# Patient Record
Sex: FEMALE | Race: BLACK OR AFRICAN AMERICAN | NOT HISPANIC OR LATINO | Employment: FULL TIME | URBAN - METROPOLITAN AREA
[De-identification: names, ages, dates, MRNs, and addresses within clinical notes are randomized per-mention and may not be internally consistent; named-entity substitution may affect disease eponyms.]

---

## 2017-05-27 ENCOUNTER — HOSPITAL ENCOUNTER (EMERGENCY)
Facility: OTHER | Age: 47
Discharge: HOME | End: 2017-05-27
Attending: EMERGENCY MEDICINE
Payer: COMMERCIAL

## 2017-05-27 VITALS
DIASTOLIC BLOOD PRESSURE: 72 MMHG | BODY MASS INDEX: 35.88 KG/M2 | SYSTOLIC BLOOD PRESSURE: 140 MMHG | TEMPERATURE: 99 F | HEART RATE: 80 BPM | OXYGEN SATURATION: 100 % | HEIGHT: 62 IN | RESPIRATION RATE: 18 BRPM | WEIGHT: 195 LBS

## 2017-05-27 DIAGNOSIS — T78.40XA ACUTE ALLERGIC REACTION, INITIAL ENCOUNTER: Primary | ICD-10-CM

## 2017-05-27 PROCEDURE — 99284 EMERGENCY DEPT VISIT MOD MDM: CPT | Mod: 25

## 2017-05-27 PROCEDURE — 25000003 PHARM REV CODE 250: Performed by: EMERGENCY MEDICINE

## 2017-05-27 PROCEDURE — 96375 TX/PRO/DX INJ NEW DRUG ADDON: CPT

## 2017-05-27 PROCEDURE — 63600175 PHARM REV CODE 636 W HCPCS: Performed by: EMERGENCY MEDICINE

## 2017-05-27 PROCEDURE — 96374 THER/PROPH/DIAG INJ IV PUSH: CPT

## 2017-05-27 RX ORDER — DIPHENHYDRAMINE HYDROCHLORIDE 50 MG/ML
25 INJECTION INTRAMUSCULAR; INTRAVENOUS
Status: COMPLETED | OUTPATIENT
Start: 2017-05-27 | End: 2017-05-27

## 2017-05-27 RX ORDER — EPINEPHRINE 0.3 MG/.3ML
1 INJECTION SUBCUTANEOUS
Qty: 2 DEVICE | Refills: 0 | Status: SHIPPED | OUTPATIENT
Start: 2017-05-27 | End: 2020-09-11

## 2017-05-27 RX ORDER — FAMOTIDINE 20 MG/1
20 TABLET, FILM COATED ORAL 2 TIMES DAILY
Qty: 20 TABLET | Refills: 0 | Status: SHIPPED | OUTPATIENT
Start: 2017-05-27 | End: 2017-05-27

## 2017-05-27 RX ORDER — PREDNISONE 20 MG/1
20 TABLET ORAL DAILY
Qty: 5 TABLET | Refills: 0 | Status: SHIPPED | OUTPATIENT
Start: 2017-05-27 | End: 2017-06-01

## 2017-05-27 RX ORDER — FAMOTIDINE 20 MG/1
20 TABLET, FILM COATED ORAL 2 TIMES DAILY
Qty: 10 TABLET | Refills: 0 | Status: SHIPPED | OUTPATIENT
Start: 2017-05-27 | End: 2018-11-01

## 2017-05-27 RX ORDER — METHYLPREDNISOLONE SOD SUCC 125 MG
125 VIAL (EA) INJECTION
Status: COMPLETED | OUTPATIENT
Start: 2017-05-27 | End: 2017-05-27

## 2017-05-27 RX ORDER — FAMOTIDINE 10 MG/ML
20 INJECTION INTRAVENOUS
Status: COMPLETED | OUTPATIENT
Start: 2017-05-27 | End: 2017-05-27

## 2017-05-27 RX ADMIN — METHYLPREDNISOLONE SODIUM SUCCINATE 125 MG: 125 INJECTION, POWDER, FOR SOLUTION INTRAMUSCULAR; INTRAVENOUS at 11:05

## 2017-05-27 RX ADMIN — FAMOTIDINE 20 MG: 10 INJECTION INTRAVENOUS at 11:05

## 2017-05-27 RX ADMIN — DIPHENHYDRAMINE HYDROCHLORIDE 25 MG: 50 INJECTION, SOLUTION INTRAMUSCULAR; INTRAVENOUS at 11:05

## 2017-05-27 NOTE — ED PROVIDER NOTES
Encounter Date: 5/27/2017       History     Chief Complaint   Patient presents with    Allergic Reaction     swelling to face, onset this am, denies shortness of breath      Review of patient's allergies indicates:  No Known Allergies  Mrs Urias reports itching and full feeling to face yesterday afternoon that she has felt 2-3 times over the past 2 months and that has usu gone away with Benadryl.  She took a Benadryl and went to bed and awoke this morning with diffuse facial swelling and redness and itching.  She denies any tongue swelling, itching or fullness in the back of her throat, difficulty swallowing, cough, wheezing or shortness of breath.  She does not know what could have triggered this.  She ate some salmon and crab dip yesterday.  She denies previous history of ALLERGIES and doesn't know what could've triggered the previous 2-3 milder episodes.  She took 2 Benadryl this morning with no relief.      The history is provided by the patient.   Allergic Reaction   The primary symptoms are  rash. The primary symptoms do not include wheezing, shortness of breath, cough, abdominal pain, nausea or palpitations. Primary symptoms comment: positive facial swelling. The current episode started several hours ago. The problem has not changed since onset.  The rash began today. The rash appears on the face. The rash is associated with itching.   Significant symptoms also include itching.     Past Medical History:   Diagnosis Date    Hypertension      Past Surgical History:   Procedure Laterality Date    DILATION AND CURETTAGE OF UTERUS      HYSTERECTOMY       Family History   Problem Relation Age of Onset    Diabetes Mother     Hypertension Mother     Hypertension Brother      Social History   Substance Use Topics    Smoking status: Never Smoker    Smokeless tobacco: Never Used    Alcohol use No     Review of Systems   Constitutional: Negative.    HENT: Positive for facial swelling. Negative for sore throat,  trouble swallowing and voice change.    Respiratory: Negative for apnea, cough, chest tightness, shortness of breath and wheezing.    Cardiovascular: Negative for palpitations.   Gastrointestinal: Negative for abdominal pain and nausea.   Musculoskeletal: Negative.    Skin: Positive for itching and rash.   Neurological: Negative.    All other systems reviewed and are negative.      Physical Exam     Initial Vitals [05/27/17 1035]   BP Pulse Resp Temp SpO2   (!) 160/93 72 18 99 °F (37.2 °C) 99 %     Physical Exam    Nursing note and vitals reviewed.  Constitutional: She appears well-developed and well-nourished. She is not diaphoretic. No distress.   HENT:   Head: Normocephalic and atraumatic.   Diffuse facial swelling and erythema. Periorbital swelling and lip swelling diffusely. Normal voice. No tongue swelling, normal posterior, patent oropharynx.    Eyes: EOM are normal. Pupils are equal, round, and reactive to light.   Neck: Normal range of motion.   Cardiovascular: Normal rate, regular rhythm and normal heart sounds.   No murmur heard.  Pulmonary/Chest: Breath sounds normal. No respiratory distress. She has no wheezes. She has no rales.   Abdominal: Soft.   Musculoskeletal: Normal range of motion. She exhibits no edema or tenderness.   Neurological: She is alert and oriented to person, place, and time.   Skin: Skin is warm.   Psychiatric: She has a normal mood and affect. Her behavior is normal. Thought content normal.         ED Course   Procedures  Labs Reviewed - No data to display          Medical Decision Making:   ED Management:  Ms Urias feels better. Swelling has started to decrease.  We discussed worrisome signs that should prompt need to return to the ER.  She will think about what may have been the trigger for this reaction and will follow up with her primary care physician.  Will refer to allergist.  We'll prescribe prednisone, Pepcid and Benadryl to take over the next few days, as well as EpiPen  to take only in case of emergency.  We discussed EpiPen extensively.  She stable for discharge.  There is no indication for further emergent intervention or evaluation at this time.                   ED Course     Clinical Impression:   The encounter diagnosis was Acute allergic reaction, initial encounter.          Inderjit Prieto MD  05/27/17 9104

## 2017-05-27 NOTE — DISCHARGE INSTRUCTIONS
Take 25-50 mg benadryl every 6 hours for the next 24 hours then every 6 hours as needed after that. Return for any new or acute problems or concerns.

## 2017-06-23 ENCOUNTER — LAB VISIT (OUTPATIENT)
Dept: LAB | Facility: HOSPITAL | Age: 47
End: 2017-06-23
Attending: STUDENT IN AN ORGANIZED HEALTH CARE EDUCATION/TRAINING PROGRAM
Payer: COMMERCIAL

## 2017-06-23 ENCOUNTER — OFFICE VISIT (OUTPATIENT)
Dept: ALLERGY | Facility: CLINIC | Age: 47
End: 2017-06-23
Payer: COMMERCIAL

## 2017-06-23 VITALS
SYSTOLIC BLOOD PRESSURE: 140 MMHG | DIASTOLIC BLOOD PRESSURE: 92 MMHG | WEIGHT: 201.94 LBS | HEIGHT: 62 IN | BODY MASS INDEX: 37.16 KG/M2

## 2017-06-23 DIAGNOSIS — R45.89 DEPRESSED MOOD: ICD-10-CM

## 2017-06-23 DIAGNOSIS — N95.1 PERIMENOPAUSAL: ICD-10-CM

## 2017-06-23 DIAGNOSIS — R60.9 SWELLING: ICD-10-CM

## 2017-06-23 DIAGNOSIS — R60.9 SWELLING: Primary | ICD-10-CM

## 2017-06-23 DIAGNOSIS — L40.9 PSORIASIS: ICD-10-CM

## 2017-06-23 LAB
ALBUMIN SERPL BCP-MCNC: 3.8 G/DL
ALP SERPL-CCNC: 65 U/L
ALT SERPL W/O P-5'-P-CCNC: 17 U/L
ANION GAP SERPL CALC-SCNC: 10 MMOL/L
AST SERPL-CCNC: 19 U/L
BASOPHILS # BLD AUTO: 0.03 K/UL
BASOPHILS NFR BLD: 0.6 %
BILIRUB SERPL-MCNC: 0.4 MG/DL
BUN SERPL-MCNC: 10 MG/DL
C4 SERPL-MCNC: 40 MG/DL
CALCIUM SERPL-MCNC: 9.6 MG/DL
CHLORIDE SERPL-SCNC: 106 MMOL/L
CO2 SERPL-SCNC: 25 MMOL/L
CREAT SERPL-MCNC: 0.8 MG/DL
CRP SERPL-MCNC: 6.6 MG/L
DIFFERENTIAL METHOD: ABNORMAL
EOSINOPHIL # BLD AUTO: 0.1 K/UL
EOSINOPHIL NFR BLD: 1.5 %
ERYTHROCYTE [DISTWIDTH] IN BLOOD BY AUTOMATED COUNT: 16.8 %
ERYTHROCYTE [SEDIMENTATION RATE] IN BLOOD BY WESTERGREN METHOD: 12 MM/HR
EST. GFR  (AFRICAN AMERICAN): >60 ML/MIN/1.73 M^2
EST. GFR  (NON AFRICAN AMERICAN): >60 ML/MIN/1.73 M^2
GLUCOSE SERPL-MCNC: 94 MG/DL
HCT VFR BLD AUTO: 41.8 %
HGB BLD-MCNC: 13.4 G/DL
LYMPHOCYTES # BLD AUTO: 1.4 K/UL
LYMPHOCYTES NFR BLD: 25.7 %
MCH RBC QN AUTO: 22.9 PG
MCHC RBC AUTO-ENTMCNC: 32.1 %
MCV RBC AUTO: 71 FL
MONOCYTES # BLD AUTO: 0.4 K/UL
MONOCYTES NFR BLD: 8.2 %
NEUTROPHILS # BLD AUTO: 3.4 K/UL
NEUTROPHILS NFR BLD: 64 %
PLATELET # BLD AUTO: 281 K/UL
PMV BLD AUTO: 12 FL
POTASSIUM SERPL-SCNC: 4.4 MMOL/L
PROT SERPL-MCNC: 7.4 G/DL
RBC # BLD AUTO: 5.86 M/UL
SODIUM SERPL-SCNC: 141 MMOL/L
TSH SERPL DL<=0.005 MIU/L-ACNC: 1.56 UIU/ML
WBC # BLD AUTO: 5.26 K/UL

## 2017-06-23 PROCEDURE — 85025 COMPLETE CBC W/AUTO DIFF WBC: CPT

## 2017-06-23 PROCEDURE — 80053 COMPREHEN METABOLIC PANEL: CPT

## 2017-06-23 PROCEDURE — 85651 RBC SED RATE NONAUTOMATED: CPT

## 2017-06-23 PROCEDURE — 86160 COMPLEMENT ANTIGEN: CPT | Mod: 59

## 2017-06-23 PROCEDURE — 36415 COLL VENOUS BLD VENIPUNCTURE: CPT | Mod: PO

## 2017-06-23 PROCEDURE — 99244 OFF/OP CNSLTJ NEW/EST MOD 40: CPT | Mod: 25,S$GLB,, | Performed by: STUDENT IN AN ORGANIZED HEALTH CARE EDUCATION/TRAINING PROGRAM

## 2017-06-23 PROCEDURE — 86038 ANTINUCLEAR ANTIBODIES: CPT

## 2017-06-23 PROCEDURE — 86160 COMPLEMENT ANTIGEN: CPT

## 2017-06-23 PROCEDURE — 86140 C-REACTIVE PROTEIN: CPT

## 2017-06-23 PROCEDURE — 99999 PR PBB SHADOW E&M-EST. PATIENT-LVL III: CPT | Mod: PBBFAC,,, | Performed by: STUDENT IN AN ORGANIZED HEALTH CARE EDUCATION/TRAINING PROGRAM

## 2017-06-23 PROCEDURE — 95004 PERQ TESTS W/ALRGNC XTRCS: CPT | Mod: S$GLB,,, | Performed by: STUDENT IN AN ORGANIZED HEALTH CARE EDUCATION/TRAINING PROGRAM

## 2017-06-23 PROCEDURE — 84443 ASSAY THYROID STIM HORMONE: CPT

## 2017-06-23 RX ORDER — CETIRIZINE HYDROCHLORIDE 10 MG/1
10 TABLET ORAL DAILY PRN
Qty: 30 TABLET | Refills: 0 | Status: SHIPPED | OUTPATIENT
Start: 2017-06-23 | End: 2018-03-15 | Stop reason: SDUPTHER

## 2017-06-23 NOTE — PATIENT INSTRUCTIONS
No evidience of allergies to foods or things in the air.  Labs to make sure no internal disease is causing swelling.  But probably causeis unknown    If another episide occurs, take phots again.  And make apt to see me.

## 2017-06-23 NOTE — LETTER
June 23, 2017      Inderjit Prieto MD  [address]  [place]  [place] [address]           [facility]  [address]  [place] [address]  Phone: [phone]          Patient: Brittny Urias   MR Number: [id number]   YOB: 1970   Date of Visit: 6/23/2017       Dear Dr. Inderjit Prieto:    Thank you for referring Brittny Urias to me for evaluation. Attached you will find relevant portions of my assessment and plan of care.    If you have questions, please do not hesitate to call me. I look forward to following Brittny Urias along with you.    Sincerely,    Gris Ulloa MD    Enclosure  CC:  No Recipients    If you would like to receive this communication electronically, please contact [email or web address] or [phone] to request more information on [facility] access.    For providers and/or their staff who would like to refer a patient to [facility], please contact us through our one-stop-shop provider referral line, [facility], at [phone].    If you feel you have received this communication in error or would no longer like to receive these types of communications, please e-mail [email or web address]

## 2017-06-23 NOTE — PROGRESS NOTES
Allergy Clinic Note  [facility]    Subjective:      Patient ID: Brittny Urias is a 46 y.o. female.    Chief Complaint: Consult (facial swelling)      History of Present Illness: 45 yo female referred from ER (Inderjit Patel M.D.) following worst-ever episode of facial swelling (face, lip, eyelids) associated with itching but no definite hives.  This episode occurred in late May 2017.  There was no associated intra-oral swelling and no n/v/d, abdominal pain or SOB. Pt states the day before presenting to the ER, she noticed a heavy feeling of her face and bumps on her cheeks but was otherwise well.  The following morning she awoke with swelling of her upper and lower eyelids.  A photo shows puffiness of lower eyelids > upper and L > R.  Pt states there was redness but no itching.  She states her lips were swollen (which has happened in the past; see below) and that her psoriasis was worse than usual.    She reported multiple episode of isolated lip swelling in past which resolved with Benadryl and for which she did not seek care.     Additional History:  She was diagnosed with psoriasis 2-3 years ago.  This has progressed significantly and she is not currently receiving TX.  This is causing her significant distress.  Family history is negative for swelling/angioedema or atopy.  She is  and lives in [place] and works in [place].  She is a lifetime nonsmoker.    Review of Systems   Constitutional: Negative for chills, fever and weight loss.   HENT: Negative for ear discharge and ear pain.    Eyes: Negative for pain, discharge and redness.   Respiratory: Negative for cough and wheezing.    Cardiovascular: Negative for chest pain and palpitations.   Gastrointestinal: Negative for diarrhea, nausea and vomiting.   Genitourinary: Negative for dysuria.   Skin: Positive for rash. Negative for itching.       Objective:     Vitals:    06/23/17 1010   BP: (!) 140/92       Physical Exam   Constitutional: She  is oriented to person, place, and time.   Nl build, occasionally tearful   HENT:   Tms clear  Nares pink, mildly swollen turbinates  oropharanx benign without exudate.  Tongue not coated.   Eyes: Conjunctivae are normal. Pupils are equal, round, and reactive to light.   Neck: Neck supple. No thyromegaly present.   Cardiovascular: Normal rate and normal heart sounds.    Pulmonary/Chest: No respiratory distress. She has no wheezes.   Abdominal: Soft. There is no tenderness.   Musculoskeletal: She exhibits no edema or deformity.   Lymphadenopathy:     She has no cervical adenopathy.   Neurological: She is alert and oriented to person, place, and time.   Skin: Rash noted.   Plaque psoriasis on extensor arms, legs, back, forehead, and ears in a symmetric pattern.   Psychiatric:   Affect sad,        Data:   Allergy skin testing by prick method negative  For standard panels or airborne and food allergens        Assessment:     1. Swelling    2. Psoriasis    3. Perimenopausal    4. Depressed mood      Swelling may or may not represent angioedema.  As it is unassociated with hives, will R/o hereditary angioedema by labs.  Plan:     Brittny was seen today for skin sx.    Diagnoses and all orders for this visit:    Swelling of eyelids (and possibly lips)  -     C4 complement; Future  -     C1 ESTERASE INHIBITOR PANEL; Future  -     CBC auto differential; Future  -     Comprehensive metabolic panel; Future  -     TSH; Future  -     BONIFACIO; Future  -     Sedimentation rate, manual; Future  -     C-REACTIVE PROTEIN; Future  -     Urinalysis; Future    Psoriasis  -     Ambulatory Referral to Dermatology ASAP ([place])    Perimenopausal with Depressed mood  Suggestions given for new PCP    Nonallergic rhinitis  -     cetirizine (ZYRTEC) 10 MG tablet; Take 1 tablet (10 mg total) by mouth daily as needed for Allergies (sneezing).        Patient Instructions   No evidience of allergies to foods or things in the air.  Labs to make sure  no internal disease is causing swelling.  But  cause is probably unknown    If another episide occurs, take photos again.  And make apt to see me.      Return in about 2 weeks (around 7/7/2017).    Gris Allen MD

## 2017-06-26 PROBLEM — L40.9 PSORIASIS: Status: ACTIVE | Noted: 2017-06-26

## 2017-06-26 PROBLEM — L40.9 PSORIASIS: Chronic | Status: ACTIVE | Noted: 2017-06-26

## 2017-06-26 LAB — ANA SER QL IF: NORMAL

## 2017-06-27 ENCOUNTER — PATIENT MESSAGE (OUTPATIENT)
Dept: FAMILY MEDICINE | Facility: CLINIC | Age: 47
End: 2017-06-27

## 2017-06-27 DIAGNOSIS — Z12.31 ENCOUNTER FOR SCREENING MAMMOGRAM FOR BREAST CANCER: Primary | ICD-10-CM

## 2017-06-27 DIAGNOSIS — I10 ESSENTIAL HYPERTENSION: ICD-10-CM

## 2017-06-27 RX ORDER — HYDROCHLOROTHIAZIDE 12.5 MG/1
12.5 TABLET ORAL DAILY
Qty: 30 TABLET | Refills: 0 | Status: SHIPPED | OUTPATIENT
Start: 2017-06-27 | End: 2018-01-30 | Stop reason: SDUPTHER

## 2017-06-29 LAB — C1INH SERPL-MCNC: 45 MG/DL

## 2017-07-05 ENCOUNTER — HOSPITAL ENCOUNTER (OUTPATIENT)
Dept: RADIOLOGY | Facility: HOSPITAL | Age: 47
Discharge: HOME | End: 2017-07-05
Attending: FAMILY MEDICINE
Payer: COMMERCIAL

## 2017-07-05 VITALS — WEIGHT: 201 LBS | BODY MASS INDEX: 36.99 KG/M2 | HEIGHT: 62 IN

## 2017-07-05 DIAGNOSIS — Z12.31 ENCOUNTER FOR SCREENING MAMMOGRAM FOR BREAST CANCER: ICD-10-CM

## 2017-07-05 PROCEDURE — 77067 SCR MAMMO BI INCL CAD: CPT | Mod: 26,,, | Performed by: RADIOLOGY

## 2017-07-05 PROCEDURE — 77063 BREAST TOMOSYNTHESIS BI: CPT | Mod: 26,,, | Performed by: RADIOLOGY

## 2017-07-05 PROCEDURE — 77067 SCR MAMMO BI INCL CAD: CPT | Mod: TC

## 2018-01-30 DIAGNOSIS — I10 ESSENTIAL HYPERTENSION: ICD-10-CM

## 2018-01-30 RX ORDER — HYDROCHLOROTHIAZIDE 12.5 MG/1
12.5 TABLET ORAL DAILY
Qty: 30 TABLET | Refills: 0 | Status: SHIPPED | OUTPATIENT
Start: 2018-01-30 | End: 2018-02-21 | Stop reason: SDUPTHER

## 2018-02-21 DIAGNOSIS — I10 ESSENTIAL HYPERTENSION: ICD-10-CM

## 2018-02-21 RX ORDER — HYDROCHLOROTHIAZIDE 12.5 MG/1
12.5 TABLET ORAL DAILY
Qty: 15 TABLET | Refills: 0 | Status: SHIPPED | OUTPATIENT
Start: 2018-02-21 | End: 2018-02-28 | Stop reason: SDUPTHER

## 2018-02-26 DIAGNOSIS — I10 ESSENTIAL HYPERTENSION: ICD-10-CM

## 2018-02-26 RX ORDER — HYDROCHLOROTHIAZIDE 12.5 MG/1
12.5 TABLET ORAL DAILY
Qty: 30 TABLET | Refills: 0 | OUTPATIENT
Start: 2018-02-26 | End: 2019-02-26

## 2018-02-28 ENCOUNTER — OFFICE VISIT (OUTPATIENT)
Dept: FAMILY MEDICINE | Facility: CLINIC | Age: 48
End: 2018-02-28
Payer: COMMERCIAL

## 2018-02-28 ENCOUNTER — LAB VISIT (OUTPATIENT)
Dept: LAB | Facility: HOSPITAL | Age: 48
End: 2018-02-28
Attending: FAMILY MEDICINE
Payer: COMMERCIAL

## 2018-02-28 VITALS
OXYGEN SATURATION: 99 % | BODY MASS INDEX: 39.02 KG/M2 | WEIGHT: 212.06 LBS | HEIGHT: 62 IN | HEART RATE: 70 BPM | TEMPERATURE: 98 F | DIASTOLIC BLOOD PRESSURE: 80 MMHG | SYSTOLIC BLOOD PRESSURE: 120 MMHG

## 2018-02-28 DIAGNOSIS — I10 ESSENTIAL HYPERTENSION: ICD-10-CM

## 2018-02-28 DIAGNOSIS — Z00.00 ANNUAL PHYSICAL EXAM: ICD-10-CM

## 2018-02-28 DIAGNOSIS — Z00.00 ANNUAL PHYSICAL EXAM: Primary | ICD-10-CM

## 2018-02-28 DIAGNOSIS — E66.01 CLASS 2 SEVERE OBESITY DUE TO EXCESS CALORIES WITH SERIOUS COMORBIDITY AND BODY MASS INDEX (BMI) OF 38.0 TO 38.9 IN ADULT: ICD-10-CM

## 2018-02-28 LAB
ALBUMIN SERPL BCP-MCNC: 3.7 G/DL
ALP SERPL-CCNC: 59 U/L
ALT SERPL W/O P-5'-P-CCNC: 17 U/L
ANION GAP SERPL CALC-SCNC: 9 MMOL/L
AST SERPL-CCNC: 17 U/L
BASOPHILS # BLD AUTO: 0.04 K/UL
BASOPHILS NFR BLD: 0.9 %
BILIRUB SERPL-MCNC: 0.5 MG/DL
BUN SERPL-MCNC: 10 MG/DL
CALCIUM SERPL-MCNC: 9.4 MG/DL
CHLORIDE SERPL-SCNC: 103 MMOL/L
CHOLEST SERPL-MCNC: 289 MG/DL
CHOLEST/HDLC SERPL: 3.3 {RATIO}
CO2 SERPL-SCNC: 26 MMOL/L
CREAT SERPL-MCNC: 0.7 MG/DL
DIFFERENTIAL METHOD: ABNORMAL
EOSINOPHIL # BLD AUTO: 0.1 K/UL
EOSINOPHIL NFR BLD: 1.1 %
ERYTHROCYTE [DISTWIDTH] IN BLOOD BY AUTOMATED COUNT: 18.4 %
EST. GFR  (AFRICAN AMERICAN): >60 ML/MIN/1.73 M^2
EST. GFR  (NON AFRICAN AMERICAN): >60 ML/MIN/1.73 M^2
ESTIMATED AVG GLUCOSE: 114 MG/DL
GLUCOSE SERPL-MCNC: 98 MG/DL
HBA1C MFR BLD HPLC: 5.6 %
HCT VFR BLD AUTO: 40.8 %
HDLC SERPL-MCNC: 88 MG/DL
HDLC SERPL: 30.4 %
HGB BLD-MCNC: 12.6 G/DL
IMM GRANULOCYTES # BLD AUTO: 0.01 K/UL
IMM GRANULOCYTES NFR BLD AUTO: 0.2 %
LDLC SERPL CALC-MCNC: 187.6 MG/DL
LYMPHOCYTES # BLD AUTO: 1.4 K/UL
LYMPHOCYTES NFR BLD: 32.8 %
MCH RBC QN AUTO: 22 PG
MCHC RBC AUTO-ENTMCNC: 30.9 G/DL
MCV RBC AUTO: 71 FL
MONOCYTES # BLD AUTO: 0.5 K/UL
MONOCYTES NFR BLD: 10.8 %
NEUTROPHILS # BLD AUTO: 2.4 K/UL
NEUTROPHILS NFR BLD: 54.2 %
NONHDLC SERPL-MCNC: 201 MG/DL
NRBC BLD-RTO: 0 /100 WBC
PLATELET # BLD AUTO: 287 K/UL
PMV BLD AUTO: 12 FL
POTASSIUM SERPL-SCNC: 4.4 MMOL/L
PROT SERPL-MCNC: 7.4 G/DL
RBC # BLD AUTO: 5.72 M/UL
SODIUM SERPL-SCNC: 138 MMOL/L
TRIGL SERPL-MCNC: 67 MG/DL
TSH SERPL DL<=0.005 MIU/L-ACNC: 1.1 UIU/ML
WBC # BLD AUTO: 4.36 K/UL

## 2018-02-28 PROCEDURE — 99999 PR PBB SHADOW E&M-EST. PATIENT-LVL III: CPT | Mod: PBBFAC,,, | Performed by: FAMILY MEDICINE

## 2018-02-28 PROCEDURE — 36415 COLL VENOUS BLD VENIPUNCTURE: CPT | Mod: PO

## 2018-02-28 PROCEDURE — 83036 HEMOGLOBIN GLYCOSYLATED A1C: CPT

## 2018-02-28 PROCEDURE — 85025 COMPLETE CBC W/AUTO DIFF WBC: CPT

## 2018-02-28 PROCEDURE — 80061 LIPID PANEL: CPT

## 2018-02-28 PROCEDURE — 80053 COMPREHEN METABOLIC PANEL: CPT

## 2018-02-28 PROCEDURE — 99396 PREV VISIT EST AGE 40-64: CPT | Mod: S$GLB,,, | Performed by: FAMILY MEDICINE

## 2018-02-28 PROCEDURE — 84443 ASSAY THYROID STIM HORMONE: CPT

## 2018-02-28 RX ORDER — HYDROCHLOROTHIAZIDE 12.5 MG/1
12.5 TABLET ORAL DAILY
Qty: 15 TABLET | Refills: 0 | Status: SHIPPED | OUTPATIENT
Start: 2018-02-28 | End: 2018-03-15 | Stop reason: SDUPTHER

## 2018-02-28 NOTE — PROGRESS NOTES
Office Visit    Patient Name: Brittny Urias    : 1970  MRN: [id number]    Subjective:  Brittny is a 47 y.o. female who presents today for:    Annual Exam      This patient has multiple medical diagnoses as noted below.  This patient is known to me and to this clinic.    She would like to complete her annual exam today.   Patient is currently working on weight loss.  She has been monitoring her dietary intake.  She reports that she struggles with meals as she will eat her evening meals and include.  Her job requires increase travel time.  Her job does not cause increased stress.  We did discuss her current weight.  We did discuss the benefit of decreasing her weight by at least 5%.  She will work on her diet and make substitutions when needed.  Answers for HPI/ROS submitted by the patient on 2018   Hypertension  Chronicity: recurrent  Onset: more than 1 year ago  Progression since onset: waxing and waning  anxiety: No  blurred vision: No  malaise/fatigue: Yes  orthopnea: No  peripheral edema: Yes  PND: No  sweats: Yes  Agents associated with hypertension: NSAIDs  CAD risks: obesity, stress  Compliance problems: exercise  Past treatments: diuretics, lifestyle changes  Improvement on treatment: moderate      Patient Active Problem List   Diagnosis    Depressed mood    Perimenopausal    Psoriasis    Class 2 obesity with serious comorbidity and body mass index (BMI) of 38.0 to 38.9 in adult       Past Surgical History:   Procedure Laterality Date    DILATION AND CURETTAGE OF UTERUS      HYSTERECTOMY         Family History   Problem Relation Age of Onset    Diabetes Mother     Hypertension Mother     Hypertension Brother        Social History     Social History    Marital status:      Spouse name: N/A    Number of children: N/A    Years of education: N/A     Occupational History    Not on file.     Social History Main Topics    Smoking status: Never Smoker    Smokeless tobacco: Never  Used    Alcohol use No    Drug use: No    Sexual activity: Yes     Partners: Male     Birth control/ protection: Surgical     Other Topics Concern    Not on file     Social History Narrative    No narrative on file       Current Medications  Medications reviewed and updated.     Allergies   Review of patient's allergies indicates:  No Known Allergies      Labs  Lab Results   Component Value Date    HGBA1C 6.0 05/05/2016     Lab Results   Component Value Date     06/23/2017    K 4.4 06/23/2017     06/23/2017    CO2 25 06/23/2017    BUN 10 06/23/2017    CREATININE 0.8 06/23/2017    CALCIUM 9.6 06/23/2017    ANIONGAP 10 06/23/2017    ESTGFRAFRICA >60.0 06/23/2017    EGFRNONAA >60.0 06/23/2017     Lab Results   Component Value Date    CHOL 247 (H) 05/05/2016    CHOL 266 (H) 06/25/2008    CHOL 248 (H) 12/22/2005     Lab Results   Component Value Date    HDL 83 (H) 05/05/2016    HDL 87 (H) 06/25/2008     (HH) 12/22/2005     Lab Results   Component Value Date    LDLCALC 150.6 05/05/2016    LDLCALC 167.4 (H) 06/25/2008    LDLCALC 129.8 (H) 12/22/2005     Lab Results   Component Value Date    TRIG 67 05/05/2016    TRIG 58 06/25/2008    TRIG 56 12/22/2005     Lab Results   Component Value Date    CHOLHDL 33.6 05/05/2016    CHOLHDL 32.7 06/25/2008    CHOLHDL 43.1 12/22/2005     Last set of blood work has been reviewed as noted above.    Review of Systems   Constitutional: Negative for activity change, appetite change, fatigue, fever and unexpected weight change.   HENT: Negative.  Negative for ear discharge, ear pain, rhinorrhea and sore throat.    Eyes: Negative.    Respiratory: Negative for apnea, cough, chest tightness, shortness of breath and wheezing.    Cardiovascular: Negative for chest pain, palpitations and leg swelling.   Gastrointestinal: Negative for abdominal distention, abdominal pain, constipation, diarrhea and vomiting.   Endocrine: Negative for cold intolerance, heat intolerance,  "polydipsia and polyuria.   Genitourinary: Negative for decreased urine volume, menstrual problem, urgency, vaginal bleeding, vaginal discharge and vaginal pain.   Musculoskeletal: Negative.  Negative for neck pain.   Skin: Negative for rash.   Neurological: Positive for headaches. Negative for dizziness.   Hematological: Does not bruise/bleed easily.   Psychiatric/Behavioral: Negative for agitation, sleep disturbance and suicidal ideas.       /80 (BP Location: Left arm, Patient Position: Sitting, BP Method: Large (Manual))   Pulse 70   Temp 98.4 °F (36.9 °C) (Oral)   Ht 5' 2" (1.575 m)   Wt 96.2 kg (212 lb 1.3 oz)   SpO2 99%   BMI 38.79 kg/m²      Physical Exam   Constitutional: She is oriented to person, place, and time. She appears well-developed and well-nourished.   HENT:   Head: Normocephalic and atraumatic.   Right Ear: External ear normal.   Left Ear: External ear normal.   Nose: Nose normal.   Mouth/Throat: Oropharynx is clear and moist.   Eyes: Conjunctivae and EOM are normal. Pupils are equal, round, and reactive to light.   Neck: Normal range of motion. No JVD present. No thyromegaly present.   Cardiovascular: Normal rate, regular rhythm and normal heart sounds.    Pulmonary/Chest: Effort normal and breath sounds normal. She has no wheezes.   Abdominal: Soft. Bowel sounds are normal. She exhibits no distension. There is no tenderness.   Musculoskeletal: Normal range of motion.   Lymphadenopathy:     She has no cervical adenopathy.   Neurological: She is alert and oriented to person, place, and time. She has normal reflexes.   Skin: Skin is warm and dry.   Psychiatric: She has a normal mood and affect. Her behavior is normal. Judgment and thought content normal.   Vitals reviewed.      Health Maintenance  Health Maintenance       Date Due Completion Date    TETANUS VACCINE 09/11/1987 ---    Influenza Vaccine 08/01/2017 ---    Mammogram 07/05/2019 7/5/2017    Lipid Panel 05/05/2021 5/5/2016    "       Assessment/Plan:  Brittny Urias is a 47 y.o. female who presents today for :    1. Annual physical exam    2. Essential hypertension    3. Class 2 severe obesity due to excess calories with serious comorbidity and body mass index (BMI) of 38.0 to 38.9 in adult        Problem List Items Addressed This Visit        Unprioritized    Class 2 obesity with serious comorbidity and body mass index (BMI) of 38.0 to 38.9 in adult  -  The patient is asked to make an attempt to improve diet and exercise patterns to aid in medical management of this problem.  -  Increase protein intake       Other Visit Diagnoses     Annual physical exam    -  Primary    Relevant Orders    CBC auto differential    Comprehensive metabolic panel    Lipid panel    Hemoglobin A1c    TSH    Essential hypertension        Relevant Medications    hydroCHLOROthiazide (HYDRODIURIL) 12.5 MG Tab    Other Relevant Orders    CBC auto differential    Comprehensive metabolic panel    Lipid panel    Hemoglobin A1c    TSH  -  Well controlled   -  Pt is currently stable on medication regimen.  Continue current therapy as scheduled.  Contact office with any questions about adjustments on medications.             Follow-up in about 1 year (around 2/28/2019).     This note was created by combination of typed  and Dragon dictation.  Transcription errors may be present.  If there are any questions, please contact me.

## 2018-03-15 DIAGNOSIS — I10 ESSENTIAL HYPERTENSION: ICD-10-CM

## 2018-03-15 RX ORDER — CETIRIZINE HYDROCHLORIDE 10 MG/1
10 TABLET ORAL DAILY PRN
Qty: 30 TABLET | Refills: 0 | Status: SHIPPED | OUTPATIENT
Start: 2018-03-15 | End: 2018-05-04 | Stop reason: SDUPTHER

## 2018-03-15 RX ORDER — HYDROCHLOROTHIAZIDE 12.5 MG/1
12.5 TABLET ORAL DAILY
Qty: 90 TABLET | Refills: 0 | Status: SHIPPED | OUTPATIENT
Start: 2018-03-15 | End: 2018-06-16 | Stop reason: SDUPTHER

## 2018-05-04 NOTE — TELEPHONE ENCOUNTER
Last office visit was 6/23/17    Received a fax from [facility] requesting refill on Cetirizine hcl 10mg tablets.

## 2018-05-07 RX ORDER — CETIRIZINE HYDROCHLORIDE 10 MG/1
10 TABLET ORAL DAILY PRN
Qty: 30 TABLET | Refills: 0 | Status: SHIPPED | OUTPATIENT
Start: 2018-05-07 | End: 2019-02-07 | Stop reason: SDUPTHER

## 2018-05-12 ENCOUNTER — HOSPITAL ENCOUNTER (EMERGENCY)
Facility: HOSPITAL | Age: 48
Discharge: HOME | End: 2018-05-12
Attending: EMERGENCY MEDICINE
Payer: COMMERCIAL

## 2018-05-12 VITALS
RESPIRATION RATE: 18 BRPM | BODY MASS INDEX: 40.12 KG/M2 | HEART RATE: 72 BPM | TEMPERATURE: 99 F | DIASTOLIC BLOOD PRESSURE: 94 MMHG | WEIGHT: 218 LBS | HEIGHT: 62 IN | SYSTOLIC BLOOD PRESSURE: 165 MMHG | OXYGEN SATURATION: 99 %

## 2018-05-12 DIAGNOSIS — J03.90 TONSILLITIS: Primary | ICD-10-CM

## 2018-05-12 DIAGNOSIS — J02.9 PHARYNGITIS, UNSPECIFIED ETIOLOGY: ICD-10-CM

## 2018-05-12 LAB
ANION GAP SERPL CALC-SCNC: 10 MMOL/L
APTT BLDCRRT: 32.4 SEC
BASOPHILS # BLD AUTO: 0.02 K/UL
BASOPHILS NFR BLD: 0.2 %
BUN SERPL-MCNC: 8 MG/DL
CALCIUM SERPL-MCNC: 9.7 MG/DL
CHLORIDE SERPL-SCNC: 104 MMOL/L
CO2 SERPL-SCNC: 25 MMOL/L
CREAT SERPL-MCNC: 0.8 MG/DL
DIFFERENTIAL METHOD: ABNORMAL
EOSINOPHIL # BLD AUTO: 0.1 K/UL
EOSINOPHIL NFR BLD: 0.6 %
ERYTHROCYTE [DISTWIDTH] IN BLOOD BY AUTOMATED COUNT: 16.7 %
EST. GFR  (AFRICAN AMERICAN): >60 ML/MIN/1.73 M^2
EST. GFR  (NON AFRICAN AMERICAN): >60 ML/MIN/1.73 M^2
GLUCOSE SERPL-MCNC: 108 MG/DL
HCT VFR BLD AUTO: 40.8 %
HGB BLD-MCNC: 13.9 G/DL
INR PPP: 1
LYMPHOCYTES # BLD AUTO: 1.4 K/UL
LYMPHOCYTES NFR BLD: 15.5 %
MCH RBC QN AUTO: 24 PG
MCHC RBC AUTO-ENTMCNC: 34.1 G/DL
MCV RBC AUTO: 70 FL
MONOCYTES # BLD AUTO: 0.8 K/UL
MONOCYTES NFR BLD: 8.8 %
NEUTROPHILS # BLD AUTO: 6.8 K/UL
NEUTROPHILS NFR BLD: 74.9 %
PLATELET # BLD AUTO: 279 K/UL
PMV BLD AUTO: 11.4 FL
POTASSIUM SERPL-SCNC: 3.8 MMOL/L
PROTHROMBIN TIME: 10.4 SEC
RBC # BLD AUTO: 5.8 M/UL
SODIUM SERPL-SCNC: 139 MMOL/L
WBC # BLD AUTO: 9.07 K/UL

## 2018-05-12 PROCEDURE — 25000003 PHARM REV CODE 250: Performed by: EMERGENCY MEDICINE

## 2018-05-12 PROCEDURE — 85025 COMPLETE CBC W/AUTO DIFF WBC: CPT

## 2018-05-12 PROCEDURE — 25500020 PHARM REV CODE 255: Performed by: EMERGENCY MEDICINE

## 2018-05-12 PROCEDURE — 96361 HYDRATE IV INFUSION ADD-ON: CPT

## 2018-05-12 PROCEDURE — 96374 THER/PROPH/DIAG INJ IV PUSH: CPT

## 2018-05-12 PROCEDURE — 96375 TX/PRO/DX INJ NEW DRUG ADDON: CPT

## 2018-05-12 PROCEDURE — 85610 PROTHROMBIN TIME: CPT

## 2018-05-12 PROCEDURE — 80048 BASIC METABOLIC PNL TOTAL CA: CPT

## 2018-05-12 PROCEDURE — 63600175 PHARM REV CODE 636 W HCPCS: Performed by: EMERGENCY MEDICINE

## 2018-05-12 PROCEDURE — 85730 THROMBOPLASTIN TIME PARTIAL: CPT

## 2018-05-12 PROCEDURE — 99284 EMERGENCY DEPT VISIT MOD MDM: CPT | Mod: 25

## 2018-05-12 RX ORDER — AMOXICILLIN AND CLAVULANATE POTASSIUM 875; 125 MG/1; MG/1
1 TABLET, FILM COATED ORAL
Status: COMPLETED | OUTPATIENT
Start: 2018-05-12 | End: 2018-05-12

## 2018-05-12 RX ORDER — AMOXICILLIN AND CLAVULANATE POTASSIUM 875; 125 MG/1; MG/1
1 TABLET, FILM COATED ORAL 2 TIMES DAILY
Qty: 14 TABLET | Refills: 0 | Status: SHIPPED | OUTPATIENT
Start: 2018-05-12 | End: 2018-08-30

## 2018-05-12 RX ORDER — METHYLPREDNISOLONE SOD SUCC 125 MG
125 VIAL (EA) INJECTION
Status: COMPLETED | OUTPATIENT
Start: 2018-05-12 | End: 2018-05-12

## 2018-05-12 RX ORDER — KETOROLAC TROMETHAMINE 30 MG/ML
15 INJECTION, SOLUTION INTRAMUSCULAR; INTRAVENOUS
Status: COMPLETED | OUTPATIENT
Start: 2018-05-12 | End: 2018-05-12

## 2018-05-12 RX ADMIN — IOHEXOL 75 ML: 350 INJECTION, SOLUTION INTRAVENOUS at 09:05

## 2018-05-12 RX ADMIN — SODIUM CHLORIDE 1000 ML: 0.9 INJECTION, SOLUTION INTRAVENOUS at 08:05

## 2018-05-12 RX ADMIN — AMOXICILLIN AND CLAVULANATE POTASSIUM 1 TABLET: 875; 125 TABLET, FILM COATED ORAL at 10:05

## 2018-05-12 RX ADMIN — KETOROLAC TROMETHAMINE 15 MG: 30 INJECTION, SOLUTION INTRAMUSCULAR; INTRAVENOUS at 08:05

## 2018-05-12 RX ADMIN — METHYLPREDNISOLONE SODIUM SUCCINATE 125 MG: 125 INJECTION, POWDER, FOR SOLUTION INTRAMUSCULAR; INTRAVENOUS at 08:05

## 2018-05-12 NOTE — ED TRIAGE NOTES
Patient arrived to ED c/o sore throat, SOB x2 days. Fever yesterday. Right side face and throat pain. Patient denies chest pain, nausea, vomiting.

## 2018-05-12 NOTE — DISCHARGE INSTRUCTIONS
Drink plenty of fluids to stay hydrated.  Complete entire course of antibiotics.  Use over-the-counter medications to control your symptoms. Return to the ER for any new or worsening symptoms particularly breathing difficulty, fever not controlled by medication or any new or worsening symptoms.

## 2018-05-12 NOTE — ED PROVIDER NOTES
Encounter Date: 5/12/2018    SCRIBE #1 NOTE: I, Cortney Lowery, am scribing for, and in the presence of,  Tadeo Graham MD. I have scribed the following portions of the note - Other sections scribed: HPI, ROS, and PEx.       History     Chief Complaint   Patient presents with    Shortness of Breath     started with sore throat x 3 days ago getting worse.  now feels like throat is swelling shut.  + fever last pm (did not measure).  Took Tylenol last pm.     CC: Sore Throat and Shortness of Breath    HPI: This 47 y.o. Female with PMHx of HTN presents to the ED c/o 2-3 day hx of progressively worsening sore throat. Pt states her throat feels like it is swelling shut. Pt also c/o shortness of breath, and she attributes it to possible swelling in throat. Pt reports subjective fever. Pt denies congestion, cough, chest pain, N/V, abdominal pain, and any other associated symptoms.       The history is provided by the patient. No  was used.     Review of patient's allergies indicates:  No Known Allergies  Past Medical History:   Diagnosis Date    Hypertension      Past Surgical History:   Procedure Laterality Date    DILATION AND CURETTAGE OF UTERUS      HYSTERECTOMY       Family History   Problem Relation Age of Onset    Diabetes Mother     Hypertension Mother     Hypertension Brother      Social History   Substance Use Topics    Smoking status: Never Smoker    Smokeless tobacco: Never Used    Alcohol use No     Review of Systems   Constitutional: Positive for fever (subjective). Negative for chills and diaphoresis.   HENT: Positive for sore throat ( progressively worsening). Negative for congestion and ear pain.    Eyes: Negative for pain.   Respiratory: Positive for shortness of breath. Negative for apnea, cough, choking, wheezing and stridor.    Cardiovascular: Negative for chest pain.   Gastrointestinal: Negative for abdominal pain, diarrhea, nausea and vomiting.   Genitourinary: Negative  for dysuria and flank pain.   Musculoskeletal: Negative for back pain.   Skin: Negative for rash.   Neurological: Negative for dizziness, numbness and headaches.       Physical Exam     Initial Vitals [05/12/18 0704]   BP Pulse Resp Temp SpO2   (!) 157/94 74 18 97.7 °F (36.5 °C) 96 %      MAP       115         Physical Exam    Nursing note and vitals reviewed.  Constitutional: She appears well-developed and well-nourished. She is not diaphoretic. No distress.   HENT:   Head: Normocephalic and atraumatic.   Mouth/Throat: Oropharyngeal exudate present.   Right luke-tonsillar swelling with exudate.    Eyes: EOM are normal. Pupils are equal, round, and reactive to light. No scleral icterus.   Neck: Normal range of motion. Neck supple.   Cardiovascular: Normal rate, regular rhythm and intact distal pulses.   see documented heart rate and blood pressure   Pulmonary/Chest: Breath sounds normal. No respiratory distress. She has no wheezes. She has no rhonchi. She has no rales.   Abdominal: Soft. There is no tenderness. There is no rebound and no guarding.   Musculoskeletal: She exhibits no edema or tenderness.   Lymphadenopathy:     She has cervical adenopathy (anterior ).   Neurological: She is alert and oriented to person, place, and time. No cranial nerve deficit or sensory deficit.   No obvious focal deficit   Skin: Skin is warm. No rash noted. No erythema.   Psychiatric: She has a normal mood and affect.         ED Course   Procedures  Labs Reviewed   CBC W/ AUTO DIFFERENTIAL - Abnormal; Notable for the following:        Result Value    RBC 5.80 (*)     MCV 70 (*)     MCH 24.0 (*)     RDW 16.7 (*)     Gran% 74.9 (*)     Lymph% 15.5 (*)     All other components within normal limits   APTT - Abnormal; Notable for the following:     aPTT 32.4 (*)     All other components within normal limits   BASIC METABOLIC PANEL   PROTIME-INR             Medical Decision Making:   History:   Old Medical Records: I decided to obtain old  medical records.  Differential Diagnosis:   Tonsillitis  Peritonsillar abscess  Retropharyngeal abscess URI  Pharyngitis  Clinical Tests:   Lab Tests: Ordered and Reviewed  Radiological Study: Ordered and Reviewed  ED Management:  Patient afebrile, no respiratory distress.  She has no stridor, she has no muffled voice.  She does have anterior cervical lymphadenopathy as well as bilateral tonsillar hypertrophy right >  left with exudates and some right peritonsillar swelling. Labs without leukocytosis.  Patient sent for CT scan of the neck with contrast to rule out peritonsillar abscess.  CT scan demonstrates some tonsillar hypertrophy with right peritonsillar stranding without fluid collection.  CT scan does note mild associated airway narrowing.  Patient given Solu-Medrol in the emergency room and monitored for several hours.  She has had no episodes of hypoxia or stridor or respiratory distress.  Patient tolerated p.o. fluids as well as p.o. antibiotics.  She is hemodynamically stable and fit for discharge on course of p.o. antibiotics.  Patient counseled on supportive care and given strict instructions return to emergency room for fever not controlled by medication, worsening swelling, or any breathing difficulty.  Patient has been expressed understanding in agreement with treatment plan.  This chart completed using dictation software, as a result there may be some typographical errors.             Scribe Attestation:   Scribe #1: I performed the above scribed service and the documentation accurately describes the services I performed. I attest to the accuracy of the note.    Attending Attestation:           Physician Attestation for Scribe:  Physician Attestation Statement for Scribe #1: I, Tadeo Graham MD, reviewed documentation, as scribed by Cortney Lowery in my presence, and it is both accurate and complete.                    Clinical Impression:   The primary encounter diagnosis was Tonsillitis. A  diagnosis of Pharyngitis, unspecified etiology was also pertinent to this visit.    Disposition:   Disposition: Discharged  Condition: Stable                        Tadeo Graham MD  05/12/18 3555

## 2018-06-16 DIAGNOSIS — I10 ESSENTIAL HYPERTENSION: ICD-10-CM

## 2018-06-18 RX ORDER — HYDROCHLOROTHIAZIDE 12.5 MG/1
12.5 TABLET ORAL DAILY
Qty: 90 TABLET | Refills: 0 | Status: SHIPPED | OUTPATIENT
Start: 2018-06-18 | End: 2018-09-17

## 2018-08-02 ENCOUNTER — TELEPHONE (OUTPATIENT)
Dept: FAMILY MEDICINE | Facility: CLINIC | Age: 48
End: 2018-08-02

## 2018-08-02 DIAGNOSIS — Z12.31 ENCOUNTER FOR SCREENING MAMMOGRAM FOR BREAST CANCER: Primary | ICD-10-CM

## 2018-08-02 NOTE — TELEPHONE ENCOUNTER
----- Message from Tye Cardenas sent at 8/2/2018  9:12 AM CDT -----  Contact: Self/[phone]  Patient would like orders submitted for a mammogram.    Thank you

## 2018-08-13 ENCOUNTER — HOSPITAL ENCOUNTER (OUTPATIENT)
Dept: RADIOLOGY | Facility: HOSPITAL | Age: 48
Discharge: HOME | End: 2018-08-13
Attending: FAMILY MEDICINE
Payer: COMMERCIAL

## 2018-08-13 VITALS — HEIGHT: 62 IN | WEIGHT: 218 LBS | BODY MASS INDEX: 40.12 KG/M2

## 2018-08-13 DIAGNOSIS — Z12.31 ENCOUNTER FOR SCREENING MAMMOGRAM FOR BREAST CANCER: ICD-10-CM

## 2018-08-13 PROCEDURE — 77063 BREAST TOMOSYNTHESIS BI: CPT | Mod: 26,,, | Performed by: RADIOLOGY

## 2018-08-13 PROCEDURE — 77067 SCR MAMMO BI INCL CAD: CPT | Mod: TC,PO

## 2018-08-13 PROCEDURE — 77067 SCR MAMMO BI INCL CAD: CPT | Mod: 26,,, | Performed by: RADIOLOGY

## 2018-08-14 ENCOUNTER — TELEPHONE (OUTPATIENT)
Dept: RADIOLOGY | Facility: HOSPITAL | Age: 48
End: 2018-08-14

## 2018-08-15 ENCOUNTER — HOSPITAL ENCOUNTER (OUTPATIENT)
Dept: RADIOLOGY | Facility: HOSPITAL | Age: 48
Discharge: HOME | End: 2018-08-15
Attending: FAMILY MEDICINE
Payer: COMMERCIAL

## 2018-08-15 DIAGNOSIS — R92.8 ABNORMAL MAMMOGRAM: ICD-10-CM

## 2018-08-15 PROCEDURE — 77061 BREAST TOMOSYNTHESIS UNI: CPT | Mod: TC

## 2018-08-15 PROCEDURE — 76642 ULTRASOUND BREAST LIMITED: CPT | Mod: 26,RT,, | Performed by: RADIOLOGY

## 2018-08-15 PROCEDURE — 77061 BREAST TOMOSYNTHESIS UNI: CPT | Mod: 26,,, | Performed by: RADIOLOGY

## 2018-08-15 PROCEDURE — 77065 DX MAMMO INCL CAD UNI: CPT | Mod: TC

## 2018-08-15 PROCEDURE — 77065 DX MAMMO INCL CAD UNI: CPT | Mod: 26,,, | Performed by: RADIOLOGY

## 2018-08-15 PROCEDURE — 76642 ULTRASOUND BREAST LIMITED: CPT | Mod: TC,RT

## 2018-08-21 ENCOUNTER — HOSPITAL ENCOUNTER (OUTPATIENT)
Dept: RADIOLOGY | Facility: HOSPITAL | Age: 48
Discharge: HOME | End: 2018-08-21
Attending: FAMILY MEDICINE
Payer: COMMERCIAL

## 2018-08-21 DIAGNOSIS — R92.8 ABNORMAL MAMMOGRAM: ICD-10-CM

## 2018-08-21 PROCEDURE — 19081 BX BREAST 1ST LESION STRTCTC: CPT | Mod: RT,,, | Performed by: RADIOLOGY

## 2018-08-21 PROCEDURE — 88305 TISSUE EXAM BY PATHOLOGIST: CPT | Mod: 26,,, | Performed by: PATHOLOGY

## 2018-08-21 PROCEDURE — 77065 DX MAMMO INCL CAD UNI: CPT | Mod: 26,RT,, | Performed by: RADIOLOGY

## 2018-08-21 PROCEDURE — 19081 BX BREAST 1ST LESION STRTCTC: CPT | Mod: TC,RT

## 2018-08-21 PROCEDURE — 27201044 MAMMO BREAST STEREOTACTIC BREAST BIOPSY RIGHT

## 2018-08-21 PROCEDURE — 88360 TUMOR IMMUNOHISTOCHEM/MANUAL: CPT | Performed by: PATHOLOGY

## 2018-08-21 PROCEDURE — 77061 BREAST TOMOSYNTHESIS UNI: CPT | Mod: TC,RT

## 2018-08-21 PROCEDURE — 88342 IMHCHEM/IMCYTCHM 1ST ANTB: CPT | Mod: 26,59,, | Performed by: PATHOLOGY

## 2018-08-21 PROCEDURE — 88360 TUMOR IMMUNOHISTOCHEM/MANUAL: CPT | Mod: 26,,, | Performed by: PATHOLOGY

## 2018-08-21 PROCEDURE — 77065 DX MAMMO INCL CAD UNI: CPT | Mod: TC,RT

## 2018-08-21 PROCEDURE — 88305 TISSUE EXAM BY PATHOLOGIST: CPT | Performed by: PATHOLOGY

## 2018-08-21 PROCEDURE — 77061 BREAST TOMOSYNTHESIS UNI: CPT | Mod: 26,RT,, | Performed by: RADIOLOGY

## 2018-08-24 ENCOUNTER — TELEPHONE (OUTPATIENT)
Dept: RADIOLOGY | Facility: HOSPITAL | Age: 48
End: 2018-08-24

## 2018-08-24 NOTE — TELEPHONE ENCOUNTER
Pt informed of positive breast biopsy results and first available appt at the [facility] will be 8/30/18 at 1:30pm.

## 2018-08-30 ENCOUNTER — OFFICE VISIT (OUTPATIENT)
Dept: SURGERY | Facility: CLINIC | Age: 48
End: 2018-08-30
Payer: COMMERCIAL

## 2018-08-30 ENCOUNTER — DOCUMENTATION ONLY (OUTPATIENT)
Dept: SURGERY | Facility: CLINIC | Age: 48
End: 2018-08-30

## 2018-08-30 VITALS
WEIGHT: 218 LBS | BODY MASS INDEX: 40.12 KG/M2 | DIASTOLIC BLOOD PRESSURE: 90 MMHG | TEMPERATURE: 98 F | HEART RATE: 74 BPM | SYSTOLIC BLOOD PRESSURE: 139 MMHG | HEIGHT: 62 IN

## 2018-08-30 DIAGNOSIS — C50.911 MALIGNANT NEOPLASM OF RIGHT FEMALE BREAST, UNSPECIFIED ESTROGEN RECEPTOR STATUS, UNSPECIFIED SITE OF BREAST: Primary | ICD-10-CM

## 2018-08-30 PROCEDURE — 99999 PR PBB SHADOW E&M-EST. PATIENT-LVL III: CPT | Mod: PBBFAC,,, | Performed by: SURGERY

## 2018-08-30 PROCEDURE — 3008F BODY MASS INDEX DOCD: CPT | Mod: CPTII,S$GLB,, | Performed by: SURGERY

## 2018-08-30 PROCEDURE — 99205 OFFICE O/P NEW HI 60 MIN: CPT | Mod: S$GLB,,, | Performed by: SURGERY

## 2018-08-30 PROCEDURE — 3080F DIAST BP >= 90 MM HG: CPT | Mod: CPTII,S$GLB,, | Performed by: SURGERY

## 2018-08-30 PROCEDURE — 3075F SYST BP GE 130 - 139MM HG: CPT | Mod: CPTII,S$GLB,, | Performed by: SURGERY

## 2018-08-30 NOTE — PROGRESS NOTES
New Breast Cancer  History and Physical  [facility]  Department of Surgery    REFERRING PROVIDER: Charmaine Sargent MD  [address] [place] [address]    CHIEF COMPLAINT: right breast cancer    Subjective:      Brittny Urias is a 47 y.o. postmenopausal female referred for evaluation of recently diagnosed carcinoma of the right breast. The patient was initially referred for surgical evaluation of an abnormal mammogram first noted 18. Follow-up breast biopsy (18), mammogram (8/15/18) and ultrasound (8/15/18) showed architectural distortion in the retroareolar right breast . A stereotactic biopsy was performed on 18 with pathology revealing infiltrating lobular carcinoma of the breast.     Patient does not routinely do self breast exams.  Patient has not noted a change on breast exam.  Patient denies nipple discharge. Patient denies to previous breast biopsy. Patient denies a personal history of breast cancer.    Findings at that time were the following:   Tumor size: not measured   Tumor ndgndrndanddndend:nd nd2nd Estrogen Receptor: +   Progesterone Receptor: +   Her-2 luis antonio: -   Lymph node status: clinically negative  Lymphatic invasion: unknown      GYN History:  Age of menarche was 11. Age of menopause was 45.  Last menstrual period was in  following partial hysterectomy, still has her ovaries. Patient admits to hormonal therapy, OCPs for approximately 10 years. Patient is . Age of first live birth was 19. Patient did breast feed.    FAMILY History:  No other family history of malignancy including ovarian, colon, pancreatic, prostate, gastric or melanoma cancers.    Past Medical History:   Diagnosis Date    Hypertension     Psoriasis     on biologic for 1 year, has been controlling it well     Past Surgical History:   Procedure Laterality Date    DILATION AND CURETTAGE OF UTERUS      HYSTERECTOMY       Current Outpatient Medications on File Prior to Visit   Medication Sig Dispense  Refill    cetirizine (ZYRTEC) 10 MG tablet Take 1 tablet (10 mg total) by mouth daily as needed for Allergies (sneezing). 30 tablet 0    hydroCHLOROthiazide (HYDRODIURIL) 12.5 MG Tab TAKE 1 TABLET (12.5 MG TOTAL) BY MOUTH ONCE DAILY. DUE FOR APPT 90 tablet 0    epinephrine (EPIPEN) 0.3 mg/0.3 mL AtIn Inject 0.3 mLs (0.3 mg total) into the muscle as needed. 2 Device 0    famotidine (PEPCID) 20 MG tablet Take 1 tablet (20 mg total) by mouth 2 (two) times daily. 10 tablet 0    [DISCONTINUED] amoxicillin-clavulanate 875-125mg (AUGMENTIN) 875-125 mg per tablet Take 1 tablet by mouth 2 (two) times daily. 14 tablet 0     No current facility-administered medications on file prior to visit.      Social History     Socioeconomic History    Marital status:      Spouse name: Not on file    Number of children: Not on file    Years of education: Not on file    Highest education level: Not on file   Social Needs    Financial resource strain: Not on file    Food insecurity - worry: Not on file    Food insecurity - inability: Not on file    Transportation needs - medical: Not on file    Transportation needs - non-medical: Not on file   Occupational History    Not on file   Tobacco Use    Smoking status: Never Smoker    Smokeless tobacco: Never Used   Substance and Sexual Activity    Alcohol use: No    Drug use: No    Sexual activity: Yes     Partners: Male     Birth control/protection: Surgical   Other Topics Concern    Not on file   Social History Narrative    Not on file     Family History   Problem Relation Age of Onset    Diabetes Mother     Hypertension Mother     Hypertension Brother         Review of Systems  Review of Systems   Constitutional: Negative for chills and fever.   Respiratory: Negative for chest tightness and shortness of breath.    Cardiovascular: Negative for chest pain.   Gastrointestinal: Positive for blood in stool (for the last 2 months). Negative for constipation, diarrhea,  "nausea and vomiting.   Genitourinary: Negative for dysuria and hematuria.   Musculoskeletal: Negative for arthralgias and back pain.   Skin: Negative for wound.   Neurological: Negative for dizziness and headaches.   Hematological: Negative for adenopathy. Does not bruise/bleed easily.        Objective:   PHYSICAL EXAM:  BP (!) 139/90 (BP Location: Left arm, Patient Position: Sitting, BP Method: Large (Automatic))   Pulse 74   Temp 98.3 °F (36.8 °C) (Oral)   Ht 5' 2" (1.575 m)   Wt 98.9 kg (218 lb)   BMI 39.87 kg/m²   General appearance: alert, appears stated age and cooperative  Head: Normocephalic, without obvious abnormality, atraumatic  Neck: no adenopathy and supple, symmetrical, trachea midline  Lungs: normal effort, nonlabored breathing  Breasts: Left breast: No nipple retraction or dimpling, No nipple discharge or bleeding, No axillary or supraclavicular adenopathy. Right breast: No nipple retraction or dimpling, No nipple discharge or bleeding, No axillary or supraclavicular adenopathy, positive findings: bruising around biopsy site.  Heart: regular rate and rhythm  Abdomen: soft, non-tender; bowel sounds normal; no masses,  no organomegaly  Extremities: extremities normal, atraumatic, no cyanosis or edema  Skin: normal, no edema and no lesions noted  Lymph nodes: Cervical, supraclavicular, and axillary nodes normal.  Neurologic: Grossly normal    Radiology review: Images personally reviewed by me in the clinic.   Mammogram:8/13/18 (screening), 8/15/18 (diagnostic)  Impression:  Right  Architectural Distortion: Right breast architectural distortion at the retroareolar anterior position. Assessment: 0 - Incomplete. Diagnostic Mammogram and/or Ultrasound is recommended.      Left  There is no mammographic evidence of malignancy.     BI-RADS Category:   Overall: 0 - Incomplete: Needs Additional Imaging Evaluation     Ultrasound:8/15/18 (diagnostic)  Targeted right breast ultrasound in the retroareolar " region and right axilla was performed. There is no definite sonographic abnormality seen to correlate likely due to significant shadowing with the mammographic finding. Normal right axillary lymph nodes are seen.        Pathology:  HORMONE RECEPTOR RESULTS (performed with appropriate controls):  ER - Positive (almost 90% of the tumor nuclei, moderate)  NE - Positive (almost 20% of the tumor nuclei, weak to moderate)  HER2 - Negative (0)  Ki67 - Less than 1% of the tumor nuclei  OMC,[id number],ER,PGR  (Electronically Signed: 2018-08-27 10:21:33 )  Diagnosed by: Linn Gill M.D.  FINAL PATHOLOGIC DIAGNOSIS  RIGHT BREAST, BIOPSY:  Invasive mammary carcinoma with lobular features      Assessment:      Brittny Urias is a 47 y.o. postmenopausal female with recently diagnosed carcinoma of the right breast.      Plan:    Options for management were discussed with the patient and her family. We reviewed the existing data noting the equivalency of breast conserving surgery with radiation therapy and mastectomy. We also reviewed the guidelines of the National Comprehensive Cancer Network for Stage 1 breast carcinoma. We discussed the need for lumpectomy margins to be negative for carcinoma, the necessity for postoperative radiation therapy after breast conservation in most cases, the possibility of a failed or false negative sentinel lymph node biopsy and the potential need for complete lymphadenectomy for a failed or positive sentinel lymph node biopsy were fully discussed. In the setting of mastectomy, delayed or immediate reconstruction options are available and were discussed.     In the setting of lumpectomy, radiation therapy would be recommended majority of the time.  The duration and treatment side effects were discussed with the patient.  This will coordinated with the radiation oncologist pending final pathology.    We also discussed the role of systemic therapy in the treatment of early stage breast cancer.  We  discussed that this is based on tumor biology and tylor status and will be determined based on final pathology.  We discussed that if the cancer is hormone positive, endocrine therapy would be recommended in most cases and its use can reduce the risk of recurrence as well as improve survival. Side effects of treatment were briefly discussed. We also discussed the potential role for chemotherapy based on a number of factors such as tumor phenotype (ER+ vs. triple negative vs. Rtk1ygx+) and this would be determined in coordination with the medical oncologist.    We will plan on obtaining bilateral breast MRI given lobular disease. We will see her back in clinic following MRI results but she is likely going to be a good candidate for a lumpectomy with SLNB pending MRI. Also discussed possible need for central lumpectomy given position just behind the nipple.  Will await MRI.  Will also refer to genetics given young age.    The patient, in consultation with her family, has elected to proceed with right partial mastectomy and sentinel lymph node biopsy pending MRI results. The operative risks of bleeding, infection, recurrence, scarring, and anesthetic complications and the possibility of requiring further surgery were all noted.    Patient was educated on breast cancer, receptors, wire localization lumpectomy, mastectomy, sentinel lymph node mapping and biopsy, axillary lymph node dissection, reconstruction, breast prosthesis with post-mastectomy bra and radiation therapy. Patient was given patient information binder including [facility] breast cancer treatment brochure.  All her questions were answered.    Total time spent with the patient: 60 minutes.  45 minutes of face to face consultation and 15 minutes of chart review and coordination of care.

## 2018-08-30 NOTE — LETTER
August 31, 2018      Charmaine Sargent MD  [address]  [place] [address]           [facility]  [address]  [facility] [address]  Phone: [phone]  Fax: [phone]          Patient: Brittny Urias   MR Number: [id number]   YOB: 1970   Date of Visit: 8/30/2018       Dear Dr. Charmaine Sargent:    Thank you for referring Brittny Urias to me for evaluation. Attached you will find relevant portions of my assessment and plan of care.    If you have questions, please do not hesitate to call me. I look forward to following Brittny Urias along with you.    Sincerely,    Marga Cardenas MD    Enclosure  CC:  No Recipients    If you would like to receive this communication electronically, please contact [email or web address] or [phone] to request more information on [employer] access.    For providers and/or their staff who would like to refer a patient to [facility], please contact us through our one-stop-shop provider referral line, [facility], at [phone].    If you feel you have received this communication in error or would no longer like to receive these types of communications, please e-mail [email or web address]

## 2018-08-30 NOTE — PROGRESS NOTES
Nurse Navigator Note:     Met with patient during her consult with Dr. Cardenas. Patient and I reviewed the information she discussed with Dr. Cardenas, including treatment options, diagnosis, and future plans for workup. Patient and I went through the new patient binder, explained some of the information and why it is provided.     Also offered patient consults with our other specialty clinics: Dr. Martin for gynecological health during treatment, Jennifer Lanier for physical therapy evaluation, Dr. Foley for psychological support, and Abigail Lanza for nutritional counseling. Explained to patient that all of these support services are completely optional. Discussed that physical therapy is the only service that is recommended pre-op specifically, everything else can be requested at a later time. Patient was given a copy of Dr. Cardenas's card and my card. Encouraged her to call me if she has any questions or concerns or would like to schedule any additional appointments. Verbalized understanding of all information.

## 2018-09-04 ENCOUNTER — HOSPITAL ENCOUNTER (OUTPATIENT)
Dept: RADIOLOGY | Facility: HOSPITAL | Age: 48
Discharge: HOME | End: 2018-09-04
Attending: SURGERY
Payer: COMMERCIAL

## 2018-09-04 DIAGNOSIS — C50.911 MALIGNANT NEOPLASM OF RIGHT FEMALE BREAST, UNSPECIFIED ESTROGEN RECEPTOR STATUS, UNSPECIFIED SITE OF BREAST: ICD-10-CM

## 2018-09-04 PROCEDURE — 77059 MRI BREAST BILATERAL W W/O CONTRAST: CPT | Mod: 26,,, | Performed by: RADIOLOGY

## 2018-09-04 PROCEDURE — 77059 MRI BREAST BILATERAL W W/O CONTRAST: CPT | Mod: TC

## 2018-09-04 PROCEDURE — A9577 INJ MULTIHANCE: HCPCS | Performed by: SURGERY

## 2018-09-04 PROCEDURE — 25500020 PHARM REV CODE 255: Performed by: SURGERY

## 2018-09-04 RX ADMIN — GADOBENATE DIMEGLUMINE 20 ML: 529 INJECTION, SOLUTION INTRAVENOUS at 01:09

## 2018-09-07 ENCOUNTER — OFFICE VISIT (OUTPATIENT)
Dept: SURGERY | Facility: CLINIC | Age: 48
End: 2018-09-07
Payer: COMMERCIAL

## 2018-09-07 DIAGNOSIS — Z71.83 ENCOUNTER FOR NONPROCREATIVE GENETIC COUNSELING: ICD-10-CM

## 2018-09-07 DIAGNOSIS — C50.919 INFILTRATING DUCTAL CARCINOMA OF FEMALE BREAST, UNSPECIFIED LATERALITY: Primary | ICD-10-CM

## 2018-09-07 PROCEDURE — 99213 OFFICE O/P EST LOW 20 MIN: CPT | Mod: S$GLB,,, | Performed by: SURGERY

## 2018-09-07 NOTE — PROGRESS NOTES
Mrs Urias presents for genetic counseling, referred by Dr Cardenas. She is a 47 year old  female with a recent diagnosis of IDC, ER/IN+ at age 47. . See pedigree for full family history which will be scanned into Epic media.  This patient does not have a known hereditary cancer genetic mutation on either side of the family and does not have known Ashkenazi [employer] ancestry.      We reviewed her medical and family history and discussed the genetics of breast cancer, cancer risks associated with a hereditary predisposition to cancer, and the benefits, risks, and limitations of genetic testing according to current NCCN guidelines.  Discussed sporadic verses family clustering verses hereditary cancer. The patients history most likely represents a sporadic breast cancer. She has no family history of any malignancies other than a paternal uncle with leukemia. She states she spoke with her mother who reports no cancers on her maternal side of the family. She reports cancer history is somewhat unknown on her father's side and she was encouraged to reach out to him to verify the family history. If no other cancers are discovered, genetic testing was not recommended based on current NCCN guidelines.     She was also inquiring about her MRI results from 9-3-18, discussed results with the patient today and she needs f/u with Dr Cardenas for definitive treatment plan. Message sent also to Dr Cardenas and she will be copied on this encounter as well.     Mrs Urias was informed to call with any changes in her family history.     Time in counseling today 35 min, total time 35 min (entire time spent in face to face counseling)

## 2018-09-11 ENCOUNTER — TELEPHONE (OUTPATIENT)
Dept: SURGERY | Facility: CLINIC | Age: 48
End: 2018-09-11

## 2018-09-11 NOTE — TELEPHONE ENCOUNTER
Spoke with patient regarding MRI results. Options discussed included:  1. Central lumpectomy with nipple removal  2. Mastectomy    She desires mastectomy with reconstruction, possible bilateral.  Will arrange for plastic surgery and coordinate surgery date.

## 2018-09-13 ENCOUNTER — SURGICAL CONSULT (OUTPATIENT)
Dept: PLASTIC SURGERY | Facility: CLINIC | Age: 48
End: 2018-09-13
Payer: COMMERCIAL

## 2018-09-13 VITALS
WEIGHT: 225 LBS | DIASTOLIC BLOOD PRESSURE: 93 MMHG | BODY MASS INDEX: 41.15 KG/M2 | SYSTOLIC BLOOD PRESSURE: 179 MMHG | HEART RATE: 69 BPM

## 2018-09-13 DIAGNOSIS — Z17.0 MALIGNANT NEOPLASM OF CENTRAL PORTION OF RIGHT BREAST IN FEMALE, ESTROGEN RECEPTOR POSITIVE: Primary | ICD-10-CM

## 2018-09-13 DIAGNOSIS — E66.01 MORBID OBESITY WITH BMI OF 40.0-44.9, ADULT: ICD-10-CM

## 2018-09-13 DIAGNOSIS — C50.111 MALIGNANT NEOPLASM OF CENTRAL PORTION OF RIGHT BREAST IN FEMALE, ESTROGEN RECEPTOR POSITIVE: Primary | ICD-10-CM

## 2018-09-13 PROCEDURE — 99204 OFFICE O/P NEW MOD 45 MIN: CPT | Mod: S$GLB,,, | Performed by: PLASTIC SURGERY

## 2018-09-13 PROCEDURE — 3077F SYST BP >= 140 MM HG: CPT | Mod: CPTII,S$GLB,, | Performed by: PLASTIC SURGERY

## 2018-09-13 PROCEDURE — 3080F DIAST BP >= 90 MM HG: CPT | Mod: CPTII,S$GLB,, | Performed by: PLASTIC SURGERY

## 2018-09-13 PROCEDURE — 3008F BODY MASS INDEX DOCD: CPT | Mod: CPTII,S$GLB,, | Performed by: PLASTIC SURGERY

## 2018-09-13 NOTE — PROGRESS NOTES
REFERRAL FOR BREAST RECONSTRUCTION    CHIEF COMPLAINT  Breast cancer    Referring Provider: No ref. provider found  PCP: Charmaine Sargetn MD    HPI  Brittny Urias is a 48 y.o. female presenting for newly diagnosed right breast cancer.  Initial abnormal mammogram was first noted 18 with follow up biopsy 18 revealing infiltrating lobular carcinoma, ER+MT+, [id number].  She denies personal or family history of breast or ovarian cancer or history of breast biopsy.  Breast MRI shows no abnormal findings on the left side and right breast 32 mm x 18 mm x 14 mm mass at the retroareolar anterior position.  She currently is a 40 C cup and would like to remain roughly the same size.   Her medical history is significant for hypertension and psoriasis.  She is otherwise healthy, obese, non-smoker, BMI 41.1.  She has had 3 prior pregnancies, prior  and laparoscopic hysterectomy for uterine prolapse.      Oncologic Hx  Diagnosis: Right breast invasive lobular cancer, ER+MT+H2neg  Tumor stage:  Adjuvant therapy  - chemotherapy: none  - radiation therapy: none  - current adjuvant therapy:none    Previous Surgery:  None  Treating surgeon: Ronald    Second opinions : none    Radiology review: Images personally reviewed by me in the clinic.   Mammogram:18 (screening), 8/15/18 (diagnostic)  Impression:  Right  Architectural Distortion: Right breast architectural distortion at the retroareolar anterior position. Assessment: 0 - Incomplete. Diagnostic Mammogram and/or Ultrasound is recommended.      Left  There is no mammographic evidence of malignancy.     BI-RADS Category:   Overall: 0 - Incomplete: Needs Additional Imaging Evaluation     Ultrasound:8/15/18 (diagnostic)  Targeted right breast ultrasound in the retroareolar region and right axilla was performed. There is no definite sonographic abnormality seen to correlate likely due to significant shadowing with the mammographic finding. Normal right axillary  lymph nodes are seen.      Pathology:  HORMONE RECEPTOR RESULTS (performed with appropriate controls):  ER - Positive (almost 90% of the tumor nuclei, moderate)  W[place] - Positive (almost 20% of the tumor nuclei, weak to moderate)  HER2 - Negative (0)  Ki67 - Less than 1% of the tumor nuclei  [facility],ETH0UIH,ER,PGR  (Electronically Signed: 2018-08-27 10:21:33 )  Diagnosed by: Linn Gill M.D.  FINAL PATHOLOGIC DIAGNOSIS  RIGHT BREAST, BIOPSY:  Invasive mammary carcinoma with lobular features    Right  There is a 32 mm x 18 mm x 14 mm irregularly shaped, homogeneous mass with spiculated margins seen in the retroareolar region of the right breast in the anterior depth, 0.8 cm from the nipple, 2 cm from the skin, and 7 cm from the chest wall. Delayed   phase is washout. Associated features include nipple retraction and architectural distortion.     Left  There is no evidence of suspicious masses, abnormal enhancement, or other abnormal findings.     There in no internal mammary or axillary adenopathy.    Impression:  Right  Mass: Right breast 32 mm x 18 mm x 14 mm mass at the retroareolar anterior position. Assessment: 6 - Known biopsy, proven malignancy.     Left  There is no MR evidence of malignancy.    PMH  Patient Active Problem List   Diagnosis    Depressed mood    Perimenopausal    Psoriasis    Class 2 obesity with serious comorbidity and body mass index (BMI) of 38.0 to 38.9 in adult       PSH  Past Surgical History:   Procedure Laterality Date    DILATION AND CURETTAGE OF UTERUS      HYSTERECTOMY         FH  Family History   Problem Relation Age of Onset    Diabetes Mother     Hypertension Mother     Hypertension Brother        MEDICATIONS  No outpatient medications have been marked as taking for the 9/13/18 encounter (Surgical Consult) with Tye Chapman MD.       ALLERGIES  Review of patient's allergies indicates:  No Known Allergies    SOCIAL HISTORY  Tobacco:   Social History     Tobacco Use   Smoking  Status Never Smoker   Smokeless Tobacco Never Used     EtOH:   Social History     Substance and Sexual Activity   Alcohol Use Yes    Alcohol/week: 1.8 oz    Types: 3 Glasses of wine per week       ROS  Review of Systems - General ROS: negative for - chills, fatigue, fever, hot flashes, malaise or night sweats  Psychological ROS: negative for - mood swings or sleep disturbances  Hematological and Lymphatic ROS: negative for - bleeding problems, blood clots, blood transfusions, bruising or fatigue  Endocrine ROS: negative for - hair pattern changes, hot flashes, malaise/lethargy, palpitations, polydipsia/polyuria or temperature intolerance  Breast ROS: positive for findings described above, negative for - nipple changes or nipple discharge  Respiratory ROS: no cough, shortness of breath, or wheezing  Cardiovascular ROS: no chest pain or dyspnea on exertion  Gastrointestinal ROS: no abdominal pain, change in bowel habits, or black or bloody stools  Genito-Urinary ROS: no dysuria, trouble voiding, or hematuria  Musculoskeletal ROS: negative for - gait disturbance, joint pain, joint stiffness, joint swelling or muscle pain  Neurological ROS: no TIA or stroke symptoms  Dermatological ROS: negative for acne, dry skin, eczema and hair changes    PHYSICAL EXAM  BP (!) 179/93   Pulse 69   Wt 102 kg (224 lb 15.7 oz)   BMI 41.15 kg/m²     Constitutional: She is oriented to person, place, and time. She appears well-developed and well-nourished.   HENT: Normocephalic and atraumatic.   Neck: Normal range of motion. Neck supple. No JVD present.   Cardiovascular: Normal rate, regular rhythm and normal heart sounds.    Pulmonary/Chest: Effort normal. No respiratory distress.   Musculoskeletal: Normal range of motion. She exhibits no edema or deformity.   Neurological: She is alert and oriented to person, place, and time. No sensory deficit. She exhibits normal muscle tone.   Skin: Skin is warm. No rash noted. No erythema.    Psychiatric: She has a normal mood and affect. Her behavior is normal.     SN-IMF: 21 cm    Right breast  SN-N: 25 cm  IMF-N: 10 cm  Base width: 13.5 cm  Height: 11 cm  Ptosis: grade 2  Masses absent  Scars: none  Subtle nipple retraction  Adenopathy: axillary, supraclavicular absent    Left breast  SN-N: 25 cm  IMF-N: 10 cm  Base width: 13.5 cm  Height: 11 cm  Ptosis: grade 2  Masses absent  Scars: none  Adenopathy: axillary, supraclavicular absent    Back  - fat pad 2 cm  - latissimus functional    Abdomen/Trunk/Thigh/Buttock  Abdomen: soft, nontender, nondistended, hernias absent, mild diastasis  Pfannensteil, LLQ scar  Fat excess in hypogastrium present  Buttock/Thigh: moderate    ASSESSMENT  Encounter Diagnoses   Name Primary?    Malignant neoplasm of central portion of right breast in female, estrogen receptor positive Yes    Morbid obesity with BMI of 40.0-44.9, adult      Options for breast reconstruction were discussed as detailed below, including the option for no reconstruction, implant-based reconstruction, and autologous tissue reconstruction.  The expected outcomes, risks, benefits, and alternatives of each option were discussed.  I additionally discussed the options for timing of reconstruction including immediate, delayed and delayed-immediate.     Reconstruction at the time of mastectomy reconstruction has a predictably higher rate of some perioperative complications than if performed after healing from a mastectomy. These include skin necrosis, infection, failure to clear the surgical margins discovered after mastectomy, unanticipated up-staging. These may be more critical management issues with device-based reconstruction, although flap loss may also be a factor.    Postsurgical complications may interfere with timely adjuvant therapy. Adjuvant radiation therapy may significantly degrade the cosmetic result of immediate breast reconstruction. Postoperative wound complications or infections  lead to additional unplanned office or emergency visits for follow up, re-operations, added expenses, pain, and disability, including inability to resume working in a timely manner.    She presents additional risk factors for immediate breast reconstruction:  1. Obesity    Options for breast reconstruction reviewed:    Lumpectomy with oncoplastic approach  Risk and limitations of this approach was discussed.  Focus was placed on the possibility of positive margins on final pathology necessitating further surgery and possible mastectomy; potential complications of radiation and changes of the breast shape and appearance with respect to radiation was discussed.    Two-stage expander and long-term implant vs direct-to-implant  Risks and limitations of this choice were discussed and written for these procedures.  A particular focus was placed upon possible limited aesthetic results in both shape and feel with this option.  Future need for surgery was also reviewed related to deflation and rupture of implant, distortion, capsular contracture and poor aesthetic outcome including visible wrinkling.    A staged technique was recommended. The first stage is performed immediately after completion of the mastectomy provided the sentinel lymph node biopsy is negative. An adjustable breast implant (expander) is placed deep to the pectoralis major muscle, and sometimes its lower portion covered with a human acellular dermal graft. After uneventful healing the expander is inflated over several months, and subsequently replaced with a long term implant.    Latissimus dorsi flap with or without implant  Details, risks and limitations of this choice were discussed.  Specific focus was placed upon the asymmetric scar across her entire back with possible puckering at each end, and possible long-term seroma, dehiscence, and back skin flap necrosis. Permanent functional restrictions were reviewed, as well as the potential for prolonged  early disability of at least two months. A longer recovery of at least 3-6 months before unrestricted activity might be achieved were also discussed. The frequent need for an implant under the latissimus muscle to provide sufficient breast mound projection was reviewed, along with potential problems this might create.    MEGAN, TRAM flap, Other  flaps (SGAP, PAP, Lateral thigh)  Details, risks and limitations of the use of free autologous tissue from the abdomen, thigh, and buttock were discussed.  Potential flap complications including fat necrosis, partial or complete flap loss, seroma, infection, bleeding, and need for further surgery.  For abdominal-based flaps, the risks of a functional deficit created by sacrifice of some or all of the rectus muscle, potential abdominal bulge or hernia that may not be correctable, abdominal wall numbness, umbilical necrosis and skin flap necrosis with resultant need for additional surgery were discussed.  Recovery time of at least two months and possible prolonged recovery of 3-6 months were likewise emphasized.    SYMMETRY  A contralateral breast reduction or mastopexy may improve symmetry. Potential problems were outlined. Contrateral mastectomy and reconstruction was also reviewed in this context.  Possible risks of breast reduction and mastopexy include but are not limited to: bleeding, infection, heavy and prolonged or permanent scarring, possible keloid formation, breasts different size and shape, breast lumps, loss of nipple sensation, hypersensitivity of nipple-areolar complex, wound separation or delayed wound healing, venous thromboembolism, complications from anesthesia, difficulty with future mammograms, emotional problems from altered breast size, a negative impact on relationships with a significant other sexual partner, desired breast size not attained: breast size too small or too large, difficult bra fitting, poor cosmetic outcome, puckering of  incisions, irregular shape, nipple location, need for further surgery    REVISION  Secondary revision surgery including autogenous fat grafting and nipple areolar reconstruction was reviewed.    Autogenous fat grafts might be necessary to improve skin thickness and enhance symmetric chest wall and breast mound symmetry and contour at a subsequent reconstructive procedure. Possible adverse outcomes, risks, and complications of fat grafting discussed include but are not limited to: Fat necrosis with a lumps, bleeding, infection, insufficient or excessive change in the size or shape, unevenness in the area grafted or donor sites, poor wound healing, inability to achieve desired cosmetic outcome, need for further interventions or surgery,iImplant puncture, venous thromboembolism    NIPPLE AREOLAR RECONSTRUCTION  Nipple reconstruction may be performed.     Risks of tattoo reviewed: pigment loss or irregularity, infection, slow healing, loss of implant (from infection), need for repeat tattooing    Risks of nipple mound creation reviewed:  Nipple flattening, asymmetry, fading tattoo pigment, poor cosmetic outcome, need for further surgery, bleeding, infection, poor scarring, wound separation, failure to heal, pain, need for further surgery, loss of implant from infection or wound separation  -----------------------------------------  After the above discussion of her options based on the history and physical examination, as well as her preferences, she is leaning toward mastectomy with MEGAN free flap; she is not a candidate for nipple sparing based on tumor proximity.  She is undecided on unilateral vs bilateral mastectomy and the considerations for reconstruction in both scenarios was discussed.  She will let us know her final decision.    PLAN  - CTA A/P ordered  - Photos ordered  - Leaning toward at least unilateral mastectomy with MEGAN flap recon; she will let us know her final preferences  *On cosentix for psoriasis-  may need to hold prior to recon  ?  This encounter length was 45 minutes for  Encounter Diagnoses   Name Primary?    Malignant neoplasm of central portion of right breast in female, estrogen receptor positive Yes    Morbid obesity with BMI of 40.0-44.9, adult      Over 50% of the encounter length was spent in face to face counseling about the relevant issues pertaining to the diagnoses, management choices, and prognosis.    Electronically signed by:  Tye Chapman  9/13/2018  1:16 PM

## 2018-09-13 NOTE — PATIENT INSTRUCTIONS
BREAST RECONSTRUCTION    A reconstructed breast IS NOT A BREAST. It can look natural, but will feel, move, and change differently than natural body tissue.    Timing of Breast Reconstruction  Reconstruction at the time of mastectomy reconstruction (immediate or synchronous breast reconstruction) has a higher rate of complications after surgery than if performed at a later date. The main problems are:  - skin necrosis  - infection  - failure to remove enough of cancer or pre-cancerous disease  - delay in initiation of necessary chemotherapy  Radiation therapy is sometimes recommended:  - to decrease recurrence with positive margins  - when tumors are large or invade surrounding tissues, or  - when 4 or more lymph nodes contain metastatic disease  The reconstructive result may change after radiation therapy. Without knowing the accurate disease extent (pathologic stage), a post-operative wound complication may delay starting necessary adjuvant therapy.    Options for Breast Reconstruction  You have many options for breast reconstruction and the list given below is an introduction to these choices.  The American Society of Pl[employer]geons web-site contains a comprehensive review of these options.    TWO-STAGE BREAST IMPLANT RECONSTRUCTION  A breast tissue expander is first placed and filled through a valve under the skin.  The expander becomes unnaturally hard during expansion. This is normal and will be temporary. After the expander is large enough, it is removed and a breast implant is placed, generally through the same scar  The operations lasts about 2-3 hours.  Length of hospitalization is for the day of surgery  Drain tubes are used if reconstruction is at the time of the mastectomy and possibly after the placement of implants.  Recovery time is about 4 weeks.  Risks of Breast Reconstruction with Implants include but are not limited to:  - implant exposure  - implant deflation, leak  - hardening of the  "implant  - visible or palpable wrinkling of implant,  - need for implant replacement  - poor cosmetic outcome  - infection requiring antibiotics, hospitalization, more surgery  - heavy (hypertrophic, keloid) or poor scarring  - gangrene of the breast skin  - need for reoperation  - incision separation, delayed wound healing  - numbness and/or pain that may be permanent  - contour irregularity of the breast mound  - fullness or soft tissue deficiency under the armpit  - shoulder or arm stiffness, loss of function, weakness  - bleeding, need for transfusion of blood  - prolonged fluid accumulation under the breast skin  - breast implant-associated anaplastic large cell lymphoma  - blood clots of the legs, pelvis, lungs  - anesthetic complications  - death    Silicone gel implants, form stable silicone implants, and saline implants:  Silicone gel implants  Benefits  - more natural looking, feeling  - deflation not accompanied by loss of projection  Limitations  - leak may not be identified by physical exam  - ultrasound available to detect leak  Form stable silicone implants ("gummy bear" implants)  Benefits  - breakage not accompanied by loss of projection  - shape controllable for certain breasts  - may resist deforming forces of radiation  Limitations  - breakage is not readily identified by physical exam  - ultrasound available to detect leak  - implant can rotate  - implant more firm  - need for larger incision  Saline implants  Benefits  - leak readily identified: the breast gets flat  - many women w/ saline implant satified  Limitations  - loss of projection if leak  - leak rate more common than gel  - implant can rotate if shaped implant used  - rippling of inner, upper edge, implant more visible through skin, easily felt  - sloshing feeling when active    LATISSIMUS DORSI BREAST RECONSTRUCTION  The latissimus dorsi muscle, along with a horizontal segment of skin below your shoulder blade will be transferred to " the breast being reconstructed. The tissue will be transferred beneath the skin under the arm and brought out from a breast incision. A tissue expander is often placed in conjunction with the latissimus dorsi flap in order to allow for eventual placement of an implant. Recovery is at least 6 weeks.  The operation lasts about 5-6 hours. Length of hospitalization is usually for 1 night after surgery. Drain tubes are typically used for the back and for the breast. They are removed after fluid drops to less than 30ml (1 oz) per day  Risks of Latissimus Dorsi Flap Breast Reconstruction include but are not limited to  - bleeding, need for transfusion of blood  - infection requiring oral or intravenous antibiotics, hospitalization, more surgery  - heavy scarring  - long scars and thinning or irregularities of back  - flap loss  - need for reoperation  - incision separation (dehiscence), poor or delayed wound healing  - numbness and/or pain that may be permanent. This may also affect the hand  - breast mound irregularity  - fullness under the armpit  - shoulder or arm stiffness, loss of function, weakness  - prolonged accumulation under the back requiring repeated drainage  - if an implant is used  implant exposure  implant deflation  hardening of the implant (tightness of the scar surrounding the implant)  visible or palpable wrinkling of implant,  need for implant replacement,  - blood clots of the legs, lungs (pulmonary emboli)  - poor cosmetic outcome  - anesthetic complications  - death    FREE FLAP (TRAM, muscle-sparing TRAM, MEGAN, PAP, SGAP) BREAST RECONSTRUCTION  Tissue in the back or lower abdomen may be used as a local or pedicled flap to reconstruct the breast. For a pedicled TRAM (transverse rectus abdominis myocutaneous), an elliptical-shaped horizontal skin segment between the belly button and pubic bone over the rectus abdominis muscle is transferred to the breast being reconstructed. The flap tissue will be  transferred beneath the skin over the rib cage and brought out from a breast incision.    With a free TRAM or MEGAN (deep inferior epigastric ) flap, blood vessels are re-connected to blood vessels around the chest. Rectus abdominal muscle is partially or fully preserved in this type of surgery. With a free PAP (profunda artery ) or SGAP (superior gluteal artery ) flap, tissue from the inner thigh or buttock is used to create the breast. All free flaps require re-connecting blood vessels from the flap to those in the chest using an operating microscope.    The operation lasts 6-10 hours. General anesthesia (totally asleep with a breathing tube). Length of hospitalization is usually for 4-5 nights after surgery. Drains are used for the abdomen/thigh/buttocks and for the breast. They are removed after fluid drops to less than 30ml (1 oz) per day, usually 7-10 days. Recovery is at least 6 weeks.  Risks of Local or Free Flap Breast Reconstruction include but are not limited to  - bleeding, need for transfusion of blood  - infection requiring oral or intravenous antibiotics, hospitalization, more surgery  - heavy (hypertrophic, keloid) or poor scarring  - partial/complete flap loss  - need for reoperation  - incision separation, poor healing  - numbness and/or pain that may be permanent  - contour of breast mound or back irregularity  - fullness under the armpit  - shoulder or arm stiffness, loss of function, weakness  - prolonged reaccumulation under the abdomen requiring drainage  - hardening of the transferred flap  - abdominal wall weakness, hernia/bulge which may be permanent  - loss of function, contour irregularity, asymmetry  - blood clots of the legs, lungs  - poor cosmetic outcome  - anesthetic complications  - death    SYMMETRY  An opposite side breast lift or breast reduction may help achieve a more symmetric result. The scars encircle the areola and extend down the lower breast  to the fold beneath the breast  Possible risks of breast reduction or mastopexy include but are not limited to:  - bleeding  - infection  - breast lumps  - permanent unsightly scarring  - breasts different size, shape  - loss of, or overly sensitive nipple sensation  - wound separation or delayed wound healing  - blood clots in your legs, lungs  - complications from anesthesia  - difficulty mammography  - emotional problems from altered breast size and shape  - a negative impact on relationships with a significant other sexual partner  - breast size too small or too large  - difficult bra fitting  - poor cosmetic outcome  - puckering of incisions  - irregular shape, nipple location  - need for further surgery    CONTRALATERAL PREVENTATIVE MASTECTOMY  There are possible means for reducing risk of another breast cancer in your other breast: tamoxifen, removal of ovaries, mastectomy.  Contralateral mastectomy risk reduction for breast cancer is estimated at 90-95%. Chemoprophylaxis risk reduction is estimated at 45% (studies include all types breast cancer).    REVISION  Secondary revision surgery may be necessary.  AUTOGENOUS FAT GRAFTS  Autogenous fat grafts might help to improve skin thickness and enhance symmetry. This is performed at a later stage after the initial reconstruction.  Risks of fat grafting include but are not limited to:  - bleeding  - infection  - insufficient or excessive change in the size or shape of the grafted area  - unevenness in the area grafted or area donating the fat by liposuction  - lumps in the grafted area  - poor wound healing or opening of the wounds  - persistent or worsened contour deformity  - need for further interventions or surgery  - fat can be mistakenly injected into an implant    NIPPLE RECONSTRUCTION  Nipple-sparing mastectomy  Limitations of this technique  - nipple asymmetry  - hidden cancer at base of nipple  - heightened risk of skin loss  - nipple inversion  - tumor  must be several centimeters away from nipple  - only for smaller breast sizes and non-smokers    A nipple can also usually be reconstructed from the skin of the reconstructed breast about 6-9 months after your reconstruction procedure. Pigment is added with a medical tattoo. Nipple reconstruction is done after all breast mound shaping is complete. The procedure is an office procedure under local anesthesia. You may drive yourself to and from the procedure. Most women can return to work and most other activities the next day.  Risks of nipple reconstruction  nipple flattening, asymmetry  loss of pigment  poor cosmetic outcome  need for further surgery  bleeding  infection  thick/poor scarring  wound separation, failure to heal  pain  need for further surgery  loss of implant from infection or wound separation    For further information about breast reconstruction, please visit the American Society of Plastic Surgeons web-site for a comprehensive review of options for breast reconstruction: [email or web address].plasticsurgery.org    Plastic Surgery Facilities:  [facility] [address], [address]  [place] [address]  Office: [phone]

## 2018-09-14 ENCOUNTER — PATIENT MESSAGE (OUTPATIENT)
Dept: FAMILY MEDICINE | Facility: CLINIC | Age: 48
End: 2018-09-14

## 2018-09-14 ENCOUNTER — PATIENT MESSAGE (OUTPATIENT)
Dept: PLASTIC SURGERY | Facility: CLINIC | Age: 48
End: 2018-09-14

## 2018-09-17 RX ORDER — HYDROCHLOROTHIAZIDE 25 MG/1
12.5 TABLET ORAL DAILY
Qty: 30 TABLET | Refills: 2 | Status: SHIPPED | OUTPATIENT
Start: 2018-09-17 | End: 2018-09-28 | Stop reason: SDUPTHER

## 2018-09-19 RX ORDER — HYDROCHLOROTHIAZIDE 25 MG/1
12.5 TABLET ORAL DAILY
Qty: 30 TABLET | Refills: 2 | Status: CANCELLED | OUTPATIENT
Start: 2018-09-19 | End: 2019-09-19

## 2018-09-24 ENCOUNTER — HOSPITAL ENCOUNTER (OUTPATIENT)
Dept: RADIOLOGY | Facility: OTHER | Age: 48
Discharge: HOME | End: 2018-09-24
Attending: PLASTIC SURGERY
Payer: COMMERCIAL

## 2018-09-24 DIAGNOSIS — Z17.0 MALIGNANT NEOPLASM OF CENTRAL PORTION OF RIGHT BREAST IN FEMALE, ESTROGEN RECEPTOR POSITIVE: ICD-10-CM

## 2018-09-24 DIAGNOSIS — C50.111 MALIGNANT NEOPLASM OF CENTRAL PORTION OF RIGHT BREAST IN FEMALE, ESTROGEN RECEPTOR POSITIVE: ICD-10-CM

## 2018-09-24 DIAGNOSIS — C50.919 MALIGNANT NEOPLASM OF FEMALE BREAST, UNSPECIFIED ESTROGEN RECEPTOR STATUS, UNSPECIFIED LATERALITY, UNSPECIFIED SITE OF BREAST: Primary | ICD-10-CM

## 2018-09-24 DIAGNOSIS — Z17.0 MALIGNANT NEOPLASM OF CENTRAL PORTION OF RIGHT BREAST IN FEMALE, ESTROGEN RECEPTOR POSITIVE: Primary | ICD-10-CM

## 2018-09-24 DIAGNOSIS — C50.111 MALIGNANT NEOPLASM OF CENTRAL PORTION OF RIGHT BREAST IN FEMALE, ESTROGEN RECEPTOR POSITIVE: Primary | ICD-10-CM

## 2018-09-24 PROCEDURE — 74174 CTA ABD&PLVS W/CONTRAST: CPT | Mod: TC

## 2018-09-24 PROCEDURE — 25500020 PHARM REV CODE 255: Performed by: PLASTIC SURGERY

## 2018-09-24 PROCEDURE — 74174 CTA ABD&PLVS W/CONTRAST: CPT | Mod: 26,,, | Performed by: RADIOLOGY

## 2018-09-24 RX ORDER — HEPARIN SODIUM 5000 [USP'U]/ML
5000 INJECTION, SOLUTION INTRAVENOUS; SUBCUTANEOUS ONCE
Status: CANCELLED | OUTPATIENT
Start: 2018-09-24

## 2018-09-24 RX ORDER — LIDOCAINE HYDROCHLORIDE 10 MG/ML
1 INJECTION, SOLUTION EPIDURAL; INFILTRATION; INTRACAUDAL; PERINEURAL ONCE
Status: CANCELLED | OUTPATIENT
Start: 2018-09-24 | End: 2018-09-24

## 2018-09-24 RX ADMIN — IOHEXOL 100 ML: 350 INJECTION, SOLUTION INTRAVENOUS at 11:09

## 2018-09-26 ENCOUNTER — TELEPHONE (OUTPATIENT)
Dept: PLASTIC SURGERY | Facility: CLINIC | Age: 48
End: 2018-09-26

## 2018-09-26 DIAGNOSIS — C50.911 MALIGNANT NEOPLASM OF RIGHT FEMALE BREAST, UNSPECIFIED ESTROGEN RECEPTOR STATUS, UNSPECIFIED SITE OF BREAST: Primary | ICD-10-CM

## 2018-09-26 DIAGNOSIS — Z01.818 PRE-OP TESTING: Primary | ICD-10-CM

## 2018-09-28 RX ORDER — HYDROCHLOROTHIAZIDE 25 MG/1
12.5 TABLET ORAL DAILY
Qty: 30 TABLET | Refills: 2 | Status: SHIPPED | OUTPATIENT
Start: 2018-09-28 | End: 2019-02-07 | Stop reason: SDUPTHER

## 2018-10-18 ENCOUNTER — TELEPHONE (OUTPATIENT)
Dept: PLASTIC SURGERY | Facility: CLINIC | Age: 48
End: 2018-10-18

## 2018-10-26 ENCOUNTER — PATIENT MESSAGE (OUTPATIENT)
Dept: SURGERY | Facility: CLINIC | Age: 48
End: 2018-10-26

## 2018-11-01 ENCOUNTER — HOSPITAL ENCOUNTER (OUTPATIENT)
Dept: RADIOLOGY | Facility: OTHER | Age: 48
Discharge: HOME | End: 2018-11-01
Attending: PLASTIC SURGERY
Payer: COMMERCIAL

## 2018-11-01 ENCOUNTER — ANESTHESIA EVENT (OUTPATIENT)
Dept: SURGERY | Facility: OTHER | Age: 48
DRG: 580 | End: 2018-11-01
Payer: COMMERCIAL

## 2018-11-01 ENCOUNTER — HOSPITAL ENCOUNTER (OUTPATIENT)
Dept: PREADMISSION TESTING | Facility: OTHER | Age: 48
Discharge: HOME | End: 2018-11-01
Attending: PLASTIC SURGERY
Payer: COMMERCIAL

## 2018-11-01 VITALS
DIASTOLIC BLOOD PRESSURE: 86 MMHG | HEART RATE: 67 BPM | HEIGHT: 62 IN | OXYGEN SATURATION: 96 % | WEIGHT: 220 LBS | TEMPERATURE: 98 F | SYSTOLIC BLOOD PRESSURE: 152 MMHG | BODY MASS INDEX: 40.48 KG/M2

## 2018-11-01 DIAGNOSIS — C50.919 MALIGNANT NEOPLASM OF FEMALE BREAST, UNSPECIFIED ESTROGEN RECEPTOR STATUS, UNSPECIFIED LATERALITY, UNSPECIFIED SITE OF BREAST: Primary | ICD-10-CM

## 2018-11-01 DIAGNOSIS — Z01.818 PRE-OP TESTING: ICD-10-CM

## 2018-11-01 LAB
ABO + RH BLD: NORMAL
ALBUMIN SERPL BCP-MCNC: 4 G/DL
ALP SERPL-CCNC: 59 U/L
ALT SERPL W/O P-5'-P-CCNC: 17 U/L
ANION GAP SERPL CALC-SCNC: 9 MMOL/L
ANISOCYTOSIS BLD QL SMEAR: SLIGHT
AST SERPL-CCNC: 19 U/L
BASOPHILS # BLD AUTO: 0.01 K/UL
BASOPHILS NFR BLD: 0.3 %
BILIRUB SERPL-MCNC: 0.4 MG/DL
BLD GP AB SCN CELLS X3 SERPL QL: NORMAL
BUN SERPL-MCNC: 10 MG/DL
CALCIUM SERPL-MCNC: 10 MG/DL
CHLORIDE SERPL-SCNC: 104 MMOL/L
CO2 SERPL-SCNC: 26 MMOL/L
CREAT SERPL-MCNC: 0.8 MG/DL
DIFFERENTIAL METHOD: ABNORMAL
EOSINOPHIL # BLD AUTO: 0.1 K/UL
EOSINOPHIL NFR BLD: 1.8 %
ERYTHROCYTE [DISTWIDTH] IN BLOOD BY AUTOMATED COUNT: 16.8 %
EST. GFR  (AFRICAN AMERICAN): >60 ML/MIN/1.73 M^2
EST. GFR  (NON AFRICAN AMERICAN): >60 ML/MIN/1.73 M^2
GIANT PLATELETS BLD QL SMEAR: PRESENT
GLUCOSE SERPL-MCNC: 100 MG/DL
HCT VFR BLD AUTO: 41.7 %
HGB BLD-MCNC: 13.3 G/DL
LYMPHOCYTES # BLD AUTO: 1.6 K/UL
LYMPHOCYTES NFR BLD: 39.1 %
MCH RBC QN AUTO: 22.7 PG
MCHC RBC AUTO-ENTMCNC: 31.9 G/DL
MCV RBC AUTO: 71 FL
MONOCYTES # BLD AUTO: 0.2 K/UL
MONOCYTES NFR BLD: 4.8 %
NEUTROPHILS # BLD AUTO: 2.2 K/UL
NEUTROPHILS NFR BLD: 54 %
PLATELET # BLD AUTO: 258 K/UL
PLATELET BLD QL SMEAR: ABNORMAL
PMV BLD AUTO: 11.7 FL
POTASSIUM SERPL-SCNC: 4.3 MMOL/L
PROT SERPL-MCNC: 7.7 G/DL
RBC # BLD AUTO: 5.87 M/UL
SODIUM SERPL-SCNC: 139 MMOL/L
WBC # BLD AUTO: 3.99 K/UL

## 2018-11-01 PROCEDURE — 36415 COLL VENOUS BLD VENIPUNCTURE: CPT

## 2018-11-01 PROCEDURE — 85025 COMPLETE CBC W/AUTO DIFF WBC: CPT

## 2018-11-01 PROCEDURE — 80053 COMPREHEN METABOLIC PANEL: CPT

## 2018-11-01 PROCEDURE — 93005 ELECTROCARDIOGRAM TRACING: CPT

## 2018-11-01 PROCEDURE — 93010 ELECTROCARDIOGRAM REPORT: CPT | Mod: ,,, | Performed by: INTERNAL MEDICINE

## 2018-11-01 PROCEDURE — 71046 X-RAY EXAM CHEST 2 VIEWS: CPT | Mod: TC

## 2018-11-01 PROCEDURE — 71046 X-RAY EXAM CHEST 2 VIEWS: CPT | Mod: 26,,, | Performed by: RADIOLOGY

## 2018-11-01 PROCEDURE — 86850 RBC ANTIBODY SCREEN: CPT

## 2018-11-01 RX ORDER — LIDOCAINE HYDROCHLORIDE 10 MG/ML
0.5 INJECTION, SOLUTION EPIDURAL; INFILTRATION; INTRACAUDAL; PERINEURAL ONCE
Status: CANCELLED | OUTPATIENT
Start: 2018-11-01 | End: 2018-11-01

## 2018-11-01 RX ORDER — MIDAZOLAM HYDROCHLORIDE 1 MG/ML
2 INJECTION INTRAMUSCULAR; INTRAVENOUS ONCE AS NEEDED
Status: DISCONTINUED | OUTPATIENT
Start: 2018-11-14 | End: 2018-11-02 | Stop reason: HOSPADM

## 2018-11-01 RX ORDER — SODIUM CHLORIDE, SODIUM LACTATE, POTASSIUM CHLORIDE, CALCIUM CHLORIDE 600; 310; 30; 20 MG/100ML; MG/100ML; MG/100ML; MG/100ML
INJECTION, SOLUTION INTRAVENOUS CONTINUOUS
Status: CANCELLED | OUTPATIENT
Start: 2018-11-01

## 2018-11-01 RX ORDER — FAMOTIDINE 20 MG/1
20 TABLET, FILM COATED ORAL
Status: CANCELLED | OUTPATIENT
Start: 2018-11-01 | End: 2018-11-01

## 2018-11-01 NOTE — ANESTHESIA PREPROCEDURE EVALUATION
11/01/2018  Brittny Urias is a 48 y.o., female.    Anesthesia Evaluation    I have reviewed the Patient Summary Reports.    I have reviewed the Nursing Notes.   I have reviewed the Medications.     Review of Systems  Anesthesia Hx:  No problems with previous Anesthesia  Denies Family Hx of Anesthesia complications.   Denies Personal Hx of Anesthesia complications.   Social:  Non-Smoker    Hematology/Oncology:  Hematology Normal   Oncology Normal     EENT/Dental:EENT/Dental Normal   Cardiovascular:   Hypertension, well controlled    Pulmonary:  Pulmonary Normal    Hepatic/GI:  Hepatic/GI Normal    Musculoskeletal:  Musculoskeletal Normal    Neurological:  Neurology Normal    Endocrine:  Endocrine Normal    Dermatological:  Skin Normal    Psych:  Psychiatric Normal           Physical Exam  General:  Well nourished    Airway/Jaw/Neck:  Airway Findings: Mouth Opening: Normal Tongue: Normal  General Airway Assessment: Adult  Mallampati: II  TM Distance: Normal, at least 6 cm  Jaw/Neck Findings:     Neck ROM: Normal ROM      Dental:  Dental Findings: In tact             Anesthesia Plan  Type of Anesthesia, risks & benefits discussed:  Anesthesia Type:  general  Patient's Preference:   Intra-op Monitoring Plan:   Intra-op Monitoring Plan Comments:   Post Op Pain Control Plan:   Post Op Pain Control Plan Comments:   Induction:   IV  Beta Blocker:         Informed Consent: Patient understands risks and agrees with Anesthesia plan.  Questions answered. Anesthesia consent signed with patient.  ASA Score: 2     Day of Surgery Review of History & Physical:    H&P update referred to the surgeon.     Anesthesia Plan Notes: Labs ordered.        Ready For Surgery From Anesthesia Perspective.

## 2018-11-01 NOTE — DISCHARGE INSTRUCTIONS
PRE-ADMIT TESTING -  [phone]    [address]  [facility]          Your surgery has been scheduled at [facility]. We are pleased to have the opportunity to serve you. For Further Information please call [phone].    On the day of surgery please report to the Information Desk on the 1st floor.    · CONTACT YOUR PHYSICIAN'S OFFICE THE DAY PRIOR TO YOUR SURGERY TO OBTAIN YOUR ARRIVAL TIME.     · The evening before surgery do not eat anything after 9 p.m. ( this includes hard candy, chewing gum and mints).  You may only have GATORADE, POWERADE AND WATER  from 9 p.m. until you leave your home.   DO NOT DRINK ANY LIQUIDS ON THE WAY TO THE HOSPITAL.      SPECIAL MEDICATION INSTRUCTIONS: TAKE medications checked off by the Anesthesiologist on your Medication List.    Angiogram Patients: Take medications as instructed by your physician, including aspirin.     Surgery Patients:    If you take ASPIRIN - Your PHYSICIAN/SURGEON will need to inform you IF/OR when you need to stop taking aspirin prior to your surgery.     Do Not take any medications containing IBUPROFEN.  Do Not Wear any make-up or dark nail polish   (especially eye make-up) to surgery. If you come to surgery with makeup on you will be required to remove the makeup or nail polish.    Do not shave your surgical area at least 5 days prior to your surgery. The surgical prep will be performed at the hospital according to Infection Control regulations.    Leave all valuables at home.   Do Not wear any jewelry or watches, including any metal in body piercings.  Contact Lens must be removed before surgery. Either do not wear the contact lens or bring a case and solution for storage.  Please bring a container for eyeglasses or dentures as required.  Bring any paperwork your physician has provided, such as consent forms,  history and physicals, doctor's orders, etc.   Bring comfortable clothes that are loose fitting to wear upon  discharge. Take into consideration the type of surgery being performed.  Maintain your diet as advised per your physician the day prior to surgery.      Adequate rest the night before surgery is advised.   Park in the Parking lot behind the hospital or in the [facility] across the street from the parking lot. Parking is complimentary.  If you will be discharged the same day as your procedure, please arrange for a responsible adult to drive you home or to accompany you if traveling by taxi.   YOU WILL NOT BE PERMITTED TO DRIVE OR TO LEAVE THE HOSPITAL ALONE AFTER SURGERY.   It is strongly recommended that you arrange for someone to remain with you for the first 24 hrs following your surgery.       Thank you for your cooperation.  The Staff of [facility].        Bathing Instructions                                                                 Please shower the evening before and morning of your procedure with    ANTIBACTERIAL SOAP. ( DIAL, etc )  Concentrate on the surgical area   for at least 3 minutes and rinse completely. Dry off as usual.   Do not use any deodorant, powder, body lotions, perfume, after shave or    cologne.

## 2018-11-12 ENCOUNTER — OFFICE VISIT (OUTPATIENT)
Dept: PLASTIC SURGERY | Facility: CLINIC | Age: 48
End: 2018-11-12
Payer: COMMERCIAL

## 2018-11-12 VITALS
HEART RATE: 65 BPM | WEIGHT: 225.63 LBS | BODY MASS INDEX: 41.52 KG/M2 | HEIGHT: 62 IN | DIASTOLIC BLOOD PRESSURE: 90 MMHG | SYSTOLIC BLOOD PRESSURE: 170 MMHG

## 2018-11-12 DIAGNOSIS — C50.911 MALIGNANT NEOPLASM OF RIGHT FEMALE BREAST, UNSPECIFIED ESTROGEN RECEPTOR STATUS, UNSPECIFIED SITE OF BREAST: ICD-10-CM

## 2018-11-12 PROCEDURE — 3077F SYST BP >= 140 MM HG: CPT | Mod: CPTII,S$GLB,, | Performed by: PLASTIC SURGERY

## 2018-11-12 PROCEDURE — 3008F BODY MASS INDEX DOCD: CPT | Mod: CPTII,S$GLB,, | Performed by: PLASTIC SURGERY

## 2018-11-12 PROCEDURE — 3080F DIAST BP >= 90 MM HG: CPT | Mod: CPTII,S$GLB,, | Performed by: PLASTIC SURGERY

## 2018-11-12 PROCEDURE — 99213 OFFICE O/P EST LOW 20 MIN: CPT | Mod: S$GLB,,, | Performed by: PLASTIC SURGERY

## 2018-11-12 NOTE — PROGRESS NOTES
PREOPERATIVE HISTORY AND PHYSICAL    Chief Complaint:  No chief complaint on file.    PCP: Charmaine Sargent MD    HPI  From my original consult:  Brittny Urias is a 48 y.o. female presenting for newly diagnosed right breast cancer.  Initial abnormal mammogram was first noted 18 with follow up biopsy 18 revealing infiltrating lobular carcinoma, ER+WV+, Smd1gdh.  She denies personal or family history of breast or ovarian cancer or history of breast biopsy.  Breast MRI shows no abnormal findings on the left side and right breast 32 mm x 18 mm x 14 mm mass at the retroareolar anterior position.  She currently is a 40 C cup and would like to remain roughly the same size.   Her medical history is significant for hypertension and psoriasis.  She is otherwise healthy, obese, non-smoker, BMI 41.1.  She has had 3 prior pregnancies, prior  and laparoscopic hysterectomy for uterine prolapse.      Today:  She returns for pre-operative markings, review of surgical consents and luke-operative considerations.  I reviewed that she may be somewhat smaller after MEGAN reconstruction, depending on the volume obtainable from the abdomen or extended abdomen.     Oncologic Hx  Diagnosis: Right breast invasive lobular cancer, ER+WV+H2neg  Tumor stage:  Adjuvant therapy  - chemotherapy: none  - radiation therapy: none  - current adjuvant therapy:none     Previous Surgery:  None  Treating surgeon: Ronald     Second opinions : none     Radiology review: Images personally reviewed by me in the clinic.   Mammogram:18 (screening), 8/15/18 (diagnostic)  Impression:  Right  Architectural Distortion: Right breast architectural distortion at the retroareolar anterior position. Assessment: 0 - Incomplete. Diagnostic Mammogram and/or Ultrasound is recommended.      Left  There is no mammographic evidence of malignancy.     BI-RADS Category:   Overall: 0 - Incomplete: Needs Additional Imaging  Evaluation     Ultrasound:8/15/18 (diagnostic)  Targeted right breast ultrasound in the retroareolar region and right axilla was performed. There is no definite sonographic abnormality seen to correlate likely due to significant shadowing with the mammographic finding. Normal right axillary lymph nodes are seen.      Pathology:  HORMONE RECEPTOR RESULTS (performed with appropriate controls):  ER - Positive (almost 90% of the tumor nuclei, moderate)  DE - Positive (almost 20% of the tumor nuclei, weak to moderate)  HER2 - Negative (0)  Ki67 - Less than 1% of the tumor nuclei  [facility],[id number],ER,PGR  (Electronically Signed: 2018-08-27 10:21:33 )  Diagnosed by: Linn Gill M.D.  FINAL PATHOLOGIC DIAGNOSIS  RIGHT BREAST, BIOPSY:  Invasive mammary carcinoma with lobular features     Right  There is a 32 mm x 18 mm x 14 mm irregularly shaped, homogeneous mass with spiculated margins seen in the retroareolar region of the right breast in the anterior depth, 0.8 cm from the nipple, 2 cm from the skin, and 7 cm from the chest wall. Delayed   phase is washout. Associated features include nipple retraction and architectural distortion.     Left  There is no evidence of suspicious masses, abnormal enhancement, or other abnormal findings.     There in no internal mammary or axillary adenopathy.    Impression:  Right  Mass: Right breast 32 mm x 18 mm x 14 mm mass at the retroareolar anterior position. Assessment: 6 - Known biopsy, proven malignancy.     Left  There is no MR evidence of malignancy.     PMH  Patient Active Problem List    Diagnosis Date Noted    Class 2 obesity with serious comorbidity and body mass index (BMI) of 38.0 to 38.9 in adult 02/28/2018    Psoriasis 06/26/2017    Depressed mood 06/23/2017    Perimenopausal 06/23/2017     There are no hospital problems to display for this patient.      PSH  Past Surgical History:   Procedure Laterality Date    DILATION AND CURETTAGE OF UTERUS      HYSTERECTOMY          FHx  Family History   Problem Relation Age of Onset    Diabetes Mother     Hypertension Mother     Hypertension Brother        OB-Hx  OB History      Para Term  AB Living    3 3 3     3    SAB TAB Ectopic Multiple Live Births            3          MEDICATIONS  Current Outpatient Medications   Medication Sig Dispense Refill    cetirizine (ZYRTEC) 10 MG tablet Take 1 tablet (10 mg total) by mouth daily as needed for Allergies (sneezing). 30 tablet 0    hydroCHLOROthiazide (HYDRODIURIL) 25 MG tablet Take 0.5 tablets (12.5 mg total) by mouth once daily. 30 tablet 2    epinephrine (EPIPEN) 0.3 mg/0.3 mL AtIn Inject 0.3 mLs (0.3 mg total) into the muscle as needed. 2 Device 0     No current facility-administered medications for this visit.        ALLERGIES Review of patient's allergies indicates:  No Known Allergies    SOCIAL HISTORY  Tobacco:   Social History     Tobacco Use   Smoking Status Never Smoker   Smokeless Tobacco Never Used     EtOH:   Social History     Substance and Sexual Activity   Alcohol Use Yes    Alcohol/week: 1.8 oz    Types: 3 Glasses of wine per week    Comment: social     Drugs:   Social History     Substance and Sexual Activity   Drug Use No       ALLERGIES  Review of patient's allergies indicates:  No Known Allergies    REVIEW OF SYSTEMS:  CONSTITUTIONAL: negative for fatigue, chills, fever, weight gain, weight loss  INTEGUMENTARY: negative for rash, pruritis, skin lesions  HENT: negative for congestion, hoarseness, hearing loss, and sore throat  PULMONARY: negative cough, productive sputum, wheezing, shortness of breath  CARDIAC: negative chest pain, palpitations, dyspnea on exertion, orthopnea  GENITOURINARY: negative for dysuria, bloody urine, discharge  ENDOCRINE: negative excessive thirst, frequent urination, unexplained weight loss  HEMATOPOIEOTIC:negative bruising, enlarged lymph nodes, prolonged bleeding  NEUROLOGIC: negative dizziness, loss of consciousness,  "numbness  PSYCHIATRIC: negative for depression, hearing voices, anxiety    PHYSICAL EXAMINATION  GENERAL APPEARANCE  BP (!) 170/90   Pulse 65   Ht 5' 2" (1.575 m)   Wt 102.3 kg (225 lb 9.6 oz)   BMI 41.26 kg/m²   Well developed, well nourished in no acute distress.    INTEGUMENT  Skin is warm and dry. No erythema, rash.    PSYCHIATRIC  Affect normal.    HEENT  Normocephalic, atraumatic.  Extraocular motions normal. PERRL  Neck: Neck supple.    SN-IMF: 21 cm     Right breast  SN-N: 25 cm  IMF-N: 10 cm  Base width: 13.5 cm  Height: 11 cm  Ptosis: grade 2  Masses absent  Scars: none  Subtle nipple retraction  Adenopathy: axillary, supraclavicular absent     Left breast  SN-N: 25 cm  IMF-N: 10 cm  Base width: 13.5 cm  Height: 11 cm  Ptosis: grade 2  Masses absent  Scars: none  Adenopathy: axillary, supraclavicular absent     Back  - fat pad 2 cm  - latissimus functional     Abdomen/Trunk/Thigh/Buttock  Abdomen: soft, nontender, nondistended, hernias absent, mild diastasis  Pfannensteil, LLQ scar  Fat excess in hypogastrium present  Buttock/Thigh: moderate    LUNGS  Clear, no wheezing, crackles, rhonchi    CARDIOVASCULAR  Regular rate and rhythm    EXTREMITIES  Peripheral edema absent  Pedal, wrist pulses present    LABS  Lab Results   Component Value Date    WBC 3.99 11/01/2018    HGB 13.3 11/01/2018    HCT 41.7 11/01/2018    MCV 71 (L) 11/01/2018     11/01/2018     Lab Results   Component Value Date     11/01/2018    K 4.3 11/01/2018     11/01/2018    CO2 26 11/01/2018     Lab Results   Component Value Date    BUN 10 11/01/2018     Lab Results   Component Value Date    CREATININE 0.8 11/01/2018     Lab Results   Component Value Date    ALBUMIN 4.0 11/01/2018     Lab Results   Component Value Date    HGBA1C 5.6 02/28/2018       IMAGING  CTA reviewed, adrenal adenoma to be followed up by PCP 6-8 weeks post-op    ASSESSMENT  Encounter Diagnoses   Name Primary?    Malignant neoplasm of right female " breast, unspecified estrogen receptor status, unspecified site of breast      I reviewed options for implant-based and autologous reconstruction as well as a combination of these procedures.  The patient prefers to use abdominal-based tissue without implants.  I reviewed the luke-operative plan and considerations for recovery, outcomes, morbidity, and aftercare.  She is aware that the breast may be smaller than her native breast volume after MEGAN flaps and is ok with that.    INFORMED CONSENT  Procedure: Bilateral breast reconstruction with deep inferior epigastric  flap reconstruction from the abdomen, possible transverse rectus abdominis free flap reconstruction    The specific risks of reconstruction with free tissue transfer in detail as listed below in addition to the anticipated recovery time and in-hospital stay for flap monitoring was discussed.  Microsurgery, due to its dependence on the small vessel anatomy of each individual patient, has inherent risks of vessel thrombosis in the perioperative period and needs to be monitored -- this may result in emergent take back to the operating room for attempted salvage. If unsuccessful, another method of reconstruction will be offered.  We finally discussed the plan for symmetrizing procedures and secondary operations including fat grafting, scar revisions, and nipple mound creation.     The proposed procedure, any treatment options, expected and possible outcomes including complications, any necessary perioperative medicinal and activity restrictions, expected recovery time and potential disability were fully reviewed with the patient. Permission to obtain photographs before, during, and after surgery was also granted. An opportunity to ask questions was given, and written informed consent was given to proceed. This Informed Consent discussion took place prior to the signing the consent form.    Risks of the procedure:  Complete or partial flap loss from  vascular thrombosis or infection  Incomplete removal of cancer/tumor  Tumor recurrence  Poor cosmetic outcome  Asymmetry  Need for re-operation, possibly urgently  Infection at all operative locations  Bleeding, need for transfusion, transfusion reaction  Infection  Hernias  Abdominal wall necrosis  Umbilical necrosis or malposition  Numbness of all operative sites, groin and/or thigh  Abdominal bulge, possible unable to be corrected  Fat necrosis or breast flap lump  Wound separation, failure to heal  Hypertrophic/poor scarring  Fluid accumulation under operative sites, need for drainage  Blood clots, venous thromboembolism, pulmonary embolism  Organ system damage: respiratory, cardiovascular, liver, kidneys, brain, gastro-intestinal, immune  Donor site morbidity: Loss of normal function, poor cosmesis, wound healing problems, hernias or bulge  Heart attack, Stroke, Death    PLAN  Procedure booked: Bilateral breast reconstruction with deep inferior epigastric  flap reconstruction from the abdomen, possible transverse rectus abdominis free flap reconstruction  Location: [facility]  Anesthesia: General  Time: 8-12 h  Labs: reviewed  Consults: none  Pre-op: needed  Imaging: CTA reviewed, markings performed  DVT Prophylaxis: SC heparin 5000 U pre-op in holding, SCDs  Abx: IV Ancef in pre-op  Medications reviewed, anticipated in-hospital course, and expected aftercare discussed  [employer] held    Electronically signed by:  Tye Chapman  11/12/2018  11:50 AM

## 2018-11-13 ENCOUNTER — TELEPHONE (OUTPATIENT)
Dept: SURGERY | Facility: CLINIC | Age: 48
End: 2018-11-13

## 2018-11-13 NOTE — TELEPHONE ENCOUNTER
Spoke with pt regarding surgery, pt advised to arrive to [facility] at 0500 for 0700 surgery, pt verbalized understanding, pre-op education reinforced, all questions answered at this time, pt given reassurance

## 2018-11-14 ENCOUNTER — HOSPITAL ENCOUNTER (INPATIENT)
Facility: OTHER | Age: 48
LOS: 4 days | Discharge: HOME | DRG: 580 | End: 2018-11-18
Attending: PLASTIC SURGERY | Admitting: PLASTIC SURGERY
Payer: COMMERCIAL

## 2018-11-14 ENCOUNTER — HOSPITAL ENCOUNTER (OUTPATIENT)
Dept: RADIOLOGY | Facility: OTHER | Age: 48
Discharge: HOME | DRG: 580 | End: 2018-11-14
Attending: SURGERY | Admitting: PLASTIC SURGERY
Payer: COMMERCIAL

## 2018-11-14 ENCOUNTER — ANESTHESIA (OUTPATIENT)
Dept: SURGERY | Facility: OTHER | Age: 48
DRG: 580 | End: 2018-11-14
Payer: COMMERCIAL

## 2018-11-14 DIAGNOSIS — C50.919 MALIGNANT NEOPLASM OF FEMALE BREAST: Primary | ICD-10-CM

## 2018-11-14 DIAGNOSIS — C50.911 MALIGNANT NEOPLASM OF RIGHT FEMALE BREAST, UNSPECIFIED ESTROGEN RECEPTOR STATUS, UNSPECIFIED SITE OF BREAST: ICD-10-CM

## 2018-11-14 DIAGNOSIS — C50.919 MALIGNANT NEOPLASM OF FEMALE BREAST, UNSPECIFIED ESTROGEN RECEPTOR STATUS, UNSPECIFIED LATERALITY, UNSPECIFIED SITE OF BREAST: ICD-10-CM

## 2018-11-14 PROCEDURE — 27800903 OPTIME MED/SURG SUP & DEVICES OTHER IMPLANTS: Performed by: PLASTIC SURGERY

## 2018-11-14 PROCEDURE — 38900 IO MAP OF SENT LYMPH NODE: CPT | Mod: ,,, | Performed by: SURGERY

## 2018-11-14 PROCEDURE — P9045 ALBUMIN (HUMAN), 5%, 250 ML: HCPCS | Mod: JG | Performed by: NURSE ANESTHETIST, CERTIFIED REGISTERED

## 2018-11-14 PROCEDURE — 25000003 PHARM REV CODE 250: Performed by: PLASTIC SURGERY

## 2018-11-14 PROCEDURE — 36000709 HC OR TIME LEV III EA ADD 15 MIN: Performed by: PLASTIC SURGERY

## 2018-11-14 PROCEDURE — 88332 PATH CONSLTJ SURG EA ADD BLK: CPT | Performed by: PATHOLOGY

## 2018-11-14 PROCEDURE — 20000000 HC ICU ROOM

## 2018-11-14 PROCEDURE — 63600175 PHARM REV CODE 636 W HCPCS: Performed by: NURSE ANESTHETIST, CERTIFIED REGISTERED

## 2018-11-14 PROCEDURE — 88332 PATH CONSLTJ SURG EA ADD BLK: CPT | Mod: 26,,, | Performed by: PATHOLOGY

## 2018-11-14 PROCEDURE — 19307 MAST MOD RAD: CPT | Mod: RT,,, | Performed by: SURGERY

## 2018-11-14 PROCEDURE — 88307 TISSUE EXAM BY PATHOLOGIST: CPT | Mod: 26,,, | Performed by: PATHOLOGY

## 2018-11-14 PROCEDURE — A9541 TC99M SULFUR COLLOID: HCPCS

## 2018-11-14 PROCEDURE — 25000003 PHARM REV CODE 250: Performed by: ANESTHESIOLOGY

## 2018-11-14 PROCEDURE — 07B50ZX EXCISION OF RIGHT AXILLARY LYMPHATIC, OPEN APPROACH, DIAGNOSTIC: ICD-10-PCS | Performed by: SURGERY

## 2018-11-14 PROCEDURE — 25000003 PHARM REV CODE 250: Performed by: NURSE ANESTHETIST, CERTIFIED REGISTERED

## 2018-11-14 PROCEDURE — 38792 RA TRACER ID OF SENTINL NODE: CPT

## 2018-11-14 PROCEDURE — 21600 PARTIAL REMOVAL OF RIB: CPT | Mod: ,,, | Performed by: PLASTIC SURGERY

## 2018-11-14 PROCEDURE — S2068 BREAST DIEP OR SIEA FLAP: HCPCS | Mod: ,,, | Performed by: PLASTIC SURGERY

## 2018-11-14 PROCEDURE — 25000003 PHARM REV CODE 250: Performed by: STUDENT IN AN ORGANIZED HEALTH CARE EDUCATION/TRAINING PROGRAM

## 2018-11-14 PROCEDURE — 19303 MAST SIMPLE COMPLETE: CPT | Mod: 59,LT,, | Performed by: SURGERY

## 2018-11-14 PROCEDURE — C1729 CATH, DRAINAGE: HCPCS | Performed by: PLASTIC SURGERY

## 2018-11-14 PROCEDURE — 21600 PARTIAL REMOVAL OF RIB: CPT | Mod: 80,,, | Performed by: PLASTIC SURGERY

## 2018-11-14 PROCEDURE — 0HRV077 REPLACEMENT OF BILATERAL BREAST USING DEEP INFERIOR EPIGASTRIC ARTERY PERFORATOR FLAP, OPEN APPROACH: ICD-10-PCS | Performed by: PLASTIC SURGERY

## 2018-11-14 PROCEDURE — S2068 BREAST DIEP OR SIEA FLAP: HCPCS | Mod: 80,,, | Performed by: PLASTIC SURGERY

## 2018-11-14 PROCEDURE — 37000009 HC ANESTHESIA EA ADD 15 MINS: Performed by: PLASTIC SURGERY

## 2018-11-14 PROCEDURE — 36000708 HC OR TIME LEV III 1ST 15 MIN: Performed by: PLASTIC SURGERY

## 2018-11-14 PROCEDURE — 37000008 HC ANESTHESIA 1ST 15 MINUTES: Performed by: PLASTIC SURGERY

## 2018-11-14 PROCEDURE — 15860 IV NJX TST VASC FLO FLAP/GRF: CPT | Mod: ,,, | Performed by: PLASTIC SURGERY

## 2018-11-14 PROCEDURE — 63600175 PHARM REV CODE 636 W HCPCS: Performed by: STUDENT IN AN ORGANIZED HEALTH CARE EDUCATION/TRAINING PROGRAM

## 2018-11-14 PROCEDURE — 88331 PATH CONSLTJ SURG 1 BLK 1SPC: CPT | Mod: 26,,, | Performed by: PATHOLOGY

## 2018-11-14 PROCEDURE — 27201423 OPTIME MED/SURG SUP & DEVICES STERILE SUPPLY: Performed by: PLASTIC SURGERY

## 2018-11-14 PROCEDURE — 63600175 PHARM REV CODE 636 W HCPCS: Performed by: PLASTIC SURGERY

## 2018-11-14 DEVICE — COUPLER 2.0MM: Type: IMPLANTABLE DEVICE | Site: BREAST | Status: FUNCTIONAL

## 2018-11-14 DEVICE — COUPLER MICROVAS ANSTMS 3.0MM: Type: IMPLANTABLE DEVICE | Site: BREAST | Status: FUNCTIONAL

## 2018-11-14 DEVICE — COUPLER MICROVAS ANSTMS 2.5MM: Type: IMPLANTABLE DEVICE | Site: BREAST | Status: FUNCTIONAL

## 2018-11-14 RX ORDER — SODIUM CHLORIDE, SODIUM LACTATE, POTASSIUM CHLORIDE, CALCIUM CHLORIDE 600; 310; 30; 20 MG/100ML; MG/100ML; MG/100ML; MG/100ML
INJECTION, SOLUTION INTRAVENOUS CONTINUOUS
Status: DISCONTINUED | OUTPATIENT
Start: 2018-11-14 | End: 2018-11-14

## 2018-11-14 RX ORDER — HEPARIN SODIUM 5000 [USP'U]/ML
5000 INJECTION, SOLUTION INTRAVENOUS; SUBCUTANEOUS ONCE
Status: COMPLETED | OUTPATIENT
Start: 2018-11-14 | End: 2018-11-14

## 2018-11-14 RX ORDER — FAMOTIDINE 20 MG/1
20 TABLET, FILM COATED ORAL
Status: COMPLETED | OUTPATIENT
Start: 2018-11-14 | End: 2018-11-14

## 2018-11-14 RX ORDER — CEFAZOLIN SODIUM 2 G/50ML
2 SOLUTION INTRAVENOUS
Status: COMPLETED | OUTPATIENT
Start: 2018-11-14 | End: 2018-11-15

## 2018-11-14 RX ORDER — ONDANSETRON 2 MG/ML
4 INJECTION INTRAMUSCULAR; INTRAVENOUS DAILY PRN
Status: DISCONTINUED | OUTPATIENT
Start: 2018-11-14 | End: 2018-11-14 | Stop reason: HOSPADM

## 2018-11-14 RX ORDER — ROCURONIUM BROMIDE 10 MG/ML
INJECTION, SOLUTION INTRAVENOUS
Status: DISCONTINUED | OUTPATIENT
Start: 2018-11-14 | End: 2018-11-14

## 2018-11-14 RX ORDER — MORPHINE SULFATE 10 MG/ML
2 INJECTION INTRAMUSCULAR; INTRAVENOUS; SUBCUTANEOUS EVERY 4 HOURS PRN
Status: DISCONTINUED | OUTPATIENT
Start: 2018-11-14 | End: 2018-11-18 | Stop reason: HOSPADM

## 2018-11-14 RX ORDER — ALBUMIN HUMAN 50 G/1000ML
SOLUTION INTRAVENOUS CONTINUOUS PRN
Status: DISCONTINUED | OUTPATIENT
Start: 2018-11-14 | End: 2018-11-14

## 2018-11-14 RX ORDER — ACETAMINOPHEN 10 MG/ML
INJECTION, SOLUTION INTRAVENOUS
Status: DISCONTINUED | OUTPATIENT
Start: 2018-11-14 | End: 2018-11-14

## 2018-11-14 RX ORDER — OXYCODONE HYDROCHLORIDE 5 MG/1
5 TABLET ORAL
Status: DISCONTINUED | OUTPATIENT
Start: 2018-11-14 | End: 2018-11-14 | Stop reason: HOSPADM

## 2018-11-14 RX ORDER — LIDOCAINE HYDROCHLORIDE AND EPINEPHRINE 10; 10 MG/ML; UG/ML
INJECTION, SOLUTION INFILTRATION; PERINEURAL
Status: DISCONTINUED | OUTPATIENT
Start: 2018-11-14 | End: 2018-11-14 | Stop reason: HOSPADM

## 2018-11-14 RX ORDER — PROPOFOL 10 MG/ML
VIAL (ML) INTRAVENOUS
Status: DISCONTINUED | OUTPATIENT
Start: 2018-11-14 | End: 2018-11-14

## 2018-11-14 RX ORDER — DEXAMETHASONE SODIUM PHOSPHATE 4 MG/ML
INJECTION, SOLUTION INTRA-ARTICULAR; INTRALESIONAL; INTRAMUSCULAR; INTRAVENOUS; SOFT TISSUE
Status: DISCONTINUED | OUTPATIENT
Start: 2018-11-14 | End: 2018-11-14

## 2018-11-14 RX ORDER — BACITRACIN 50000 [IU]/1
INJECTION, POWDER, FOR SOLUTION INTRAMUSCULAR
Status: DISCONTINUED | OUTPATIENT
Start: 2018-11-14 | End: 2018-11-14 | Stop reason: HOSPADM

## 2018-11-14 RX ORDER — PAPAVERINE HYDROCHLORIDE 30 MG/ML
INJECTION INTRAMUSCULAR; INTRAVENOUS
Status: DISCONTINUED | OUTPATIENT
Start: 2018-11-14 | End: 2018-11-14 | Stop reason: HOSPADM

## 2018-11-14 RX ORDER — MIDAZOLAM HYDROCHLORIDE 1 MG/ML
INJECTION INTRAMUSCULAR; INTRAVENOUS
Status: DISCONTINUED | OUTPATIENT
Start: 2018-11-14 | End: 2018-11-14

## 2018-11-14 RX ORDER — HEPARIN SODIUM 10000 [USP'U]/ML
INJECTION, SOLUTION INTRAVENOUS; SUBCUTANEOUS
Status: DISCONTINUED | OUTPATIENT
Start: 2018-11-14 | End: 2018-11-14 | Stop reason: HOSPADM

## 2018-11-14 RX ORDER — FENTANYL CITRATE 50 UG/ML
25 INJECTION, SOLUTION INTRAMUSCULAR; INTRAVENOUS EVERY 5 MIN PRN
Status: DISCONTINUED | OUTPATIENT
Start: 2018-11-14 | End: 2018-11-14 | Stop reason: HOSPADM

## 2018-11-14 RX ORDER — MEPERIDINE HYDROCHLORIDE 50 MG/ML
12.5 INJECTION INTRAMUSCULAR; INTRAVENOUS; SUBCUTANEOUS ONCE AS NEEDED
Status: DISCONTINUED | OUTPATIENT
Start: 2018-11-14 | End: 2018-11-14 | Stop reason: HOSPADM

## 2018-11-14 RX ORDER — INDOCYANINE GREEN AND WATER 25 MG
KIT INJECTION
Status: DISCONTINUED | OUTPATIENT
Start: 2018-11-14 | End: 2018-11-14

## 2018-11-14 RX ORDER — FENTANYL CITRATE 50 UG/ML
INJECTION, SOLUTION INTRAMUSCULAR; INTRAVENOUS
Status: DISCONTINUED | OUTPATIENT
Start: 2018-11-14 | End: 2018-11-14

## 2018-11-14 RX ORDER — SODIUM CHLORIDE 0.9 % (FLUSH) 0.9 %
3 SYRINGE (ML) INJECTION
Status: DISCONTINUED | OUTPATIENT
Start: 2018-11-14 | End: 2018-11-15

## 2018-11-14 RX ORDER — LIDOCAINE HYDROCHLORIDE 10 MG/ML
0.5 INJECTION, SOLUTION EPIDURAL; INFILTRATION; INTRACAUDAL; PERINEURAL ONCE
Status: DISCONTINUED | OUTPATIENT
Start: 2018-11-14 | End: 2018-11-14

## 2018-11-14 RX ORDER — CETIRIZINE HYDROCHLORIDE 10 MG/1
10 TABLET ORAL DAILY
Status: DISCONTINUED | OUTPATIENT
Start: 2018-11-15 | End: 2018-11-18 | Stop reason: HOSPADM

## 2018-11-14 RX ORDER — HETASTARCH 6 G/100ML
INJECTION, SOLUTION INTRAVENOUS CONTINUOUS PRN
Status: DISCONTINUED | OUTPATIENT
Start: 2018-11-14 | End: 2018-11-14

## 2018-11-14 RX ORDER — ONDANSETRON 8 MG/1
8 TABLET, ORALLY DISINTEGRATING ORAL EVERY 8 HOURS PRN
Status: DISCONTINUED | OUTPATIENT
Start: 2018-11-14 | End: 2018-11-18 | Stop reason: HOSPADM

## 2018-11-14 RX ORDER — ASPIRIN 325 MG
325 TABLET ORAL ONCE
Status: COMPLETED | OUTPATIENT
Start: 2018-11-14 | End: 2018-11-14

## 2018-11-14 RX ORDER — HYDROCODONE BITARTRATE AND ACETAMINOPHEN 5; 325 MG/1; MG/1
1 TABLET ORAL EVERY 4 HOURS PRN
Status: DISCONTINUED | OUTPATIENT
Start: 2018-11-14 | End: 2018-11-18 | Stop reason: HOSPADM

## 2018-11-14 RX ORDER — ONDANSETRON 2 MG/ML
INJECTION INTRAMUSCULAR; INTRAVENOUS
Status: DISCONTINUED | OUTPATIENT
Start: 2018-11-14 | End: 2018-11-14

## 2018-11-14 RX ORDER — SODIUM CHLORIDE 0.9 % (FLUSH) 0.9 %
3 SYRINGE (ML) INJECTION
Status: DISCONTINUED | OUTPATIENT
Start: 2018-11-14 | End: 2018-11-18 | Stop reason: HOSPADM

## 2018-11-14 RX ORDER — HYDROCODONE BITARTRATE AND ACETAMINOPHEN 10; 325 MG/1; MG/1
1 TABLET ORAL EVERY 4 HOURS PRN
Status: DISCONTINUED | OUTPATIENT
Start: 2018-11-14 | End: 2018-11-18 | Stop reason: HOSPADM

## 2018-11-14 RX ORDER — CEFAZOLIN SODIUM 1 G/3ML
INJECTION, POWDER, FOR SOLUTION INTRAMUSCULAR; INTRAVENOUS
Status: DISCONTINUED | OUTPATIENT
Start: 2018-11-14 | End: 2018-11-14

## 2018-11-14 RX ORDER — LIDOCAINE HYDROCHLORIDE 10 MG/ML
1 INJECTION, SOLUTION EPIDURAL; INFILTRATION; INTRACAUDAL; PERINEURAL ONCE
Status: DISCONTINUED | OUTPATIENT
Start: 2018-11-14 | End: 2018-11-14

## 2018-11-14 RX ORDER — PHENYLEPHRINE HYDROCHLORIDE 10 MG/ML
INJECTION INTRAVENOUS
Status: DISCONTINUED | OUTPATIENT
Start: 2018-11-14 | End: 2018-11-14

## 2018-11-14 RX ORDER — SODIUM CHLORIDE, SODIUM LACTATE, POTASSIUM CHLORIDE, CALCIUM CHLORIDE 600; 310; 30; 20 MG/100ML; MG/100ML; MG/100ML; MG/100ML
INJECTION, SOLUTION INTRAVENOUS CONTINUOUS
Status: DISCONTINUED | OUTPATIENT
Start: 2018-11-14 | End: 2018-11-15

## 2018-11-14 RX ORDER — GLYCOPYRROLATE 0.2 MG/ML
INJECTION INTRAMUSCULAR; INTRAVENOUS
Status: DISCONTINUED | OUTPATIENT
Start: 2018-11-14 | End: 2018-11-14

## 2018-11-14 RX ORDER — LIDOCAINE HCL/PF 100 MG/5ML
SYRINGE (ML) INTRAVENOUS
Status: DISCONTINUED | OUTPATIENT
Start: 2018-11-14 | End: 2018-11-14

## 2018-11-14 RX ORDER — ACETAMINOPHEN 325 MG/1
650 TABLET ORAL EVERY 4 HOURS PRN
Status: DISCONTINUED | OUTPATIENT
Start: 2018-11-14 | End: 2018-11-18 | Stop reason: HOSPADM

## 2018-11-14 RX ORDER — NEOSTIGMINE METHYLSULFATE 1 MG/ML
INJECTION, SOLUTION INTRAVENOUS
Status: DISCONTINUED | OUTPATIENT
Start: 2018-11-14 | End: 2018-11-14

## 2018-11-14 RX ADMIN — ALBUMIN (HUMAN): 2.5 SOLUTION INTRAVENOUS at 10:11

## 2018-11-14 RX ADMIN — PROPOFOL 100 MG: 10 INJECTION, EMULSION INTRAVENOUS at 08:11

## 2018-11-14 RX ADMIN — PHENYLEPHRINE HYDROCHLORIDE 200 MCG: 10 INJECTION INTRAVENOUS at 02:11

## 2018-11-14 RX ADMIN — PHENYLEPHRINE HYDROCHLORIDE 100 MCG: 10 INJECTION INTRAVENOUS at 04:11

## 2018-11-14 RX ADMIN — LIDOCAINE HYDROCHLORIDE 100 MG: 20 INJECTION, SOLUTION INTRAVENOUS at 08:11

## 2018-11-14 RX ADMIN — ROCURONIUM BROMIDE 20 MG: 10 INJECTION INTRAVENOUS at 01:11

## 2018-11-14 RX ADMIN — PROPOFOL 100 MG: 10 INJECTION, EMULSION INTRAVENOUS at 09:11

## 2018-11-14 RX ADMIN — FAMOTIDINE 20 MG: 20 TABLET ORAL at 05:11

## 2018-11-14 RX ADMIN — ONDANSETRON 4 MG: 2 INJECTION INTRAMUSCULAR; INTRAVENOUS at 08:11

## 2018-11-14 RX ADMIN — CEFAZOLIN SODIUM 2 G: 2 SOLUTION INTRAVENOUS at 08:11

## 2018-11-14 RX ADMIN — PHENYLEPHRINE HYDROCHLORIDE 100 MCG: 10 INJECTION INTRAVENOUS at 05:11

## 2018-11-14 RX ADMIN — PHENYLEPHRINE HYDROCHLORIDE 200 MCG: 10 INJECTION INTRAVENOUS at 04:11

## 2018-11-14 RX ADMIN — SODIUM CHLORIDE, SODIUM LACTATE, POTASSIUM CHLORIDE, AND CALCIUM CHLORIDE: 600; 310; 30; 20 INJECTION, SOLUTION INTRAVENOUS at 12:11

## 2018-11-14 RX ADMIN — ROCURONIUM BROMIDE 10 MG: 10 INJECTION INTRAVENOUS at 10:11

## 2018-11-14 RX ADMIN — ROCURONIUM BROMIDE 10 MG: 10 INJECTION INTRAVENOUS at 09:11

## 2018-11-14 RX ADMIN — CEFAZOLIN 2 G: 330 INJECTION, POWDER, FOR SOLUTION INTRAMUSCULAR; INTRAVENOUS at 03:11

## 2018-11-14 RX ADMIN — HETASTARCH IN SODIUM CHLORIDE: 6; .9 INJECTION, SOLUTION INTRAVENOUS at 04:11

## 2018-11-14 RX ADMIN — MIDAZOLAM HYDROCHLORIDE 2 MG: 1 INJECTION, SOLUTION INTRAMUSCULAR; INTRAVENOUS at 09:11

## 2018-11-14 RX ADMIN — ROCURONIUM BROMIDE 20 MG: 10 INJECTION INTRAVENOUS at 03:11

## 2018-11-14 RX ADMIN — GLYCOPYRROLATE 0.8 MG: 0.2 INJECTION, SOLUTION INTRAMUSCULAR; INTRAVENOUS at 07:11

## 2018-11-14 RX ADMIN — ROCURONIUM BROMIDE 30 MG: 10 INJECTION INTRAVENOUS at 02:11

## 2018-11-14 RX ADMIN — MIDAZOLAM HYDROCHLORIDE 2 MG: 1 INJECTION, SOLUTION INTRAMUSCULAR; INTRAVENOUS at 02:11

## 2018-11-14 RX ADMIN — HYDROCODONE BITARTRATE AND ACETAMINOPHEN 1 TABLET: 10; 325 TABLET ORAL at 09:11

## 2018-11-14 RX ADMIN — PHENYLEPHRINE HYDROCHLORIDE 200 MCG: 10 INJECTION INTRAVENOUS at 01:11

## 2018-11-14 RX ADMIN — ALBUMIN (HUMAN): 2.5 SOLUTION INTRAVENOUS at 09:11

## 2018-11-14 RX ADMIN — MIDAZOLAM HYDROCHLORIDE 2 MG: 1 INJECTION, SOLUTION INTRAMUSCULAR; INTRAVENOUS at 12:11

## 2018-11-14 RX ADMIN — FENTANYL CITRATE 100 MCG: 50 INJECTION, SOLUTION INTRAMUSCULAR; INTRAVENOUS at 06:11

## 2018-11-14 RX ADMIN — GLYCOPYRROLATE 0.2 MG: 0.2 INJECTION, SOLUTION INTRAMUSCULAR; INTRAVENOUS at 08:11

## 2018-11-14 RX ADMIN — NEOSTIGMINE METHYLSULFATE 5 MG: 1 INJECTION INTRAVENOUS at 07:11

## 2018-11-14 RX ADMIN — PROPOFOL 50 MG: 10 INJECTION, EMULSION INTRAVENOUS at 03:11

## 2018-11-14 RX ADMIN — PROPOFOL 200 MG: 10 INJECTION, EMULSION INTRAVENOUS at 08:11

## 2018-11-14 RX ADMIN — ALBUMIN (HUMAN): 2.5 SOLUTION INTRAVENOUS at 01:11

## 2018-11-14 RX ADMIN — CEFAZOLIN 2 G: 330 INJECTION, POWDER, FOR SOLUTION INTRAMUSCULAR; INTRAVENOUS at 01:11

## 2018-11-14 RX ADMIN — FENTANYL CITRATE 150 MCG: 50 INJECTION, SOLUTION INTRAMUSCULAR; INTRAVENOUS at 08:11

## 2018-11-14 RX ADMIN — SODIUM CHLORIDE, SODIUM LACTATE, POTASSIUM CHLORIDE, AND CALCIUM CHLORIDE: 600; 310; 30; 20 INJECTION, SOLUTION INTRAVENOUS at 11:11

## 2018-11-14 RX ADMIN — CARBOXYMETHYLCELLULOSE SODIUM 2 DROP: 2.5 SOLUTION/ DROPS OPHTHALMIC at 08:11

## 2018-11-14 RX ADMIN — MORPHINE SULFATE 2 MG: 10 INJECTION INTRAVENOUS at 08:11

## 2018-11-14 RX ADMIN — DEXAMETHASONE SODIUM PHOSPHATE 8 MG: 4 INJECTION, SOLUTION INTRAMUSCULAR; INTRAVENOUS at 08:11

## 2018-11-14 RX ADMIN — CEFAZOLIN 2 G: 330 INJECTION, POWDER, FOR SOLUTION INTRAMUSCULAR; INTRAVENOUS at 08:11

## 2018-11-14 RX ADMIN — SODIUM CHLORIDE, SODIUM LACTATE, POTASSIUM CHLORIDE, AND CALCIUM CHLORIDE: 600; 310; 30; 20 INJECTION, SOLUTION INTRAVENOUS at 07:11

## 2018-11-14 RX ADMIN — INDOCYANINE GREEN 20 MG: KIT INTRAVENOUS at 05:11

## 2018-11-14 RX ADMIN — HEPARIN SODIUM 5000 UNITS: 5000 INJECTION, SOLUTION INTRAVENOUS; SUBCUTANEOUS at 05:11

## 2018-11-14 RX ADMIN — FENTANYL CITRATE 100 MCG: 50 INJECTION, SOLUTION INTRAMUSCULAR; INTRAVENOUS at 08:11

## 2018-11-14 RX ADMIN — ALBUMIN (HUMAN): 2.5 SOLUTION INTRAVENOUS at 02:11

## 2018-11-14 RX ADMIN — MIDAZOLAM HYDROCHLORIDE 2 MG: 1 INJECTION, SOLUTION INTRAMUSCULAR; INTRAVENOUS at 08:11

## 2018-11-14 RX ADMIN — SODIUM CHLORIDE, SODIUM LACTATE, POTASSIUM CHLORIDE, AND CALCIUM CHLORIDE: .6; .31; .03; .02 INJECTION, SOLUTION INTRAVENOUS at 07:11

## 2018-11-14 RX ADMIN — ROCURONIUM BROMIDE 40 MG: 10 INJECTION INTRAVENOUS at 12:11

## 2018-11-14 RX ADMIN — ASPIRIN 325 MG ORAL TABLET 325 MG: 325 PILL ORAL at 08:11

## 2018-11-14 RX ADMIN — ROCURONIUM BROMIDE 20 MG: 10 INJECTION INTRAVENOUS at 04:11

## 2018-11-14 RX ADMIN — ACETAMINOPHEN 1000 MG: 10 INJECTION, SOLUTION INTRAVENOUS at 07:11

## 2018-11-14 RX ADMIN — ROCURONIUM BROMIDE 35 MG: 10 INJECTION INTRAVENOUS at 08:11

## 2018-11-14 RX ADMIN — ROCURONIUM BROMIDE 5 MG: 10 INJECTION INTRAVENOUS at 09:11

## 2018-11-14 NOTE — H&P
Plastic Surgery H&P Update    No changes in medical history since the patient was last seen in clinic on 11/12/2018. To OR for bilateral breast reconstruction with deep inferior epigastric  flap reconstruction from the abdomen, possible transverse rectus abdominis free flap reconstruction.         Maciel Garcia MD  Plastic Surgery PGY-4  [phone]

## 2018-11-14 NOTE — OP NOTE
Operative Note     11/14/2018    PRE-OP DIAGNOSIS: Malignant neoplasm of female breast, unspecified estrogen receptor status, unspecified laterality, unspecified site of breast [C50.919]      POST-OP DIAGNOSIS: Post-Op Diagnosis Codes:     * Malignant neoplasm of female breast, unspecified estrogen receptor status, unspecified laterality, unspecified site of breast [C50.919]    Procedure(s):  RECONSTRUCTION, BREAST, USING MEGAN FREE FLAP  MASTECTOMY bilateral skin sparing  SENTINEL NODE BIOPSY right axillary  ID of SLN  Injection of radioactive technetium colloid and isosulfan blue dye for SLN  Completion axillary dissection     SURGEON: Surgeon(s) and Role:  Panel 1:     * Tye Chapman MD - Primary     * Kevin Nelson MD - Assisting  Panel 2:     * Marga Cardenas MD - Primary    ANESTHESIA: General     OPERATIVE FINDINGS: 2 SLN, count 111 and 61.  #1 positive for carcinoma so completion dissection performed.    INDICATION FOR PROCEDURE: This patient presents with a history of cancer of the right breast    PROCEDURE IN DETAIL:  Brittny Urias is a 48 y.o. female brought to the operating room for definitive surgery of cancer of the right breast.  The patient has elected to undergo bilateral simple mastectomy with sentinel lymph node biopsy for tylor assessment. The patient was informed of the possible risks and complications of the procedure, including but not limited to anesthetic risks, bleeding, infection, and need for additional surgery.  The patient concurred with the proposed plan, and has given informed consent.  The site of surgery was properly noted/marked in the preoperative holding area.    Prior to arriving in the operating room, the patient's right breast was injected with technetium to facilitate sentinel lymph node identification. The patient was then brought to the operating room and placed in the supine position with both upper extremities extended.  general anesthesia was administered.  Perioperative antibiotics were administered consisting of Ancef and a time out was performed confirming the patient, site, and procedure.  The bilateral chest and axilla was then prepped and draped in the usual sterile fashion.    We first turned our attention to the left prophylactic breast where a circumareolar incision was made to incorporate the nipple areolar complex.  The incision was made with a plasmablade and deepened through the subcutaneous tissues with plasmablade.  Skin flaps were raised to the clavicle superiorly, to the lateral border of the sternum medially, to the inframammary fold inferiorly, and to the anterior border of the latissimus dorsi muscle laterally. The breast tissue was sharply excised off the chest wall taking care to incorporate the pectoralis fascia while leaving the serratus fascia behind.  The resulting mastectomy specimen was marked using a short stitch superiorly and a long stitch laterally.  The breast was sent to pathology for permanent evaluation.   The operative field was irrigated with normal saline and all bleeding points were secured with plasmablade. The incision will be closed by the plastic surgery service.      We then turned our attention to the right breast where a circumareolar incision was fashioned to incorporate the nipple areolar complex.  The incision was made with a plasmablade and extended through the subcutaneous tissues with plasmablade.  Skin flaps were raised to the clavicle superiorly.  We then  turned our attention to the right axilla.  A small incision was made in the axilla. Blue dye was injected prior to the incision in the usual subareolar fashion. The gamma probe was used to identify an area of increased radioactivity within the lower axilla. The clavipectoral sheath was sharply incised to reveal the level I axillary lymph nodes. The probe was used to identify a single node with increased radioactivity.  This node was brought into the operative  field and carefully dissected free of the surrounding lymphovascular structures.  The highest ex vivo count of the node was 111.  The node was then sent to pathology for frozen section evaluation, labeled as sentinel node #1.  A total of 2 axillary sentinel nodes and 0 axillary non-sentinel nodes were identified, excised and submitted to pathology.  Bed counts were obtained to confirm that the 10% rule had not been violated.   The wound was irrigated with normal saline, and all bleeding points were secured with plasmablade.     We then proceeded to raise the remainder of the flaps to the lateral border of the sternum medially, to the inframammary fold inferiorly, and to the anterior border of the latissimus dorsi muscle laterally. The breast tissue was sharply excised off the chest wall taking care to incorporate the pectoralis fascia while leaving the serratus fascia behind.  The resulting mastectomy specimen was marked using a short stitch superiorly and long stitch laterally.  The breast was sent to pathology for permanent evaluation.      Frozen section tylor evaluation revealed evidence of metastatic disease.  Therefore,an axillary dissection was performed.   An axillary dissection was performed with removal of the associated lymph nodes and surrounding adipose tissue. This included levels I and II. This was accomplished by exposing the axillary vein superiorly. Small venous tributaries, lymphatics, and vessels were clipped and ligated or cauterized and divided. The subscapularis muscle was skeletonized. The long thoracic and thoracodorsal neurovascular bundles were identified and preserved. The tissue between the long thoracic and thoracodorsal bundle was removed.  Of note, at the end of the dissection, level 3 nodes were palpated and no abnormal nodes were noted. The specimen was submitted to pathology. The wound was irrigated. The incision will be closed by the plastic surgery service.      At the end of the  operation, all sponge, instrument, and needle counts x 2 were correct.    ESTIMATED BLOOD LOSS: less than 50 mL    COMPLICATIONS: none    DISPOSITION: intraop transfer to plastic surgery    ATTESTATION:   I was present and scrubbed for the entire procedure.

## 2018-11-14 NOTE — OP NOTE
OPERATIVE REPORT    Date of Service 11/14/2018  MRN [id number]  Patient Name Brittny Urias    SURGEON: Tye Chapman MD    ASSISTANT: Kevin Nelson MD    FELLOW: Karey Donaldson MD    RESIDENT: Maciel Garcia MD  The resident is not qualified to act as assistant in this case.    PRE-OPERATIVE DIAGNOSIS  1. Right Breast Cancer  2. Breast Hypertrophy, Bilateral    POST-OPERATIVE DIAGNOSIS  1. Right Breast Cancer  2. Breast Hypertrophy, Bilateral    PROCEDURE  1.  BILATERAL MICROVASCULAR FREE MEGAN FLAPS FOR IMMEDIATE BREAST RECONSTRUCTION  2.  RIB RESECTION, PARTIAL, BILATERAL  3.  ICG ANGIOGRAPHY    ANESTHESIA: GENERAL, ENDOTRACHEAL.    Micro:  Arterial anastomosis: Right: Deep inf epigastric a to LIZA antegrade 2.5 mm  Arterial anastomosis: Left: Deep inf epigastric a to LIZA antegrade 2.5 mm  Venous anastomosis: Right: DIEV to IMV antegrade 2.5 mm, DIEV to 2nd IMV antegrade 2.0 mm  Venous anastomosis: Left: DIEV to IMV antegrade 2.5 mm, DIEV to 2nd IMV antegrade 2.5 mm  Right flap weight: 830 gm  Left flap weight: 820 gm  Right mastectomy weight: 745 gm   Left mastectomy weight: 789 gm    Implant Name Type Inv. Item Serial No.  Lot No. LRB No. Used    MICROVAS ANSTMS 2.5MM - [id number]   MICROVAS ANSTMS 2.5MM  [employer] [id number] Right 1    2.0MM - [id number]   2.0MM  [employer] [id number] Right 1    MICROVAS ANSTMS 3.0MM - [id number]   MICROVAS ANSTMS 3.0MM  [employer] [id number] Left 2    MICROVAS ANSTMS 2.5MM - [id number]   MICROVAS ANSTMS 2.5MM  [employer] [id number] Left 1    MICROVAS ANSTMS 2.5MM - [id number]   MICROVAS ANSTMS 2.5MM  [employer] [id number] Left 1       INTAKE / OUTPUT  Estimated blood loss: 200  mL  Urine output: 1500 mL  Fluid Replacement: 2500 mL LR, 1 L Albumin    SPECIMENS:   Mastectomy specimens, bilateral  Axillary  lymph nodes, right    CULTURES: NONE    DRAINS:  - ABDOMEN: 19 FR RADHA X 2  - BREAST: 19 FR RADHA x2   Bladder indwelling    COMPLICATIONS: None.    COUNTS: Sponge and needle counts correct. Radiofrequency negative    DISPOSITION: Extubated to postanesthesia care unit.    INDICATIONS  Brittny Urias is a 48 y.o. female with right breast cancer now presenting for bilateral mastectomy and reconstruction.  Options for treatment were discussed including implant-based and autologous options. She preferred autologous tissue from the abdomen; pre-operative imaging demonstrated good blood supply off the deep inferior epigastric vessels and plan was made for bilateral MEGAN flap reconstruction. The risks, benefits, alternatives, expected outcomes, recovery time, and potential morbidity were discussed and the patient wished to proceed. Informed consent was obtained.    OPERATIVE PROCEDURE  The patient was met in the preoperative holding area. The patient denied an interval change in her health and review of preoperative screening tests was normal. The operative sites were marked including sternal midline, IMF, and abdominal flap outline. Perforators were marked based on pre-operative mapping from the abdominal imaging study.    The patient was then taken to the operating room and a surgical time-out verified her identity, diagnosis, nature and sites of procedures. Necessary equipment was verified. The patient was given 2 grams cefazolin intravenously for antimicrobial prophylaxis; this was re-dosed every 6 hours throughout the case. SCD boots were placed. After induction of anesthesia and orotracheal intubation an indwelling bladder catheter was placed. The entire chest, both upper extremities and the abdomen were sterilely prepped and draped.    Markings were reconfirmed. Local with 1% lidocaine and 1:100,000 epinephrine solution was injected into all incision sites. The breast surgery team then performed bilateral  immediate skin sparing mastectomy with sentinel lymph node biopsy.  The mastectomy specimens were each oriented and weighed.  MEGAN harvest from the abdomen proceeded simultaneously.    MEGAN flap harvest proceeded with incision through the lower abdominal skin and subcutaneous tissue. SIEVs were dissected for several cm close to their origin and clipped with hemoclips. Circumflex iliac and SIEA vessels were ligated bilaterally. Dissection was carried down to fascia. Incision was then made along the upper portion of the flap bilaterally with slight beveling to capture additional subcutaneous fat. The superior abdominal wall flap was elevated to the xiphoid superiorly and to the rib margin laterally with careful attention to preserve lateral perforators.    The flap harvest then proceeded.  Lateral row perforators were identified. The umbilicus was released and midline incised.  Medial row perforators approached.  One dominant central  was chosen for harvest of the left flap, including the dominant  identified on pre-op imaging.  A fascial incision was made following the pedicle inferiorly.   dissection was then carried out, maintaining perforators of choice.  Dissection was carried back to the origin of the DIEA/DIEV. The health of the flap was confirmed by doppler signal, capillary refill and robust bleeding from the edge.    Perforators of the right flap were then similarly dissected.  Lateral row followed by medial row perforators were identified; 2 medial row perforators including the dominant  were chosen for flap harvest. A fascial incision was made following the pedicle inferiorly.  dissection was then carried out, maintaining perforators of choice.  Dissection was carried back to the origin of the DIEA/DIEV. The health of the flap was confirmed.     In the chest, the mastectomy pockets were irrigated and hemostasis achieved. The pocket was opened to clavicle  superiorly, anterior axillary line laterally, and IMF inferiorly. The right chest internal mammary vessels were isolated followed by the left. First, the 3rd rib was identified. Electrocautery was used to dissect through the pectoralis muscle to create a superior flap of muscle. The 3rd rib was exposed and perichondrium incised. The perichondrium was then elevated off the cartilaginous portion with a freer elevator. Bovie was used to dissect intercostal muscle free from the 2nd and 4th ribs superiorly and inferiorly along the length of the cartilaginous portion of the ribs. The cartilaginous portion of the 3rd rib was removed leaving intact perichondrium. The perichondrium was split along its length to identify the internal mammary vessels which were healthy and intact. Bipolar cautery was then used to dissect remaining intercostal muscle fibers away from the course of the vessels superiorly and inferiorly. Perforating branches off the LIZA/IMV were carefully ligated with hemoclips. Once the course of the vessels was free, remaining dissection was performed under an operating microscope. Vessels were cleaned of surrounding fat and prepared for microsurgical anastomosis.    The operating microscope was then brought into the field and chest vessels prepared. Vascular clamps were used to clamp the proximal IMV and LIZA and the distal end of the LIZA was clipped and transected. The distal IMV was clamped and preserved as a possible retrograde system. The IMV was transected in the distal midportion. The left flap was harvested and brought to the right chest. The pedicle was dissected and vessels  for a few cm. The lay of the pedicle was verified to be free of kink or twist. Venous anastomosis was then performed from the DIEV to the antegrade IMV using a 2.5 mm venous  and second venous anastomosis from the DIEV to antegrade IMV using a second 2.0 mm . The arterial anastomosis was sewn using  interrupted 9-0 nylon. Vascular clamps were removed and excellent flow confirmed. The distal IMV was clipped. The flap was inset in a few places. The right flap was then harvested and brought to the left chest. The pedicle was dissected and vessels  for a few cm. The lay of the pedicle was verified to be free of kink or twist. Two antegrade venous anastomoses were then performed using 2.5 mm venous couplers. The arterial anastomosis was sewn using interrupted 9-0 nylon. Vascular clamps were removed and excellent flow confirmed.    The flaps were then shaped and inset.  Each flap was rotated 90 degrees, setting the area of zone 4 as the superior pole. Excess tissue was removed from zone 4. Partial de-epithelialization was performed. 2-0 vicryl sutures were used to inset the flaps along the medial and lateral breast border, IMF, and superior pole.  The native skin envelope of the breast was contoured to the flap by tailor-tacking.  ICG angiography was performed after intravenous injection of ICG.  The vascularity of both flaps and mastectomy skin was imaged and appeared healthy with excellent blood supply.  Exparel was infiltrated for local intercostal block and along incisions. 19 Fr matthew drains were placed. The skin of the flap was closed to native skin with interrupted 3-0 monocryl followed by running 4-0 monocryl. Dopplerable arterial and venous signals were confirmed and marked with 5-0 prolene.    The abdomen was closed simultaneously. Hemostasis was achieved. Muscle fibers were reapproximated with 2-0 polysorb as needed.  0-polysorb suture was used to closed fascia followed by running 2-0 PDS. Rectus diastasis of the upper abdominal wall was repaired with 0-vicryl followed by 2-0 PDS.  Progressive tension sutures were placed with 2-0 Vicryl.  Irrigation was performed. The back and legs of the bed were brought up and abdomen was closed. Sharon's layer was approximated with 2-0 vicryl followed by  interrupted and running 3-0 monocryl for deep dermal closure. Bilateral 19 Fr Zia drains were placed. The umbilicus was re-inset. Sterile dressings were applied. Doppler signals were reconfirmed.    The patient was then awakened, extubated, and transferred to recovery in good condition.    Electronically signed by:  Tye Chapman  11/14/2018  8:04 AM

## 2018-11-15 LAB
ANION GAP SERPL CALC-SCNC: 8 MMOL/L
BASOPHILS # BLD AUTO: 0.01 K/UL
BASOPHILS NFR BLD: 0.1 %
BUN SERPL-MCNC: 10 MG/DL
CALCIUM SERPL-MCNC: 8.5 MG/DL
CHLORIDE SERPL-SCNC: 107 MMOL/L
CO2 SERPL-SCNC: 25 MMOL/L
CREAT SERPL-MCNC: 0.8 MG/DL
DIFFERENTIAL METHOD: ABNORMAL
EOSINOPHIL # BLD AUTO: 0 K/UL
EOSINOPHIL NFR BLD: 0 %
ERYTHROCYTE [DISTWIDTH] IN BLOOD BY AUTOMATED COUNT: 16.8 %
EST. GFR  (AFRICAN AMERICAN): >60 ML/MIN/1.73 M^2
EST. GFR  (NON AFRICAN AMERICAN): >60 ML/MIN/1.73 M^2
GLUCOSE SERPL-MCNC: 137 MG/DL
HCT VFR BLD AUTO: 30.1 %
HGB BLD-MCNC: 9.8 G/DL
LYMPHOCYTES # BLD AUTO: 1.4 K/UL
LYMPHOCYTES NFR BLD: 17.3 %
MCH RBC QN AUTO: 23.1 PG
MCHC RBC AUTO-ENTMCNC: 32.6 G/DL
MCV RBC AUTO: 71 FL
MONOCYTES # BLD AUTO: 0.6 K/UL
MONOCYTES NFR BLD: 7.2 %
NEUTROPHILS # BLD AUTO: 6.2 K/UL
NEUTROPHILS NFR BLD: 75.3 %
PLATELET # BLD AUTO: 241 K/UL
PMV BLD AUTO: 10.9 FL
POTASSIUM SERPL-SCNC: 3.6 MMOL/L
RBC # BLD AUTO: 4.24 M/UL
SODIUM SERPL-SCNC: 140 MMOL/L
WBC # BLD AUTO: 8.22 K/UL

## 2018-11-15 PROCEDURE — 99900035 HC TECH TIME PER 15 MIN (STAT)

## 2018-11-15 PROCEDURE — 11000001 HC ACUTE MED/SURG PRIVATE ROOM

## 2018-11-15 PROCEDURE — 36415 COLL VENOUS BLD VENIPUNCTURE: CPT

## 2018-11-15 PROCEDURE — 25000003 PHARM REV CODE 250: Performed by: STUDENT IN AN ORGANIZED HEALTH CARE EDUCATION/TRAINING PROGRAM

## 2018-11-15 PROCEDURE — 94799 UNLISTED PULMONARY SVC/PX: CPT

## 2018-11-15 PROCEDURE — 63600175 PHARM REV CODE 636 W HCPCS: Performed by: STUDENT IN AN ORGANIZED HEALTH CARE EDUCATION/TRAINING PROGRAM

## 2018-11-15 PROCEDURE — 85025 COMPLETE CBC W/AUTO DIFF WBC: CPT

## 2018-11-15 PROCEDURE — 80048 BASIC METABOLIC PNL TOTAL CA: CPT

## 2018-11-15 PROCEDURE — 94761 N-INVAS EAR/PLS OXIMETRY MLT: CPT

## 2018-11-15 RX ORDER — BISACODYL 5 MG
5 TABLET, DELAYED RELEASE (ENTERIC COATED) ORAL DAILY PRN
Status: DISCONTINUED | OUTPATIENT
Start: 2018-11-15 | End: 2018-11-18 | Stop reason: HOSPADM

## 2018-11-15 RX ORDER — DOCUSATE SODIUM 100 MG/1
100 CAPSULE, LIQUID FILLED ORAL 2 TIMES DAILY
Status: DISCONTINUED | OUTPATIENT
Start: 2018-11-15 | End: 2018-11-18 | Stop reason: HOSPADM

## 2018-11-15 RX ORDER — DEXTROSE MONOHYDRATE, SODIUM CHLORIDE, AND POTASSIUM CHLORIDE 50; 1.49; 4.5 G/1000ML; G/1000ML; G/1000ML
INJECTION, SOLUTION INTRAVENOUS CONTINUOUS
Status: DISCONTINUED | OUTPATIENT
Start: 2018-11-15 | End: 2018-11-17

## 2018-11-15 RX ORDER — ASPIRIN 81 MG/1
81 TABLET ORAL DAILY
Status: DISCONTINUED | OUTPATIENT
Start: 2018-11-15 | End: 2018-11-18 | Stop reason: HOSPADM

## 2018-11-15 RX ORDER — HEPARIN SODIUM 5000 [USP'U]/ML
5000 INJECTION, SOLUTION INTRAVENOUS; SUBCUTANEOUS EVERY 8 HOURS
Status: DISCONTINUED | OUTPATIENT
Start: 2018-11-15 | End: 2018-11-18 | Stop reason: HOSPADM

## 2018-11-15 RX ADMIN — HYDROCODONE BITARTRATE AND ACETAMINOPHEN 1 TABLET: 10; 325 TABLET ORAL at 05:11

## 2018-11-15 RX ADMIN — MORPHINE SULFATE 2 MG: 10 INJECTION INTRAVENOUS at 03:11

## 2018-11-15 RX ADMIN — DEXTROSE MONOHYDRATE, SODIUM CHLORIDE, AND POTASSIUM CHLORIDE: 50; 4.5; 1.49 INJECTION, SOLUTION INTRAVENOUS at 09:11

## 2018-11-15 RX ADMIN — MORPHINE SULFATE 2 MG: 10 INJECTION INTRAVENOUS at 07:11

## 2018-11-15 RX ADMIN — SODIUM CHLORIDE, SODIUM LACTATE, POTASSIUM CHLORIDE, AND CALCIUM CHLORIDE: .6; .31; .03; .02 INJECTION, SOLUTION INTRAVENOUS at 04:11

## 2018-11-15 RX ADMIN — DOCUSATE SODIUM 100 MG: 100 CAPSULE, LIQUID FILLED ORAL at 09:11

## 2018-11-15 RX ADMIN — HYDROCODONE BITARTRATE AND ACETAMINOPHEN 1 TABLET: 10; 325 TABLET ORAL at 09:11

## 2018-11-15 RX ADMIN — CEFAZOLIN SODIUM 2 G: 2 SOLUTION INTRAVENOUS at 03:11

## 2018-11-15 RX ADMIN — HEPARIN SODIUM 5000 UNITS: 5000 INJECTION, SOLUTION INTRAVENOUS; SUBCUTANEOUS at 09:11

## 2018-11-15 RX ADMIN — HEPARIN SODIUM 5000 UNITS: 5000 INJECTION, SOLUTION INTRAVENOUS; SUBCUTANEOUS at 03:11

## 2018-11-15 RX ADMIN — ONDANSETRON 8 MG: 8 TABLET, ORALLY DISINTEGRATING ORAL at 09:11

## 2018-11-15 RX ADMIN — CETIRIZINE HYDROCHLORIDE 10 MG: 10 TABLET, FILM COATED ORAL at 09:11

## 2018-11-15 RX ADMIN — ASPIRIN 81 MG: 81 TABLET, COATED ORAL at 09:11

## 2018-11-15 NOTE — NURSING
Patient received from ICU RN. Patient alert and oriented x 4. Doppler checks done with RN with good venous and arterial sound. Flaps pink and warm. Abdominal incision clean and well approximated. Patient's bed is low and brake is engaged, call light within patient's reach and advised to call nurse with all assists.

## 2018-11-15 NOTE — ANESTHESIA POSTPROCEDURE EVALUATION
"Anesthesia Post Evaluation    Patient: Brittny Urias    Procedure(s) Performed: Procedure(s) (LRB):  RECONSTRUCTION, BREAST, USING MEGAN FREE FLAP (Bilateral)  MASTECTOMY (Bilateral)  SENTINEL NODE BIOPSY AND AXILLARY LYMPHADENECTOMY (Right)    Final Anesthesia Type: general  Patient location during evaluation: ICU  Patient participation: Yes- Able to Participate  Level of consciousness: awake and alert  Post-procedure vital signs: reviewed and stable  Pain management: adequate  Airway patency: patent  PONV status at discharge: No PONV  Anesthetic complications: no      Cardiovascular status: hemodynamically stable  Respiratory status: unassisted  Hydration status: euvolemic  Follow-up not needed.        Visit Vitals  BP (!) 163/98 (BP Location: Left arm, Patient Position: Sitting)   Pulse 71   Temp 37 °C (98.6 °F) (Oral)   Resp 18   Ht 5' 2" (1.575 m)   Wt 102.3 kg (225 lb 9.6 oz)   SpO2 98%   Breastfeeding? No   BMI 41.26 kg/m²       Pain/Deep Score: Pain Assessment Performed: Yes (11/14/2018  5:34 AM)  Presence of Pain: denies (11/14/2018  5:34 AM)        "

## 2018-11-15 NOTE — PLAN OF CARE
Patient is alert and oriented with no communication barriers.     Prior to admission patient was independent. Patient denies the use of HH or DME.     Patients PCP is correct on the face sheet. Patient choice pharmacy is [facility] on [address].       Patient denies a history of mental illness.     Patients family will transport her home at discharge.     No CM needs identified for discharge at this time.      CM team will continue to follow.     11/15/18 1330   Discharge Assessment   Assessment Type Discharge Planning Assessment   Confirmed/corrected address and phone number on facesheet? Yes   Assessment information obtained from? Patient   Communicated expected length of stay with patient/caregiver no   Prior to hospitilization cognitive status: Alert/Oriented   Prior to hospitalization functional status: Independent   Current cognitive status: Alert/Oriented   Current Functional Status: Independent   Lives With spouse   Able to Return to Prior Arrangements yes   Is patient able to care for self after discharge? Yes   Patient's perception of discharge disposition home or selfcare   Readmission Within The Last 30 Days no previous admission in last 30 days   Patient currently being followed by outpatient case management? No   Patient currently receives any other outside agency services? No   Equipment Currently Used at Home none   Do you have any problems affording any of your prescribed medications? No   Is the patient taking medications as prescribed? yes   Does the patient have transportation home? Yes   Transportation Available family or friend will provide   Does the patient receive services at the [facility]? No   Discharge Plan A Home   Patient/Family In Agreement With Plan yes

## 2018-11-15 NOTE — PROGRESS NOTES
Plastic Surgery Progress Note    S: NAEO, pain well controlled, flap checks intact       O:  Vitals:    11/15/18 0800   BP: (!) 161/67   Pulse: 88   Resp: 18   Temp:      General: NAD, A&Ox3  CV: RRR  Pulm: CTAB  Abd: Soft, appropriately tender, lower incision intact with Perino tape and Dermabond in place, R drain 50 cc serosang, L drain 30 cc serosang  Breast: Bilateral breasts soft, flaps viable              Right: moderate mastectomy flap ecchymosis, skin paddle warm with good capillary refill, strong dopplerable signal, drain 50 cc serosang              Left: moderate mastectomy flap ecchymosis, skin paddle warm with good capillary refill, strong venous dopplerable signal, drain 20 cc serosang    Recent Labs   Lab 11/15/18  0401   WBC 8.22   RBC 4.24   HGB 9.8*   HCT 30.1*      MCV 71*   MCH 23.1*   MCHC 32.6           A/P: 48 year old female with history of right breast cancer status-post right therapeutic skin-sparing mastectomy with SLN and left prophylactic skin-sparing mastectomy. She underwent immediate bilateral MEGAN flap breast reconstruction on 11-. She was admitted to the ICU post-operatively for close flap monitoring.     1. Post-op care  -Transfer to the floor  -Decrease flap checks to q4h  -Start clear liquid diet, advance to regular diet by dinner if tolerating.    -D5 0.5NS @ 75 cc-h, heplock once tolerating a regular diet  -Remove gonzalez  -Continue current pain regimen   -Continue all drains  -Start Colace and Dulcolax for constipation  -Out of bed to chair  -DVT ppx: SQH 5000U Q8H, continue Asa 81mg PO daily    2 . HTN  -Hold HCTZ      Maciel Garcia MD  Plastic Surgery PGY-4  [phone]

## 2018-11-15 NOTE — NURSING TRANSFER
Nursing Transfer Note      11/15/2018     Transfer To:     Transfer via wheelchair    Transfer with belongings    Transported by Taya Motta RN    Medicines sent: None    Chart send with patient: Yes    Notified: daughter at bedside    Patient reassessed at: 1050 11/15/18     Upon arrival to floor: patient oriented to room, call bell in reach and bed in lowest position

## 2018-11-15 NOTE — TRANSFER OF CARE
"Anesthesia Transfer of Care Note    Patient: Brittny Urias    Procedure(s) Performed: Procedure(s) (LRB):  RECONSTRUCTION, BREAST, USING MEGAN FREE FLAP (Bilateral)  MASTECTOMY (Bilateral)  SENTINEL NODE BIOPSY AND AXILLARY LYMPHADENECTOMY (Right)    Patient location: ICU    Anesthesia Type: general    Transport from OR: Transported from OR on 2-3 L/min O2 by NC with adequate spontaneous ventilation    Post pain: adequate analgesia    Post assessment: no apparent anesthetic complications    Post vital signs: stable    Level of consciousness: awake, alert and oriented    Nausea/Vomiting: no nausea/vomiting    Complications: none    Transfer of care protocol was followed      Last vitals:   Visit Vitals  BP (!) 163/98 (BP Location: Left arm, Patient Position: Sitting)   Pulse 71   Temp 37 °C (98.6 °F) (Oral)   Resp 18   Ht 5' 2" (1.575 m)   Wt 102.3 kg (225 lb 9.6 oz)   SpO2 98%   Breastfeeding? No   BMI 41.26 kg/m²     "

## 2018-11-15 NOTE — NURSING
Dr. Chapman called and update on LINO drain output and moderate amount of leaking at insertion site. Will monitor.

## 2018-11-16 PROCEDURE — 94761 N-INVAS EAR/PLS OXIMETRY MLT: CPT

## 2018-11-16 PROCEDURE — 11000001 HC ACUTE MED/SURG PRIVATE ROOM

## 2018-11-16 PROCEDURE — 25000003 PHARM REV CODE 250: Performed by: STUDENT IN AN ORGANIZED HEALTH CARE EDUCATION/TRAINING PROGRAM

## 2018-11-16 PROCEDURE — 63600175 PHARM REV CODE 636 W HCPCS: Performed by: STUDENT IN AN ORGANIZED HEALTH CARE EDUCATION/TRAINING PROGRAM

## 2018-11-16 RX ADMIN — CETIRIZINE HYDROCHLORIDE 10 MG: 10 TABLET, FILM COATED ORAL at 09:11

## 2018-11-16 RX ADMIN — HYDROCODONE BITARTRATE AND ACETAMINOPHEN 1 TABLET: 10; 325 TABLET ORAL at 12:11

## 2018-11-16 RX ADMIN — HEPARIN SODIUM 5000 UNITS: 5000 INJECTION, SOLUTION INTRAVENOUS; SUBCUTANEOUS at 04:11

## 2018-11-16 RX ADMIN — HYDROCODONE BITARTRATE AND ACETAMINOPHEN 1 TABLET: 10; 325 TABLET ORAL at 05:11

## 2018-11-16 RX ADMIN — DOCUSATE SODIUM 100 MG: 100 CAPSULE, LIQUID FILLED ORAL at 08:11

## 2018-11-16 RX ADMIN — HYDROCODONE BITARTRATE AND ACETAMINOPHEN 1 TABLET: 10; 325 TABLET ORAL at 08:11

## 2018-11-16 RX ADMIN — HEPARIN SODIUM 5000 UNITS: 5000 INJECTION, SOLUTION INTRAVENOUS; SUBCUTANEOUS at 05:11

## 2018-11-16 RX ADMIN — HYDROCODONE BITARTRATE AND ACETAMINOPHEN 1 TABLET: 10; 325 TABLET ORAL at 01:11

## 2018-11-16 RX ADMIN — DOCUSATE SODIUM 100 MG: 100 CAPSULE, LIQUID FILLED ORAL at 09:11

## 2018-11-16 RX ADMIN — HEPARIN SODIUM 5000 UNITS: 5000 INJECTION, SOLUTION INTRAVENOUS; SUBCUTANEOUS at 08:11

## 2018-11-16 RX ADMIN — ASPIRIN 81 MG: 81 TABLET, COATED ORAL at 09:11

## 2018-11-16 NOTE — NURSING
Patient lying in bed with family at her bedside. Patient is alert and oriented X 4. Patient running a low grade temp, Dr. Chapman notified and is aware. IS ordered. Purposeful rounding done with patient. Patient's bed is low and brake is engaged, call light at her bedside and advised to call nurse with all assists. RN to continue to monitor this shift.

## 2018-11-16 NOTE — PROGRESS NOTES
Plastic Surgery Progress Note    S: NAEO, pain well controlled, flap checks intact       O:  Vitals:    11/16/18 0814   BP: 125/78   Pulse: 93   Resp: 17   Temp: 99.4 °F (37.4 °C)     General: NAD, A&Ox3  CV: RRR  Pulm: CTAB  Abd: Soft, appropriately tender, lower incision intact with Perino tape and Dermabond in place, R drain 165 cc serosang, L drain 85 cc serosang  Breast: Bilateral breasts soft, flaps viable              Right: moderate mastectomy flap ecchymosis, skin paddle congested/ecchymotic and cool medially but with good capillary refill, strong dopplerable venous signal, drain 165 cc serosang, stab incision with moderately congested blood              Left: moderate mastectomy flap ecchymosis, skin paddle warm with good capillary refill, strong venous dopplerable signal, drain 90 cc serosang      A/P: 48 year old female with history of right breast cancer status-post right therapeutic skin-sparing mastectomy with SLN and left prophylactic skin-sparing mastectomy. She underwent immediate bilateral MEGAN flap breast reconstruction on 11-. She was transferred to the floor yesterday. Right breast flap with medial congestion/bruising, however had good capillary refill. No evidence of breast hematoma.     1. Post-op flap care  -Continue q4h flap checks  -Will make NPO for now and monitor the flap, restart diet later in the day if no worsening  -Bedrest for now  -Continue current pain regimen   -Continue all drains  -IS for fevers  -DVT ppx: SQH 5000U Q8H, continue Asa 81mg PO daily      2 . HTN  -Hold HCTZ for normotension      Maciel Garcia MD  Plastic Surgery PGY-4  [phone]

## 2018-11-16 NOTE — PLAN OF CARE
Problem: Patient Care Overview  Goal: Plan of Care Review  Outcome: Ongoing (interventions implemented as appropriate)  Patient remained free from falls/injury.  No acute changes in status.  Patient ambulates to bathroom, voiding freely.  Pain maintained with ordered meds.  Flap check completed q4 hours.  Bed locked in lowest position.  Side rails up x2.  Call bell within reach.  Purposeful rounding maintained throughout shift.

## 2018-11-16 NOTE — PLAN OF CARE
Problem: Patient Care Overview  Goal: Plan of Care Review  Pt currently on RA  . Pt in no distress at this time. Sats 96  %. Will continue to monitor.

## 2018-11-17 PROCEDURE — 94761 N-INVAS EAR/PLS OXIMETRY MLT: CPT

## 2018-11-17 PROCEDURE — 94799 UNLISTED PULMONARY SVC/PX: CPT

## 2018-11-17 PROCEDURE — 63600175 PHARM REV CODE 636 W HCPCS: Performed by: STUDENT IN AN ORGANIZED HEALTH CARE EDUCATION/TRAINING PROGRAM

## 2018-11-17 PROCEDURE — 25000003 PHARM REV CODE 250: Performed by: STUDENT IN AN ORGANIZED HEALTH CARE EDUCATION/TRAINING PROGRAM

## 2018-11-17 PROCEDURE — 11000001 HC ACUTE MED/SURG PRIVATE ROOM

## 2018-11-17 RX ADMIN — HEPARIN SODIUM 5000 UNITS: 5000 INJECTION, SOLUTION INTRAVENOUS; SUBCUTANEOUS at 06:11

## 2018-11-17 RX ADMIN — HYDROCODONE BITARTRATE AND ACETAMINOPHEN 1 TABLET: 10; 325 TABLET ORAL at 11:11

## 2018-11-17 RX ADMIN — DOCUSATE SODIUM 100 MG: 100 CAPSULE, LIQUID FILLED ORAL at 09:11

## 2018-11-17 RX ADMIN — HEPARIN SODIUM 5000 UNITS: 5000 INJECTION, SOLUTION INTRAVENOUS; SUBCUTANEOUS at 04:11

## 2018-11-17 RX ADMIN — HYDROCODONE BITARTRATE AND ACETAMINOPHEN 1 TABLET: 10; 325 TABLET ORAL at 12:11

## 2018-11-17 RX ADMIN — HYDROCODONE BITARTRATE AND ACETAMINOPHEN 1 TABLET: 10; 325 TABLET ORAL at 09:11

## 2018-11-17 RX ADMIN — ASPIRIN 81 MG: 81 TABLET, COATED ORAL at 09:11

## 2018-11-17 RX ADMIN — CETIRIZINE HYDROCHLORIDE 10 MG: 10 TABLET, FILM COATED ORAL at 09:11

## 2018-11-17 RX ADMIN — HYDROCODONE BITARTRATE AND ACETAMINOPHEN 1 TABLET: 10; 325 TABLET ORAL at 04:11

## 2018-11-17 RX ADMIN — HEPARIN SODIUM 5000 UNITS: 5000 INJECTION, SOLUTION INTRAVENOUS; SUBCUTANEOUS at 09:11

## 2018-11-17 NOTE — PROGRESS NOTES
Plastic Surgery Progress Note    S: NAEO, pain well controlled, flap checks intact         O:  Vitals:    11/17/18 0403   BP: 131/78   Pulse: 83   Resp: 18   Temp: 98.4 °F (36.9 °C)     General: NAD, A&Ox3  CV: RRR  Pulm: CTAB  Abd: Soft, appropriately tender, lower incision intact with Perino tape and Dermabond in place, R drain 100 cc serosang, L drain 70 cc serosang  Breast: Bilateral breasts soft, flaps viable              Right: mastectomy flap ecchymosis improved, skin paddle improved with good capillary refill, strong dopplerable arterial and venous signal, drain 115 cc serosang, stab incision with moderately congested blood              Left: moderate mastectomy flap ecchymosis, skin paddle warm with good capillary refill, strong venous dopplerable signal, drain 60 cc serosang            A/P: 48 year old female with history of right breast cancer status-post right therapeutic skin-sparing mastectomy with SLN and left prophylactic skin-sparing mastectomy. She underwent immediate bilateral MEGAN flap breast reconstruction on 11-. She was transferred to the floor yesterday. Right breast flap improved.      1. Post-op flap care  -Continue q4h flap checks  -Regular diet  -Ambulate  -Continue current pain regimen   -Continue all drains  -No fevers in last 24 h  -DVT ppx: SQH 5000U Q8H, continue Asa 81mg PO daily        2 . HTN  -Hold HCTZ for normotension        Dispo: plan for DC tomorrow if continued improvement       Maciel Garcia MD  Plastic Surgery PGY-4  [phone]

## 2018-11-18 VITALS
TEMPERATURE: 98 F | BODY MASS INDEX: 41.38 KG/M2 | SYSTOLIC BLOOD PRESSURE: 130 MMHG | DIASTOLIC BLOOD PRESSURE: 76 MMHG | OXYGEN SATURATION: 97 % | HEART RATE: 76 BPM | RESPIRATION RATE: 18 BRPM | WEIGHT: 224.88 LBS | HEIGHT: 62 IN

## 2018-11-18 PROCEDURE — 63600175 PHARM REV CODE 636 W HCPCS: Performed by: STUDENT IN AN ORGANIZED HEALTH CARE EDUCATION/TRAINING PROGRAM

## 2018-11-18 PROCEDURE — 94799 UNLISTED PULMONARY SVC/PX: CPT

## 2018-11-18 PROCEDURE — 25000003 PHARM REV CODE 250: Performed by: STUDENT IN AN ORGANIZED HEALTH CARE EDUCATION/TRAINING PROGRAM

## 2018-11-18 PROCEDURE — 94761 N-INVAS EAR/PLS OXIMETRY MLT: CPT

## 2018-11-18 RX ORDER — HYDROCODONE BITARTRATE AND ACETAMINOPHEN 5; 325 MG/1; MG/1
1 TABLET ORAL EVERY 4 HOURS PRN
Qty: 30 TABLET | Refills: 0 | Status: SHIPPED | OUTPATIENT
Start: 2018-11-18 | End: 2019-03-29

## 2018-11-18 RX ORDER — ONDANSETRON 8 MG/1
8 TABLET, ORALLY DISINTEGRATING ORAL EVERY 8 HOURS PRN
Qty: 15 TABLET | Refills: 0 | Status: SHIPPED | OUTPATIENT
Start: 2018-11-18 | End: 2019-03-29

## 2018-11-18 RX ORDER — ASPIRIN 81 MG/1
81 TABLET ORAL DAILY
Qty: 30 TABLET | Refills: 0 | Status: ON HOLD | OUTPATIENT
Start: 2018-11-18 | End: 2019-10-10 | Stop reason: HOSPADM

## 2018-11-18 RX ORDER — DOCUSATE SODIUM 100 MG/1
100 CAPSULE, LIQUID FILLED ORAL 2 TIMES DAILY
Qty: 30 CAPSULE | Refills: 0 | Status: SHIPPED | OUTPATIENT
Start: 2018-11-18 | End: 2019-03-29

## 2018-11-18 RX ORDER — BISACODYL 5 MG
5 TABLET, DELAYED RELEASE (ENTERIC COATED) ORAL DAILY PRN
Qty: 30 TABLET | Refills: 0 | Status: SHIPPED | OUTPATIENT
Start: 2018-11-18 | End: 2019-03-29

## 2018-11-18 RX ADMIN — HYDROCODONE BITARTRATE AND ACETAMINOPHEN 1 TABLET: 10; 325 TABLET ORAL at 06:11

## 2018-11-18 RX ADMIN — ASPIRIN 81 MG: 81 TABLET, COATED ORAL at 09:11

## 2018-11-18 RX ADMIN — HEPARIN SODIUM 5000 UNITS: 5000 INJECTION, SOLUTION INTRAVENOUS; SUBCUTANEOUS at 06:11

## 2018-11-18 RX ADMIN — DOCUSATE SODIUM 100 MG: 100 CAPSULE, LIQUID FILLED ORAL at 09:11

## 2018-11-18 RX ADMIN — CETIRIZINE HYDROCHLORIDE 10 MG: 10 TABLET, FILM COATED ORAL at 09:11

## 2018-11-18 NOTE — PLAN OF CARE
Problem: Patient Care Overview  Goal: Plan of Care Review  Outcome: Ongoing (interventions implemented as appropriate)  Pt remains free from falls. Vitals were stable throughout the night on room air. Positions self independently. Pain managed with PO medications, no complaints of nausea. Bed in low position and call light within reach. Will continue to monitor.

## 2018-11-18 NOTE — PLAN OF CARE
No DC needs from CM perspective.       11/18/18 1022   Final Note   Assessment Type Final Discharge Note   Anticipated Discharge Disposition Home   What phone number can be called within the next 1-3 days to see how you are doing after discharge? [phone]   Hospital Follow Up  Appt(s) scheduled? Yes   Discharge plans and expectations educations in teach back method with documentation complete? Yes   Right Care Referral Info   Post Acute Recommendation No Care

## 2018-11-18 NOTE — DISCHARGE SUMMARY
HOSPITAL DISCHARGE SUMMARY     ADMISSION DATE: 11/14/2018   DISCHARGE DATE: 11/18/2018   ADMITTING PROVIDER   Candido Olvera   PRIMARY CARE PROVIDER   Charmaine Sargent MD     PRINCIPAL/SECONDARY HOSPITAL DIAGNOSES   Active Hospital Problems    Diagnosis  POA    *Malignant neoplasm of female breast [C50.919]  Yes      Resolved Hospital Problems   No resolved problems to display.        DISCHARGE DIAGNOSES   Active Hospital Problems    Diagnosis  POA    *Malignant neoplasm of female breast [C50.919]  Yes      Resolved Hospital Problems   No resolved problems to display.        PROCEDURES   BREAST MEGAN OR SIEA FLAP [] (RECONSTRUCTION, BREAST, USING MEGAN FREE FLAP)  MO MASTECTOMY, SIMPLE, COMPLETE [13322] (MASTECTOMY)  HC BIOPSY/REM LYMPH NODES, AXILLARY [67846] (SENTINEL NODE BIOPSY AND AXILLARY LYMPHADENECTOMY)     CONSULTS   General Surgery     ALLERGIES   Review of patient's allergies indicates:  No Known Allergies     HOSPITAL COURSE   Brittny Urias is a 48 y.o. female admitted per hospital ambulatory surgery same day admission on 11/14/2018 for treatment of   Active Hospital Problems    Diagnosis  POA    *Malignant neoplasm of female breast [C50.919]  Yes      Resolved Hospital Problems   No resolved problems to display.   .   BREAST MEGAN OR SIEA FLAP [] (RECONSTRUCTION, BREAST, USING MEGAN FREE FLAP)  MO MASTECTOMY, SIMPLE, COMPLETE [34085] (MASTECTOMY)  HC BIOPSY/REM LYMPH NODES, AXILLARY [08868] (SENTINEL NODE BIOPSY AND AXILLARY LYMPHADENECTOMY) performed on 11/14/2018 by GIOVANNY LAURA MD without perioperative complications. At this time, it is anticipated that the patient will be discharged when all discharge criteria have been met. The patient is to be discharged on 11/18/2018 to home with family by personal automobile.     LABS   No results for input(s): WBC, RBC, HGB, HCT, PLT, MCV, MCH, MCHC in the last 24 hours.    COMPLICATIONS   None     DISPOSITION   Discharged to  home on 11/18/2018 with family by personal automobile.     CONDITION ON DISCHARGE   Good condition, problem improved     MEDICATION RECONCILIATION (All Prior To Admission Medications and Inpatient Medications have been reviewed)   Current Discharge Medication List      START taking these medications    Details   aspirin (ECOTRIN) 81 MG EC tablet Take 1 tablet (81 mg total) by mouth once daily.  Qty: 30 tablet, Refills: 0      bisacodyl (DULCOLAX) 5 mg EC tablet Take 1 tablet (5 mg total) by mouth daily as needed for Constipation.  Qty: 30 tablet, Refills: 0      docusate sodium (COLACE) 100 MG capsule Take 1 capsule (100 mg total) by mouth 2 (two) times daily.  Qty: 30 capsule, Refills: 0      HYDROcodone-acetaminophen (NORCO) 5-325 mg per tablet Take 1 tablet by mouth every 4 (four) hours as needed.  Qty: 30 tablet, Refills: 0      ondansetron (ZOFRAN-ODT) 8 MG TbDL Take 1 tablet (8 mg total) by mouth every 8 (eight) hours as needed.  Qty: 15 tablet, Refills: 0         CONTINUE these medications which have NOT CHANGED    Details   hydroCHLOROthiazide (HYDRODIURIL) 25 MG tablet Take 0.5 tablets (12.5 mg total) by mouth once daily.  Qty: 30 tablet, Refills: 2      cetirizine (ZYRTEC) 10 MG tablet Take 1 tablet (10 mg total) by mouth daily as needed for Allergies (sneezing).  Qty: 30 tablet, Refills: 0      epinephrine (EPIPEN) 0.3 mg/0.3 mL AtIn Inject 0.3 mLs (0.3 mg total) into the muscle as needed.  Qty: 2 Device, Refills: 0              ACTIVITY   Avoid:   - Lifting more than 2 lbs   - Reaching above head   - Pullover garments (T-shirt, crew neck sweater or shirt)   - Sitting more than 1 hour at a time during daytime hours.   - Bending over, twisting, pushing, pulling, stooping   - Driving   Recommended:   - Walk around your house or apartment occasionally during the day   - When walking, stay slightly bent forward at the hips; as your abdomen relaxes, you will walk more upright; this will take a few weeks   -  Breathe deeply and cough frequently to clear your lungs after anesthesia   - Sleep with your head propped up on several pillows for the first few weeks after surgery     Sleeping:   - Keep your back elevated at 30 to 45 degrees with several pillows; place pillows under your knees to keep them bent   - DO NOT REST ON FLAP; SLEEP ON YOUR BACK ONLY   - PLACE PILLOWS ON EACH SIDE TO PREVENT TURNING AND RESTING ON THE FLAPS     INCISIONS:   - Re-apply xeroform or gauze to abdominal incisions as needed.   - Empty and record drain outputs from the breast and abdomen every 8 hours.   - Keep dressings dry and intact until your first office visit.   - Wear the abdominal binder LOW on the abdomen until your first post-op visit.     BATHING:   - Sponge bathe until first postop visit; keep incisions dry   - Keep surgical bra applied loosely, apply soft abdominal pad as needed for light support within the bra   - DO NOT TIGHTLY COMPRESS the breast flaps     SHOWERING:   - You may shower after first postop visit, even if drains are still in place; pat the incisions dry and re-apply any necessary dressings   - It is normal to have slight oozing or drainage for a few days to 2 weeks.   - Pad your bra with gauze as needed to areas of greater seepage/drainage.   Observe for and report immediately:   - foul smelling drainage from wound,   - color of drainage is bright red,   - sudden dramatic increase in size of breast,   - red and hot skin (mild swelling and bruises are normal)   - chills and fever over 102 degrees.   Please see additional discharge instructions attached.     CODE STATUS   Full     FOLLOW UP   Future Appointments   Date Time Provider Department Center   11/20/2018 11:20 AM Tye Chapman MD [facility]   11/26/2018 10:15 AM Marga Cardenas MD [facility] [place]      ?   Total Unit/Floor Time: 45 minutes spent coordinating this discharge of which greater than 50% was spent in counseling and arranging  care as summarized elsewhere in this note.   Electronically signed by:   Candido Olvera   11/18/2018   9:08 AM

## 2018-11-18 NOTE — DISCHARGE INSTRUCTIONS
INSTRUCTIONS FOR DISCHARGE    FOLLOW-UP  You will be scheduled for follow-up in 1 week(s)  Please return to have the wound inspected and sutures removed as instructed by Dr. Chapman    EMERGENCY NUMBERS  For all life-threatening emergencies, please call 911  For all other concerns regarding your plastic surgery, you may call the office from 9am - 5pm  For the after-hours plastic surgery 24-h on-call answering service  EMERGENCY FACILITIES:  [facility]     ACTIVITY:  Avoid:  - Lifting more than 2 lbs  - Reaching above head  - Pullover garments (T-shirt, crew neck sweater or shirt)  - Sitting for prolonged periods of time during daytime hours.  - Bending over, twisting, pushing, pulling, stooping  - Driving until instructed this is safe (generally 1-2 weeks after surgery)    Recommended:  - Walk around your house or apartment occasionally during the day  - When walking, stay slightly bent forward at the hips; as your abdomen relaxes, you will walk more upright; this will take a few weeks  - Breathe deeply and cough frequently to clear your lungs after anesthesia  - Sleep with your head propped up on several pillows for the first few weeks after surgery    Sleeping:  - Keep your back elevated at 30 to 45 degrees with several pillows; place pillows under your knees to keep them bent  - DO NOT REST ON THE BREAST FLAP; SLEEP ON YOUR BACK ONLY  - PLACE PILLOWS ON EACH SIDE TO PREVENT TURNING AND RESTING ON THE FLAPS    BATHING:  Sponge bathe or shower until first post-op visit; pat incisions dry and replace any dressings.  Keep surgical bra applied for support, avoid tight compression/  DO NOT COMPRESS the breast flap.    INCISIONS & WOUND CARE:  Re-apply xeroform and gauze to abdominal incisions as needed.  Empty and record drain outputs every 8 hours.  Apply the abdominal binder and wear low on the abdomen.  It is normal to have slight oozing or drainage along the incisions for a few days to 2 weeks.  Pad your  bra and abdominal binder with gauze or pads as needed to areas of greater seepage/drainage.  Observe for and report immediately:  - foul smelling drainage from wound,  - color of drainage is bright red,  - sudden dramatic increase in size of breast,  - red and hot skin (mild swelling and bruises are normal)  - chills and fever over 102 degrees.    CLOSED SUCTION DRAIN INSTRUCTIONS  1. Care of this bulb is very simple. However, it is important that neither the drain site nor bulb be submerged in water; do not take a tub bath, use a hot tub or go swimming. You may shower with your doctor's approval.  2. You will need to empty the bulb itself 2 or 3 times per day or whenever it is 1/3 full.  Measure and record the amount of fluid drained from the bulb if you have been instructed to do so by your doctor.  3. To empty your drain, you will need to:  a. Wash your hands thoroughly.  b. Open the cap on the bulb.  c. Empty the contents into the measuring cup provided.  d. Restore suction by squeezing the bulb and replacing the cap while the bulb is depressed between your fingers.  e. Secure the bulb with tape or pin to your clothing.  4. What to do if your drain is not emptying.  Pinch the tubing with 2 fingers as close as possible to your skin and hold it tightly to prevent pulling on the skin. With your other hand pinch the tube and gently slide your finder toward the bulb. By doing that once a day you will prevent blood clots from obstructing possible drainage. If the tubing remains flat, gently pinch the tube at the flat edges until it is round again.    DIET  After surgery eat and drink lightly: crackers, plain toast, clear soups, water and sport drinks are preferable  Avoid food high in protein and fat until you are tolerating starches and fluids without problem  Resume your normal diet when you feel well. If your stomach is upset, try bland, low-fat foods like plain rice, broiled chicken, toast, and yogurt. Continue to  drink plenty of fluids.  Many people are constipated after surgery. This can be due to the pain medicine and a lack of activity. Be sure you get plenty of fluids, and eat high fiber cereal, prunes, or take a fiber supplement such as Citrucel or Metamucil, or a stool softener like Miralax or Colace.    MEDICATIONS  Current Discharge Medication List      START taking these medications    Details   aspirin (ECOTRIN) 81 MG EC tablet Take 1 tablet (81 mg total) by mouth once daily.  Qty: 30 tablet, Refills: 0      bisacodyl (DULCOLAX) 5 mg EC tablet Take 1 tablet (5 mg total) by mouth daily as needed for Constipation.  Qty: 30 tablet, Refills: 0      docusate sodium (COLACE) 100 MG capsule Take 1 capsule (100 mg total) by mouth 2 (two) times daily.  Qty: 30 capsule, Refills: 0      HYDROcodone-acetaminophen (NORCO) 5-325 mg per tablet Take 1 tablet by mouth every 4 (four) hours as needed.  Qty: 30 tablet, Refills: 0      ondansetron (ZOFRAN-ODT) 8 MG TbDL Take 1 tablet (8 mg total) by mouth every 8 (eight) hours as needed.  Qty: 15 tablet, Refills: 0         CONTINUE these medications which have NOT CHANGED    Details   hydroCHLOROthiazide (HYDRODIURIL) 25 MG tablet Take 0.5 tablets (12.5 mg total) by mouth once daily.  Qty: 30 tablet, Refills: 2      cetirizine (ZYRTEC) 10 MG tablet Take 1 tablet (10 mg total) by mouth daily as needed for Allergies (sneezing).  Qty: 30 tablet, Refills: 0      epinephrine (EPIPEN) 0.3 mg/0.3 mL AtIn Inject 0.3 mLs (0.3 mg total) into the muscle as needed.  Qty: 2 Device, Refills: 0             Antibiotics  Take the prescribed oral antibiotics until gone unless you suspect a side effect. Contact your surgeon if you suspect a drug reaction.    Pain Medications  Take pain medicine as needed, following the directions carefully. Plan to take your pain medicine 30 minutes before exercises or therapy. Do not wait until you are in severe pain. You will get better results if you take it sooner.  To avoid an upset stomach, take your pain pills with food.  Do not take plain acetominophen (Tylenol) within 6 hrs of any prescribed narcotic: most narcotic preparations already contain acetominophen and you may receive a dangerous overdose    You will continue baby aspirin (81 mg) tab for a total of 30 days    DRIVING  It may be 2-4 weeks after surgery before it is safe for you to drive. Ask your doctor or his staff when this will be OK. For your safety, you must not drive until you are no longer taking narcotic pain medicines and you can move and react easily.    SCAR MANAGEMENT  Scars may take over 1 year to mature.  Some scars will remain pink, dark purple, and possibly raised for 6-9 months after surgery.  After one year, scars often become flatter, smoother, and may change color.  After removal of the tape, suture removal, or when glue was used, apply a thin layer of Aquaphor (available at any drugstore) or antibiotic ointment to the scars for another 2 weeks.  Begin silicone when scars smooth, generally starting about 6 weeks after surgery.  Medical grade silicone gel is available on companies' web sites or on amazon.com  Brands recommended:  - Scarfade (scarfade.com)  - NewGel (newgelplus.com)  - Kelocote (kelocote.com)  Massage the scar twice daily for about 30 seconds    Sun Screen  The best sunscreens contain zinc oxide and SPF 50+  Use at all times, even when overcast or raining while scar is pink  Recommended brands for face:  Fallene Total Block (available at amazon.com) especially good for the face  Neutrogena Sensitive Skin SPF 60    WHEN TO CALL  Call your surgeon or leave a message for any problems between 8 am and 5 pm. If you do not receive a call back within 1 hour, call again but you should then call the [facility]  Your wound is still draining 1 week after the surgery.  Your wound comes open or begins to bleed larger than a spot 3 inches in diameter  You have signs of infection, such  as increasing tenderness, red streaks, pus from your incision, oral fever >101, or shaking chills.  You have pain that does not get better after you take pain pills. Pain pills will make the pain less, but WILL NOT REMOVE ALL THE PAIN    Call or have someone caring for you call 911 immediately for any medical emergency. Examples of symptoms that may be an emergency include:  - You pass out (lose consciousness).  - You have severe trouble breathing.  - You have chest pain.    WHEN TO GO TO EMERGENCY ROOM  Go to the Emergency Room if:  Your wound comes open and begins to bleed.  You have signs of infection, such as increasing tenderness, red streaks, or pus from your incision.  You are sick to your stomach or cannot keep fluids down.  You have signs of a blood clot, such as unexplained pain or extensive swelling of your leg.  You have increasing numbness or tingling in your leg or foot that is not relieved with elevation.  You have cannot pass urine or stool.    PLASTIC SURGERY FACILITIES  For general inquiries, appointments, or concerns, call the office

## 2018-11-18 NOTE — PLAN OF CARE
Problem: Patient Care Overview  Goal: Plan of Care Review  Outcome: Outcome(s) achieved Date Met: 11/18/18  Eager & in agreement w/ DC. VU of DC instructions--paperwork & prescriptions passed & explained.To be DCd home w/ daughter that's at bedside- will be escorted downstairs via  transport team once dressed and ready. Free from falls, injury, or skin breakdown this hospital admission.

## 2018-11-19 NOTE — PHYSICIAN QUERY
"PT Name: Brittny Urias  MR #: [id number]     Physician Query Form - Documentation Clarification      CDS/: Brandy E Capley               Contact information:  Spectralink:  [phone]    This form is a permanent document in the medical record.     Query Date: November 19, 2018    By submitting this query, we are merely seeking further clarification of documentation. Please utilize your independent clinical judgment when addressing the question(s) below.    The Medical record reflects the following:    Supporting Clinical Findings Location in Medical Record     Height 5' 2" (1.575 m)    Weight 102.3 kg (225lb 9.6 oz)   BMI (Calculated) 41.      Anthropometric flowsheet 11/12     Cardiovascular: Hypertension     Anesthesia info 11/14                                                                            Doctor, Please specify diagnosis or diagnoses associated with above clinical findings.    Provider Use Only     (  x)  Morbid obesity     (  )  Other (please specify):  _______________________                                                                                                                Clinically Undetermined               "

## 2018-11-20 ENCOUNTER — OFFICE VISIT (OUTPATIENT)
Dept: PLASTIC SURGERY | Facility: CLINIC | Age: 48
End: 2018-11-20
Payer: COMMERCIAL

## 2018-11-20 VITALS — WEIGHT: 224.88 LBS | BODY MASS INDEX: 41.38 KG/M2 | HEIGHT: 62 IN

## 2018-11-20 DIAGNOSIS — C50.911 MALIGNANT NEOPLASM OF RIGHT FEMALE BREAST, UNSPECIFIED ESTROGEN RECEPTOR STATUS, UNSPECIFIED SITE OF BREAST: Primary | ICD-10-CM

## 2018-11-20 PROCEDURE — 99024 POSTOP FOLLOW-UP VISIT: CPT | Mod: S$GLB,,, | Performed by: PLASTIC SURGERY

## 2018-11-21 NOTE — PROGRESS NOTES
"POSTOP FOLLOW-UP EXAM    CC  No chief complaint on file.      DIAGNOSES  Encounter Diagnoses   Name Primary?    Malignant neoplasm of right female breast, unspecified estrogen receptor status, unspecified site of breast Yes       PROCEDURE  Bilateral MEGAN reconstruction    SUBJECTIVE  Patient doing well, complaining of minor amount of abdominal pain that is slowly improving. She is ambulating, tolerating diet, having bowel movements     PHYSICAL EXAM  Ht 5' 2" (1.575 m)   Wt 102 kg (224 lb 13.9 oz)   BMI 41.13 kg/m²   Breasts:  Soft, no collections  Flap soft, warm, well perfused, healthy appearing  Cap refill 2-3 sec  Good contour and symmetry  L Drain removed    Abdomen:  Soft, nt, nd  Umbilicus healthy and intact  Incisions c/d/i  Drains: serosanguinous, no collections    ASSESSMENT  Encounter Diagnoses   Name Primary?    Malignant neoplasm of right female breast, unspecified estrogen receptor status, unspecified site of breast Yes       PLAN  - Daily dressing change with xeroform/abd pads  - Continue loose fitting bra and abdominal binder  - Ok to shower daily, pat incisions dry, reapply dressings  - Light ambulation, no heavy lifting  - Activity and aftercare instructions reviewed  - Notify office once right abdominal drain <30 cc per day for removal  - F/u as scheduled with me in 1 week    Electronically signed by:  Tye Chapman  11/20/2018  7:19 PM      "

## 2018-11-26 ENCOUNTER — OFFICE VISIT (OUTPATIENT)
Dept: SURGERY | Facility: CLINIC | Age: 48
End: 2018-11-26
Payer: COMMERCIAL

## 2018-11-26 VITALS
HEART RATE: 70 BPM | SYSTOLIC BLOOD PRESSURE: 137 MMHG | WEIGHT: 229 LBS | DIASTOLIC BLOOD PRESSURE: 87 MMHG | TEMPERATURE: 99 F | BODY MASS INDEX: 41.88 KG/M2

## 2018-11-26 DIAGNOSIS — C50.911 MALIGNANT NEOPLASM OF RIGHT FEMALE BREAST, UNSPECIFIED ESTROGEN RECEPTOR STATUS, UNSPECIFIED SITE OF BREAST: Primary | ICD-10-CM

## 2018-11-26 PROCEDURE — 99999 PR PBB SHADOW E&M-EST. PATIENT-LVL III: CPT | Mod: PBBFAC,,, | Performed by: SURGERY

## 2018-11-26 PROCEDURE — 99024 POSTOP FOLLOW-UP VISIT: CPT | Mod: S$GLB,,, | Performed by: SURGERY

## 2018-11-26 NOTE — PROGRESS NOTES
New Breast Cancer  History and Physical  [facility]  Department of Surgery    REFERRING PROVIDER: No referring provider defined for this encounter.    CHIEF COMPLAINT: right breast cancer    Subjective:   47 yo female with hx of right IDC (Stage 1a -pT1pN1, ER/FL+,Her2-) s/p bilateral mastectomy with right axillary dissection and immediate MEGAN reconstruction on 18.     She is doing well. Only needing pain medications intermittently. Right breast and right abdomen drains in place - only 15ml and 20ml in the last 24 hours.       Interval History:   Brittny Urias is a 48 y.o. postmenopausal female referred for evaluation of recently diagnosed carcinoma of the right breast. The patient was initially referred for surgical evaluation of an abnormal mammogram first noted 18. Follow-up breast biopsy (18), mammogram (8/15/18) and ultrasound (8/15/18) showed architectural distortion in the retroareolar right breast . A stereotactic biopsy was performed on 18 with pathology revealing infiltrating lobular carcinoma of the breast.     Patient does not routinely do self breast exams.  Patient has not noted a change on breast exam.  Patient denies nipple discharge. Patient denies to previous breast biopsy. Patient denies a personal history of breast cancer.      GYN History:  Age of menarche was 11. Age of menopause was 45.  Last menstrual period was in  following partial hysterectomy, still has her ovaries. Patient admits to hormonal therapy, OCPs for approximately 10 years. Patient is . Age of first live birth was 19. Patient did breast feed.    FAMILY History:  No other family history of malignancy including ovarian, colon, pancreatic, prostate, gastric or melanoma cancers.    Past Medical History:   Diagnosis Date    Breast cancer, right breast 2018    Cancer     Hypertension     Psoriasis     on biologic for 1 year, has been controlling it well     Past Surgical  History:   Procedure Laterality Date    DILATION AND CURETTAGE OF UTERUS      HYSTERECTOMY      MASTECTOMY Bilateral 11/14/2018    Procedure: MASTECTOMY;  Surgeon: Marga Cardenas MD;  Location: [facility] OR;  Service: Plastics;  Laterality: Bilateral;    MASTECTOMY Bilateral 11/14/2018    Performed by Marga Cardenas MD at [facility] OR    MASTECTOMY WITH SENTINEL NODE BIOPSY AND AXILLARY LYMPH NODE DISSECTION Right 11/14/2018    Procedure: SENTINEL NODE BIOPSY AND AXILLARY LYMPHADENECTOMY;  Surgeon: Marga Cardenas MD;  Location: [facility] OR;  Service: Plastics;  Laterality: Right;    RECONSTRUCTION OF BREAST WITH DEEP INFERIOR EPIGASTRIC ARTERY  (MEGAN) FREE FLAP Bilateral 11/14/2018    Procedure: RECONSTRUCTION, BREAST, USING MEGAN FREE FLAP;  Surgeon: Tye Chapman MD;  Location: [facility] OR;  Service: Plastics;  Laterality: Bilateral;    RECONSTRUCTION, BREAST, USING MEGAN FREE FLAP Bilateral 11/14/2018    Performed by Tye Chapman MD at [facility] OR    SENTINEL NODE BIOPSY AND AXILLARY LYMPHADENECTOMY Right 11/14/2018    Performed by Marga Cardenas MD at [facility] OR     Current Outpatient Medications on File Prior to Visit   Medication Sig Dispense Refill    aspirin (ECOTRIN) 81 MG EC tablet Take 1 tablet (81 mg total) by mouth once daily. 30 tablet 0    bisacodyl (DULCOLAX) 5 mg EC tablet Take 1 tablet (5 mg total) by mouth daily as needed for Constipation. 30 tablet 0    cetirizine (ZYRTEC) 10 MG tablet Take 1 tablet (10 mg total) by mouth daily as needed for Allergies (sneezing). 30 tablet 0    docusate sodium (COLACE) 100 MG capsule Take 1 capsule (100 mg total) by mouth 2 (two) times daily. 30 capsule 0    epinephrine (EPIPEN) 0.3 mg/0.3 mL AtIn Inject 0.3 mLs (0.3 mg total) into the muscle as needed. 2 Device 0    hydroCHLOROthiazide (HYDRODIURIL) 25 MG tablet Take 0.5 tablets (12.5 mg total) by mouth once daily. 30 tablet 2    HYDROcodone-acetaminophen (NORCO) 5-325 mg per tablet Take 1 tablet by mouth  every 4 (four) hours as needed. 30 tablet 0    ondansetron (ZOFRAN-ODT) 8 MG TbDL Take 1 tablet (8 mg total) by mouth every 8 (eight) hours as needed. 15 tablet 0     No current facility-administered medications on file prior to visit.      Social History     Socioeconomic History    Marital status:      Spouse name: Not on file    Number of children: Not on file    Years of education: Not on file    Highest education level: Not on file   Social Needs    Financial resource strain: Not on file    Food insecurity - worry: Not on file    Food insecurity - inability: Not on file    Transportation needs - medical: Not on file    Transportation needs - non-medical: Not on file   Occupational History    Not on file   Tobacco Use    Smoking status: Never Smoker    Smokeless tobacco: Never Used   Substance and Sexual Activity    Alcohol use: Yes     Alcohol/week: 1.8 oz     Types: 3 Glasses of wine per week     Comment: social    Drug use: No    Sexual activity: Yes     Partners: Male     Birth control/protection: Surgical   Other Topics Concern    Not on file   Social History Narrative    Not on file     Family History   Problem Relation Age of Onset    Diabetes Mother     Hypertension Mother     Hypertension Brother         Review of Systems  Review of Systems   Constitutional: Negative for chills and fever.   Respiratory: Negative for chest tightness and shortness of breath.    Cardiovascular: Negative for chest pain.   Gastrointestinal: Positive for blood in stool (for the last 2 months). Negative for constipation, diarrhea, nausea and vomiting.   Genitourinary: Negative for dysuria and hematuria.   Musculoskeletal: Negative for arthralgias and back pain.   Skin: Negative for wound.   Neurological: Negative for dizziness and headaches.   Hematological: Negative for adenopathy. Does not bruise/bleed easily.        Objective:   PHYSICAL EXAM:  There were no vitals taken for this visit.  General  appearance: alert, appears stated age and cooperative  Head: Normocephalic, without obvious abnormality, atraumatic  Neck: no adenopathy and supple, symmetrical, trachea midline  Lungs: normal effort, nonlabored breathing  Breasts: bilateral MEGAN flap. Appear healthy and incisions are clean and dry. No erythema or drainage. Right drain in place with serosanguinous drainage. Lower abdominal incision is clean and dry. Right abdominal drain in place which is serosanguinous.   Heart: regular rate and rhythm  Abdomen: soft, non-tender; bowel sounds normal; no masses,  no organomegaly  Extremities: extremities normal, atraumatic, no cyanosis or edema  Skin: normal, no edema and no lesions noted  Lymph nodes: Cervical, supraclavicular, and axillary nodes normal.  Neurologic: Grossly normal    Radiology review: Images personally reviewed by me in the clinic.   Mammogram:8/13/18 (screening), 8/15/18 (diagnostic)  Impression:  Right  Architectural Distortion: Right breast architectural distortion at the retroareolar anterior position. Assessment: 0 - Incomplete. Diagnostic Mammogram and/or Ultrasound is recommended.      Left  There is no mammographic evidence of malignancy.     BI-RADS Category:   Overall: 0 - Incomplete: Needs Additional Imaging Evaluation     Ultrasound:8/15/18 (diagnostic)  Targeted right breast ultrasound in the retroareolar region and right axilla was performed. There is no definite sonographic abnormality seen to correlate likely due to significant shadowing with the mammographic finding. Normal right axillary lymph nodes are seen.        Pathology:  FINAL PATHOLOGIC DIAGNOSIS  1. LYMPH NODE (RIGHT SENTINEL LYMPH NODE, CLINICAL): METASTATIC CARCINOMA.  Note: The metastatic tumor measures up to 6 mm and shows focal extranodal extension.  2. LYMPH NODES (2 RIGHT SENTINEL LYMPH NODES, CLINICAL): METASTATIC CARCINOMA TO ONE (1)  OF TWO (2) LYMPH NODES.  Note: The metastatic tumor measures up to 11 mm and  "shows extranodal extension.  3. BREAST (LEFT MASTECTOMY): FIBROCYSTIC CHANGES; NO EVIDENCE OF MALIGNANCY IN  SECTIONS OF BREAST, SKIN, OR NIPPLE.  4. BREAST (RIGHT MASTECTOMY): INVASIVE MAMMARY CARCINOMA WITH LOBULAR FEATURES (20  mm, CENTRAL BREAST); ANTERIOR AND POSTERIOR RESECTIONS MARGINS, NIPPLE, AND SKIN FREE  OF MALIGNANCY; FIBROCYSTIC CHANGES OF SURROUNDING BREAST.  5. LYMPH NODES (12, AXILLA): NO EVIDENCE OF MALIGNANCY; SMALL FRAGMENT OF BENIGN  AXILLARY BREAST TISSUE.  SURGICAL PATHOLOGY CANCER CASE SUMMARY  Invasive carcinoma of the breast:  Procedure: total mastectomy  Laterality: right  Tumor site: central breast  Tumor size: 20mm  Histologic type: invasive mammary carcinoma with lobular features  Histologic grade: intermediate grade (3, 2, 1)  Tumor focality: single focus  DCIS: absent  LCIS: absent  Tumor extension:  Skin: absent  Nipple: absent  Skeletal muscle: na  Margins: uninvolved  Invasive carcinoma: >20 mm from anterior and posterior margins  DCIS margin: na  Lymph nodes: 2+/15  Nodes with macrometastases (>2mm): 2  Nodes with micrometastases (>0.2mm-</=2mm): 0  Nodes with isolated tumor cells (</=0.2mm or 200cells): 0  Size of largest tumor deposit: 11mm  Extranodal extension: present  Treatment effect: na  In breast: na  In lymph nodes: na  Pathological stage: pT1c pN1  Ancillary studies-interpreted by Dr. YURI Gill on needle biopsy [id number]  ER positive  IN positive  Her2 negative  Tumor block: 4D  Supplemental Diagnosis  The invasive mammary carcinoma with lobular features appears of the "classic" variety.      Assessment:      Brittny Urias is a 48 y.o. postmenopausal female with recently diagnosed carcinoma of the right breast (Stage 1a).  s/p bilateral mastectomy with right axillary dissection and immediate MEGAN reconstruction 11/14     Plan:       Healing well. Both drains removed. Large amount of drainage from right breast once drain removed.   Radiation oncology referral for " possible post adjuvant radiation   Medical oncology referral for post adjuvant for endocrine therapy   Arm exercises, PT for arm mobility   F/u in 4 weeks

## 2018-11-27 DIAGNOSIS — Z17.0 MALIGNANT NEOPLASM OF RIGHT BREAST IN FEMALE, ESTROGEN RECEPTOR POSITIVE, UNSPECIFIED SITE OF BREAST: Primary | ICD-10-CM

## 2018-11-27 DIAGNOSIS — Z91.89 AT RISK FOR LYMPHEDEMA: ICD-10-CM

## 2018-11-27 DIAGNOSIS — C50.911 MALIGNANT NEOPLASM OF RIGHT BREAST IN FEMALE, ESTROGEN RECEPTOR POSITIVE, UNSPECIFIED SITE OF BREAST: Primary | ICD-10-CM

## 2018-11-28 ENCOUNTER — CLINICAL SUPPORT (OUTPATIENT)
Dept: REHABILITATION | Facility: HOSPITAL | Age: 48
End: 2018-11-28
Attending: SURGERY
Payer: COMMERCIAL

## 2018-11-28 DIAGNOSIS — Z17.0 MALIGNANT NEOPLASM OF NIPPLE OF RIGHT BREAST IN FEMALE, ESTROGEN RECEPTOR POSITIVE: ICD-10-CM

## 2018-11-28 DIAGNOSIS — C50.011 MALIGNANT NEOPLASM OF NIPPLE OF RIGHT BREAST IN FEMALE, ESTROGEN RECEPTOR POSITIVE: ICD-10-CM

## 2018-11-28 DIAGNOSIS — C50.012 BILATERAL MALIGNANT NEOPLASM OF AREOLA OF BREAST IN FEMALE, UNSPECIFIED ESTROGEN RECEPTOR STATUS: Primary | ICD-10-CM

## 2018-11-28 DIAGNOSIS — M25.611 DECREASED SHOULDER MOBILITY, RIGHT: ICD-10-CM

## 2018-11-28 DIAGNOSIS — Z91.89 AT RISK FOR LYMPHEDEMA: ICD-10-CM

## 2018-11-28 DIAGNOSIS — C50.011 BILATERAL MALIGNANT NEOPLASM OF AREOLA OF BREAST IN FEMALE, UNSPECIFIED ESTROGEN RECEPTOR STATUS: Primary | ICD-10-CM

## 2018-11-28 PROCEDURE — 97162 PT EVAL MOD COMPLEX 30 MIN: CPT | Mod: PO | Performed by: PHYSICAL MEDICINE & REHABILITATION

## 2018-11-28 PROCEDURE — 97110 THERAPEUTIC EXERCISES: CPT | Mod: PO | Performed by: PHYSICAL MEDICINE & REHABILITATION

## 2018-11-28 NOTE — PATIENT INSTRUCTIONS
Exaggerated Breathing and Relaxation      Practice deep breathing frequently in the first few days following surgery even before you begin exercising your arm. Inhale slowly and deeply through the nose and exhale through pursed lips. Concentrate on relaxing as you let the air out of your lungs. Repeat three (3) to four (4) times, remembering to breath in deeply and then relaxing. This exercise helps to ease the sensation of pulling and discomfort that may be experienced while exercising.    ROM: Flexion - Wand (Supine)    Lie on back holding wand. Raise arms over head.   Repeat 10 times and hold 30 seconds.  Do 2 sessions per day.         ROM: Abduction (Lying Down)    Bring arms straight out from sides and raise as high as possible without pain.  Repeat 10 times. Do 1 sessions 2x per day.        Scapular Retraction (Standing)    With arms at sides, squeeze shoulder blades together. Do not shrug and do not hold your breath. Hold 5 seconds. Repeat 10 times 2 sessions 2 x day.         One hand on wall, rotate shoulder by stepping outward.  Hold 5 seconds - repeat 5x.  Do 2 sessions per day    .Exaggerated Breathing and Relaxation      Practice deep breathing frequently in the first few days following surgery even before you begin exercising. This exercise helps with tissue extensibility in the chest wall.  Inhale slowly and deeply through the nose and exhale through pursed lips. Concentrate on relaxing as you let the air out of your lungs. Repeat three (3) to four (4) times, remembering to breath in deeply and then relaxing. This exercise helps to ease the sensation of pulling and discomfort that may be experienced while exercising.                                                     POST OP PATIENT EDUCATION        Lymphedema - Identification and Prevention     Lymphedema - is the swelling of a body area or extremity caused by the accumulation of lymphatic fluid.  There is a risk for lymphedema with the removal of  lymph nodes, trauma or radiation therapy.  Treatment of breast cancer often involves surgery: mastectomy or lumpectomy. Some of the lymph nodes in the underarm (called axillary lymph nodes) may be removed and checked to see if they contain cancer cells.     During breast surgery when axillary lymph nodes are removed (with sentinel node biopsy or axillary dissection) or are treated with radiation therapy, the lymphatic system may become impaired. This may prevent lymphatic fluid from leaving the area therefore, causing lymphedema.     Lymphatic fluid is a normal part of the circulatory system. Its function is to remove waste products and to produce cells vital to fighting infection. Swelling occurs when the vessels become restricted and the lymphatic fluid is unable to freely flow through them.  If lymphedema is left untreated, the affected limb could progressively become more swollen, which could lead to hardening of the skin, bulkiness in the limb, infection and impaired wound healing.         There are things you can do to decrease the chance of developing lymphedema.                                          [email or web address]                                                                                                                                                  The information presented is intended for general information and educational purposes. It is not intended to replace the  advice of your health care provider. Contact your health care provider if you believe you have a health problem.

## 2018-11-28 NOTE — PLAN OF CARE
[facility]  Physical Therapy Initial Evaluation    Name: Brittny Urias  Clinic Number: [id number]    Therapy Diagnosis:   Encounter Diagnoses   Name Primary?    Bilateral malignant neoplasm of areola of breast in female, unspecified estrogen receptor status Yes    Malignant neoplasm of nipple of right breast in female, estrogen receptor positive     Decreased shoulder mobility, right     At risk for lymphedema      Physician: Marga Cardenas MD    Physician Orders: PT Eval and Treat   Medical Diagnosis: right breast cancer  Evaluation Date: 11/28/2018  Authorization Period Expiration: 12/31/18  Plan of Care Certification Period: 1/23/18  Visit # / Visits authorized: 1/ 20  Insurance: [employer]    Time In: 10;00 AM  Time Out: 11:00 AM  Total Billable Time: 60  minutes    Precautions: Standard and cancer      History   History of Present Illness: Brittny is a 48 y.o. female that presents to  [facility] at the [facility] s/p right breast surgery.   Diagnosis: Right breast IDC, Grade 1, ER (+), MT (+), HER2 (-)   Chief complaint: tightness in abdomen and chest and difficulty raising arms upward  Surgical History:11/14/18 for bilateral mastectomies with SLNB and MEGAN Flap reconstruction  Brittny Urias  has a past surgical history that includes Hysterectomy; Dilation and curettage of uterus; RECONSTRUCTION, BREAST, USING MEGAN FREE FLAP (Bilateral, 11/14/2018); MASTECTOMY (Bilateral, 11/14/2018); and SENTINEL NODE BIOPSY AND AXILLARY LYMPHADENECTOMY (Right, 11/14/2018).    Chemotherapy: pending  Radiation: pending    Past Medical History:   Diagnosis Date    Breast cancer, right breast 11/12/2018    Cancer     Hypertension     Psoriasis     on biologic for 1 year, has been controlling it well          Medications:  Brittny has a current medication list which includes the following prescription(s): aspirin, bisacodyl, cetirizine, docusate  sodium, epinephrine, hydrochlorothiazide, hydrocodone-acetaminophen, and ondansetron.    Allergies:  Review of patient's allergies indicates:  No Known Allergies     Prior Therapy: None  Social History:  lives with their family  Occupation:    Prior Level of Function: Independent with all ADL's  Current Level of Function: difficulty with dressing and raising arms upward    Other Past Medical History: None    Patient's Goals: I want to be able to move my arms better    Hand dominance: Right handed    Subjective   Pt states: not having a lot of pain. States not wearing abdominal garment--was removed at Dr. Chapman's office  Pain: 2/10 on VAS currently.   Best: 2/10  Worst: 6/10   Pain location:  Chest and abdomen   Objective   Mental status :alert    Postural examination/scapula alignment: Rounded shoulder and Head forward; trunk held in forward flexion    Skin Integrity:   Scar Location: bilateral breasts and abdomen  Appearance: white eschar, healing, blood tinged drainage-abdominal incision; scab present umbilicus  Signs of infection: No  Drainage:blood tinged  Color:pinkish    Edema: Moderate  Location: abdomen    Axillary Web Syndrome/Cording:   Location: Right Axilla  Degree of Cording: mild (mild, moderate etc...)   Number of cords present: 3    Sensation: numbness noted bilateral breasts and lateral right thigh        Range of Motion:      Shoulder Range of Motion:   Active /Passive ROM Right Left   Flexion 120 130   Abduction 90 110   Extension 70 70   IR/90deg 85 85   ER/90deg 55 60          Strength: manual muscle test grades below   Upper Extremity Strength   (R) UE (L) UE   Shoulder flexion: 4/5 5/5   Shoulder Abduction: 4/5 5/5   Shoulder IR 4/5 5/5   Shoulder ER 4/5 5/5   Elbow flexion: 5/5 5/5   Elbow extension: 5/5 5/5   Wrist flexion: 5/5 5/5   Wrist extension: 5/5 5/5    5/5 5/5       Baseline Measurements of BL UE's for early detection of Lymphedema:     LANDMARK RIGHT UE LEFT UE  "DIFFERENCE   E + 8" 40 cm 40 cm 0 cm   E + 6" 35 cm 36 cm 1 cm   E + 4" 33 cm 32 cm 1 cm   E + 2" 32 cm 30 cm 2 cm   Elbow 29 cm 28 cm 1 cm   W+ 8" 29 cm 28 cm 1 cm   W +  6" 27.5 cm 26 cm 1.5 cm   W + 4" 23 cm 22 cm 1 cm   Wrist 18 cm 17 cm 1 cm   DPC 22 cm 22 cm 0 cm   IP Thumb 8 cm 8 cm 0 cm     Functional Mobility (Bed mobility, transfers)  I    ADL's:  Needs assist with dressing    Gait Assessment:   - AD used: none  - Assistance: independent  - Distance: community distances     Patient Education Provided   - role of therapy in multi - disciplinary team, goals for therapy  - Pt was educated in lymphedema etiology and management plans.    - Pt was provided with written risk reductions and precautions for managing lymphedema.     Pt has no cultural, educational or language barriers to learning provided.  Treatment and Instruction of Home Exercise Program    Time In: 10:30  Time Out: 11:00    Brittny received individual therapeutic exercises to improve postural correction and alignment, stretching and soft tissue mobility, and strengthening for 30 minutes including the following:  Supine Shoulder Flexion with wand     1 x 10  Supine Shoulder Abd ([place])   1 x 10  Exaggerated Breathing  Seated Scap Retractions                     10 x 5"  Shoulder ER with hand on wall            5 x 5"  (B)  Patient instructed to sit up staight when sitting and to work on trying to hold her trunk erect when standing      Written Home Exercises Provided and Patient Education: Handouts given   See EMR under patient instructions for program given  Pt demonstrated good understanding of the education provided. Patient demo good return demo of skill of exercises.    Functional Limitations Reporting      CMS Impairment/Limitation/Restriction for FOTO Outcome Survey    Therapist reviewed FOTO scores for Brittny Urias on 11/28/2018.   FOTO documents entered into [employer] - see Media section.    Limitations Score: 77%  Category: " Carrying           Assessment   This is a 48 y.o. female referred to outpatient physical therapy and presents with a medical diagnosis of right  breast cancer and was seen today post-operatively to establish PT plan of care for impairments following surgery including: decreased AROM and strength bilateral shoulders, cording right axilla; poor posture.     Pt instructed in HEP this session and was able to perform all exercises given independently. Pt instructed to follow up with therapist if any concerns arise with program established. Pt will continue to benefit from skilled physical therapy to address the impairments stated in chart below, provide patient/family education and to maximize pt's level of independence in home and community environment. Suggested to patient to call Dr. Chapman and ask about an abdominal garment for support.    Anticipated barriers to physical therapy: None     Pt's spiritual, cultural and educational needs considered and pt agreeable to plan of care and goals as stated below:     Medical necessity is demonstrated by the following IMPAIRMENTS/PROMBLEM LIST:  History  Co-morbidities and personal factors that may impact the plan of care Examination  Body Structures and Functions, activity limitations and participation restrictions that may impact the plan of care    Clinical Presentation   Co-morbidities:   Breast cancer  S/p Bilateral mastectomies with MEGAN Flap Reconstruction          Personal Factors:   no deficits Body Regions:   upper extremities  trunk    Body Systems:   ROM  strength  edema  scar formation        Participation Restrictions:   Standing straight, lifting BUE upward     Activity limitations:   Mobility  lifting and carrying objects    Self care  washing oneself (bathing, drying, washing hands)  dressing    Domestic Life  cooking  doing house work (cleaning house, washing dishes, laundry)    Interactions/Relationships  no deficits    Life Areas  no deficits    Community  and Social Life  no deficits         evolving clinical presentation with changing clinical characteristics                      moderate   moderate  moderate Decision Making/ Complexity Score:  moderate       Goals: Pt agrees with goals set    Short Term goals: 4 weeks  1. Patient will demonstrate 100% understanding of lymphedema risk reduction practices to include self monitoring for lymphedema. (progressing, not met)  2. Patient will demonstrate independence with Home Exercise program established. (progressing, not met)  3. Pt will increase AROM/PROM in shoulder abduction ROM to 140 degrees bilaterally to improve functional reach, carry, push, pull pain free. (progressing, not met)  4. Pt will increase AROM/PROM in shoulder flexion to 160 degrees bilaterally to improve functional reach, carry, push, pull pain free.(progressing, not met)  5. Pt will increase AROM/PROMin shoulder ER to 75 degrees bilaterally in order to perform functional activites  6. Strength will be 4+/5 for right shoulder in order to perform functional activities. (progressing, not met)    Long Term Goals: 8 weeks   1.  Pt will increase AROM/PROM in shoulder flexion to 180 degrees bilaterally to improve functional reach, carry, push, pull pain free. (progressing, not met)  2. Pt will increase strength to 5/5 in gross right shoulder musculature to improve tolerance to all functional activities pain free. (progressing, not met)  3. Pt will demonstrate full/maximized tissue mobility to increase ROM, promote healthy tissue to be pain free, and to hold trunk erect when sitting, standing and ambulating at discharge. (progressing, not met)  4. Pt will report decrease in overall worst pain to 2/10 at discharge. (progressing, not met)  5. Pt will increase AROM/PROM in shoulder abduction ROM to 180 degrees bilaterally to improve functional reach, carry, push, pull pain free. (progressing, not met)  6. Patient will report compliance with walking program 5x  week for 10 min each day to improve overall cardiovascular function and decrease cancer related fatigue at discharge. (progressing, not met)      Plan   Outpatient physical therapy 2x week for 8 weeks to include the following:   Manual Therapy, Patient Education and Therapeutic Exercise.    Plan of care Certification Period: 11/28/2018 to 1/23/18.    Therapist: Roberta Love, PT  11/28/2018

## 2018-11-28 NOTE — PROGRESS NOTES
[facility]  Physical Therapy Initial Evaluation    Name: Brittny Urias  Clinic Number: [id number]    Therapy Diagnosis:   Encounter Diagnoses   Name Primary?    Bilateral malignant neoplasm of areola of breast in female, unspecified estrogen receptor status Yes    Malignant neoplasm of nipple of right breast in female, estrogen receptor positive     Decreased shoulder mobility, right     At risk for lymphedema      Physician: Marga Cardenas MD    Physician Orders: PT Eval and Treat   Medical Diagnosis: right breast cancer  Evaluation Date: 11/28/2018  Authorization Period Expiration: 12/31/18  Plan of Care Certification Period: 1/23/18  Visit # / Visits authorized: 1/ 20  Insurance: im[employer]in ([employer])    Time In: 10;00 AM  Time Out: 11:00 AM  Total Billable Time: 60  minutes    Precautions: Standard and cancer      History   History of Present Illness: Brittny is a 48 y.o. female that presents to  [facility] at the [facility] s/p right breast surgery.   Diagnosis: Right breast IDC, Grade 1, ER (+), LA (+), HER2 (-)   Chief complaint: tightness in abdomen and chest and difficulty raising arms upward  Surgical History:11/14/18 for bilateral mastectomies with SLNB and MEGAN Flap reconstruction  Brittny Urias  has a past surgical history that includes Hysterectomy; Dilation and curettage of uterus; RECONSTRUCTION, BREAST, USING MEGAN FREE FLAP (Bilateral, 11/14/2018); MASTECTOMY (Bilateral, 11/14/2018); and SENTINEL NODE BIOPSY AND AXILLARY LYMPHADENECTOMY (Right, 11/14/2018).    Chemotherapy: pending  Radiation: pending    Past Medical History:   Diagnosis Date    Breast cancer, right breast 11/12/2018    Cancer     Hypertension     Psoriasis     on biologic for 1 year, has been controlling it well          Medications:  Brittny has a current medication list which includes the following prescription(s): aspirin, bisacodyl, cetirizine, docusate  sodium, epinephrine, hydrochlorothiazide, hydrocodone-acetaminophen, and ondansetron.    Allergies:  Review of patient's allergies indicates:  No Known Allergies     Prior Therapy: None  Social History:  lives with their family  Occupation:    Prior Level of Function: Independent with all ADL's  Current Level of Function: difficulty with dressing and raising arms upward    Other Past Medical History: None    Patient's Goals: I want to be able to move my arms better    Hand dominance: Right handed    Subjective   Pt states: not having a lot of pain. States not wearing abdominal garment--was removed at Dr. Chapman's office  Pain: 2/10 on VAS currently.   Best: 2/10  Worst: 6/10   Pain location:  Chest and abdomen   Objective   Mental status :alert    Postural examination/scapula alignment: Rounded shoulder and Head forward; trunk held in forward flexion    Skin Integrity:   Scar Location: bilateral breasts and abdomen  Appearance: white eschar, healing, blood tinged drainage-abdominal incision; scab present umbilicus  Signs of infection: No  Drainage:blood tinged  Color:pinkish    Edema: Moderate  Location: abdomen    Axillary Web Syndrome/Cording:   Location: Right Axilla  Degree of Cording: mild (mild, moderate etc...)   Number of cords present: 3    Sensation: numbness noted bilateral breasts and lateral right thigh        Range of Motion:      Shoulder Range of Motion:   Active /Passive ROM Right Left   Flexion 120 130   Abduction 90 110   Extension 70 70   IR/90deg 85 85   ER/90deg 55 60          Strength: manual muscle test grades below   Upper Extremity Strength   (R) UE (L) UE   Shoulder flexion: 4/5 5/5   Shoulder Abduction: 4/5 5/5   Shoulder IR 4/5 5/5   Shoulder ER 4/5 5/5   Elbow flexion: 5/5 5/5   Elbow extension: 5/5 5/5   Wrist flexion: 5/5 5/5   Wrist extension: 5/5 5/5    5/5 5/5       Baseline Measurements of BL UE's for early detection of Lymphedema:     LANDMARK RIGHT UE LEFT UE  "DIFFERENCE   E + 8" 40 cm 40 cm 0 cm   E + 6" 35 cm 36 cm 1 cm   E + 4" 33 cm 32 cm 1 cm   E + 2" 32 cm 30 cm 2 cm   Elbow 29 cm 28 cm 1 cm   W+ 8" 29 cm 28 cm 1 cm   W +  6" 27.5 cm 26 cm 1.5 cm   W + 4" 23 cm 22 cm 1 cm   Wrist 18 cm 17 cm 1 cm   DPC 22 cm 22 cm 0 cm   IP Thumb 8 cm 8 cm 0 cm     Functional Mobility (Bed mobility, transfers)  I    ADL's:  Needs assist with dressing    Gait Assessment:   - AD used: none  - Assistance: independent  - Distance: community distances     Patient Education Provided   - role of therapy in multi - disciplinary team, goals for therapy  - Pt was educated in lymphedema etiology and management plans.    - Pt was provided with written risk reductions and precautions for managing lymphedema.     Pt has no cultural, educational or language barriers to learning provided.  Treatment and Instruction of Home Exercise Program    Time In: 10:30  Time Out: 11:00    Brittny received individual therapeutic exercises to improve postural correction and alignment, stretching and soft tissue mobility, and strengthening for 30 minutes including the following:  Supine Shoulder Flexion with wand     1 x 10  Supine Shoulder Abd ([place])   1 x 10  Exaggerated Breathing  Seated Scap Retractions                     10 x 5"  Shoulder ER with hand on wall            5 x 5"  (B)  Patient instructed to sit up staight when sitting and to work on trying to hold her trunk erect when standing      Written Home Exercises Provided and Patient Education: Handouts given   See EMR under patient instructions for program given  Pt demonstrated good understanding of the education provided. Patient demo good return demo of skill of exercises.    Functional Limitations Reporting      CMS Impairment/Limitation/Restriction for FOTO Outcome Survey    Therapist reviewed FOTO scores for Brittny Urias on 11/28/2018.   FOTO documents entered into [employer] - see Media section.    Limitations Score: 77%  Category: " Carrying           Assessment   This is a 48 y.o. female referred to outpatient physical therapy and presents with a medical diagnosis of right  breast cancer and was seen today post-operatively to establish PT plan of care for impairments following surgery including: decreased AROM and strength bilateral shoulders, cording right axilla; poor posture.     Pt instructed in HEP this session and was able to perform all exercises given independently. Pt instructed to follow up with therapist if any concerns arise with program established. Pt will continue to benefit from skilled physical therapy to address the impairments stated in chart below, provide patient/family education and to maximize pt's level of independence in home and community environment. Suggested to patient to call Dr. Chapman and ask about an abdominal garment for support.    Anticipated barriers to physical therapy: None     Pt's spiritual, cultural and educational needs considered and pt agreeable to plan of care and goals as stated below:     Medical necessity is demonstrated by the following IMPAIRMENTS/PROMBLEM LIST:  History  Co-morbidities and personal factors that may impact the plan of care Examination  Body Structures and Functions, activity limitations and participation restrictions that may impact the plan of care    Clinical Presentation   Co-morbidities:   Breast cancer  S/p Bilateral mastectomies with MEGAN Flap Reconstruction          Personal Factors:   no deficits Body Regions:   upper extremities  trunk    Body Systems:   ROM  strength  edema  scar formation        Participation Restrictions:   Standing straight, lifting BUE upward     Activity limitations:   Mobility  lifting and carrying objects    Self care  washing oneself (bathing, drying, washing hands)  dressing    Domestic Life  cooking  doing house work (cleaning house, washing dishes, laundry)    Interactions/Relationships  no deficits    Life Areas  no deficits    Community  and Social Life  no deficits         evolving clinical presentation with changing clinical characteristics                      moderate   moderate  moderate Decision Making/ Complexity Score:  moderate       Goals: Pt agrees with goals set    Short Term goals: 4 weeks  1. Patient will demonstrate 100% understanding of lymphedema risk reduction practices to include self monitoring for lymphedema. (progressing, not met)  2. Patient will demonstrate independence with Home Exercise program established. (progressing, not met)  3. Pt will increase AROM/PROM in shoulder abduction ROM to 140 degrees bilaterally to improve functional reach, carry, push, pull pain free. (progressing, not met)  4. Pt will increase AROM/PROM in shoulder flexion to 160 degrees bilaterally to improve functional reach, carry, push, pull pain free.(progressing, not met)  5. Pt will increase AROM/PROMin shoulder ER to 75 degrees bilaterally in order to perform functional activites  6. Strength will be 4+/5 for right shoulder in order to perform functional activities. (progressing, not met)    Long Term Goals: 8 weeks   1.  Pt will increase AROM/PROM in shoulder flexion to 180 degrees bilaterally to improve functional reach, carry, push, pull pain free. (progressing, not met)  2. Pt will increase strength to 5/5 in gross right shoulder musculature to improve tolerance to all functional activities pain free. (progressing, not met)  3. Pt will demonstrate full/maximized tissue mobility to increase ROM, promote healthy tissue to be pain free, and to hold trunk erect when sitting, standing and ambulating at discharge. (progressing, not met)  4. Pt will report decrease in overall worst pain to 2/10 at discharge. (progressing, not met)  5. Pt will increase AROM/PROM in shoulder abduction ROM to 180 degrees bilaterally to improve functional reach, carry, push, pull pain free. (progressing, not met)  6. Patient will report compliance with walking program 5x  week for 10 min each day to improve overall cardiovascular function and decrease cancer related fatigue at discharge. (progressing, not met)      Plan   Outpatient physical therapy 2x week for 8 weeks to include the following:   Manual Therapy, Patient Education and Therapeutic Exercise.    Plan of care Certification Period: 11/28/2018 to 1/23/18.    Therapist: Roberta Love, PT  11/28/2018

## 2018-12-04 ENCOUNTER — OFFICE VISIT (OUTPATIENT)
Dept: PLASTIC SURGERY | Facility: CLINIC | Age: 48
End: 2018-12-04
Payer: COMMERCIAL

## 2018-12-04 VITALS — BODY MASS INDEX: 42.15 KG/M2 | HEIGHT: 62 IN | WEIGHT: 229.06 LBS

## 2018-12-04 DIAGNOSIS — C50.011 MALIGNANT NEOPLASM OF NIPPLE OF RIGHT BREAST IN FEMALE, ESTROGEN RECEPTOR POSITIVE: Primary | ICD-10-CM

## 2018-12-04 DIAGNOSIS — Z17.0 MALIGNANT NEOPLASM OF NIPPLE OF RIGHT BREAST IN FEMALE, ESTROGEN RECEPTOR POSITIVE: Primary | ICD-10-CM

## 2018-12-04 PROCEDURE — 99024 POSTOP FOLLOW-UP VISIT: CPT | Mod: S$GLB,,, | Performed by: PLASTIC SURGERY

## 2018-12-04 PROCEDURE — 99999 PR PBB SHADOW E&M-EST. PATIENT-LVL III: CPT | Mod: PBBFAC,,, | Performed by: PLASTIC SURGERY

## 2018-12-04 NOTE — PROGRESS NOTES
"POSTOP FOLLOW-UP EXAM    CC  No chief complaint on file.    DIAGNOSES  Encounter Diagnoses   Name Primary?    Malignant neoplasm of nipple of right breast in female, estrogen receptor positive Yes     PROCEDURE  Bilateral MEGAN reconstruction    SUBJECTIVE  Patient doing well, small amount of central dehiscence of abdomen, about 3 cm length    PHYSICAL EXAM  Ht 5' 2" (1.575 m)   Wt 103.9 kg (229 lb 0.9 oz)   BMI 41.90 kg/m²   Breasts:  Soft, no collections  Flaps soft, warm, well perfused, healthy appearing  Cap refill 2-3 sec  Good contour and symmetry    Abdomen:  Soft, nt, nd  Umbilicus healthy and intact  Incisions clean ,dry, small central dehiscence, about 1 x3 cm    ASSESSMENT  Encounter Diagnoses   Name Primary?    Malignant neoplasm of nipple of right breast in female, estrogen receptor positive Yes       PLAN  - Daily dressing change with xeroform/abd pads  - Continue loose fitting bra and abdominal binder  - Ok to shower daily, pat incisions dry, reapply dressings  - Light ambulation, no heavy lifting  - Activity and aftercare instructions reviewed  - Wet-to-dry gauze dressing to abdominal midline    Electronically signed by:  Tye Chapman  12/4/2018  1:54 PM      "

## 2018-12-05 NOTE — PROGRESS NOTES
Physical Therapy Daily Treatment Note     Name: Brittny Urias  Clinic Number: [id number]  Diagnosis:   Encounter Diagnoses   Name Primary?    Decreased shoulder mobility, right     At risk for lymphedema      Physician: Marga Cardenas MD    Precautions: None  Visit #: 2 of 20  Time In: 2:05 PM  Time Out: 3:00 PM  Total Treatment Time 1:1: 55    Evaluation Date: 11/28/18  Visit # authorized: 20  Authorization period: 12/31/18  Plan of care Expiration: 1/23/19  MD referral: Marga Cardenas MD  Insurance: [facility]      Subjective     Pt reports: feeling better and having slight tightness and pain in anterior chest and right axilla. Patient states has small open area in abdominal incision and saw Dr. Chapman on 12/4/18. Also states she is wearing surgical binding belt over her abdomen for support per Dr. Chapman's orders.   Pain Scale: Brittny rates pain on a scale of 2/10 on VAS.   Pain location: abdomen and chest    Fatigue: Mild  Functional change: able to raise her arms higher and standing straighter   Diagnosis: Right breast IDC, Grade 1, ER (+), ME (+), HER2 (-)   Chief complaint: tightness in abdomen and chest and difficulty raising arms upward  Surgical History:11/14/18 for bilateral mastectomies with SLNB and MEGAN Flap reconstruction  Brittny Urias  has a past surgical history that includes Hysterectomy; Dilation and curettage of uterus; RECONSTRUCTION, BREAST, USING MEGAN FREE FLAP (Bilateral, 11/14/2018); MASTECTOMY (Bilateral, 11/14/2018); and SENTINEL NODE BIOPSY AND AXILLARY LYMPHADENECTOMY (Right, 11/14/2018).     Chemotherapy: pending  Radiation: pending       Objective     Brittny received individual therapeutic exercises to improve postural correction and alignment, stretching and soft tissue mobility, and strengthening for 25 minutes including the following:   Supine Shoulder Flexion with wand     1 x 10  Butterflies                              "              10 x 5"  Supine Shld ER with wand                1 x 10  LTR                                                       1 x 10  Standing Shoulder Abd ([place])   1 x 10  Exaggerated Breathing--at home  Seated Scap Retractions                     10 x 5"  Patient instructed to sit up staight when sitting and to work on trying to hold her trunk erect when standing       Brittny received the following manual therapy techniques were performed to increased myofascial/soft tissue length, mobility and pliability, increase PROM, AROM and function as well as to decrease pain for 30 minutes  Stretching and bending for right axillary cording  Anterior chest Stretch (B)  Lateral chest stretch (B)  Grade II G-H jt mobs right shoulder  Passive stretch all shoulder motions (B)    Home Exercise Program and Patient Education   Education provided re:  - role of PT in multi - disciplinary team, goals for PT  - progress towards goals     See EMR under notes/patient instructions for HEP given/taught this session - all sets and reps included. Pt received printed copy.     Pt was able to demonstrate and report understanding and performance  Pt has no cultural, educational or language barriers to learning provided.      CMS Impairment/Limitation/Restriction for FOTO Outcome Survey     Therapist reviewed FOTO scores for Brittny Urias on 12/6/2018.   FOTO documents entered into [employer] - see Media section.     Limitations Score: 44%  Category: Carrying       Assessment     Patient is responding well to physical therapy. Is at expected function and pain level for 3 weeks post operatively.   Pain after treatment: 1/10    Pt prognosis is Good. Patient received manual therapy as above to decrease shoulder joint tightness and right axillary cording. Patient performed above exercise program and tolerated addition of Shld ER with wand, Butterflies and LTR exercises.  Pt will continue to benefit from skilled outpatient physical " therapy to address the deficits listed in the problem list chart on initial evaluation, provide pt/family education and to maximize pt's level of independence in the home and community environment.     Goals as follows:  Short Term goals: 4 weeks  1. Patient will demonstrate 100% understanding of lymphedema risk reduction practices to include self monitoring for lymphedema. (progressing, not met)  2. Patient will demonstrate independence with Home Exercise program established. (progressing, not met)  3. Pt will increase AROM/PROM in shoulder abduction ROM to 140 degrees bilaterally to improve functional reach, carry, push, pull pain free. (progressing, not met)  4. Pt will increase AROM/PROM in shoulder flexion to 160 degrees bilaterally to improve functional reach, carry, push, pull pain free.(progressing, not met)  5. Pt will increase AROM/PROMin shoulder ER to 75 degrees bilaterally in order to perform functional activites  6. Strength will be 4+/5 for right shoulder in order to perform functional activities. (progressing, not met)     Long Term Goals: 8 weeks   1.  Pt will increase AROM/PROM in shoulder flexion to 180 degrees bilaterally to improve functional reach, carry, push, pull pain free. (progressing, not met)  2. Pt will increase strength to 5/5 in gross right shoulder musculature to improve tolerance to all functional activities pain free. (progressing, not met)  3. Pt will demonstrate full/maximized tissue mobility to increase ROM, promote healthy tissue to be pain free, and to hold trunk erect when sitting, standing and ambulating at discharge. (progressing, not met)  4. Pt will report decrease in overall worst pain to 2/10 at discharge. (progressing, not met)  5. Pt will increase AROM/PROM in shoulder abduction ROM to 180 degrees bilaterally to improve functional reach, carry, push, pull pain free. (progressing, not met)  6. Patient will report compliance with walking program 5x week for 10 min each  day to improve overall cardiovascular function and decrease cancer related fatigue at discharge. (progressing, not met)        Plan     Continue with established POC towards physical therapy goals. Will measure AROM bilateral shoulders next session.    Therapist: Roberta Love, PT  12/6/2018

## 2018-12-06 ENCOUNTER — CLINICAL SUPPORT (OUTPATIENT)
Dept: REHABILITATION | Facility: HOSPITAL | Age: 48
End: 2018-12-06
Attending: SURGERY
Payer: COMMERCIAL

## 2018-12-06 DIAGNOSIS — M25.611 DECREASED SHOULDER MOBILITY, RIGHT: ICD-10-CM

## 2018-12-06 DIAGNOSIS — Z91.89 AT RISK FOR LYMPHEDEMA: ICD-10-CM

## 2018-12-06 PROCEDURE — 97140 MANUAL THERAPY 1/> REGIONS: CPT | Mod: PO | Performed by: PHYSICAL MEDICINE & REHABILITATION

## 2018-12-06 PROCEDURE — 97110 THERAPEUTIC EXERCISES: CPT | Mod: PO | Performed by: PHYSICAL MEDICINE & REHABILITATION

## 2018-12-06 NOTE — PATIENT INSTRUCTIONS
Butterflies    Lie on your back with your hands behind your head. Open your chest by  your elbows apart and gently down. Hold for 5 seconds. Then, bring  your elbows back together. Repeat 2x daily  10 reps         ROM: External Rotation - Wand (supine)    Lie on back holding wand with elbows bent to 90°. Rotate forearms over head as far as possible.   Repeat _10___ times per set. Do __1__ sets per session. Do _2___ sessions per day.     https://orth.[employer].us/932     Copyright © I. All rights reserved.              Sidelying Shoulder Abduction            Sidelying Shoulder Abduction    Lie on your right/left side with right/left arm on top. Keep your elbow straight. Lift your arm upward toward your head.  Perform __10__ times. Perform __1__sets.   Perform __2__ times per day.  Copyright © 9909-4901 [employer]ROM:       Lumbar Rotation       Feet on floor, slowly rock knees from side to side in small, pain-free range of motion. Allow lower back to rotate slightly, but keep shoulders flat on ground. Repeat each side. Repeat 10 times per side. Do 2 sessions per day.

## 2018-12-06 NOTE — OPERATIVE NOTE ADDENDUM
Certification of Assistant at Surgery       Surgery Date:  11/14/18    Participating Surgeons:  Tye Chapman   Primary  Kevin Nelson First Assist  Maciel España Fellow  Karey Donaldson Fellow    Procedures:  Bilateral MEGAN, rib resection, ICG angiograph    Assistant Surgeon's Certification of Necessity:  I understand that section 1842 (b) (6) (d) of the Social Security Act generally prohibits Medicare Part B reasonable charge payment for the services of assistants at surgery in teaching hospitals when qualified residents are available to furnish such services. I certify that the services for which payment is claimed were medically necessary, and that no qualified resident was available to perform the services. I further understand that these services are subject to post-payment review by the Medicare carrier.      Kevin Nelson MD    12/06/2018  4:48 PM

## 2018-12-11 ENCOUNTER — TUMOR BOARD CONFERENCE (OUTPATIENT)
Dept: SURGERY | Facility: CLINIC | Age: 48
End: 2018-12-11

## 2018-12-13 ENCOUNTER — OFFICE VISIT (OUTPATIENT)
Dept: SURGERY | Facility: CLINIC | Age: 48
End: 2018-12-13
Payer: COMMERCIAL

## 2018-12-13 ENCOUNTER — TELEPHONE (OUTPATIENT)
Dept: HEMATOLOGY/ONCOLOGY | Facility: CLINIC | Age: 48
End: 2018-12-13

## 2018-12-13 ENCOUNTER — CLINICAL SUPPORT (OUTPATIENT)
Dept: REHABILITATION | Facility: HOSPITAL | Age: 48
End: 2018-12-13
Attending: SURGERY
Payer: COMMERCIAL

## 2018-12-13 ENCOUNTER — OFFICE VISIT (OUTPATIENT)
Dept: PLASTIC SURGERY | Facility: CLINIC | Age: 48
End: 2018-12-13
Payer: COMMERCIAL

## 2018-12-13 VITALS
DIASTOLIC BLOOD PRESSURE: 85 MMHG | HEART RATE: 65 BPM | HEIGHT: 62 IN | WEIGHT: 229.75 LBS | BODY MASS INDEX: 42.28 KG/M2 | SYSTOLIC BLOOD PRESSURE: 164 MMHG | TEMPERATURE: 98 F

## 2018-12-13 VITALS
TEMPERATURE: 98 F | WEIGHT: 229.75 LBS | BODY MASS INDEX: 42.28 KG/M2 | DIASTOLIC BLOOD PRESSURE: 85 MMHG | RESPIRATION RATE: 16 BRPM | SYSTOLIC BLOOD PRESSURE: 164 MMHG | HEIGHT: 62 IN | HEART RATE: 65 BPM

## 2018-12-13 VITALS — BODY MASS INDEX: 42.28 KG/M2 | HEIGHT: 62 IN | WEIGHT: 229.75 LBS

## 2018-12-13 DIAGNOSIS — Z17.0 MALIGNANT NEOPLASM OF CENTRAL PORTION OF RIGHT BREAST IN FEMALE, ESTROGEN RECEPTOR POSITIVE: Primary | ICD-10-CM

## 2018-12-13 DIAGNOSIS — C50.111 MALIGNANT NEOPLASM OF CENTRAL PORTION OF RIGHT BREAST IN FEMALE, ESTROGEN RECEPTOR POSITIVE: ICD-10-CM

## 2018-12-13 DIAGNOSIS — M25.611 DECREASED SHOULDER MOBILITY, RIGHT: ICD-10-CM

## 2018-12-13 DIAGNOSIS — Z91.89 AT RISK FOR LYMPHEDEMA: ICD-10-CM

## 2018-12-13 DIAGNOSIS — C50.111 MALIGNANT NEOPLASM OF CENTRAL PORTION OF RIGHT BREAST IN FEMALE, ESTROGEN RECEPTOR POSITIVE: Primary | ICD-10-CM

## 2018-12-13 DIAGNOSIS — Z17.0 MALIGNANT NEOPLASM OF CENTRAL PORTION OF RIGHT BREAST IN FEMALE, ESTROGEN RECEPTOR POSITIVE: ICD-10-CM

## 2018-12-13 PROCEDURE — 99999 PR PBB SHADOW E&M-EST. PATIENT-LVL IV: CPT | Mod: PBBFAC,,, | Performed by: RADIOLOGY

## 2018-12-13 PROCEDURE — 97140 MANUAL THERAPY 1/> REGIONS: CPT | Mod: PO | Performed by: PHYSICAL MEDICINE & REHABILITATION

## 2018-12-13 PROCEDURE — 99204 OFFICE O/P NEW MOD 45 MIN: CPT | Mod: S$GLB,,, | Performed by: RADIOLOGY

## 2018-12-13 PROCEDURE — 3079F DIAST BP 80-89 MM HG: CPT | Mod: CPTII,S$GLB,, | Performed by: INTERNAL MEDICINE

## 2018-12-13 PROCEDURE — 99999 PR PBB SHADOW E&M-EST. PATIENT-LVL III: CPT | Mod: PBBFAC,,, | Performed by: PLASTIC SURGERY

## 2018-12-13 PROCEDURE — 3079F DIAST BP 80-89 MM HG: CPT | Mod: CPTII,S$GLB,, | Performed by: RADIOLOGY

## 2018-12-13 PROCEDURE — 99205 OFFICE O/P NEW HI 60 MIN: CPT | Mod: S$GLB,,, | Performed by: INTERNAL MEDICINE

## 2018-12-13 PROCEDURE — 99024 POSTOP FOLLOW-UP VISIT: CPT | Mod: S$GLB,,, | Performed by: PLASTIC SURGERY

## 2018-12-13 PROCEDURE — 3077F SYST BP >= 140 MM HG: CPT | Mod: CPTII,S$GLB,, | Performed by: INTERNAL MEDICINE

## 2018-12-13 PROCEDURE — 99999 PR PBB SHADOW E&M-EST. PATIENT-LVL IV: CPT | Mod: PBBFAC,,, | Performed by: INTERNAL MEDICINE

## 2018-12-13 PROCEDURE — 3077F SYST BP >= 140 MM HG: CPT | Mod: CPTII,S$GLB,, | Performed by: RADIOLOGY

## 2018-12-13 PROCEDURE — 3008F BODY MASS INDEX DOCD: CPT | Mod: CPTII,S$GLB,, | Performed by: INTERNAL MEDICINE

## 2018-12-13 PROCEDURE — 3008F BODY MASS INDEX DOCD: CPT | Mod: CPTII,S$GLB,, | Performed by: RADIOLOGY

## 2018-12-13 PROCEDURE — 97110 THERAPEUTIC EXERCISES: CPT | Mod: PO | Performed by: PHYSICAL MEDICINE & REHABILITATION

## 2018-12-13 NOTE — PROGRESS NOTES
"POSTOP FOLLOW-UP EXAM    CC  No chief complaint on file.      DIAGNOSES  Encounter Diagnoses   Name Primary?    Malignant neoplasm of central portion of right breast in female, estrogen receptor positive Yes       PROCEDURE  Bilateral MEGAN reconstruction    SUBJECTIVE  Patient doing well, developed some breakdown along midportion of abdominal incision    PHYSICAL EXAM  Ht 5' 2" (1.575 m)   Wt 104.2 kg (229 lb 11.5 oz)   BMI 42.02 kg/m²   Breasts:  Soft, no collections  Flap soft, warm, well perfused, healthy appearing  Cap refill 2-3 sec  Good contour and symmetry    Abdomen:  Soft, nt, nd  Umbilicus healthy and intact  Midline breakdown along incision, otherwise clean, intact    ASSESSMENT  Encounter Diagnoses   Name Primary?    Malignant neoplasm of central portion of right breast in female, estrogen receptor positive Yes       PLAN  - Daily dressing change with wet to dry dressing to abdomen  - Continue loose surgical bra and abdominal binder  - Ok to shower daily, pat incisions dry, reapply dressings  - Light ambulation, no heavy lifting   - Pt was recommended for radiation, will plan according to anticipated start date  - Activity and aftercare instructions reviewed    Electronically signed by:  Tye Chapman  12/13/2018  12:45 PM    "

## 2018-12-13 NOTE — PATIENT INSTRUCTIONS
Return to see me based on chemo plans.    Radiation Therapy Treatment  Radiation therapy can help you in your fight against cancer. It begins with a session to discuss treatment with your doctor. If you and your doctor decide on radiation, you will return for a simulation. The simulation is a planning session that helps the doctor target your cancer. He or she will design a radiation plan to protect normal tissues. When the simulation and plan are completed, you will begin your daily treatments. Treatment is usually once daily Monday to Friday. It takes less than a half an hour. Sometimes you may need radiation twice a day, with usually 6 hours between treatments. After the course of radiation is complete, you will be scheduled for follow-up appointments. This is to make sure the cancer is under control. The follow-ups will also make sure that any side effects from the treatment are taken care of.  Radiation therapy uses high-energy X-rays to kill cancer cells.   Your treatment planning visit: The simulation  Your radiation therapy team uses a special machine called a simulator to map out your treatment. The simulator is usually an X-ray machine (fluoroscopy), CT scanner, MRI scanner, or PET-CT scanner machine. Laser lights act as guides to help position your body accurately. During this visit:  · The team figures out the best position for your body. They make notes in your chart so youll be placed the same way each time.  · You may use special devices to keep your body correctly positioned and still during treatment. These may include molds, masks, rests, and blocks.  · The team makes ink marks on your skin. These will help you get in the same position for each treatment. Tiny permanent tattoos may also be used.   · Markers such as metal balls or wires may be put on or in your body. Sometime these are taped to the skin to help with the imaging process. These work with the X-rays to position your body. The  markers are removed when the visit is over.  After the team has the imaging and data, the information is sent into the computer planning system. Your doctor and the team of physicists and dosimetrists design a treatment field. The field will best target your cancer and how it might spread. It will also help limit radiation to nearby normal tissues.  Your treatments  Each treatment usually takes 10 to 30 minutes. You may need to change into a hospital gown. The radiation therapist puts you in the correct position on the treatment table, then leaves the room. Sometimes you may need more imaging before each treatment. The machine may take digital X-rays or a CT scan to help make sure you are lined up correctly. During treatment, lie as still as you can and breathe normally. You will hear noises coming from the machine. You can talk to the radiation therapist, who watches you from the control room on a TV monitor. After treatment, the therapist will help you off the table. You can then get dressed and go back to your normal activities.  Date Last Reviewed: 1/14/2016 © 2000-2017 [employer]. [address] [place] [address]. All rights reserved. This information is not intended as a substitute for professional medical care. Always follow your healthcare professional's instructions.        Discharge Instructions for Radiation Therapy  Radiation therapy uses high-energy X-rays to kill cancer cells and help you in your fight against cancer. Radiation destroys cancer cells gradually, over time. The goal of therapy is to focus on and kill as many cancer cells as possible. Radiation can also damage or kill some of the normal cells that are closest to the tumor. Damaged normal cells can repair themselves, often within a few days.  Caring for your skin  You should ask your healthcare provider for specific products that he or she recommends for washing and bathing. In general, use a mild nondetergent soap  "and warm (not hot) water to clean the area receiving radiation. Pat the region dry rather than rubbing.  Your healthcare provider may give you products to moisturize the skin and prevent infection. The goal is to prevent cracks or breaks in the skin that may be sensitive from treatment:   · Dont be surprised if your treatment causes skin redness, and a type of "sunburn" over time. Some radiation treatments can cause this.   · Ask your therapy team what lotion to use. Also ask for directions about when and how to apply it.  · Avoid prolonged or direct sunlight on the treated area. Ask your therapy team about using a sunscreen. You do not have to avoid going outside altogether, but must take appropriate precautions.  · Dont remove ink marks unless your radiation therapist says its OK. Dont scrub or use soap on the marks when you wash. Let water run over them and pat them dry.  · Protect your skin from heat or cold. Avoid hot tubs, saunas, heating pads, or ice packs.  · Avoid clothing that causes friction or rubbing on the skin.  Fighting fatigue  Radiation therapy may cause you to feel tired. Your body is working hard to heal and repair itself. To feel better, try these things:  · Do light exercise each day. Take short walks.  · Plan tasks for the times when you tend to have the most energy. Ask for help when you need it.  · Relax before you go to bed. This will help you sleep better. Try reading or listening to soothing music.  Coping with appetite changes  Here are ways to cope:  · Tell your therapy team if you find it hard to eat or you have no appetite. You may be referred to a nutritionist to help you with meal planning.  · Radiation to certain internal sites can cause nausea, depending on the location of treatment. This can affect your appetite. Think of healthy eating as part of your treatment. Try these tips:  ¨ Eat slowly.  ¨ Eat small meals several times a day.  ¨ Eat more food when youre feeling " better.  ¨ Ask others to keep you company when you eat.  ¨ Stock up on easy-to-prepare foods.  ¨ Eat foods high in protein and calories. Your healthcare provider may recommend liquid meal supplements.  ¨ Drink plenty of water and other fluids.  ¨ Ask your healthcare provider before taking any vitamins or over-the-counter supplements. Such products are not regulated by the FDA and can sometimes interfere with your treatments.   Dealing with other side effects  Here are suggestions to deal with other side effects:   · Be prepared for hair loss in the area being treated. The hair loss may be permanent. Be sure to discuss this with your healthcare provider.  · Sip cool water if your mouth or throat becomes dry or sore. Ice chips may also help.  · Tell your healthcare provider if you have diarrhea or constipation. You may be given a special diet.  · If you have trouble swallowing liquids, tell your healthcare provider.  Follow-up  Make a follow-up appointment as directed by your healthcare provider.     When to call your healthcare provider  Call your healthcare provider right away if you have any of the following:  · Unexpected headaches  · Trouble concentrating  · Ongoing fatigue  · Wheezing, shortness of breath, or trouble breathing  · Pain that doesnt go away, especially if its always in the same place  · New or unusual lumps, bumps, or swelling  · Dizziness or lightheadedness  · Unusual rashes, bruises, or bleeding  · Fever of 100.4°F (38°C) or higher, or chills  · Nausea and vomiting  · Diarrhea that doesnt improve with time  · Skin breakdown; significant pain due to skin irritation   Date Last Reviewed: 1/13/2016  © 3605-9119 [employer]. [address] [place] [address], [place] [address]. All rights reserved. This information is not intended as a substitute for professional medical care. Always follow your healthcare professional's instructions.        Radiation Therapy: Managing Short-Term Side Effects      Take short walks daily.   Radiation therapy uses high-energy X-rays or particles to kill cancer cells. Some normal cells can also be affected. This causes side effects such as dry skin, tiredness (fatigue), or changes in your appetite. Most side effects go away when your radiation therapy is over.  Having side effects of radiation therapy does not mean that your cancer is getting worse or that therapy isnt working.   Caring for your skin  Skin problems may happen where your body gets radiation. Your skin may become dry, itchy, red, and peeling. It may darken in that spot, like a tan. To care for your skin:  · Dont scrub on the treatment area. Clean that area of the skin every day. Use warm water and mild soap, or as your healthcare provider advises. Pat the skin afterward or let it air dry.  · Ask your therapy team what lotion to use and when to use it.  · Keep the treated area out of the sun. Ask your team about using a sunscreen.  · Don't remove ink marks unless your radiation therapist says you can. Dont scrub the marks when you wash. Let water run over them and pat them dry.  · Protect your skin from heat or cold. Avoid hot tubs, saunas, hot pads, and ice packs.  · Wear soft, loose clothing to avoid rubbing skin.  Fighting tiredness  The cancer itself or the radiation therapy may cause you to feel tired. Your body is working hard to heal and repair itself. To feel better:  · Try light exercise each day. Take short walks.  · Plan tasks for the times when you tend to have the most energy. Ask for help when you need it.  · Relax before you go to bed to sleep better. Try reading or listening to soothing music.  · Be sure to let your cancer care team know if you continue to have fatigue that is not getting better. They may be able to offer ways to help.   Coping with appetite changes  Tell your therapy team if you find it hard to eat or have no appetite. You may need to see a nutritionist. This is a healthcare  provider with special training in meal planning. To keep your strength up, you need to eat well and maintain your weight. Think of healthy eating as part of your treatment. Try these tips:  · Eat slowly.  · Eat small meals several times a day.  · Eat more food when youre feeling better, even if it is not mealtime.  · Ask others to keep you company when you eat.  · Stock up on easy-to-prepare foods.  · Eat foods high in protein and calories.  · Drink plenty of water and other fluids.  · Ask your healthcare provider before taking any vitamins.  Site-specific side effects  These side effects include the following:   · You may lose hair in the area being treated. The hair often grows back after treatment.  · Your mouth or throat can become dry or sore if your head or neck is being treated. Sip cool water to help ease discomfort.  · Nausea and bowel changes can happen with radiation to the pelvic region. Tell your healthcare provider if you have nausea, diarrhea, or constipation. You may need to take medicine or follow a special diet.  Talk with your healthcare team  Radiation therapy can also have other side effects, including some that might not show up until years later. Be sure to talk with your healthcare team about what to expect with the type of radiation therapy you are getting, including when you should call them with concerns.   Date Last Reviewed: 5/1/2016  © 8022-0508 [employer]. [address] [place] [address], [place] [address]. All rights reserved. This information is not intended as a substitute for professional medical care. Always follow your healthcare professional's instructions.

## 2018-12-13 NOTE — PROGRESS NOTES
Physical Therapy Daily Treatment Note     Name: Brittny Urias  Clinic Number: [id number]  Diagnosis:   Encounter Diagnoses   Name Primary?    Decreased shoulder mobility, right     At risk for lymphedema      Physician: Marga Cardenas MD    Precautions: None  Visit #: 3 of 20  Time In: 11:20 AM  Time Out: 12:15 PM  Total Treatment Time 1:1: 55    Evaluation Date: 11/28/18  Visit # authorized: 20  Authorization period: 12/31/18  Plan of care Expiration: 1/23/19  MD referral: Marga Cardenas MD  Insurance: [facility]      Subjective     Pt reports: feeling better and having slight tightness in anterior chest and right axilla. Patient states has small open area in abdominal incision and continuing to pack this are. Patient  saw Dr. Chapman, Dr. Teague and Dr. Woods today prior to therapy.   Pain Scale: Brittny rates pain on a scale of 1/10 on VAS.   Pain location: abdomen and chest    Fatigue: Mild  Functional change: able to raise her arms higher and standing straighter and easier use BUE's with dressing   Diagnosis: Right breast IDC, Grade 1, ER (+), MI (+), HER2 (-)   Chief complaint: tightness in abdomen and chest and difficulty raising arms upward  Surgical History:11/14/18 for bilateral mastectomies with SLNB and MEGAN Flap reconstruction  Brittny Urias  has a past surgical history that includes Hysterectomy; Dilation and curettage of uterus; RECONSTRUCTION, BREAST, USING MEGAN FREE FLAP (Bilateral, 11/14/2018); MASTECTOMY (Bilateral, 11/14/2018); and SENTINEL NODE BIOPSY AND AXILLARY LYMPHADENECTOMY (Right, 11/14/2018).     Chemotherapy: pending  Radiation: pending       Objective     Brittny received individual therapeutic exercises to improve postural correction and alignment, stretching and soft tissue mobility, and strengthening for 30 minutes including the following:   Supine Shoulder Flexion with wand            1 x 10  Butterflies                      "                               10 x 5"  Supine Shld ER with wand                         1 x 10  LTR  with ER                                              1 x 10  Standing Shoulder Abd (Snow Chamberlain)      1 x 10  Scap Retractions                                       10 x 5" OTB  Wall Climbs  Forward (B)                           5 x 5"                        Sideways (B)                        5 x 5'    Brittny received the following manual therapy techniques were performed to increased myofascial/soft tissue length, mobility and pliability, increase PROM, AROM and function as well as to decrease pain for 25 minutes  Stretching and bending for right axillary cording  Anterior chest Stretch (B)  Lateral chest stretch (B)  Grade II G-H jt mobs right shoulder  Passive stretch all shoulder motions (B)    Shoulder Range of Motion: 12/13/18  Active /Passive ROM Right Left   Flexion 135 145   Abduction 115 135   Extension 70 70   IR/90deg 85 85   ER/90deg 70 85          Home Exercise Program and Patient Education   Education provided re:  - role of PT in multi - disciplinary team, goals for PT  - progress towards goals     See EMR under notes/patient instructions for HEP given/taught this session - all sets and reps included. Pt received printed copy.     Pt was able to demonstrate and report understanding and performance  Pt has no cultural, educational or language barriers to learning provided.      CMS Impairment/Limitation/Restriction for FOTO Outcome Survey     Therapist reviewed FOTO scores for Brittny Urias on 12/13/2018.   FOTO documents entered into [employer] - see Media section.     Limitations Score: 42%  Category: Carrying       Assessment     Patient is responding well to physical therapy. Is at expected function and pain level for 4 weeks post operatively.   Pain after treatment: 1/10    Pt prognosis is Good. Patient received manual therapy as above to decrease shoulder joint tightness and right axillary " cording and cording is decreasing. Patient performed above exercise program and tolerated addition of LTR with ER exercises. Improvements noted with AROM bilateral shoulders.  Patient instructed to continue with HEP.  Pt will continue to benefit from skilled outpatient physical therapy to address the deficits listed in the problem list chart on initial evaluation, provide pt/family education and to maximize pt's level of independence in the home and community environment. Patient met 2 STG this session.     Goals as follows:  Short Term goals: 4 weeks  1. Patient will demonstrate 100% understanding of lymphedema risk reduction practices to include self monitoring for lymphedema. (met, 12/13/18)  2. Patient will demonstrate independence with Home Exercise program established. (met, 12/13/18)  3. Pt will increase AROM/PROM in shoulder abduction ROM to 140 degrees bilaterally to improve functional reach, carry, push, pull pain free. (progressing, not met)  4. Pt will increase AROM/PROM in shoulder flexion to 160 degrees bilaterally to improve functional reach, carry, push, pull pain free.(progressing, not met)  5. Pt will increase AROM/PROMin shoulder ER to 75 degrees bilaterally in order to perform functional activites (progressing, not met)  6. Strength will be 4+/5 for right shoulder in order to perform functional activities. (progressing, not met)     Long Term Goals: 8 weeks   1.  Pt will increase AROM/PROM in shoulder flexion to 180 degrees bilaterally to improve functional reach, carry, push, pull pain free. (progressing, not met)  2. Pt will increase strength to 5/5 in gross right shoulder musculature to improve tolerance to all functional activities pain free. (progressing, not met)  3. Pt will demonstrate full/maximized tissue mobility to increase ROM, promote healthy tissue to be pain free, and to hold trunk erect when sitting, standing and ambulating at discharge. (progressing, not met)  4. Pt will report  decrease in overall worst pain to 2/10 at discharge. (progressing, not met)  5. Pt will increase AROM/PROM in shoulder abduction ROM to 180 degrees bilaterally to improve functional reach, carry, push, pull pain free. (progressing, not met)  6. Patient will report compliance with walking program 5x week for 10 min each day to improve overall cardiovascular function and decrease cancer related fatigue at discharge. (progressing, not met)        Plan     Continue with established POC towards physical therapy goals.     Therapist: Roberta Love, PT  12/13/2018

## 2018-12-13 NOTE — TELEPHONE ENCOUNTER
Contacted [facility] to see if mammaprint completed/requested. Per [facility] , no records indicate that patient on their files. Form to request downloaded, completed and to be faxed.

## 2018-12-13 NOTE — PROGRESS NOTES
REFERRING PHYSICIAN:  Marga Cardenas M.D.    DIAGNOSIS:  pT1c N1a M0, stage IB,  invasive mammary carcinoma of the right breast    HISTORY OF PRESENT ILLNESS:   Ms. Urias is a 48-year-old female who was recently diagnosed with right breast cancer after an abnormal mammogram in August 2018 which revealed an architectural distortion in the retroareolar region of the right breast.  A core needle biopsy on August 21, 2018 revealed invasive mammary carcinoma with lobular features.  This lesion is ER positive (90%), NJ positive (20%), and her 2 negative with Ki-67 index of less than 1%.  An MRI of the bilateral breasts on September 4, 2018 revealed a 3.2 x 1.8 x 1.4 cm irregularly-shaped homogeneous mass with spiculated margins in the retroareolar region of the right breast.  On November 14, 2018, she underwent bilateral mastectomy and MEGAN reconstruction with right sentinel node biopsy and axillary dissection.  The pathology revealed the right breast with a 2.0 cm, intermediate grade, invasive mammary carcinoma with lobular features.  The closest margin is greater than 2 cm.  2/3 sentinel lymph nodes were found to have macro metastatic involvement, one measuring 6 mm and the other measuring 11 mm, both with extranodal extension.  Upon further axillary dissection, 12 additional lymph nodes were removed which were negative for involvement.  She is here today for recommendations regarding further treatment.    At present, patient is healing from the surgery without any unexpected side effects.  She reports wound dehiscence involving the abdominal incision. Dr. De Luna is following that. She denies pain, erythema or discharge in the breast mounds. She also denies fever, night sweats, or weight loss.     REVIEW OF SYSTEMS:  As above.  In addition, patient denies headaches, visual problems, dizziness, chest pain, shortness of breath, cough, nausea, vomiting, diarrhea, or any new bony pains. Patient also denies easy bruising,  skin rashes, or numbness or tingling.    GYN HISTORY:   Menarche age 11.  .  Menopause at age 45.  She has approximately 10 year history of oral contraceptive pills.    ECO    PAST MEDICAL HISTORY:  Past Medical History:   Diagnosis Date    Breast cancer, right breast 2018    Cancer     Hypertension     Psoriasis     on biologic for 1 year, has been controlling it well       PAST SURGICAL HISTORY:  Past Surgical History:   Procedure Laterality Date    DILATION AND CURETTAGE OF UTERUS      HYSTERECTOMY      MASTECTOMY Bilateral 2018    Procedure: MASTECTOMY;  Surgeon: Marga Cardenas MD;  Location: [facility];  Service: Plastics;  Laterality: Bilateral;    MASTECTOMY Bilateral 2018    Performed by Marga Cardenas MD at [facility], [place]    MASTECTOMY WITH SENTINEL NODE BIOPSY AND AXILLARY LYMPH NODE DISSECTION Right 2018    Procedure: SENTINEL NODE BIOPSY AND AXILLARY LYMPHADENECTOMY;  Surgeon: Marga Cardenas MD;  Location: [facility];  Service: Plastics;  Laterality: Right;    RECONSTRUCTION OF BREAST WITH DEEP INFERIOR EPIGASTRIC ARTERY  (MEGAN) FREE FLAP Bilateral 2018    Procedure: RECONSTRUCTION, BREAST, USING MEGAN FREE FLAP;  Surgeon: Tye Chapman MD;  Location: [facility];  Service: Plastics;  Laterality: Bilateral;    RECONSTRUCTION, BREAST, USING MEGAN FREE FLAP Bilateral 2018    Performed by Tye Chapman MD at [facility], [place]    SENTINEL NODE BIOPSY AND AXILLARY LYMPHADENECTOMY Right 2018    Performed by Marga Cardenas MD at [facility], [place]       ALLERGIES:   Review of patient's allergies indicates:  No Known Allergies    MEDICATIONS:  Current Outpatient Medications   Medication Sig    aspirin (ECOTRIN) 81 MG EC tablet Take 1 tablet (81 mg total) by mouth once daily.    bisacodyl (DULCOLAX) 5 mg EC tablet Take 1 tablet (5 mg total) by mouth daily as needed for Constipation.    docusate sodium (COLACE) 100 MG capsule Take 1 capsule (100 mg total) by mouth 2 (two)  "times daily.    hydroCHLOROthiazide (HYDRODIURIL) 25 MG tablet Take 0.5 tablets (12.5 mg total) by mouth once daily.    cetirizine (ZYRTEC) 10 MG tablet Take 1 tablet (10 mg total) by mouth daily as needed for Allergies (sneezing).    epinephrine (EPIPEN) 0.3 mg/0.3 mL AtIn Inject 0.3 mLs (0.3 mg total) into the muscle as needed.    HYDROcodone-acetaminophen (NORCO) 5-325 mg per tablet Take 1 tablet by mouth every 4 (four) hours as needed.    ondansetron (ZOFRAN-ODT) 8 MG TbDL Take 1 tablet (8 mg total) by mouth every 8 (eight) hours as needed.     No current facility-administered medications for this visit.        SOCIAL HISTORY:  Social History     Socioeconomic History    Marital status:      Spouse name: Not on file    Number of children: Not on file    Years of education: Not on file    Highest education level: Not on file   Social Needs    Financial resource strain: Not on file    Food insecurity - worry: Not on file    Food insecurity - inability: Not on file    Transportation needs - medical: Not on file    Transportation needs - non-medical: Not on file   Occupational History    Not on file   Tobacco Use    Smoking status: Never Smoker    Smokeless tobacco: Never Used   Substance and Sexual Activity    Alcohol use: Yes     Alcohol/week: 1.8 oz     Types: 3 Glasses of wine per week     Comment: social    Drug use: No    Sexual activity: Yes     Partners: Male     Birth control/protection: Surgical   Other Topics Concern    Not on file   Social History Narrative    Not on file       FAMILY HISTORY:  Family History   Problem Relation Age of Onset    Diabetes Mother     Hypertension Mother     Hypertension Brother     Cancer Paternal Uncle          PHYSICAL EXAMINATION:  Vitals:    12/13/18 0823   BP: (!) 164/85   Pulse: 65   Resp: 16   Temp: 98.4 °F (36.9 °C)   TempSrc: Oral   Weight: 104.2 kg (229 lb 11.5 oz)   Height: 5' 2" (1.575 m)   Body mass index is 42.02 " kg/m².  GENERAL: Patient is alert and oriented, in no acute distress.  HEENT:Extraocular muscles are intact.  Oropharynx is clear without lesions.  There is no cervical or supraclavicular lymphadenopathy palpated.  No thyromegaly noted.  HEART: Regular rate and rhythm.  LUNGS: Clear to auscultation bilaterally.  BREAST EXAM: bilateral breast mounds with healing scars secondary to mastectomy and MEGAN reconstruction. No abnormal masses palpated in the bialaterl breast mounds or bilaterall axillae.  ABDOMEN:Soft, nontender, nondistended, without hepatosplenomegaly.  Normoactive bowel sounds.  EXTREMITIES: No clubbing, cyanosis, or edema.  NEUROLOGICAL: Cranial nerve II through XII grossly intact.  Sensation is intact.  Strength is 5 out of 5 in the upper and lower extremities bilaterally.     ASSESSMENT:   This is a 48-year-old female with p T1c N1a M0, grade 2, invasive mammary carcinoma (with lobular features)  of the right breast who underwent bilateral mastectomy, MEGAN reconstruction, and right axillary dissection on November 14, 2018 with a 2 cm lesion with 2/14 lymph nodes with macro metastasis, both with extranodal extension, ER/NY positive, her 2-with Ki-67 less than 1%.    PLAN:   After review of the available pathology and images of the radiological studies, Ms. Urias is noted to have 2 lymph nodes with macrometastasis with extranodal extension. She is undergoing genomic testing to determine the benefit with chemotherapy. I had a long discussion with the patient and her sister about the risks, benefits, and side effects of radiation. I also discussed the results of trials that show benefit with post mastectomy radiation in patients with one to three lymph nodes even in the setting of chemotherapy. All of her questions were answered. She would like to undergo post mastectomy radiation to reduce her chance of recurrence. I plan to deliver 5040 cGy to the right breast mound and draining lymph nodes. Based on  recommendations from medical oncology, Dr. Woods, regarding chemotherapy, I will see her back for treatment planning CT.    The risks, benefits, and side effects of radiation were explained in detail to the patient and her sister.  All questions were answered and informed consent was signed.  I plan to see the patient back for follow up and/or radiation planning CT based on chemo plans.    Psychosocial Distress screening score of Distress Score: 0 noted and reviewed. A referral to  Oncology Social Worker initiated per patient's request.     I spent approximately 60 minutes reviewing the available records and evaluating the patient, out of which over 50% of the time was spent face to face with the patient in counseling and coordinating this patient's care.

## 2018-12-13 NOTE — PROGRESS NOTES
Subjective:       Patient ID: Brittny Urias is a 48 y.o. female.    Chief Complaint: No chief complaint on file.    HPI 48-year-old female referred by Dr. Cardenas for recent diagnosis of right breast cancer.    Screening mammogram on August 3, 2018 showed architectural distortion in the retroareolar right breast.  Follow-up diagnostic mammogram on August 18th showed architectural distortion in nipple retraction on right.  Ultrasound showed no definite abnormality.    August 21, 2018 a biopsy was performed which showed infiltrating lobular carcinoma (histologic grade 3, nuclear grade 1, mitotic index 1).  ER was 90% positive, NH was 20% positive and HER2 was 0.  Ki-67 was less than 1%.    MRI of the breast on September 4, 2018 showed a 3.2 x 1.8 x 1.4 cm mass with spiculated margins in the subareolar region of the right breast.  The left breast was unremarkable.    On November 14, 2018 bilateral mastectomies were performed.  The right breast showed a 2 cm intermediate grade carcinoma (3+ 2+ 1) 2 of 3 sentinel lymph nodes were positive with some extracapsular extension.  Completion axillary lymph node dissection showed 0 of 12 additional nodes were involved.  Margins were negative.  The left breast showed no abnormality.    Mammo print results are pending.      Menstrual History:    Menarche - 11   G -3   P - 3 First birth age -19 ,BCP -  10 years,   Menopause - partial hysterectomy 2003, menopausal symptoms for the last 2-3 years      HRT - no    Family History -                                 Breast - no                                Ovarian -   no                                   Other -paternal uncle with leukemia    Social History :    Smoking -never             ETOH -occasion  Works at [employer] as a     PMH:    Review of Systems   Constitutional: Negative.    HENT: Negative.    Respiratory: Negative.    Cardiovascular: Negative.    Gastrointestinal: Negative.    Genitourinary: Negative.     Musculoskeletal: Positive for arthralgias (right hip).   Neurological: Negative.    Psychiatric/Behavioral: Negative.        Objective:      Physical Exam   Constitutional: She is oriented to person, place, and time. She appears well-developed and well-nourished.   HENT:   Head: Normocephalic and atraumatic.   Mouth/Throat: No oropharyngeal exudate.   Eyes: Pupils are equal, round, and reactive to light. No scleral icterus.   Cardiovascular: Normal rate and regular rhythm.   Pulmonary/Chest: Effort normal and breath sounds normal. She has no wheezes. She has no rales.       Abdominal: Soft. She exhibits no mass. There is no tenderness.   Musculoskeletal: She exhibits no edema.   Lymphadenopathy:     She has no cervical adenopathy.   Neurological: She is alert and oriented to person, place, and time.   Psychiatric: She has a normal mood and affect. Her behavior is normal. Thought content normal.   Vitals reviewed.      Assessment:       1. Malignant neoplasm of central portion of right breast in female, estrogen receptor positive        Plan:       I discussed endocrine therapy which she will need. Await Mammaprint results.   RTC 2 weeks with labs and BMD.         Distress Screening Results: Psychosocial Distress screening score of Distress Score: 2 noted and reviewed. No intervention indicated.

## 2018-12-17 DIAGNOSIS — Z17.0 MALIGNANT NEOPLASM OF CENTRAL PORTION OF RIGHT BREAST IN FEMALE, ESTROGEN RECEPTOR POSITIVE: Primary | ICD-10-CM

## 2018-12-17 DIAGNOSIS — C50.111 MALIGNANT NEOPLASM OF CENTRAL PORTION OF RIGHT BREAST IN FEMALE, ESTROGEN RECEPTOR POSITIVE: Primary | ICD-10-CM

## 2018-12-19 ENCOUNTER — CLINICAL SUPPORT (OUTPATIENT)
Dept: REHABILITATION | Facility: HOSPITAL | Age: 48
End: 2018-12-19
Attending: SURGERY
Payer: COMMERCIAL

## 2018-12-19 DIAGNOSIS — Z91.89 AT RISK FOR LYMPHEDEMA: ICD-10-CM

## 2018-12-19 DIAGNOSIS — M25.611 DECREASED SHOULDER MOBILITY, RIGHT: ICD-10-CM

## 2018-12-19 PROCEDURE — 97110 THERAPEUTIC EXERCISES: CPT | Mod: PO | Performed by: PHYSICAL MEDICINE & REHABILITATION

## 2018-12-19 PROCEDURE — 97140 MANUAL THERAPY 1/> REGIONS: CPT | Mod: PO | Performed by: PHYSICAL MEDICINE & REHABILITATION

## 2018-12-19 NOTE — PROGRESS NOTES
"                                                    Physical Therapy Daily Treatment Note     Name: Brittny Urias  Clinic Number: [id number]  Diagnosis:   Encounter Diagnoses   Name Primary?    Decreased shoulder mobility, right     At risk for lymphedema      Physician: Marga Cardenas MD    Precautions: None  Visit #: 4 of 20  Time In: 10:00 AM  Time Out:  10:45 AM  Total Treatment Time 1:1: 55    Evaluation Date: 11/28/18  Visit # authorized: 20  Authorization period: 12/31/18  Plan of care Expiration: 1/23/19  MD referral: Marga Cardenas MD  Insurance: [facility]      Subjective     Pt reports: feeling better and having slight tightness in anterior chest and right axilla. Patient states has small open area in abdominal incision and continuing to pack this area..   Pain Scale: Brittny rates pain on a scale of 1/10 on VAS.   Pain location: abdomen and chest    Fatigue: Mild  Functional change: able to raise her arms higher and standing straighter and easier use BUE's with dressing   Diagnosis: Right breast IDC, Grade 1, ER (+), AZ (+), HER2 (-)   Chief complaint: tightness in abdomen and chest and difficulty raising arms upward  Surgical History:11/14/18 for bilateral mastectomies with SLNB and MEGAN Flap reconstruction  Brittny Urias  has a past surgical history that includes Hysterectomy; Dilation and curettage of uterus; RECONSTRUCTION, BREAST, USING MEGAN FREE FLAP (Bilateral, 11/14/2018); MASTECTOMY (Bilateral, 11/14/2018); and SENTINEL NODE BIOPSY AND AXILLARY LYMPHADENECTOMY (Right, 11/14/2018).     Chemotherapy: pending  Radiation: pending       Objective     Brittny received individual therapeutic exercises to improve postural correction and alignment, stretching and soft tissue mobility, and strengthening for 30 minutes including the following:   Supine Shoulder Flexion with wand            1 x 10  Butterflies                                                    10 x 5"  Supine Shld ER with wand         " "                1 x 10  LTR  with ER                                              1 x 10  Standing Shoulder Abd ([place])      1 x 10  Scap Retractions                                       10 x 5" OTB  Wall Climbs  Forward (B)                           5 x 5"                        Sideways (B)                        5 x 5'    Brittny received the following manual therapy techniques were performed to increased myofascial/soft tissue length, mobility and pliability, increase PROM, AROM and function as well as to decrease pain for 15 minutes  Stretching and bending for right axillary cording--no longer present  Anterior chest Stretch (B)  Lateral chest stretch (B)  Grade II G-H jt mobs right shoulder  Passive stretch all shoulder motions (B)    Shoulder Range of Motion: 12/19/18  Active /Passive ROM Right Left   Flexion 160 165   Abduction 130 160   Extension 70 70   IR/90deg 85 85   ER/90deg 90 90          Home Exercise Program and Patient Education   Education provided re:  - role of PT in multi - disciplinary team, goals for PT  - progress towards goals     See EMR under notes/patient instructions for HEP given/taught this session - all sets and reps included. Pt received printed copy.     Pt was able to demonstrate and report understanding and performance  Pt has no cultural, educational or language barriers to learning provided.      CMS Impairment/Limitation/Restriction for FOTO Outcome Survey     Therapist reviewed FOTO scores for Brittny Urias on 12/19/2018.   FOTO documents entered into [employer] - see Media section.     Limitations Score: 37%  Category: Carrying       Assessment     Patient is responding well to physical therapy. Is at expected function and pain level for 5 weeks post operatively.   Pain after treatment: 1/10    Pt prognosis is Good. Patient received manual therapy as above to decrease shoulder joint tightness and right axillary cording and cording no longer present. Patient performed " above exercise program. Improvements noted with AROM bilateral shoulders.  Patient instructed to continue with HEP.  Pt will continue to benefit from skilled outpatient physical therapy to address the deficits listed in the problem list chart on initial evaluation, provide pt/family education and to maximize pt's level of independence in the home and community environment. Patient met 2 STG this session.     Goals as follows:  Short Term goals: 4 weeks  1. Patient will demonstrate 100% understanding of lymphedema risk reduction practices to include self monitoring for lymphedema. (met, 12/13/18)  2. Patient will demonstrate independence with Home Exercise program established. (met, 12/13/18)  3. Pt will increase AROM/PROM in shoulder abduction ROM to 140 degrees bilaterally to improve functional reach, carry, push, pull pain free. (progressing, not met)  4. Pt will increase AROM/PROM in shoulder flexion to 160 degrees bilaterally to improve functional reach, carry, push, pull pain free.(met, 12/19/18)  5. Pt will increase AROM/PROMin shoulder ER to 75 degrees bilaterally in order to perform functional activites (met, 12/19/18)  6. Strength will be 4+/5 for right shoulder in order to perform functional activities. (progressing, not met)     Long Term Goals: 8 weeks   1.  Pt will increase AROM/PROM in shoulder flexion to 180 degrees bilaterally to improve functional reach, carry, push, pull pain free. (progressing, not met)  2. Pt will increase strength to 5/5 in gross right shoulder musculature to improve tolerance to all functional activities pain free. (progressing, not met)  3. Pt will demonstrate full/maximized tissue mobility to increase ROM, promote healthy tissue to be pain free, and to hold trunk erect when sitting, standing and ambulating at discharge. (progressing, not met)  4. Pt will report decrease in overall worst pain to 2/10 at discharge. (progressing, not met)  5. Pt will increase AROM/PROM in  shoulder abduction ROM to 180 degrees bilaterally to improve functional reach, carry, push, pull pain free. (progressing, not met)  6. Patient will report compliance with walking program 5x week for 10 min each day to improve overall cardiovascular function and decrease cancer related fatigue at discharge. (progressing, not met)        Plan     Continue with established POC towards physical therapy goals.     Therapist: Roberta Love, PT  12/19/2018

## 2018-12-20 ENCOUNTER — OFFICE VISIT (OUTPATIENT)
Dept: PLASTIC SURGERY | Facility: CLINIC | Age: 48
End: 2018-12-20
Payer: COMMERCIAL

## 2018-12-20 ENCOUNTER — OFFICE VISIT (OUTPATIENT)
Dept: SURGERY | Facility: CLINIC | Age: 48
End: 2018-12-20
Payer: COMMERCIAL

## 2018-12-20 VITALS
DIASTOLIC BLOOD PRESSURE: 85 MMHG | BODY MASS INDEX: 42.28 KG/M2 | HEART RATE: 65 BPM | SYSTOLIC BLOOD PRESSURE: 167 MMHG | HEIGHT: 62 IN | WEIGHT: 229.75 LBS

## 2018-12-20 VITALS — HEIGHT: 62 IN | BODY MASS INDEX: 42.28 KG/M2 | WEIGHT: 229.75 LBS

## 2018-12-20 DIAGNOSIS — Z17.0 MALIGNANT NEOPLASM OF CENTRAL PORTION OF RIGHT BREAST IN FEMALE, ESTROGEN RECEPTOR POSITIVE: ICD-10-CM

## 2018-12-20 DIAGNOSIS — Z51.89 AFTERCARE: Primary | ICD-10-CM

## 2018-12-20 DIAGNOSIS — Z17.0 MALIGNANT NEOPLASM OF RIGHT BREAST IN FEMALE, ESTROGEN RECEPTOR POSITIVE, UNSPECIFIED SITE OF BREAST: Primary | ICD-10-CM

## 2018-12-20 DIAGNOSIS — C50.111 MALIGNANT NEOPLASM OF CENTRAL PORTION OF RIGHT BREAST IN FEMALE, ESTROGEN RECEPTOR POSITIVE: ICD-10-CM

## 2018-12-20 DIAGNOSIS — C50.911 MALIGNANT NEOPLASM OF RIGHT BREAST IN FEMALE, ESTROGEN RECEPTOR POSITIVE, UNSPECIFIED SITE OF BREAST: Primary | ICD-10-CM

## 2018-12-20 PROCEDURE — 99024 POSTOP FOLLOW-UP VISIT: CPT | Mod: S$GLB,,, | Performed by: SURGERY

## 2018-12-20 PROCEDURE — 99024 POSTOP FOLLOW-UP VISIT: CPT | Mod: S$GLB,,, | Performed by: PLASTIC SURGERY

## 2018-12-20 PROCEDURE — 99999 PR PBB SHADOW E&M-EST. PATIENT-LVL III: CPT | Mod: PBBFAC,,, | Performed by: SURGERY

## 2018-12-21 ENCOUNTER — TELEPHONE (OUTPATIENT)
Dept: HEMATOLOGY/ONCOLOGY | Facility: CLINIC | Age: 48
End: 2018-12-21

## 2018-12-21 DIAGNOSIS — C50.911 MALIGNANT NEOPLASM OF RIGHT FEMALE BREAST, UNSPECIFIED ESTROGEN RECEPTOR STATUS, UNSPECIFIED SITE OF BREAST: Primary | ICD-10-CM

## 2018-12-21 NOTE — TELEPHONE ENCOUNTER
Spoke with Jimy at [facility] to give her a verbal okay to release pts specimen results to Dr Cardenas. Jimy verbalized an understanding and does not need any other information at this time.     ----- Message from Carolina Selby sent at 12/21/2018 10:50 AM CST -----  Contact: jimy from [facility]   jimy from [facility] called to speak with nurse erick  have some questions   Callback#[phone]  Thank You  VICTOR MANUEL Selby

## 2018-12-26 NOTE — PROGRESS NOTES
Subjective:       Patient ID: Brittny Urias is a 48 y.o. female.    Chief Complaint: No chief complaint on file.    HPI 48-year-old female who returns for follow-up of a recent diagnosis of right breast cancer, T1cN1a, ER positive, HER2 negative.    She has subsequently met with Radiation Oncology.    Screening mammogram on August 3, 2018 showed architectural distortion in the retroareolar right breast.  Follow-up diagnostic mammogram on August 18th showed architectural distortion in nipple retraction on right.  Ultrasound showed no definite abnormality.    August 21, 2018 a biopsy was performed which showed infiltrating lobular carcinoma (histologic grade 3, nuclear grade 1, mitotic index 1).  ER was 90% positive, LA was 20% positive and HER2 was 0.  Ki-67 was less than 1%.    MRI of the breast on September 4, 2018 showed a 3.2 x 1.8 x 1.4 cm mass with spiculated margins in the subareolar region of the right breast.  The left breast was unremarkable.    On November 14, 2018 bilateral mastectomies were performed.  The right breast showed a 2 cm intermediate grade carcinoma (3+ 2+ 1) 2 of 3 sentinel lymph nodes were positive with some extracapsular extension.  Completion axillary lymph node dissection showed 0 of 12 additional nodes were involved.  Margins were negative.  The left breast showed no abnormali          Review of Systems       Objective:      Physical Exam   Constitutional: She is oriented to person, place, and time. She appears well-developed and well-nourished.   Neurological: She is alert and oriented to person, place, and time.   Psychiatric: She has a normal mood and affect. Her behavior is normal. Thought content normal.   Vitals reviewed.      Assessment:     mamma print is pending  Bone mineral density is normal  FSH is 54 and estradiol 10  1. Malignant neoplasm of central portion of right breast in female, estrogen receptor positive        Plan:       I provided her with written  information regarding letrozole.  Based on her genomic score I will see her back either before or after radiation.  Distress Screening Results: Psychosocial Distress screening score of Distress Score: 0 noted and reviewed. No intervention indicated.

## 2018-12-26 NOTE — PROGRESS NOTES
"POSTOP FOLLOW-UP EXAM    CC  No chief complaint on file.      DIAGNOSES  Encounter Diagnoses   Name Primary?    Aftercare Yes    Malignant neoplasm of central portion of right breast in female, estrogen receptor positive        PROCEDURE  Bilateral MEGAN reconstruction    SUBJECTIVE  Patient doing well, denies complaints, abdominal incision healing secondarily at midline    PHYSICAL EXAM  Ht 5' 2" (1.575 m)   Wt 104.2 kg (229 lb 11.5 oz)   BMI 42.02 kg/m²   Breasts:  Soft, no collections  Flap soft, warm, well perfused, healthy appearing  Cap refill 2-3 sec  Good contour and symmetry     Abdomen:  Soft, nt, nd  Umbilicus healthy and intact  Midline breakdown along incision, otherwise clean, intact    ASSESSMENT  Encounter Diagnoses   Name Primary?    Aftercare Yes    Malignant neoplasm of central portion of right breast in female, estrogen receptor positive        PLAN  - Daily dressing change with wet to dry to abdomen  - Continue surgical bra and abdominal binder  - Ok to shower daily, pat incisions dry, reapply dressings  - Light ambulation, no heavy lifting, slow progression of activities  - Activity and aftercare instructions reviewed  - F/u as scheduled with me in 3 weeks    Electronically signed by:  Tye Chapman  12/25/2018  10:08 PM    "

## 2018-12-27 ENCOUNTER — CLINICAL SUPPORT (OUTPATIENT)
Dept: REHABILITATION | Facility: HOSPITAL | Age: 48
End: 2018-12-27
Attending: SURGERY
Payer: COMMERCIAL

## 2018-12-27 ENCOUNTER — OFFICE VISIT (OUTPATIENT)
Dept: HEMATOLOGY/ONCOLOGY | Facility: CLINIC | Age: 48
End: 2018-12-27
Payer: COMMERCIAL

## 2018-12-27 ENCOUNTER — LAB VISIT (OUTPATIENT)
Dept: LAB | Facility: HOSPITAL | Age: 48
End: 2018-12-27
Attending: INTERNAL MEDICINE
Payer: COMMERCIAL

## 2018-12-27 ENCOUNTER — HOSPITAL ENCOUNTER (OUTPATIENT)
Dept: RADIOLOGY | Facility: CLINIC | Age: 48
Discharge: HOME | End: 2018-12-27
Attending: INTERNAL MEDICINE
Payer: COMMERCIAL

## 2018-12-27 VITALS
HEIGHT: 62 IN | RESPIRATION RATE: 18 BRPM | OXYGEN SATURATION: 96 % | DIASTOLIC BLOOD PRESSURE: 91 MMHG | WEIGHT: 230.63 LBS | TEMPERATURE: 98 F | BODY MASS INDEX: 42.44 KG/M2 | SYSTOLIC BLOOD PRESSURE: 160 MMHG | HEART RATE: 71 BPM

## 2018-12-27 DIAGNOSIS — Z91.89 AT RISK FOR LYMPHEDEMA: ICD-10-CM

## 2018-12-27 DIAGNOSIS — C50.111 MALIGNANT NEOPLASM OF CENTRAL PORTION OF RIGHT BREAST IN FEMALE, ESTROGEN RECEPTOR POSITIVE: ICD-10-CM

## 2018-12-27 DIAGNOSIS — Z17.0 MALIGNANT NEOPLASM OF CENTRAL PORTION OF RIGHT BREAST IN FEMALE, ESTROGEN RECEPTOR POSITIVE: Primary | ICD-10-CM

## 2018-12-27 DIAGNOSIS — M25.611 DECREASED SHOULDER MOBILITY, RIGHT: ICD-10-CM

## 2018-12-27 DIAGNOSIS — C50.111 MALIGNANT NEOPLASM OF CENTRAL PORTION OF RIGHT BREAST IN FEMALE, ESTROGEN RECEPTOR POSITIVE: Primary | ICD-10-CM

## 2018-12-27 DIAGNOSIS — Z17.0 MALIGNANT NEOPLASM OF CENTRAL PORTION OF RIGHT BREAST IN FEMALE, ESTROGEN RECEPTOR POSITIVE: ICD-10-CM

## 2018-12-27 LAB
ESTRADIOL SERPL-MCNC: 10 PG/ML
FSH SERPL-ACNC: 52.4 MIU/ML

## 2018-12-27 PROCEDURE — 3080F DIAST BP >= 90 MM HG: CPT | Mod: CPTII,S$GLB,, | Performed by: INTERNAL MEDICINE

## 2018-12-27 PROCEDURE — 83001 ASSAY OF GONADOTROPIN (FSH): CPT

## 2018-12-27 PROCEDURE — 77080 DXA BONE DENSITY AXIAL: CPT | Mod: TC

## 2018-12-27 PROCEDURE — 3077F SYST BP >= 140 MM HG: CPT | Mod: CPTII,S$GLB,, | Performed by: INTERNAL MEDICINE

## 2018-12-27 PROCEDURE — 99999 PR PBB SHADOW E&M-EST. PATIENT-LVL IV: CPT | Mod: PBBFAC,,, | Performed by: INTERNAL MEDICINE

## 2018-12-27 PROCEDURE — 99212 OFFICE O/P EST SF 10 MIN: CPT | Mod: S$GLB,,, | Performed by: INTERNAL MEDICINE

## 2018-12-27 PROCEDURE — 97140 MANUAL THERAPY 1/> REGIONS: CPT | Mod: PO | Performed by: PHYSICAL MEDICINE & REHABILITATION

## 2018-12-27 PROCEDURE — 36415 COLL VENOUS BLD VENIPUNCTURE: CPT

## 2018-12-27 PROCEDURE — 97110 THERAPEUTIC EXERCISES: CPT | Mod: PO | Performed by: PHYSICAL MEDICINE & REHABILITATION

## 2018-12-27 PROCEDURE — 77080 DXA BONE DENSITY AXIAL: CPT | Mod: 26,,, | Performed by: INTERNAL MEDICINE

## 2018-12-27 PROCEDURE — 82670 ASSAY OF TOTAL ESTRADIOL: CPT

## 2018-12-27 PROCEDURE — 3008F BODY MASS INDEX DOCD: CPT | Mod: CPTII,S$GLB,, | Performed by: INTERNAL MEDICINE

## 2018-12-27 NOTE — PROGRESS NOTES
Physical Therapy Daily Treatment Note     Name: Brittny Urias  Clinic Number: [id number]  Diagnosis:   Encounter Diagnoses   Name Primary?    Decreased shoulder mobility, right     At risk for lymphedema      Physician: Marga Cardenas MD    Precautions: None  Visit #: 5 of 20  Time In: 10:00 AM  Time Out:  10:45 AM  Total Treatment Time 1:1: 45    Evaluation Date: 11/28/18  Visit # authorized: 20  Authorization period: 12/31/18  Plan of care Expiration: 1/23/19  MD referral: Marga Cardenas MD  Insurance: [facility]      Subjective     Pt reports: feeling better and having slight tightness in anterior chest and right axilla. Patient states has small open area in abdominal incision and continuing to pack this area and states these areas are closing in..   Pain Scale: Brittny rates pain on a scale of 0/10 on VAS.   Pain location: abdomen and chest    Fatigue: Mild  Functional change: able to raise her arms higher and standing straighter and easier use BUE's with dressing   Diagnosis: Right breast IDC, Grade 1, ER (+), FL (+), HER2 (-)   Chief complaint: tightness in abdomen and chest and difficulty raising arms upward  Surgical History:11/14/18 for bilateral mastectomies with SLNB and MEGAN Flap reconstruction  Brittny Urias  has a past surgical history that includes Hysterectomy; Dilation and curettage of uterus; RECONSTRUCTION, BREAST, USING MEGAN FREE FLAP (Bilateral, 11/14/2018); MASTECTOMY (Bilateral, 11/14/2018); and SENTINEL NODE BIOPSY AND AXILLARY LYMPHADENECTOMY (Right, 11/14/2018).     Chemotherapy: pending  Radiation: pending       Objective     Brittny received individual therapeutic exercises to improve postural correction and alignment, stretching and soft tissue mobility, and strengthening for 30 minutes including the following:   Supine Shoulder Flexion with wand            1 x 10  Butterflies                                                    10  "x 5"  Supine Shld ER with wand                         1 x 10  LTR  with ER                                              1 x 10  Standing Shoulder Abd ([place])      1 x 10  Scap Retractions                                       2 x 10 x 5" OTB  Wall Climbs  Forward (B)                           10 x 5"                        Sideways (B)                        10 x 5'  Doorway Pec Stretch (B)                         5 x 5"    Brittny received the following manual therapy techniques were performed to increased myofascial/soft tissue length, mobility and pliability, increase PROM, AROM and function as well as to decrease pain for 15 minutes  Anterior chest Stretch (B)  Lateral chest stretch (B)  Grade II G-H jt mobs right shoulder  Passive stretch all shoulder motions (B)    Shoulder Range of Motion: 12/19/18  Active /Passive ROM Right Left   Flexion 165 170   Abduction 150 170   Extension 70 70   IR/90deg 85 85   ER/90deg 90 90          Home Exercise Program and Patient Education   Education provided re:  - role of PT in multi - disciplinary team, goals for PT  - progress towards goals     See EMR under notes/patient instructions for HEP given/taught this session - all sets and reps included. Pt received printed copy.     Pt was able to demonstrate and report understanding and performance  Pt has no cultural, educational or language barriers to learning provided.      CMS Impairment/Limitation/Restriction for FOTO Outcome Survey--not done today (12/27/18) due error with FOTO system     Therapist reviewed FOTO scores for Brittny Urias on 12/19/2018.   FOTO documents entered into [employer] - see Media section.     Limitations Score: 37%  Category: Carrying       Assessment     Patient is responding well to physical therapy. Is at expected function and pain level for 6 weeks post operatively.   Pain after treatment: 1/10    Pt prognosis is Good. Patient received manual therapy as above to decrease shoulder joint " tightness.Patiient performed above exercise program and tolerated addition of Doorway Pec stretch. Improvements noted with AROM bilateral shoulders.  Patient instructed to continue with HEP.  Pt will continue to benefit from skilled outpatient physical therapy to address the deficits listed in the problem list chart on initial evaluation, provide pt/family education and to maximize pt's level of independence in the home and community environment. Patient met 1 STG this session.     Goals as follows:  Short Term goals: 4 weeks  1. Patient will demonstrate 100% understanding of lymphedema risk reduction practices to include self monitoring for lymphedema. (met, 12/13/18)  2. Patient will demonstrate independence with Home Exercise program established. (met, 12/13/18)  3. Pt will increase AROM/PROM in shoulder abduction ROM to 140 degrees bilaterally to improve functional reach, carry, push, pull pain free. (met, 12/27/18)  4. Pt will increase AROM/PROM in shoulder flexion to 160 degrees bilaterally to improve functional reach, carry, push, pull pain free.(met, 12/19/18)  5. Pt will increase AROM/PROMin shoulder ER to 75 degrees bilaterally in order to perform functional activites (met, 12/19/18)  6. Strength will be 4+/5 for right shoulder in order to perform functional activities. (progressing, not met)     Long Term Goals: 8 weeks   1.  Pt will increase AROM/PROM in shoulder flexion to 180 degrees bilaterally to improve functional reach, carry, push, pull pain free. (progressing, not met)  2. Pt will increase strength to 5/5 in gross right shoulder musculature to improve tolerance to all functional activities pain free. (progressing, not met)  3. Pt will demonstrate full/maximized tissue mobility to increase ROM, promote healthy tissue to be pain free, and to hold trunk erect when sitting, standing and ambulating at discharge. (progressing, not met)  4. Pt will report decrease in overall worst pain to 2/10 at  discharge. (progressing, not met)  5. Pt will increase AROM/PROM in shoulder abduction ROM to 180 degrees bilaterally to improve functional reach, carry, push, pull pain free. (progressing, not met)  6. Patient will report compliance with walking program 5x week for 10 min each day to improve overall cardiovascular function and decrease cancer related fatigue at discharge. (progressing, not met)        Plan     Continue with established POC towards physical therapy goals.     Therapist: Roberta Love, PT  12/27/2018

## 2018-12-27 NOTE — PATIENT INSTRUCTIONS
Doorway Pec Stretch:  Start by having the affected arm flush against  the side of the wall at ~90 degrees between  your arm and torso and also the arm and  elbow.  Have the opposite leg forward and the other  back in a modified lunging stance.  Slowly begin to lean forward and there will be  a stretching sensation through the pectoral  muscle group of the arm on the wall.  Perform 5 times holding for count of 5. Perform to each side 2 times a day.

## 2018-12-28 ENCOUNTER — TELEPHONE (OUTPATIENT)
Dept: HEMATOLOGY/ONCOLOGY | Facility: CLINIC | Age: 48
End: 2018-12-28

## 2018-12-28 NOTE — TELEPHONE ENCOUNTER
Spoke with pt to let her know her MammaPrint came back with a low risk result and with these results Dr Woods would like to proceed with hormonal therapy (which he will prescribe for her) as well as radiation therapy. Informed pt would reach out to [facility] in [facility] to see if they can set her up with an appt to see Dr Teague. Pt verbalized understanding of this information and knows to call with any questions or concerns.      ----- Message from Kamran Woods MD sent at 12/28/2018 11:28 AM CST -----  Can you get her Mammaprint results for me and call me [phone]?

## 2019-01-02 ENCOUNTER — CLINICAL SUPPORT (OUTPATIENT)
Dept: REHABILITATION | Facility: HOSPITAL | Age: 49
End: 2019-01-02
Attending: SURGERY
Payer: COMMERCIAL

## 2019-01-02 DIAGNOSIS — Z91.89 AT RISK FOR LYMPHEDEMA: ICD-10-CM

## 2019-01-02 DIAGNOSIS — M25.611 DECREASED SHOULDER MOBILITY, RIGHT: ICD-10-CM

## 2019-01-02 PROCEDURE — 97110 THERAPEUTIC EXERCISES: CPT | Mod: PO | Performed by: PHYSICAL MEDICINE & REHABILITATION

## 2019-01-02 PROCEDURE — 97140 MANUAL THERAPY 1/> REGIONS: CPT | Mod: PO | Performed by: PHYSICAL MEDICINE & REHABILITATION

## 2019-01-02 NOTE — PROGRESS NOTES
Physical Therapy Discharge Note     Name: Brittny Urias  Clinic Number: [id number]  Diagnosis:   Encounter Diagnoses   Name Primary?    Decreased shoulder mobility, right     At risk for lymphedema      Physician: Marga Cardenas MD    Precautions: None  Visit #: 6 of 20  Time In: 9:00 AM  Time Out:   9:55AM  Total Treatment Time 1:1: 55 minutes     Evaluation Date: 11/28/18  Visit # authorized: 20  Authorization period: 12/31/19  Plan of care Expiration: 1/23/19  MD referral: Marga Cardenas MD  Insurance: [facility]      Subjective     Pt reports: feeling much better and having very slight tightness in anterior chest and right axilla. Patient states still  has small open area in abdominal incision which is closing in.Patient states Dr. Chapman told her she ca begin to exercise and to ease into exercising slowly. Patient feels ready for discharge and  to continue with HEP and   Pain Scale: Brittny rates pain on a scale of 0/10 on VAS.   Pain location: NA    Fatigue: Mild  Functional change: able to use both arms with all ADL's--able to do her hair now   Diagnosis: Right breast IDC, Grade 1, ER (+), [place] (+), HER2 (-)   Chief complaint: tightness in abdomen and chest and difficulty raising arms upward  Surgical History:11/14/18 for bilateral mastectomies with SLNB and MEGAN Flap reconstruction  Brittny Urias  has a past surgical history that includes Hysterectomy; Dilation and curettage of uterus; RECONSTRUCTION, BREAST, USING MEGAN FREE FLAP (Bilateral, 11/14/2018); MASTECTOMY (Bilateral, 11/14/2018); and SENTINEL NODE BIOPSY AND AXILLARY LYMPHADENECTOMY (Right, 11/14/2018).     Chemotherapy: None  Radiation: Appoint with Dr. Teague 1/8/19       Objective     Brittny received individual therapeutic exercises to improve postural correction and alignment, stretching and soft tissue mobility, and strengthening for 40 minutes including the following:   Supine  "Shoulder Flexion with wand            1 x 10  Supine Shld ER with wand                         1 x 10  LTR  with ER                                              1 x 10  Triceps Extensions                                  2 x 10 3#  Chest Fly                                                   1 x 10 3#  Standing Shoulder Abd ([place])      1 x 10  Scap Retractions                                       2 x 10 x 5" OTB  Wall Climbs  Forward (B)                           10 x 5"                        Sideways (B)                        10 x 5'  Doorway Pec Stretch (B)                          5 x 5"  Biceps curls                                            2 x 10 3#  Standing Shoulder Press                      2 x 10 2#    Core Exercises:  TA contractions                                   10 x 5"  Bridging                                                1 x 10  Supine Marching                                 1 x 10  Supine HSS   (B)                                  5 x 10"      [place] received the following manual therapy techniques were performed to increased myofascial/soft tissue length, mobility and pliability, increase PROM, AROM and function as well as to decrease pain for 15 minutes  Anterior chest Stretch (B)  Lateral chest stretch (B)  Grade II G-H jt mobs right shoulder  Passive stretch all shoulder motions (B)    Shoulder Range of Motion: 1/2/19  Active /Passive ROM Right Left   Flexion 170 170   Abduction 170 170   Extension 70 70   IR/90deg 85 85   ER/90deg 90 90      Strength: manual muscle test grades below   Upper Extremity Strength    (R) UE (L) UE   Shoulder flexion: 5/5 5/5   Shoulder Abduction: 5/5 5/5   Shoulder IR 5/5 5/5   Shoulder ER 5/5 5/5   Elbow flexion: 5/5 5/5   Elbow extension: 5/5 5/5   Wrist flexion: 5/5 5/5   Wrist extension: 5/5 5/5    5/5 5/5         Home Exercise Program and Patient Education   Education provided re:  - role of PT in multi - disciplinary team, goals for PT  - " progress towards goals     See EMR under notes/patient instructions for HEP given/taught this session - all sets and reps included. Pt received printed copy.     Pt was able to demonstrate and report understanding and performance  Pt has no cultural, educational or language barriers to learning provided.      CMS Impairment/Limitation/Restriction for FOTO Outcome Survey--not done today (12/27/18) due error with FOTO system     Therapist reviewed FOTO scores for Brittny Urias on 1/2/2019.   FOTO documents entered into [employer] - see Media section.     Limitations Score: 15%  Category: Carrying       Assessment     Patient has responded well to physical therapy. Is at expected function and pain level for 7 weeks post operatively.   Pain after treatment: 0/10    Pt prognosis is Good. Reassessment for discharge. AROM bilateral shoulders WNL and strength now in 5/5 range; patient able to assume radiation position comfortably with RUE. Patient received manual therapy as above and no longer exhibits right shoulder joint  tightness.Patiient reviewed  above exercise program to continue on HEP and tolerated addition of UE and core strengthening exercises.  Patient is independent with HEP.  Patient has met all STG and LTG.  Goals as follows:  Short Term goals: 4 weeks  1. Patient will demonstrate 100% understanding of lymphedema risk reduction practices to include self monitoring for lymphedema. (met, 12/13/18)  2. Patient will demonstrate independence with Home Exercise program established. (met, 12/13/18)  3. Pt will increase AROM/PROM in shoulder abduction ROM to 140 degrees bilaterally to improve functional reach, carry, push, pull pain free. (met, 12/27/18)  4. Pt will increase AROM/PROM in shoulder flexion to 160 degrees bilaterally to improve functional reach, carry, push, pull pain free.(met, 12/19/18)  5. Pt will increase AROM/PROMin shoulder ER to 75 degrees bilaterally in order to perform functional activites  (met, 12/19/18)  6. Strength will be 4+/5 for right shoulder in order to perform functional activities. (Met, 1/2/19)     Long Term Goals: 8 weeks   1.  Pt will increase AROM/PROM in shoulder flexion to 180 degrees bilaterally to improve functional reach, carry, push, pull pain free. (met, 1/2/19)  2. Pt will increase strength to 5/5 in gross right shoulder musculature to improve tolerance to all functional activities pain free. (met, 1/2/19)  3. Pt will demonstrate full/maximized tissue mobility to increase ROM, promote healthy tissue to be pain free, and to hold trunk erect when sitting, standing and ambulating at discharge. (met, 1/2/19)  4. Pt will report decrease in overall worst pain to 2/10 at discharge. (met, 1/2/19)  5. Pt will increase AROM/PROM in shoulder abduction ROM to 180 degrees bilaterally to improve functional reach, carry, push, pull pain free. (met, 1/2/19)  6. Patient will report compliance with walking program 5x week for 10 min each day to improve overall cardiovascular function and decrease cancer related fatigue at discharge. (met, 1/2/19)        Plan     Patient is discharged from physical therapy.    Therapist: Roberta Love, PT  1/2/2019

## 2019-01-02 NOTE — PATIENT INSTRUCTIONS
Tricep Strength    Lie comfortably on back with a weight in one palm. Bend arm so elbow points up and weight gently hangs. Keep shoulder flat on floor. Raise hand to straighten arm. Repeat with other arm. Use 3 pounds.  Repeat 10 times. Do 2 sessions 1x per day.            Chest Fly    Lying on your back, lower arms until parallel with floor, elbows slightly bent, palms up. Use 3 pound dumbbells.  Repeat 10 times. Do 1 sessions 1-2x per day.        Bilateral Arm Curl    Sit holding 3 lb dumbbell in each hand. Bend elbows.   Repeat 10 times. Do2 sessions 1x per day.      Press: Standing (Dumbbell)    Knees slightly bent, palms in, press to straight arms, rotating to palms forward at end of movement. Use 2 pounds.  Repeat 10 times. Do 2 sessions 1x per day.          Core Strengthening    Flatten back by tightening stomach muscles and buttocks. Do not hold your breath.  Hold 5 seconds.  Repeat 10 times per set. Do 1 sets per session. Do 2 sessions per day.      Bridging    Flatten back against floor then slowly raise buttocks from floor, keeping stomach tight.   Repeat 10 times per set. Do 1 sets per session. Do 2 sessions per day.      Bent Leg Lift (Hook-Lying)    Tighten stomach and slowly raise right leg from floor. Keep trunk rigid. Slowly lower and switch sides.  Repeat 10 times per set. Do 1 sets per session. Do 2 sessions per day.          Supine Hamstring Stretch    Straight leg with belt around heel of foot. Raise leg until a stretch is felt in the back of the thigh. Keep knee straight. Hold 30 seconds.   Repeat 10 times each side per set. Do 1 sets per session. Do 2 sessions per day.

## 2019-01-08 ENCOUNTER — OFFICE VISIT (OUTPATIENT)
Dept: PLASTIC SURGERY | Facility: CLINIC | Age: 49
End: 2019-01-08
Payer: COMMERCIAL

## 2019-01-08 ENCOUNTER — HOSPITAL ENCOUNTER (OUTPATIENT)
Dept: RADIATION THERAPY | Facility: HOSPITAL | Age: 49
Discharge: HOME | End: 2019-01-08
Attending: RADIOLOGY
Payer: COMMERCIAL

## 2019-01-08 VITALS
HEART RATE: 75 BPM | HEIGHT: 62 IN | SYSTOLIC BLOOD PRESSURE: 162 MMHG | BODY MASS INDEX: 42.18 KG/M2 | DIASTOLIC BLOOD PRESSURE: 93 MMHG

## 2019-01-08 DIAGNOSIS — Z17.0 MALIGNANT NEOPLASM OF CENTRAL PORTION OF RIGHT BREAST IN FEMALE, ESTROGEN RECEPTOR POSITIVE: Primary | ICD-10-CM

## 2019-01-08 DIAGNOSIS — C50.111 MALIGNANT NEOPLASM OF CENTRAL PORTION OF RIGHT BREAST IN FEMALE, ESTROGEN RECEPTOR POSITIVE: Primary | ICD-10-CM

## 2019-01-08 DIAGNOSIS — Z51.89 AFTERCARE: ICD-10-CM

## 2019-01-08 PROCEDURE — 77333 RADIATION TREATMENT AID(S): CPT | Mod: TC | Performed by: RADIOLOGY

## 2019-01-08 PROCEDURE — 77263 THER RADIOLOGY TX PLNG CPLX: CPT | Mod: ,,, | Performed by: RADIOLOGY

## 2019-01-08 PROCEDURE — 77290 THER RAD SIMULAJ FIELD CPLX: CPT | Mod: TC | Performed by: RADIOLOGY

## 2019-01-08 PROCEDURE — 77333 RADIATION TREATMENT AID(S): CPT | Mod: 26,,, | Performed by: RADIOLOGY

## 2019-01-08 PROCEDURE — 99024 PR POST-OP FOLLOW-UP VISIT: ICD-10-PCS | Mod: S$GLB,,, | Performed by: PLASTIC SURGERY

## 2019-01-08 PROCEDURE — 77014 HC CT GUIDANCE RADIATION THERAPY FLDS PLACEMENT: CPT | Mod: TC | Performed by: RADIOLOGY

## 2019-01-08 PROCEDURE — 99024 POSTOP FOLLOW-UP VISIT: CPT | Mod: S$GLB,,, | Performed by: PLASTIC SURGERY

## 2019-01-08 PROCEDURE — 77290 THER RAD SIMULAJ FIELD CPLX: CPT | Mod: 26,,, | Performed by: RADIOLOGY

## 2019-01-08 PROCEDURE — 77333 PR  RADN TREATMENT AID(S) INTERM: ICD-10-PCS | Mod: 26,,, | Performed by: RADIOLOGY

## 2019-01-08 PROCEDURE — 77263 PR  RADIATION THERAPY PLAN COMPLEX: ICD-10-PCS | Mod: ,,, | Performed by: RADIOLOGY

## 2019-01-08 PROCEDURE — 77290 PR  SET RADN THERAPY FIELD COMPLEX: ICD-10-PCS | Mod: 26,,, | Performed by: RADIOLOGY

## 2019-01-08 NOTE — PROGRESS NOTES
"POSTOP FOLLOW-UP EXAM    CC  Chief Complaint   Patient presents with    Post-op Evaluation       DIAGNOSES  Encounter Diagnoses   Name Primary?    Malignant neoplasm of central portion of right breast in female, estrogen receptor positive Yes    Aftercare        PROCEDURE  Bilateral breast immediate MEGAN reconstruction    SUBJECTIVE  Patient doing well, denies complaints, abdomen almost fully healed; has been recommended for radiation to the right breast; ok to start after 8 weeks post-operatively    PHYSICAL EXAM  BP (!) 162/93   Pulse 75   Ht 5' 2" (1.575 m)   BMI 42.18 kg/m²   Breasts:  Soft, no collections  Flap soft, warm, well perfused, healthy appearing  Cap refill 2-3 sec  Good contour and symmetry  Flap skin paddle warm, well perfused  Incisions well healed, intact    Abdomen:  Soft, nt, nd  Umbilicus healthy and intact  Small area of secondary wound healing present right of midline, approx 4 cm along the incisions    ASSESSMENT  Encounter Diagnoses   Name Primary?    Malignant neoplasm of central portion of right breast in female, estrogen receptor positive Yes    Aftercare        PLAN  - Ok to start radiation at 8 weeks post-op, advised necessary precautions for flap care, will monitor flap health throughout radiation course  - Wet to dry dressing along abdominal incision until healed  - May shower normally  - Surgical garments discussed; continue surgical bra or soft front zip/clasp sports bra for 6 weeks, then bra without underwire for and additional 6 weeks; advise surgical compression garment until abdominal incision is fully healed  - Slow progression to full activities by 8-10 weeks post-op  - Avoid lying on chest or direct pressure to chest for 3 months after surgery  - Activity and aftercare instructions reviewed  - PT/OT permissible per therapist recommendations  - F/u as scheduled with me in 3-4 weeks  - Return to work discussed    Electronically signed by:  Tye EASLEY" Ari  1/8/2019  1:58 PM

## 2019-01-15 PROCEDURE — 77300 PR RADIATION THERAPY,DOSIMETRY PLAN: ICD-10-PCS | Mod: 26,,, | Performed by: RADIOLOGY

## 2019-01-15 PROCEDURE — 77300 RADIATION THERAPY DOSE PLAN: CPT | Mod: TC | Performed by: RADIOLOGY

## 2019-01-15 PROCEDURE — 77300 RADIATION THERAPY DOSE PLAN: CPT | Mod: 26,,, | Performed by: RADIOLOGY

## 2019-01-15 PROCEDURE — 77295 3-D RADIOTHERAPY PLAN: CPT | Mod: 26,,, | Performed by: RADIOLOGY

## 2019-01-15 PROCEDURE — 77334 RADIATION TREATMENT AID(S): CPT | Mod: 26,,, | Performed by: RADIOLOGY

## 2019-01-15 PROCEDURE — 77295 3-D RADIOTHERAPY PLAN: CPT | Mod: TC | Performed by: RADIOLOGY

## 2019-01-15 PROCEDURE — 77334 RADIATION TREATMENT AID(S): CPT | Mod: TC | Performed by: RADIOLOGY

## 2019-01-15 PROCEDURE — 77295 PR 3D RADIOTHERAPY PLAN: ICD-10-PCS | Mod: 26,,, | Performed by: RADIOLOGY

## 2019-01-15 PROCEDURE — 77334 PR  RADN TREATMENT AID(S) COMPLX: ICD-10-PCS | Mod: 26,,, | Performed by: RADIOLOGY

## 2019-01-16 PROCEDURE — 77280 THER RAD SIMULAJ FIELD SMPL: CPT | Mod: TC | Performed by: RADIOLOGY

## 2019-01-16 PROCEDURE — 77280 THER RAD SIMULAJ FIELD SMPL: CPT | Mod: 26,,, | Performed by: RADIOLOGY

## 2019-01-16 PROCEDURE — 77280 PR  SET RADN THERAPY FIELD SIMPLE: ICD-10-PCS | Mod: 26,,, | Performed by: RADIOLOGY

## 2019-01-17 PROCEDURE — 77412 RADIATION TX DELIVERY LVL 3: CPT | Performed by: RADIOLOGY

## 2019-01-18 PROCEDURE — 77412 RADIATION TX DELIVERY LVL 3: CPT | Performed by: RADIOLOGY

## 2019-01-21 PROCEDURE — 77412 RADIATION TX DELIVERY LVL 3: CPT | Performed by: RADIOLOGY

## 2019-01-22 ENCOUNTER — DOCUMENTATION ONLY (OUTPATIENT)
Dept: RADIATION ONCOLOGY | Facility: CLINIC | Age: 49
End: 2019-01-22

## 2019-01-22 PROCEDURE — 77412 RADIATION TX DELIVERY LVL 3: CPT | Performed by: RADIOLOGY

## 2019-01-22 PROCEDURE — 77336 RADIATION PHYSICS CONSULT: CPT | Performed by: RADIOLOGY

## 2019-01-22 NOTE — PLAN OF CARE
Problem: Adult Inpatient Plan of Care  Goal: Plan of Care Review  Outcome: Ongoing (interventions implemented as appropriate)  Pt. On day 4 of xrt to breast.  Pt. Doing well.  Just started.

## 2019-01-23 PROCEDURE — 77412 RADIATION TX DELIVERY LVL 3: CPT | Performed by: RADIOLOGY

## 2019-01-24 PROCEDURE — 77412 RADIATION TX DELIVERY LVL 3: CPT | Performed by: RADIOLOGY

## 2019-01-24 PROCEDURE — 77417 THER RADIOLOGY PORT IMAGE(S): CPT | Performed by: RADIOLOGY

## 2019-01-24 PROCEDURE — 77387 GUIDANCE FOR RADJ TX DLVR: CPT | Mod: TC | Performed by: RADIOLOGY

## 2019-01-24 PROCEDURE — 77387 PR GUIDANCE FOR RADIATION TREATMENT DELIVERY: ICD-10-PCS | Mod: 26,,, | Performed by: RADIOLOGY

## 2019-01-24 PROCEDURE — 77387 GUIDANCE FOR RADJ TX DLVR: CPT | Mod: 26,,, | Performed by: RADIOLOGY

## 2019-01-25 PROCEDURE — 77412 RADIATION TX DELIVERY LVL 3: CPT | Performed by: RADIOLOGY

## 2019-01-28 PROCEDURE — 77387 GUIDANCE FOR RADJ TX DLVR: CPT | Mod: 26,,, | Performed by: RADIOLOGY

## 2019-01-28 PROCEDURE — 77412 RADIATION TX DELIVERY LVL 3: CPT | Performed by: RADIOLOGY

## 2019-01-28 PROCEDURE — 77387 PR GUIDANCE FOR RADIATION TREATMENT DELIVERY: ICD-10-PCS | Mod: 26,,, | Performed by: RADIOLOGY

## 2019-01-28 PROCEDURE — 77387 GUIDANCE FOR RADJ TX DLVR: CPT | Mod: TC | Performed by: RADIOLOGY

## 2019-01-30 PROCEDURE — 77412 RADIATION TX DELIVERY LVL 3: CPT | Performed by: RADIOLOGY

## 2019-01-31 PROCEDURE — 77336 RADIATION PHYSICS CONSULT: CPT | Performed by: RADIOLOGY

## 2019-01-31 PROCEDURE — 77412 RADIATION TX DELIVERY LVL 3: CPT | Performed by: RADIOLOGY

## 2019-02-01 ENCOUNTER — HOSPITAL ENCOUNTER (OUTPATIENT)
Dept: RADIATION THERAPY | Facility: HOSPITAL | Age: 49
Discharge: HOME | End: 2019-02-01
Attending: RADIOLOGY
Payer: COMMERCIAL

## 2019-02-01 PROCEDURE — 77412 RADIATION TX DELIVERY LVL 3: CPT | Performed by: RADIOLOGY

## 2019-02-04 PROCEDURE — 77387 GUIDANCE FOR RADJ TX DLVR: CPT | Mod: TC | Performed by: RADIOLOGY

## 2019-02-04 PROCEDURE — 77412 RADIATION TX DELIVERY LVL 3: CPT | Performed by: RADIOLOGY

## 2019-02-04 PROCEDURE — 77387 PR GUIDANCE FOR RADIATION TREATMENT DELIVERY: ICD-10-PCS | Mod: 26,,, | Performed by: RADIOLOGY

## 2019-02-04 PROCEDURE — 77387 GUIDANCE FOR RADJ TX DLVR: CPT | Mod: 26,,, | Performed by: RADIOLOGY

## 2019-02-05 ENCOUNTER — DOCUMENTATION ONLY (OUTPATIENT)
Dept: RADIATION ONCOLOGY | Facility: CLINIC | Age: 49
End: 2019-02-05

## 2019-02-05 PROCEDURE — 77417 THER RADIOLOGY PORT IMAGE(S): CPT | Performed by: RADIOLOGY

## 2019-02-05 PROCEDURE — 77412 RADIATION TX DELIVERY LVL 3: CPT | Performed by: RADIOLOGY

## 2019-02-05 NOTE — PLAN OF CARE
Problem: Adult Inpatient Plan of Care  Goal: Plan of Care Review  Outcome: Ongoing (interventions implemented as appropriate)  Pt. On day 13 of outpt xrt to right breast.  Doing well.  SMall pimple peasize.  Skin intact.  Using miaderm.

## 2019-02-06 ENCOUNTER — PATIENT MESSAGE (OUTPATIENT)
Dept: PLASTIC SURGERY | Facility: CLINIC | Age: 49
End: 2019-02-06

## 2019-02-06 PROCEDURE — 77387 GUIDANCE FOR RADJ TX DLVR: CPT | Mod: TC | Performed by: RADIOLOGY

## 2019-02-06 PROCEDURE — 77387 GUIDANCE FOR RADJ TX DLVR: CPT | Mod: 26,,, | Performed by: RADIOLOGY

## 2019-02-06 PROCEDURE — 77412 RADIATION TX DELIVERY LVL 3: CPT | Performed by: RADIOLOGY

## 2019-02-06 PROCEDURE — 77387 PR GUIDANCE FOR RADIATION TREATMENT DELIVERY: ICD-10-PCS | Mod: 26,,, | Performed by: RADIOLOGY

## 2019-02-07 PROCEDURE — 77387 GUIDANCE FOR RADJ TX DLVR: CPT | Mod: TC | Performed by: RADIOLOGY

## 2019-02-07 PROCEDURE — 77336 RADIATION PHYSICS CONSULT: CPT | Performed by: RADIOLOGY

## 2019-02-07 PROCEDURE — 77412 RADIATION TX DELIVERY LVL 3: CPT | Performed by: RADIOLOGY

## 2019-02-07 PROCEDURE — 77387 PR GUIDANCE FOR RADIATION TREATMENT DELIVERY: ICD-10-PCS | Mod: 26,,, | Performed by: RADIOLOGY

## 2019-02-07 PROCEDURE — 77387 GUIDANCE FOR RADJ TX DLVR: CPT | Mod: 26,,, | Performed by: RADIOLOGY

## 2019-02-07 RX ORDER — CETIRIZINE HYDROCHLORIDE 10 MG/1
10 TABLET ORAL DAILY PRN
Qty: 30 TABLET | Refills: 0 | Status: SHIPPED | OUTPATIENT
Start: 2019-02-07 | End: 2020-09-11

## 2019-02-07 RX ORDER — HYDROCHLOROTHIAZIDE 25 MG/1
12.5 TABLET ORAL DAILY
Qty: 30 TABLET | Refills: 0 | Status: SHIPPED | OUTPATIENT
Start: 2019-02-07 | End: 2019-03-14 | Stop reason: SDUPTHER

## 2019-02-07 RX ORDER — CETIRIZINE HYDROCHLORIDE 10 MG/1
10 TABLET ORAL DAILY PRN
Qty: 30 TABLET | Refills: 0 | Status: SHIPPED | OUTPATIENT
Start: 2019-02-07 | End: 2019-02-07 | Stop reason: SDUPTHER

## 2019-02-08 PROCEDURE — 77412 RADIATION TX DELIVERY LVL 3: CPT | Performed by: RADIOLOGY

## 2019-02-11 PROCEDURE — 77412 RADIATION TX DELIVERY LVL 3: CPT | Performed by: RADIOLOGY

## 2019-02-12 ENCOUNTER — DOCUMENTATION ONLY (OUTPATIENT)
Dept: RADIATION ONCOLOGY | Facility: CLINIC | Age: 49
End: 2019-02-12

## 2019-02-12 PROCEDURE — 77412 RADIATION TX DELIVERY LVL 3: CPT | Performed by: RADIOLOGY

## 2019-02-13 PROCEDURE — 77412 RADIATION TX DELIVERY LVL 3: CPT | Performed by: RADIOLOGY

## 2019-02-13 PROCEDURE — 77417 THER RADIOLOGY PORT IMAGE(S): CPT | Performed by: RADIOLOGY

## 2019-02-14 PROCEDURE — 77412 RADIATION TX DELIVERY LVL 3: CPT | Performed by: RADIOLOGY

## 2019-02-14 PROCEDURE — 77336 RADIATION PHYSICS CONSULT: CPT | Performed by: RADIOLOGY

## 2019-02-15 PROCEDURE — 77412 RADIATION TX DELIVERY LVL 3: CPT | Performed by: RADIOLOGY

## 2019-02-18 PROCEDURE — 77387 PR GUIDANCE FOR RADIATION TREATMENT DELIVERY: ICD-10-PCS | Mod: 26,,, | Performed by: RADIOLOGY

## 2019-02-18 PROCEDURE — 77412 RADIATION TX DELIVERY LVL 3: CPT | Performed by: RADIOLOGY

## 2019-02-18 PROCEDURE — 77387 GUIDANCE FOR RADJ TX DLVR: CPT | Mod: TC | Performed by: RADIOLOGY

## 2019-02-18 PROCEDURE — 77387 GUIDANCE FOR RADJ TX DLVR: CPT | Mod: 26,,, | Performed by: RADIOLOGY

## 2019-02-19 PROCEDURE — 77412 RADIATION TX DELIVERY LVL 3: CPT | Performed by: RADIOLOGY

## 2019-02-20 ENCOUNTER — DOCUMENTATION ONLY (OUTPATIENT)
Dept: RADIATION ONCOLOGY | Facility: CLINIC | Age: 49
End: 2019-02-20

## 2019-02-20 PROCEDURE — 77417 THER RADIOLOGY PORT IMAGE(S): CPT | Performed by: RADIOLOGY

## 2019-02-20 PROCEDURE — 77412 RADIATION TX DELIVERY LVL 3: CPT | Performed by: RADIOLOGY

## 2019-02-20 NOTE — PLAN OF CARE
Problem: Adult Inpatient Plan of Care  Goal: Plan of Care Review  Outcome: Ongoing (interventions implemented as appropriate)  Pt. On day 24 of outpt. xrt to breast mound.  Pt. With brisk erythema.  Skin dark.  Dry desquamation in axilla.  Given gel packs for comfort.

## 2019-02-21 PROCEDURE — 77336 RADIATION PHYSICS CONSULT: CPT | Performed by: RADIOLOGY

## 2019-02-21 PROCEDURE — 77412 RADIATION TX DELIVERY LVL 3: CPT | Performed by: RADIOLOGY

## 2019-02-22 PROCEDURE — 77387 PR GUIDANCE FOR RADIATION TREATMENT DELIVERY: ICD-10-PCS | Mod: 26,,, | Performed by: RADIOLOGY

## 2019-02-22 PROCEDURE — 77387 GUIDANCE FOR RADJ TX DLVR: CPT | Mod: TC | Performed by: RADIOLOGY

## 2019-02-22 PROCEDURE — 77387 GUIDANCE FOR RADJ TX DLVR: CPT | Mod: 26,,, | Performed by: RADIOLOGY

## 2019-02-22 PROCEDURE — 77412 RADIATION TX DELIVERY LVL 3: CPT | Performed by: RADIOLOGY

## 2019-02-25 PROCEDURE — 77412 RADIATION TX DELIVERY LVL 3: CPT | Performed by: RADIOLOGY

## 2019-02-26 ENCOUNTER — DOCUMENTATION ONLY (OUTPATIENT)
Dept: RADIATION ONCOLOGY | Facility: CLINIC | Age: 49
End: 2019-02-26

## 2019-02-26 ENCOUNTER — PATIENT MESSAGE (OUTPATIENT)
Dept: HEMATOLOGY/ONCOLOGY | Facility: CLINIC | Age: 49
End: 2019-02-26

## 2019-02-26 ENCOUNTER — OFFICE VISIT (OUTPATIENT)
Dept: PLASTIC SURGERY | Facility: CLINIC | Age: 49
End: 2019-02-26
Payer: COMMERCIAL

## 2019-02-26 VITALS — DIASTOLIC BLOOD PRESSURE: 92 MMHG | SYSTOLIC BLOOD PRESSURE: 168 MMHG

## 2019-02-26 DIAGNOSIS — Z90.12 HISTORY OF MASTECTOMY, LEFT: ICD-10-CM

## 2019-02-26 DIAGNOSIS — Z98.890 HISTORY OF RECONSTRUCTION OF LEFT BREAST: ICD-10-CM

## 2019-02-26 DIAGNOSIS — Z92.3 HISTORY OF RADIATION THERAPY: ICD-10-CM

## 2019-02-26 DIAGNOSIS — Z51.89 AFTERCARE: Primary | ICD-10-CM

## 2019-02-26 PROCEDURE — 77412 RADIATION TX DELIVERY LVL 3: CPT | Performed by: RADIOLOGY

## 2019-02-26 PROCEDURE — 99213 OFFICE O/P EST LOW 20 MIN: CPT | Mod: S$GLB,,, | Performed by: PLASTIC SURGERY

## 2019-02-26 PROCEDURE — 99213 PR OFFICE/OUTPT VISIT, EST, LEVL III, 20-29 MIN: ICD-10-PCS | Mod: S$GLB,,, | Performed by: PLASTIC SURGERY

## 2019-02-26 PROCEDURE — 77387 GUIDANCE FOR RADJ TX DLVR: CPT | Mod: TC | Performed by: RADIOLOGY

## 2019-02-26 PROCEDURE — 77387 PR GUIDANCE FOR RADIATION TREATMENT DELIVERY: ICD-10-PCS | Mod: 26,,, | Performed by: RADIOLOGY

## 2019-02-26 PROCEDURE — 3077F PR MOST RECENT SYSTOLIC BLOOD PRESSURE >= 140 MM HG: ICD-10-PCS | Mod: CPTII,S$GLB,, | Performed by: PLASTIC SURGERY

## 2019-02-26 PROCEDURE — 3080F PR MOST RECENT DIASTOLIC BLOOD PRESSURE >= 90 MM HG: ICD-10-PCS | Mod: CPTII,S$GLB,, | Performed by: PLASTIC SURGERY

## 2019-02-26 PROCEDURE — 3080F DIAST BP >= 90 MM HG: CPT | Mod: CPTII,S$GLB,, | Performed by: PLASTIC SURGERY

## 2019-02-26 PROCEDURE — 3077F SYST BP >= 140 MM HG: CPT | Mod: CPTII,S$GLB,, | Performed by: PLASTIC SURGERY

## 2019-02-26 PROCEDURE — 77387 GUIDANCE FOR RADJ TX DLVR: CPT | Mod: 26,,, | Performed by: RADIOLOGY

## 2019-02-26 NOTE — PLAN OF CARE
Problem: Adult Inpatient Plan of Care  Goal: Plan of Care Review  Outcome: Outcome(s) achieved Date Met: 02/26/19  Pt. Completed outpt. xrt to breast mound.  rtc in 6 weeks.

## 2019-02-26 NOTE — PROGRESS NOTES
FOLLOW-UP EXAM    CC  History of bilateral breast reconstruction    DIAGNOSES  Encounter Diagnoses   Name Primary?    Aftercare Yes    History of mastectomy, left     History of reconstruction of left breast     History of radiation therapy        PROCEDURE  Bilateral MEGAN reconstruction    SUBJECTIVE  Patient doing well, denies complaints, now presents at completion of XRT therapy for check of left flap; overall she has done well, has some superficial wounds with desquamation of the skin at the axilla, IMF and sternum, otherwise doing well; her abdomen has healed and she desires to restart cosentyx for psoriasis    PHYSICAL EXAM  BP (!) 168/92   Breasts:  Soft, no collections  Flaps soft, warm, well perfused, healthy appearing  Cap refill 2-3 sec  Superficial skin slough, radiation hyperpigmentation of the right skin  Desquamation in axilla, sternum and IMF  Right flap soft overall, no discrete fat necrosis, medial breast mastectomy skin flap with moderate edema  Incisions well healed, intact    Abdomen:  Soft, nt, nd  Umbilicus healthy and intact  Incisions c/d/i, central midline scar healed, firm    ASSESSMENT  Encounter Diagnoses   Name Primary?    Aftercare Yes    History of mastectomy, left     History of reconstruction of left breast     History of radiation therapy        PLAN  - Dressing changes per Rad onc; recommended keeping skin hydrated with aquaphor   - Ok to restart Cosyntx  - May shower normally  - Full activities permissible  - Avoid lying on chest or direct pressure to chest for 3 months  - Activity and aftercare instructions reviewed  - PT/OT permissible if patient desires  - F/u with me 8-12 weeks  - Second stage surgery 6 mo after XRT completion (Aug/Sept)    Electronically signed by:  Tye Chapman  2/26/2019  9:20 AM

## 2019-03-01 ENCOUNTER — HOSPITAL ENCOUNTER (OUTPATIENT)
Dept: RADIATION THERAPY | Facility: HOSPITAL | Age: 49
Discharge: HOME | End: 2019-03-01
Attending: RADIOLOGY
Payer: COMMERCIAL

## 2019-03-06 PROCEDURE — 77336 RADIATION PHYSICS CONSULT: CPT | Performed by: RADIOLOGY

## 2019-03-08 ENCOUNTER — TELEPHONE (OUTPATIENT)
Dept: HEMATOLOGY/ONCOLOGY | Facility: CLINIC | Age: 49
End: 2019-03-08

## 2019-03-12 NOTE — PROGRESS NOTES
Subjective:       Patient ID: Brittny Urias is a 48 y.o. female.    Chief Complaint: No chief complaint on file.    HPI 48-year-old postmenopausal female who returns for follow-up of a recent diagnosis of right breast cancer, T1cN1a, ER positive, HER2 negative.  I discussed letrozole therapy with her at the time of her last visit.    Overall she is doing well.  She Is recovery radiation.  She had some desquamation related to that.      Screening mammogram on August 3, 2018 showed architectural distortion in the retroareolar right breast.  Follow-up diagnostic mammogram on August 18th showed architectural distortion in nipple retraction on right.  Ultrasound showed no definite abnormality.    August 21, 2018 a biopsy was performed which showed infiltrating lobular carcinoma (histologic grade 3, nuclear grade 1, mitotic index 1).  ER was 90% positive, OK was 20% positive and HER2 was 0.  Ki-67 was less than 1%.    MRI of the breast on September 4, 2018 showed a 3.2 x 1.8 x 1.4 cm mass with spiculated margins in the subareolar region of the right breast.  The left breast was unremarkable.    On November 14, 2018 bilateral mastectomies were performed.  The right breast showed a 2 cm intermediate grade carcinoma (3+ 2+ 1) 2 of 3 sentinel lymph nodes were positive with some extracapsular extension.  Completion axillary lymph node dissection showed 0 of 12 additional nodes were involved.  Margins were negative.  The left breast showed no abnormali  Mammaprint was low risk at + 0.362  She completed radiation on February 26, 2019      Review of Systems   Constitutional: Negative.    Respiratory: Negative.    Gastrointestinal: Negative.    Musculoskeletal: Negative.    Skin:        Post radiation affects with peeling   Neurological: Negative.    Psychiatric/Behavioral: Negative.        Objective:      Physical Exam   Constitutional: She is oriented to person, place, and time. She appears well-developed and  well-nourished.   HENT:   Mouth/Throat: No oropharyngeal exudate.   Eyes: No scleral icterus.   Cardiovascular: Normal rate and regular rhythm.   Pulmonary/Chest: Effort normal and breath sounds normal. She has no wheezes.       Abdominal: Soft. She exhibits no mass. There is no tenderness.   Lymphadenopathy:     She has no cervical adenopathy.   Neurological: She is alert and oriented to person, place, and time.   Skin:   Healing desquamation right breast reconstruction   Psychiatric: She has a normal mood and affect. Her behavior is normal. Thought content normal.   Vitals reviewed.      Assessment:      1. Malignant neoplasm of central portion of right breast in female, estrogen receptor positive        Plan:       I reviewed side effects of letrozole.  She will start that therapy now.  Return in 3 months for survivor ship.    Distress Screening Results: Psychosocial Distress screening score of Distress Score: 0 noted and reviewed. No intervention indicated.

## 2019-03-13 ENCOUNTER — OFFICE VISIT (OUTPATIENT)
Dept: HEMATOLOGY/ONCOLOGY | Facility: CLINIC | Age: 49
End: 2019-03-13
Payer: COMMERCIAL

## 2019-03-13 VITALS
OXYGEN SATURATION: 97 % | WEIGHT: 234.13 LBS | BODY MASS INDEX: 43.08 KG/M2 | DIASTOLIC BLOOD PRESSURE: 102 MMHG | HEIGHT: 62 IN | TEMPERATURE: 98 F | HEART RATE: 73 BPM | SYSTOLIC BLOOD PRESSURE: 181 MMHG | RESPIRATION RATE: 18 BRPM

## 2019-03-13 DIAGNOSIS — C50.111 MALIGNANT NEOPLASM OF CENTRAL PORTION OF RIGHT BREAST IN FEMALE, ESTROGEN RECEPTOR POSITIVE: Primary | ICD-10-CM

## 2019-03-13 DIAGNOSIS — Z17.0 MALIGNANT NEOPLASM OF CENTRAL PORTION OF RIGHT BREAST IN FEMALE, ESTROGEN RECEPTOR POSITIVE: Primary | ICD-10-CM

## 2019-03-13 PROCEDURE — 3008F BODY MASS INDEX DOCD: CPT | Mod: CPTII,S$GLB,, | Performed by: INTERNAL MEDICINE

## 2019-03-13 PROCEDURE — 3077F SYST BP >= 140 MM HG: CPT | Mod: CPTII,S$GLB,, | Performed by: INTERNAL MEDICINE

## 2019-03-13 PROCEDURE — 99213 PR OFFICE/OUTPT VISIT, EST, LEVL III, 20-29 MIN: ICD-10-PCS | Mod: S$GLB,,, | Performed by: INTERNAL MEDICINE

## 2019-03-13 PROCEDURE — 3077F PR MOST RECENT SYSTOLIC BLOOD PRESSURE >= 140 MM HG: ICD-10-PCS | Mod: CPTII,S$GLB,, | Performed by: INTERNAL MEDICINE

## 2019-03-13 PROCEDURE — 3080F DIAST BP >= 90 MM HG: CPT | Mod: CPTII,S$GLB,, | Performed by: INTERNAL MEDICINE

## 2019-03-13 PROCEDURE — 3080F PR MOST RECENT DIASTOLIC BLOOD PRESSURE >= 90 MM HG: ICD-10-PCS | Mod: CPTII,S$GLB,, | Performed by: INTERNAL MEDICINE

## 2019-03-13 PROCEDURE — 99999 PR PBB SHADOW E&M-EST. PATIENT-LVL IV: CPT | Mod: PBBFAC,,, | Performed by: INTERNAL MEDICINE

## 2019-03-13 PROCEDURE — 3008F PR BODY MASS INDEX (BMI) DOCUMENTED: ICD-10-PCS | Mod: CPTII,S$GLB,, | Performed by: INTERNAL MEDICINE

## 2019-03-13 PROCEDURE — 99999 PR PBB SHADOW E&M-EST. PATIENT-LVL IV: ICD-10-PCS | Mod: PBBFAC,,, | Performed by: INTERNAL MEDICINE

## 2019-03-13 PROCEDURE — 99213 OFFICE O/P EST LOW 20 MIN: CPT | Mod: S$GLB,,, | Performed by: INTERNAL MEDICINE

## 2019-03-13 RX ORDER — LETROZOLE 2.5 MG/1
2.5 TABLET, FILM COATED ORAL DAILY
Qty: 30 TABLET | Refills: 11 | Status: SHIPPED | OUTPATIENT
Start: 2019-03-13 | End: 2019-04-01 | Stop reason: SDUPTHER

## 2019-03-14 RX ORDER — HYDROCHLOROTHIAZIDE 25 MG/1
12.5 TABLET ORAL DAILY
Qty: 10 TABLET | Refills: 0 | Status: SHIPPED | OUTPATIENT
Start: 2019-03-14 | End: 2019-03-29 | Stop reason: SDUPTHER

## 2019-03-14 RX ORDER — HYDROCODONE BITARTRATE AND ACETAMINOPHEN 5; 325 MG/1; MG/1
1 TABLET ORAL EVERY 4 HOURS PRN
Qty: 90 TABLET | Refills: 1 | OUTPATIENT
Start: 2019-03-14

## 2019-03-21 ENCOUNTER — PATIENT MESSAGE (OUTPATIENT)
Dept: SURGERY | Facility: CLINIC | Age: 49
End: 2019-03-21

## 2019-03-27 ENCOUNTER — PATIENT OUTREACH (OUTPATIENT)
Dept: ADMINISTRATIVE | Facility: HOSPITAL | Age: 49
End: 2019-03-27

## 2019-03-27 NOTE — PROGRESS NOTES
[facility]  Physical Therapy Initial Evaluation    Name: Brittny Urias  Clinic Number: [id number]    Therapy Diagnosis:   Encounter Diagnoses   Name Primary?    Malignant neoplasm of central portion of right breast in female, estrogen receptor positive Yes    Pain of right forearm      Physician: Dr. Kamran Woods    Physician Orders: PT Eval and Treat   Medical Diagnosis: Right breast cancer  Evaluation Date: 3/28/2019  Authorization Period Expiration: 12/31/19  Plan of Care Certification Period: 4/26/19  Visit # / Visits authorized: 1/ 20  Insurance: BC    Time In: 2:05 PM  Time Out: 2:55 PM  Total Billable Time: 50 minutes    Precautions: Standard and cancer      History   History of Present Illness: Brittny is a 48 y.o. female that presents to  [facility] s/p right breast surgery.   Chief complaint: Patient c/o pain in right volar forearm for past 2 weeks since starting Lethrozole. Patient states occasionally has throbbing pain in forearm in the mornings and also states has begun working out and using elliptical with arm movements.  Surgical History:  Brittny Urias  has a past surgical history that includes Hysterectomy; Dilation and curettage of uterus; Reconstruction of breast with deep inferior epigastric artery  (MEGAN) free flap (Bilateral, 11/14/2018); Mastectomy (Bilateral, 11/14/2018); and Mastectomy with sentinel node biopsy and axillary lymph node dissection (Right, 11/14/2018).    Chemotherapy: beginning Lethrozole  Radiation: Completed 2/26/19    Past Medical History:   Diagnosis Date    Breast cancer, right breast 11/12/2018    Cancer     Hypertension     Psoriasis     on biologic for 1 year, has been controlling it well          Medications:  Brittny has a current medication list which includes the following prescription(s): aspirin, bisacodyl, cetirizine, docusate sodium, epinephrine,  "hydrochlorothiazide, hydrocodone-acetaminophen, letrozole, and ondansetron.    Allergies:  Review of patient's allergies indicates:  No Known Allergies     Prior Therapy: Post-op breast surgery at [facility] from 11/28/18 to 1/10 19  Social History:  lives with their family  Occupation:   Prior Level of Function: Independent with ADL's  Current Level of Function:difficulty lifting  With RUE    Other Past Medical History: See Above    Patient's Goals: decrease right forearm pain    Hand dominance: right handed    Subjective   Pt states: having pain in right volar forearm  Pain: 1/10 on VAS currently.   Best: 0/10  Worst: 9/10   Pain location: right volar forearm   Objective   Mental status :oriented    Postural examination/scapula alignment: Rounded shoulder and Head forward    Skin Integrity:   Scar Location: right breast  Appearance: healed  Signs of infection: No  Drainage:None  Color:NA    Edema: None  Location: NA    Axillary Web Syndrome/Cording:   Location: None  Degree of Cording: NA (mild, moderate etc...)   Number of cords present: NA    Sensation: WNL         Range of Motion:      Shoulder Range of Motion:   Active /Passive ROM Right Left   Flexion 170 170   Abduction 180 180   Extension 80 80   IR/90deg 80 80   ER/90deg 90 90   Elbow ext/flex 0/130 0/130   Forearm supination/pronation 85/90 85/90   Wrist Flexion/Extension  70/50 70/50          Strength: manual muscle test grades below   Upper Extremity Strength   (R) UE (L) UE   Shoulder flexion: 5/5 5/5   Shoulder Abduction: 5/5 5/5   Shoulder IR 5/5 5/5   Shoulder ER 5/5 5/5   Elbow flexion: 5/5 5/5   Elbow extension: 5/5 5/5   Wrist flexion: 5/5 5/5   Wrist extension: 5/5 5/5   Forearm Pronation 5/5 5/5   Forearm Supination 5/5 5/5    5/5 5/5       Baseline Measurements of BL UE's for early detection of Lymphedema:     LANDMARK RIGHT UE LEFT UE DIFFERENCE   E + 8" 40 cm 39 cm 1 cm   E + 6" 36 cm 36.5 cm 0.5 cm   E + 4" 33 cm 33.5 cm 0.5 " "cm   E + 2" 32 cm 32 cm 0 cm   Elbow 28 cm 28 cm 0 cm   W+ 8" 28 cm 28 cm 0 cm   W +  6" 26 cm 26 cm 0 cm   W + 4" 22 cm 23 cm 1 cm   Wrist 16 cm 16.5 cm 0.5 cm   DPC 20 cm 20 cm 0 cm   IP Thumb 7 cm 6.5 cm 0.5 cm     Functional Mobility (Bed mobility, transfers)  Independent    ADL's:  Difficulty with sustained lifting with RUE    Gait Assessment:   - AD used: none  - Assistance: independent  - Distance: community distances     Patient Education Provided   - role of therapy in multi - disciplinary team, goals for therapy  - Pt was educated in lymphedema etiology and management plans.  --prior physical therapy sessions  - Pt was provided with written risk reductions and precautions for managing lymphedema-prior physical therapy sessions.     Pt has no cultural, educational or language barriers to learning provided.  Treatment and Instruction of Home Exercise Program    Time In:  Time Out:     No exercises given at this session      Written Home Exercises Provided and Patient Education: Handouts given   See EMR under patient instructions for program given  Pt demonstrated good understanding of the education provided. Patient demo good return demo of skill of exercises.    Functional Limitations Reporting      CMS Impairment/Limitation/Restriction for FOTO Outcome Survey    Therapist reviewed FOTO scores for Brittny Urias on 3/28/2019.   FOTO documents entered into [employer] - see Media section.    Limitations Score: 20%  Category: Carrying             Assessment   This is a 48 y.o. female referred to outpatient physical therapy and presents with a medical diagnosis of right  breast cancer and was seen today post-operatively to establish PT plan of care for impairments following surgery including: Patient does not display any deficits in AROM or strength of RUE..     Patient presented with c/o pain in right forearm which began about 2 weeks ago with starting Lethrozole and with beginning to work out. I feel " patient's right forearm pain is due to walking on elliptical and performing repetitive motions with RUE while on elliptical, possibly a side effect of lethrozole; and with lifting and carrying--her purse on right arm, grocery bags with right arm. I discussed with patient to discontinue using arm bars with elliptical to eliminate the repetitive motion; to decrease lifting with RUE, especially static lifting of grocery bags and carrying her purse on right forearm; to use heat (heating pad on low to medium setting) to decrease right forearm soreness. Patient encouraged to continue to exercise--walking and elliptical without use of arms and to not perform any UE strengthening exercises--use of weights or exercise bands-to allow for right forearm pain to decrease. Patient instructed to contact Dr. Woods if symptoms worsen. No HEP given today. Patient to call me in 1- 2 weeks  to let me know status of right forearm.    Anticipated barriers to physical therapy: None     Pt's spiritual, cultural and educational needs considered and pt agreeable to plan of care and goals as stated below:     Medical necessity is demonstrated by the following IMPAIRMENTS/PROMBLEM LIST:    History  Co-morbidities and personal factors that may impact the plan of care Co-morbidities:   history of cancer    Personal Factors:   no deficits     moderate   Examination  Body Structures and Functions, activity limitations and participation restrictions that may impact the plan of care Body Regions:   upper extremities    Body Systems:    pain right forearm    Participation Restrictions:   liftting with RUE    Activity limitations:   Learning and applying knowledge  no deficits    General Tasks and Commands  no deficits    Communication  no deficits    Mobility  lifting and carrying objects    Self care  no deficits    Domestic Life  doing house work (cleaning house, washing dishes, laundry)    Interactions/Relationships  no deficits    Life Areas  no  deficits    Community and Social Life  no deficits         moderate   Clinical Presentation evolving clinical presentation with changing clinical characteristics moderate   Decision Making/ Complexity Score: moderate       Goals: Pt agrees with goals set    Short Term goals: 2 weeks  1. Patient will demonstrate 100% understanding of lymphedema risk reduction practices to include self monitoring for lymphedema. (progressing, not met)  2. Patient will report decreased pain in right forearm to 4/10 with RUE function. (progressing, not met)    Long Term Goals: 4 weeks   1. Pt will report decrease in overall worst pain to 2/10 at discharge. (progressing, not met)      Plan   Outpatient physical therapy 1x week for 4 weeks to include the following:   Patient Education and Therapeutic Exercise.    Plan of care Certification Period: 3/28/2019 to 4/26/19.    Therapist: Roberta Love, PT  3/28/2019

## 2019-03-28 ENCOUNTER — CLINICAL SUPPORT (OUTPATIENT)
Dept: REHABILITATION | Facility: HOSPITAL | Age: 49
End: 2019-03-28
Attending: FAMILY MEDICINE
Payer: COMMERCIAL

## 2019-03-28 DIAGNOSIS — M79.631 PAIN OF RIGHT FOREARM: ICD-10-CM

## 2019-03-28 DIAGNOSIS — Z17.0 MALIGNANT NEOPLASM OF CENTRAL PORTION OF RIGHT BREAST IN FEMALE, ESTROGEN RECEPTOR POSITIVE: Primary | ICD-10-CM

## 2019-03-28 DIAGNOSIS — C50.111 MALIGNANT NEOPLASM OF CENTRAL PORTION OF RIGHT BREAST IN FEMALE, ESTROGEN RECEPTOR POSITIVE: Primary | ICD-10-CM

## 2019-03-28 PROCEDURE — 97162 PT EVAL MOD COMPLEX 30 MIN: CPT | Mod: PO | Performed by: PHYSICAL MEDICINE & REHABILITATION

## 2019-03-28 NOTE — PLAN OF CARE
[facility]  Physical Therapy Initial Evaluation    Name: Brittny Urias  Clinic Number: [id number]    Therapy Diagnosis:   Encounter Diagnoses   Name Primary?    Malignant neoplasm of central portion of right breast in female, estrogen receptor positive Yes    Pain of right forearm      Physician: Dr. Woods  Physician Orders: PT Eval and Treat   Medical Diagnosis: Right breast cancer  Evaluation Date: 3/28/2019  Authorization Period Expiration: 12/31/19  Plan of Care Certification Period: 4/26/19  Visit # / Visits authorized: 1/ 20  Insurance: BC    Time In: 2:05 PM  Time Out: 2:55 PM  Total Billable Time: 50 minutes    Precautions: Standard and cancer      History   History of Present Illness: Brittny is a 48 y.o. female that presents to  [facility] s/p right breast surgery.   Chief complaint: Patient c/o pain in right volar forearm for past 2 weeks since starting Lethrozole. Patient states occasionally has throbbing pain in forearm in the mornings and also states has begun working out and using elliptical with arm movements.  Surgical History:  Brittny Urias  has a past surgical history that includes Hysterectomy; Dilation and curettage of uterus; Reconstruction of breast with deep inferior epigastric artery  (MEGAN) free flap (Bilateral, 11/14/2018); Mastectomy (Bilateral, 11/14/2018); and Mastectomy with sentinel node biopsy and axillary lymph node dissection (Right, 11/14/2018).    Chemotherapy: beginning Lethrozole  Radiation: Completed 2/26/19    Past Medical History:   Diagnosis Date    Breast cancer, right breast 11/12/2018    Cancer     Hypertension     Psoriasis     on biologic for 1 year, has been controlling it well          Medications:  Brittny has a current medication list which includes the following prescription(s): aspirin, bisacodyl, cetirizine, docusate sodium, epinephrine,  "hydrochlorothiazide, hydrocodone-acetaminophen, letrozole, and ondansetron.    Allergies:  Review of patient's allergies indicates:  No Known Allergies     Prior Therapy: Post-op breast surgery at [facility] from 11/28/18 to 1/10 19  Social History:  lives with their family  Occupation:   Prior Level of Function: Independent with ADL's  Current Level of Function:difficulty lifting  With RUE    Other Past Medical History: See Above    Patient's Goals: decrease right forearm pain    Hand dominance: right handed    Subjective   Pt states: having pain in right volar forearm  Pain: 1/10 on VAS currently.   Best: 0/10  Worst: 9/10   Pain location: right volar forearm   Objective   Mental status :oriented    Postural examination/scapula alignment: Rounded shoulder and Head forward    Skin Integrity:   Scar Location: right breast  Appearance: healed  Signs of infection: No  Drainage:None  Color:NA    Edema: None  Location: NA    Axillary Web Syndrome/Cording:   Location: None  Degree of Cording: NA (mild, moderate etc...)   Number of cords present: NA    Sensation: WNL         Range of Motion:      Shoulder Range of Motion:   Active /Passive ROM Right Left   Flexion 170 170   Abduction 180 180   Extension 80 80   IR/90deg 80 80   ER/90deg 90 90   Elbow ext/flex 0/130 0/130   Forearm supination/pronation 85/90 85/90   Wrist Flexion/Extension  70/50 70/50          Strength: manual muscle test grades below   Upper Extremity Strength   (R) UE (L) UE   Shoulder flexion: 5/5 5/5   Shoulder Abduction: 5/5 5/5   Shoulder IR 5/5 5/5   Shoulder ER 5/5 5/5   Elbow flexion: 5/5 5/5   Elbow extension: 5/5 5/5   Wrist flexion: 5/5 5/5   Wrist extension: 5/5 5/5   Forearm Pronation 5/5 5/5   Forearm Supination 5/5 5/5    5/5 5/5       Baseline Measurements of BL UE's for early detection of Lymphedema:     LANDMARK RIGHT UE LEFT UE DIFFERENCE   E + 8" 40 cm 39 cm 1 cm   E + 6" 36 cm 36.5 cm 0.5 cm   E + 4" 33 cm 33.5 cm 0.5 " "cm   E + 2" 32 cm 32 cm 0 cm   Elbow 28 cm 28 cm 0 cm   W+ 8" 28 cm 28 cm 0 cm   W +  6" 26 cm 26 cm 0 cm   W + 4" 22 cm 23 cm 1 cm   Wrist 16 cm 16.5 cm 0.5 cm   DPC 20 cm 20 cm 0 cm   IP Thumb 7 cm 6.5 cm 0.5 cm     Functional Mobility (Bed mobility, transfers)  Independent    ADL's:  Difficulty with sustained lifting with RUE    Gait Assessment:   - AD used: none  - Assistance: independent  - Distance: community distances     Patient Education Provided   - role of therapy in multi - disciplinary team, goals for therapy  - Pt was educated in lymphedema etiology and management plans.  --prior physical therapy sessions  - Pt was provided with written risk reductions and precautions for managing lymphedema-prior physical therapy sessions.     Pt has no cultural, educational or language barriers to learning provided.  Treatment and Instruction of Home Exercise Program    Time In:  Time Out:     No exercises given at this session      Written Home Exercises Provided and Patient Education: Handouts given   See EMR under patient instructions for program given  Pt demonstrated good understanding of the education provided. Patient demo good return demo of skill of exercises.    Functional Limitations Reporting      CMS Impairment/Limitation/Restriction for FOTO Outcome Survey    Therapist reviewed FOTO scores for Brittny Urias on 3/28/2019.   FOTO documents entered into [employer] - see Media section.    Limitations Score: 20%  Category: Carrying             Assessment   This is a 48 y.o. female referred to outpatient physical therapy and presents with a medical diagnosis of right  breast cancer and was seen today post-operatively to establish PT plan of care for impairments following surgery including: Patient does not display any deficits in AROM or strength of RUE..     Patient presented with c/o pain in right forearm which began about 2 weeks ago with starting Lethrozole and with beginning to work out. I feel " patient's right forearm pain is due to walking on elliptical and performing repetitive motions with RUE while on elliptical, possibly a side effect of lethrozole; and with lifting and carrying--her purse on right arm, grocery bags with right arm. I discussed with patient to discontinue using arm bars with elliptical to eliminate the repetitive motion; to decrease lifting with RUE, especially static lifting of grocery bags and carrying her purse on right forearm; to use heat (heating pad on low to medium setting) to decrease right forearm soreness. Patient encouraged to continue to exercise--walking and elliptical without use of arms and to not perform any UE strengthening exercises--use of weights or exercise bands-to allow for right forearm pain to decrease. Patient instructed to contact Dr. Woods if symptoms worsen. No HEP given today. Patient to call me in 1- 2 weeks  to let me know status of right forearm.    Anticipated barriers to physical therapy: None     Pt's spiritual, cultural and educational needs considered and pt agreeable to plan of care and goals as stated below:     Medical necessity is demonstrated by the following IMPAIRMENTS/PROMBLEM LIST:    History  Co-morbidities and personal factors that may impact the plan of care Co-morbidities:   history of cancer    Personal Factors:   no deficits     moderate   Examination  Body Structures and Functions, activity limitations and participation restrictions that may impact the plan of care Body Regions:   upper extremities    Body Systems:    pain right forearm    Participation Restrictions:   liftting with RUE    Activity limitations:   Learning and applying knowledge  no deficits    General Tasks and Commands  no deficits    Communication  no deficits    Mobility  lifting and carrying objects    Self care  no deficits    Domestic Life  doing house work (cleaning house, washing dishes, laundry)    Interactions/Relationships  no deficits    Life Areas  no  deficits    Community and Social Life  no deficits         moderate   Clinical Presentation evolving clinical presentation with changing clinical characteristics moderate   Decision Making/ Complexity Score: moderate       Goals: Pt agrees with goals set    Short Term goals: 2 weeks  1. Patient will demonstrate 100% understanding of lymphedema risk reduction practices to include self monitoring for lymphedema. (progressing, not met)  2. Patient will report decreased pain in right forearm to 4/10 with RUE function. (progressing, not met)    Long Term Goals: 4 weeks   1. Pt will report decrease in overall worst pain to 2/10 at discharge. (progressing, not met)      Plan   Outpatient physical therapy 1x week for 4 weeks to include the following:   Patient Education and Therapeutic Exercise.    Plan of care Certification Period: 3/28/2019 to 4/26/19.    Therapist: Roberta Love, PT  3/28/2019

## 2019-03-29 ENCOUNTER — OFFICE VISIT (OUTPATIENT)
Dept: FAMILY MEDICINE | Facility: CLINIC | Age: 49
End: 2019-03-29
Payer: COMMERCIAL

## 2019-03-29 VITALS
TEMPERATURE: 98 F | OXYGEN SATURATION: 99 % | BODY MASS INDEX: 42.88 KG/M2 | SYSTOLIC BLOOD PRESSURE: 130 MMHG | DIASTOLIC BLOOD PRESSURE: 90 MMHG | HEIGHT: 62 IN | HEART RATE: 65 BPM | WEIGHT: 233 LBS

## 2019-03-29 DIAGNOSIS — C50.111 MALIGNANT NEOPLASM OF CENTRAL PORTION OF RIGHT BREAST IN FEMALE, ESTROGEN RECEPTOR POSITIVE: ICD-10-CM

## 2019-03-29 DIAGNOSIS — Z17.0 MALIGNANT NEOPLASM OF CENTRAL PORTION OF RIGHT BREAST IN FEMALE, ESTROGEN RECEPTOR POSITIVE: ICD-10-CM

## 2019-03-29 DIAGNOSIS — Z00.00 ANNUAL PHYSICAL EXAM: Primary | ICD-10-CM

## 2019-03-29 DIAGNOSIS — I10 ESSENTIAL HYPERTENSION: ICD-10-CM

## 2019-03-29 PROCEDURE — 3075F SYST BP GE 130 - 139MM HG: CPT | Mod: CPTII,S$GLB,, | Performed by: FAMILY MEDICINE

## 2019-03-29 PROCEDURE — 3075F PR MOST RECENT SYSTOLIC BLOOD PRESS GE 130-139MM HG: ICD-10-PCS | Mod: CPTII,S$GLB,, | Performed by: FAMILY MEDICINE

## 2019-03-29 PROCEDURE — 99396 PREV VISIT EST AGE 40-64: CPT | Mod: S$GLB,,, | Performed by: FAMILY MEDICINE

## 2019-03-29 PROCEDURE — 99999 PR PBB SHADOW E&M-EST. PATIENT-LVL III: ICD-10-PCS | Mod: PBBFAC,,, | Performed by: FAMILY MEDICINE

## 2019-03-29 PROCEDURE — 99999 PR PBB SHADOW E&M-EST. PATIENT-LVL III: CPT | Mod: PBBFAC,,, | Performed by: FAMILY MEDICINE

## 2019-03-29 PROCEDURE — 3080F DIAST BP >= 90 MM HG: CPT | Mod: CPTII,S$GLB,, | Performed by: FAMILY MEDICINE

## 2019-03-29 PROCEDURE — 3080F PR MOST RECENT DIASTOLIC BLOOD PRESSURE >= 90 MM HG: ICD-10-PCS | Mod: CPTII,S$GLB,, | Performed by: FAMILY MEDICINE

## 2019-03-29 PROCEDURE — 99396 PR PREVENTIVE VISIT,EST,40-64: ICD-10-PCS | Mod: S$GLB,,, | Performed by: FAMILY MEDICINE

## 2019-03-29 RX ORDER — HYDROCHLOROTHIAZIDE 25 MG/1
12.5 TABLET ORAL DAILY
Qty: 10 TABLET | Refills: 0 | Status: CANCELLED | OUTPATIENT
Start: 2019-03-29 | End: 2020-03-28

## 2019-03-29 RX ORDER — HYDROCHLOROTHIAZIDE 12.5 MG/1
12.5 TABLET ORAL DAILY
Qty: 90 TABLET | Refills: 2 | Status: SHIPPED | OUTPATIENT
Start: 2019-03-29 | End: 2019-12-20

## 2019-03-29 NOTE — PROGRESS NOTES
Assessment & Plan  Problem List Items Addressed This Visit        Oncology    Malignant neoplasm of central portion of right breast in female, estrogen receptor positive    Current Assessment & Plan     Currently on letrozole           Other Visit Diagnoses     Annual physical exam    -  Primary    Essential hypertension        Relevant Medications    hydroCHLOROthiazide (HYDRODIURIL) 12.5 MG Tab        I addressed all major concerns as it related to health maintenance.  All were ordered and scheduled based on the patients wishes.  Any additional health maintenance will be readdressed at the next physical if declined or deferred by the patient.      Health Maintenance reviewed.    Follow-up: Follow up in about 1 year (around 3/29/2020) for annual exam.    ______________________________________________________________________    Chief Complaint  Chief Complaint   Patient presents with    Annual Exam    discuss medication       HPI  Brittny Urias is a 48 y.o. female with multiple medical diagnoses as listed in the medical history and problem list that presents for annual exam.  Pt is known to me with last appointment 2/28/2018.    Patient denies any new symptoms including chest pain, SOB, blurry vision, N/V, diarrhea.        PAST MEDICAL HISTORY:  Past Medical History:   Diagnosis Date    Breast cancer, right breast 11/12/2018    Cancer     Hypertension     Psoriasis     on biologic for 1 year, has been controlling it well       PAST SURGICAL HISTORY:  Past Surgical History:   Procedure Laterality Date    DILATION AND CURETTAGE OF UTERUS      HYSTERECTOMY      MASTECTOMY Bilateral 11/14/2018    Performed [facility] at [facility]    RECONSTRUCTION, BREAST, USING MEGAN FREE FLAP Bilateral 11/14/2018    Performed by Tye Chapman MD at [facility] OR    SENTINEL NODE BIOPSY AND AXILLARY LYMPHADENECTOMY Right 11/14/2018    Performed by [facility]a [facility]as [facility] at [facility] OR       SOCIAL HISTORY:  Social History      Socioeconomic History    Marital status:      Spouse name: Not on file    Number of children: Not on file    Years of education: Not on file    Highest education level: Not on file   Occupational History    Not on file   Social Needs    Financial resource strain: Not hard at all    Food insecurity:     Worry: Never true     Inability: Never true    Transportation needs:     Medical: No     Non-medical: No   Tobacco Use    Smoking status: Never Smoker    Smokeless tobacco: Never Used   Substance and Sexual Activity    Alcohol use: Yes     Alcohol/week: 1.8 oz     Types: 3 Glasses of wine per week     Frequency: 2-4 times a month     Drinks per session: 1 or 2     Binge frequency: Never     Comment: social    Drug use: No    Sexual activity: Yes     Partners: Male     Birth control/protection: Surgical   Lifestyle    Physical activity:     Days per week: 4 days     Minutes per session: 30 min    Stress: Not at all   Relationships    Social connections:     Talks on phone: More than three times a week     Gets together: More than three times a week     Attends Denominational service: Not on file     Active member of club or organization: Yes     Attends meetings of clubs or organizations: More than 4 times per year     Relationship status:     Intimate partner violence:     Fear of current or ex partner: Not on file     Emotionally abused: Not on file     Physically abused: Not on file     Forced sexual activity: Not on file   Other Topics Concern    Not on file   Social History Narrative    Not on file       FAMILY HISTORY:  Family History   Problem Relation Age of Onset    Diabetes Mother     Hypertension Mother     Hypertension Brother     Cancer Paternal Uncle        ALLERGIES AND MEDICATIONS: updated and reviewed.  Review of patient's allergies indicates:  No Known Allergies  Current Outpatient Medications   Medication Sig Dispense Refill    aspirin (ECOTRIN) 81 MG EC tablet  "Take 1 tablet (81 mg total) by mouth once daily. 30 tablet 0    cetirizine (ZYRTEC) 10 MG tablet Take 1 tablet (10 mg total) by mouth daily as needed for Allergies (sneezing). 30 tablet 0    hydroCHLOROthiazide (HYDRODIURIL) 12.5 MG Tab Take 1 tablet (12.5 mg total) by mouth once daily. 90 tablet 2    letrozole (FEMARA) 2.5 mg Tab Take 1 tablet (2.5 mg total) by mouth once daily. 30 tablet 11    epinephrine (EPIPEN) 0.3 mg/0.3 mL AtIn Inject 0.3 mLs (0.3 mg total) into the muscle as needed. 2 Device 0     No current facility-administered medications for this visit.          ROS  Review of Systems   Constitutional: Negative for activity change and unexpected weight change.   HENT: Negative for hearing loss, rhinorrhea and trouble swallowing.    Eyes: Negative for discharge and visual disturbance.   Respiratory: Negative for chest tightness and wheezing.    Cardiovascular: Negative for chest pain and palpitations.   Gastrointestinal: Negative for blood in stool, constipation, diarrhea and vomiting.   Endocrine: Negative for polydipsia and polyuria.   Genitourinary: Negative for difficulty urinating, dysuria, hematuria and menstrual problem.   Musculoskeletal: Negative for arthralgias, joint swelling and neck pain.   Neurological: Negative for weakness and headaches.   Psychiatric/Behavioral: Negative for confusion and dysphoric mood.           Physical Exam  Vitals:    03/29/19 0855   BP: (!) 130/90   BP Location: Left arm   Patient Position: Sitting   BP Method: Large (Manual)   Pulse: 65   Temp: 98.3 °F (36.8 °C)   TempSrc: Oral   SpO2: 99%   Weight: 105.7 kg (233 lb 0.4 oz)   Height: 5' 2" (1.575 m)    Body mass index is 42.62 kg/m².  Weight: 105.7 kg (233 lb 0.4 oz)   Height: 5' 2" (157.5 cm)   Physical Exam   Constitutional: She is oriented to person, place, and time. She appears well-developed and well-nourished.   HENT:   Head: Normocephalic and atraumatic.   Right Ear: External ear normal.   Left Ear: " External ear normal.   Nose: Nose normal.   Mouth/Throat: Oropharynx is clear and moist.   Eyes: Pupils are equal, round, and reactive to light. Conjunctivae and EOM are normal.   Neck: Normal range of motion. No JVD present. No thyromegaly present.   Cardiovascular: Normal rate, regular rhythm and normal heart sounds.   Pulmonary/Chest: Effort normal and breath sounds normal. She has no wheezes.   Abdominal: Soft. Bowel sounds are normal. She exhibits no distension. There is no tenderness.   Musculoskeletal: Normal range of motion.   Lymphadenopathy:     She has no cervical adenopathy.   Neurological: She is alert and oriented to person, place, and time. She has normal reflexes.   Skin: Skin is warm and dry.   Psychiatric: She has a normal mood and affect. Her behavior is normal. Judgment and thought content normal.   Vitals reviewed.        Health Maintenance       Date Due Completion Date    TETANUS VACCINE 09/11/1987 ---    Pneumococcal Vaccine (Highest Risk) (1 of 3 - PCV13) 09/11/1988 ---    Lipid Panel 02/28/2023 2/28/2018              Patient note was created using [employer].  Any errors in syntax or even information may not have been identified and edited on initial review prior to signing this note.

## 2019-04-01 DIAGNOSIS — C50.111 MALIGNANT NEOPLASM OF CENTRAL PORTION OF RIGHT BREAST IN FEMALE, ESTROGEN RECEPTOR POSITIVE: ICD-10-CM

## 2019-04-01 DIAGNOSIS — Z17.0 MALIGNANT NEOPLASM OF CENTRAL PORTION OF RIGHT BREAST IN FEMALE, ESTROGEN RECEPTOR POSITIVE: ICD-10-CM

## 2019-04-02 RX ORDER — LETROZOLE 2.5 MG/1
2.5 TABLET, FILM COATED ORAL DAILY
Qty: 30 TABLET | Refills: 11 | Status: SHIPPED | OUTPATIENT
Start: 2019-04-02 | End: 2019-05-02

## 2019-04-29 DIAGNOSIS — C50.111 MALIGNANT NEOPLASM OF CENTRAL PORTION OF RIGHT BREAST IN FEMALE, ESTROGEN RECEPTOR POSITIVE: ICD-10-CM

## 2019-04-29 DIAGNOSIS — Z17.0 MALIGNANT NEOPLASM OF CENTRAL PORTION OF RIGHT BREAST IN FEMALE, ESTROGEN RECEPTOR POSITIVE: ICD-10-CM

## 2019-04-30 ENCOUNTER — DOCUMENTATION ONLY (OUTPATIENT)
Dept: REHABILITATION | Facility: HOSPITAL | Age: 49
End: 2019-04-30

## 2019-04-30 DIAGNOSIS — M79.631 PAIN OF RIGHT FOREARM: ICD-10-CM

## 2019-04-30 DIAGNOSIS — Z17.0 MALIGNANT NEOPLASM OF CENTRAL PORTION OF RIGHT BREAST IN FEMALE, ESTROGEN RECEPTOR POSITIVE: ICD-10-CM

## 2019-04-30 DIAGNOSIS — C50.111 MALIGNANT NEOPLASM OF CENTRAL PORTION OF RIGHT BREAST IN FEMALE, ESTROGEN RECEPTOR POSITIVE: ICD-10-CM

## 2019-04-30 NOTE — PROGRESS NOTES
Discharge Note     was seen in outpatient physical therapy for an initial evaluation on 3/28/19 for tendinitis of right forearm. Ms. Urias attended NO follow up visits and has self discharged as she has not returned for further physical therapy. POC has ended and patient is discharged from physical therapy.        Roberta Love, PT

## 2019-05-09 ENCOUNTER — OFFICE VISIT (OUTPATIENT)
Dept: RADIATION ONCOLOGY | Facility: CLINIC | Age: 49
End: 2019-05-09
Payer: COMMERCIAL

## 2019-05-09 ENCOUNTER — PATIENT MESSAGE (OUTPATIENT)
Dept: HEMATOLOGY/ONCOLOGY | Facility: CLINIC | Age: 49
End: 2019-05-09

## 2019-05-09 VITALS
TEMPERATURE: 98 F | HEIGHT: 62 IN | BODY MASS INDEX: 43.94 KG/M2 | SYSTOLIC BLOOD PRESSURE: 174 MMHG | DIASTOLIC BLOOD PRESSURE: 87 MMHG | HEART RATE: 84 BPM | WEIGHT: 238.81 LBS | RESPIRATION RATE: 16 BRPM

## 2019-05-09 DIAGNOSIS — Z17.0 MALIGNANT NEOPLASM OF CENTRAL PORTION OF RIGHT BREAST IN FEMALE, ESTROGEN RECEPTOR POSITIVE: Primary | ICD-10-CM

## 2019-05-09 DIAGNOSIS — C50.111 MALIGNANT NEOPLASM OF CENTRAL PORTION OF RIGHT BREAST IN FEMALE, ESTROGEN RECEPTOR POSITIVE: Primary | ICD-10-CM

## 2019-05-09 PROCEDURE — 99213 PR OFFICE/OUTPT VISIT, EST, LEVL III, 20-29 MIN: ICD-10-PCS | Mod: S$GLB,,, | Performed by: RADIOLOGY

## 2019-05-09 PROCEDURE — 3008F BODY MASS INDEX DOCD: CPT | Mod: CPTII,S$GLB,, | Performed by: RADIOLOGY

## 2019-05-09 PROCEDURE — 3077F SYST BP >= 140 MM HG: CPT | Mod: CPTII,S$GLB,, | Performed by: RADIOLOGY

## 2019-05-09 PROCEDURE — 3077F PR MOST RECENT SYSTOLIC BLOOD PRESSURE >= 140 MM HG: ICD-10-PCS | Mod: CPTII,S$GLB,, | Performed by: RADIOLOGY

## 2019-05-09 PROCEDURE — 3008F PR BODY MASS INDEX (BMI) DOCUMENTED: ICD-10-PCS | Mod: CPTII,S$GLB,, | Performed by: RADIOLOGY

## 2019-05-09 PROCEDURE — 3079F PR MOST RECENT DIASTOLIC BLOOD PRESSURE 80-89 MM HG: ICD-10-PCS | Mod: CPTII,S$GLB,, | Performed by: RADIOLOGY

## 2019-05-09 PROCEDURE — 3079F DIAST BP 80-89 MM HG: CPT | Mod: CPTII,S$GLB,, | Performed by: RADIOLOGY

## 2019-05-09 PROCEDURE — 99999 PR PBB SHADOW E&M-EST. PATIENT-LVL III: ICD-10-PCS | Mod: PBBFAC,,, | Performed by: RADIOLOGY

## 2019-05-09 PROCEDURE — 99999 PR PBB SHADOW E&M-EST. PATIENT-LVL III: CPT | Mod: PBBFAC,,, | Performed by: RADIOLOGY

## 2019-05-09 PROCEDURE — 99213 OFFICE O/P EST LOW 20 MIN: CPT | Mod: S$GLB,,, | Performed by: RADIOLOGY

## 2019-05-09 RX ORDER — LETROZOLE 2.5 MG/1
2.5 TABLET, FILM COATED ORAL DAILY
COMMUNITY
Start: 2019-05-08 | End: 2020-05-07

## 2019-05-09 NOTE — PATIENT INSTRUCTIONS
Continue follow up with Dr. Woods and Plastic surgery as scheduled.  Continue letrozole  Return to see me in 6 months.

## 2019-05-13 ENCOUNTER — PATIENT MESSAGE (OUTPATIENT)
Dept: FAMILY MEDICINE | Facility: CLINIC | Age: 49
End: 2019-05-13

## 2019-05-14 ENCOUNTER — OFFICE VISIT (OUTPATIENT)
Dept: PLASTIC SURGERY | Facility: CLINIC | Age: 49
End: 2019-05-14
Payer: COMMERCIAL

## 2019-05-14 VITALS — HEIGHT: 62 IN | WEIGHT: 238.13 LBS | BODY MASS INDEX: 43.82 KG/M2

## 2019-05-14 DIAGNOSIS — Z51.89 AFTERCARE: Primary | ICD-10-CM

## 2019-05-14 DIAGNOSIS — Z17.0 MALIGNANT NEOPLASM OF CENTRAL PORTION OF RIGHT BREAST IN FEMALE, ESTROGEN RECEPTOR POSITIVE: ICD-10-CM

## 2019-05-14 DIAGNOSIS — C50.111 MALIGNANT NEOPLASM OF CENTRAL PORTION OF RIGHT BREAST IN FEMALE, ESTROGEN RECEPTOR POSITIVE: ICD-10-CM

## 2019-05-14 DIAGNOSIS — Z92.3 HISTORY OF RADIATION THERAPY: ICD-10-CM

## 2019-05-14 PROCEDURE — 3008F PR BODY MASS INDEX (BMI) DOCUMENTED: ICD-10-PCS | Mod: CPTII,S$GLB,, | Performed by: PLASTIC SURGERY

## 2019-05-14 PROCEDURE — 99213 OFFICE O/P EST LOW 20 MIN: CPT | Mod: S$GLB,,, | Performed by: PLASTIC SURGERY

## 2019-05-14 PROCEDURE — 3008F BODY MASS INDEX DOCD: CPT | Mod: CPTII,S$GLB,, | Performed by: PLASTIC SURGERY

## 2019-05-14 PROCEDURE — 99213 PR OFFICE/OUTPT VISIT, EST, LEVL III, 20-29 MIN: ICD-10-PCS | Mod: S$GLB,,, | Performed by: PLASTIC SURGERY

## 2019-05-14 NOTE — PROGRESS NOTES
"POSTOP FOLLOW-UP EXAM    CC  No chief complaint on file.      DIAGNOSES  Encounter Diagnoses   Name Primary?    Aftercare Yes    History of radiation therapy     Malignant neoplasm of central portion of right breast in female, estrogen receptor positive        PROCEDURE  Bilateral MEGAN reconstruction    SUBJECTIVE  Patient doing well, completed XRT in Feb 2018. Desires to wait until October for second stage reconstruction as she is trying to lose weight.  Otherwise she is doing well, her breast has healed well from radiation    PHYSICAL EXAM  Ht 5' 2" (1.575 m)   Wt 108 kg (238 lb 1.6 oz)   BMI 43.55 kg/m²   Breasts:  Soft, no collections  Flaps soft, warm, well perfused, healthy appearing  Cap refill 2-3 sec  Right chest wall with obvious pigmentation changes, lower pole scar contracture  Good overall symmetry    Abdomen:  Soft, nt, nd  Umbilicus healthy and intact  Lower midline healed, some scar deformity at site of delayed healing  Incisions c/d/i    ASSESSMENT  Encounter Diagnoses   Name Primary?    Aftercare Yes    History of radiation therapy     Malignant neoplasm of central portion of right breast in female, estrogen receptor positive        PLAN  - Second stage planning for October; right breast has gotten slightly smaller may require fat grafting for volume symmetry  - Activity and aftercare instructions reviewed  - F/u as scheduled with me in late July/early Aug    Electronically signed by:  Tye Chapman  5/14/2019  1:25 PM      "

## 2019-06-05 PROBLEM — Z51.89 AFTERCARE: Status: ACTIVE | Noted: 2019-06-05

## 2019-06-05 RX ORDER — LIDOCAINE HYDROCHLORIDE 10 MG/ML
1 INJECTION, SOLUTION EPIDURAL; INFILTRATION; INTRACAUDAL; PERINEURAL ONCE
Status: CANCELLED | OUTPATIENT
Start: 2019-06-05 | End: 2019-06-05

## 2019-06-25 NOTE — PROGRESS NOTES
Subjective:       Patient ID: Brittny Urias is a 48 y.o. female.    Chief Complaint: No chief complaint on file.    HPI 48-year-old postmenopausal female who returns for follow-up of a recent diagnosis of right breast cancer, T1cN1a, ER positive, HER2 negative.  She is on letrozole hormonal therapy.  Her major complaint today is some arm pain. She initially had some right arm pain in then developed some aching pain in the left arm which seems to be worse after some activities such as lifting.  She has been watching her diet and has lost some weight.  She has no shortness of breath.  She does report some fatigue with energy level approximately 6/10.  Her psoriasis has flared since she has been off therapy.    Screening mammogram on August 3, 2018 showed architectural distortion in the retroareolar right breast.  Follow-up diagnostic mammogram on August 18th showed architectural distortion in nipple retraction on right.  Ultrasound showed no definite abnormality.    August 21, 2018 a biopsy was performed which showed infiltrating lobular carcinoma (histologic grade 3, nuclear grade 1, mitotic index 1).  ER was 90% positive, MT was 20% positive and HER2 was 0.  Ki-67 was less than 1%.    MRI of the breast on September 4, 2018 showed a 3.2 x 1.8 x 1.4 cm mass with spiculated margins in the subareolar region of the right breast.  The left breast was unremarkable.    On November 14, 2018 bilateral mastectomies were performed.  The right breast showed a 2 cm intermediate grade carcinoma (3+ 2+ 1) 2 of 3 sentinel lymph nodes were positive with some extracapsular extension.  Completion axillary lymph node dissection showed 0 of 12 additional nodes were involved.  Margins were negative.  The left breast showed no abnormali  Mammaprint was low risk at + 0.362  She completed radiation on February 26, 2019      Review of Systems   Constitutional: Positive for fatigue. Negative for appetite change and unexpected weight  change.   Eyes: Negative for visual disturbance.   Respiratory: Negative.  Negative for cough and shortness of breath.    Cardiovascular: Negative for chest pain.   Gastrointestinal: Negative.  Negative for abdominal pain and diarrhea.   Genitourinary: Positive for frequency.   Musculoskeletal: Positive for arthralgias (Left arm pain). Negative for back pain.   Skin: Positive for rash.        Post radiation affects with peeling   Neurological: Negative.  Negative for headaches.   Hematological: Negative for adenopathy.   Psychiatric/Behavioral: Negative.  The patient is not nervous/anxious.        Objective:      Physical Exam   Constitutional: She is oriented to person, place, and time. She appears well-developed and well-nourished.   HENT:   Mouth/Throat: No oropharyngeal exudate.   Eyes: No scleral icterus.   Cardiovascular: Normal rate and regular rhythm.   Pulmonary/Chest: Effort normal and breath sounds normal. She has no wheezes.       Abdominal: Soft. She exhibits no mass. There is no tenderness.   Lymphadenopathy:     She has no cervical adenopathy.   Neurological: She is alert and oriented to person, place, and time.   Skin:   Healing desquamation right breast reconstruction   Psychiatric: She has a normal mood and affect. Her behavior is normal. Thought content normal.   Vitals reviewed.      Assessment:      1. Malignant neoplasm of central portion of right breast in female, estrogen receptor positive        Plan:       I encouraged her to do some light weight exercise with her left arm to see if that helps with her symptoms.  She will let me know how that works out.  She will follow up with her dermatologist to restart treatment for her psoriasis.

## 2019-06-26 ENCOUNTER — OFFICE VISIT (OUTPATIENT)
Dept: HEMATOLOGY/ONCOLOGY | Facility: CLINIC | Age: 49
End: 2019-06-26
Payer: COMMERCIAL

## 2019-06-26 VITALS
BODY MASS INDEX: 41.94 KG/M2 | OXYGEN SATURATION: 100 % | SYSTOLIC BLOOD PRESSURE: 160 MMHG | HEART RATE: 71 BPM | TEMPERATURE: 98 F | HEIGHT: 62 IN | DIASTOLIC BLOOD PRESSURE: 91 MMHG | WEIGHT: 227.94 LBS | RESPIRATION RATE: 20 BRPM

## 2019-06-26 DIAGNOSIS — C50.111 MALIGNANT NEOPLASM OF CENTRAL PORTION OF RIGHT BREAST IN FEMALE, ESTROGEN RECEPTOR POSITIVE: Primary | ICD-10-CM

## 2019-06-26 DIAGNOSIS — Z17.0 MALIGNANT NEOPLASM OF CENTRAL PORTION OF RIGHT BREAST IN FEMALE, ESTROGEN RECEPTOR POSITIVE: Primary | ICD-10-CM

## 2019-06-26 PROCEDURE — 99213 PR OFFICE/OUTPT VISIT, EST, LEVL III, 20-29 MIN: ICD-10-PCS | Mod: S$GLB,,, | Performed by: INTERNAL MEDICINE

## 2019-06-26 PROCEDURE — 99213 OFFICE O/P EST LOW 20 MIN: CPT | Mod: S$GLB,,, | Performed by: INTERNAL MEDICINE

## 2019-06-26 PROCEDURE — 3077F PR MOST RECENT SYSTOLIC BLOOD PRESSURE >= 140 MM HG: ICD-10-PCS | Mod: CPTII,S$GLB,, | Performed by: INTERNAL MEDICINE

## 2019-06-26 PROCEDURE — 3077F SYST BP >= 140 MM HG: CPT | Mod: CPTII,S$GLB,, | Performed by: INTERNAL MEDICINE

## 2019-06-26 PROCEDURE — 3080F DIAST BP >= 90 MM HG: CPT | Mod: CPTII,S$GLB,, | Performed by: INTERNAL MEDICINE

## 2019-06-26 PROCEDURE — 3080F PR MOST RECENT DIASTOLIC BLOOD PRESSURE >= 90 MM HG: ICD-10-PCS | Mod: CPTII,S$GLB,, | Performed by: INTERNAL MEDICINE

## 2019-06-26 PROCEDURE — 3008F PR BODY MASS INDEX (BMI) DOCUMENTED: ICD-10-PCS | Mod: CPTII,S$GLB,, | Performed by: INTERNAL MEDICINE

## 2019-06-26 PROCEDURE — 99999 PR PBB SHADOW E&M-EST. PATIENT-LVL III: ICD-10-PCS | Mod: PBBFAC,,, | Performed by: INTERNAL MEDICINE

## 2019-06-26 PROCEDURE — 3008F BODY MASS INDEX DOCD: CPT | Mod: CPTII,S$GLB,, | Performed by: INTERNAL MEDICINE

## 2019-06-26 PROCEDURE — 99999 PR PBB SHADOW E&M-EST. PATIENT-LVL III: CPT | Mod: PBBFAC,,, | Performed by: INTERNAL MEDICINE

## 2019-07-08 ENCOUNTER — PATIENT MESSAGE (OUTPATIENT)
Dept: FAMILY MEDICINE | Facility: CLINIC | Age: 49
End: 2019-07-08

## 2019-07-08 DIAGNOSIS — M21.619 BUNION: Primary | ICD-10-CM

## 2019-07-08 DIAGNOSIS — H92.03 OTALGIA, BILATERAL: ICD-10-CM

## 2019-07-15 ENCOUNTER — PATIENT MESSAGE (OUTPATIENT)
Dept: FAMILY MEDICINE | Facility: CLINIC | Age: 49
End: 2019-07-15

## 2019-07-16 ENCOUNTER — TELEPHONE (OUTPATIENT)
Dept: FAMILY MEDICINE | Facility: CLINIC | Age: 49
End: 2019-07-16

## 2019-07-16 NOTE — LETTER
July 16, 2019    [facility]  [address] [place] [address]             [facility]  [address] [place] [address]  Phone: [phone]  Fax: [phone] Dear Ms. Urias:    I have been unable to reach you by phone for your appointment to Podiatry.  Please call me at the clinic [phone] to book your appointment.      If you have any questions or concerns, please don't hesitate to call.    Sincerely,        Lisa Cano MA

## 2019-07-19 ENCOUNTER — TELEPHONE (OUTPATIENT)
Dept: PODIATRY | Facility: CLINIC | Age: 49
End: 2019-07-19

## 2019-07-21 ENCOUNTER — PATIENT OUTREACH (OUTPATIENT)
Dept: ADMINISTRATIVE | Facility: OTHER | Age: 49
End: 2019-07-21

## 2019-07-23 ENCOUNTER — TELEPHONE (OUTPATIENT)
Dept: PODIATRY | Facility: CLINIC | Age: 49
End: 2019-07-23

## 2019-07-23 ENCOUNTER — TELEPHONE (OUTPATIENT)
Dept: FAMILY MEDICINE | Facility: CLINIC | Age: 49
End: 2019-07-23

## 2019-07-23 ENCOUNTER — OFFICE VISIT (OUTPATIENT)
Dept: PLASTIC SURGERY | Facility: CLINIC | Age: 49
End: 2019-07-23
Payer: COMMERCIAL

## 2019-07-23 VITALS — HEIGHT: 62 IN | BODY MASS INDEX: 41.94 KG/M2 | WEIGHT: 227.94 LBS

## 2019-07-23 DIAGNOSIS — Z92.3 HISTORY OF RADIATION THERAPY: ICD-10-CM

## 2019-07-23 DIAGNOSIS — Z85.3 HISTORY OF BREAST CANCER: ICD-10-CM

## 2019-07-23 DIAGNOSIS — Z51.89 AFTERCARE: Primary | ICD-10-CM

## 2019-07-23 PROCEDURE — 3008F PR BODY MASS INDEX (BMI) DOCUMENTED: ICD-10-PCS | Mod: CPTII,S$GLB,, | Performed by: PLASTIC SURGERY

## 2019-07-23 PROCEDURE — 3008F BODY MASS INDEX DOCD: CPT | Mod: CPTII,S$GLB,, | Performed by: PLASTIC SURGERY

## 2019-07-23 PROCEDURE — 99213 PR OFFICE/OUTPT VISIT, EST, LEVL III, 20-29 MIN: ICD-10-PCS | Mod: S$GLB,,, | Performed by: PLASTIC SURGERY

## 2019-07-23 PROCEDURE — 99213 OFFICE O/P EST LOW 20 MIN: CPT | Mod: S$GLB,,, | Performed by: PLASTIC SURGERY

## 2019-07-23 NOTE — PROGRESS NOTES
"POSTOP FOLLOW-UP EXAM    CC  No chief complaint on file.      DIAGNOSES  No diagnosis found.    PROCEDURE  Bilateral MEGAN reconstruction    SUBJECTIVE  Patient doing well, denies complaints, would like to move forward with her second stage reconstruction; her psoriasis has recurred significantly; she is now waiting on insurance approval to restart cosentyx or other targeted therapy     PHYSICAL EXAM  Ht 5' 2" (1.575 m)   Wt 103.4 kg (227 lb 15.3 oz)   BMI 41.69 kg/m²   Breasts:  Soft, no collections  Flap soft, warm, well perfused, healthy appearing  Cap refill 2-3 sec  Good contour and symmetry  NAC healthy, well perfused  Right breast with slight volume shrinkage compared to left    Abdomen:  Soft, nt, nd  Umbilicus healthy and intact  Incisions c/d/i    ASSESSMENT  Encounter Diagnoses   Name Primary?    Aftercare Yes    History of breast cancer     History of radiation therapy        PLAN  - Will schedule second stage reconstruction, will time around dosing for psoriasis meds as needed  - Photos with Lori  - Surgery booked today for bilateral flap revision, fat graft to breasts, nipple areola reconstruction, abdominal scar revision    Electronically signed by:  Tye Chapman  7/23/2019  4:03 PM              "

## 2019-07-23 NOTE — TELEPHONE ENCOUNTER
Called pt to reschedule her appt for July 24 with Dr. Swann.  No answer; left VM on pt's home and cell numbers to call the clinic back to reschedule.

## 2019-07-31 ENCOUNTER — PATIENT MESSAGE (OUTPATIENT)
Dept: SURGERY | Facility: OTHER | Age: 49
End: 2019-07-31

## 2019-08-02 ENCOUNTER — OFFICE VISIT (OUTPATIENT)
Dept: PODIATRY | Facility: CLINIC | Age: 49
End: 2019-08-02
Payer: COMMERCIAL

## 2019-08-02 VITALS
DIASTOLIC BLOOD PRESSURE: 101 MMHG | HEART RATE: 72 BPM | BODY MASS INDEX: 41.96 KG/M2 | SYSTOLIC BLOOD PRESSURE: 162 MMHG | HEIGHT: 62 IN | WEIGHT: 228 LBS

## 2019-08-02 DIAGNOSIS — M21.619 BUNION: Primary | ICD-10-CM

## 2019-08-02 PROCEDURE — 3077F SYST BP >= 140 MM HG: CPT | Mod: CPTII,S$GLB,, | Performed by: PODIATRIST

## 2019-08-02 PROCEDURE — 99999 PR PBB SHADOW E&M-EST. PATIENT-LVL IV: CPT | Mod: PBBFAC,,, | Performed by: PODIATRIST

## 2019-08-02 PROCEDURE — 99203 OFFICE O/P NEW LOW 30 MIN: CPT | Mod: S$GLB,,, | Performed by: PODIATRIST

## 2019-08-02 PROCEDURE — 3080F PR MOST RECENT DIASTOLIC BLOOD PRESSURE >= 90 MM HG: ICD-10-PCS | Mod: CPTII,S$GLB,, | Performed by: PODIATRIST

## 2019-08-02 PROCEDURE — 99999 PR PBB SHADOW E&M-EST. PATIENT-LVL IV: ICD-10-PCS | Mod: PBBFAC,,, | Performed by: PODIATRIST

## 2019-08-02 PROCEDURE — 3008F BODY MASS INDEX DOCD: CPT | Mod: CPTII,S$GLB,, | Performed by: PODIATRIST

## 2019-08-02 PROCEDURE — 99203 PR OFFICE/OUTPT VISIT, NEW, LEVL III, 30-44 MIN: ICD-10-PCS | Mod: S$GLB,,, | Performed by: PODIATRIST

## 2019-08-02 PROCEDURE — 3008F PR BODY MASS INDEX (BMI) DOCUMENTED: ICD-10-PCS | Mod: CPTII,S$GLB,, | Performed by: PODIATRIST

## 2019-08-02 PROCEDURE — 3077F PR MOST RECENT SYSTOLIC BLOOD PRESSURE >= 140 MM HG: ICD-10-PCS | Mod: CPTII,S$GLB,, | Performed by: PODIATRIST

## 2019-08-02 PROCEDURE — 3080F DIAST BP >= 90 MM HG: CPT | Mod: CPTII,S$GLB,, | Performed by: PODIATRIST

## 2019-08-02 NOTE — LETTER
August 6, 2019      Terry Shepherd, FNP-C  [address]  [place] [address]           [facility] - Podiatry  [address]  [place] [address]  Phone: [phone]          Patient: Brittny Urias   MR Number: [id number]   YOB: 1970   Date of Visit: 8/2/2019       Dear Terry Shepherd:    Thank you for referring Brittny Urias to me for evaluation. Attached you will find relevant portions of my assessment and plan of care.    If you have questions, please do not hesitate to call me. I look forward to following Brittny Urias along with you.    Sincerely,    Charlie Gupta, AMRIT    Enclosure  CC:  No Recipients    If you would like to receive this communication electronically, please contact [email or web address] or [phone] to request more information on [employer] access.    For providers and/or their staff who would like to refer a patient to [facility], please contact us through our one-stop-shop provider referral line, [facility], at [phone].    If you feel you have received this communication in error or would no longer like to receive these types of communications, please e-mail [email or web address]

## 2019-08-02 NOTE — PATIENT INSTRUCTIONS
[facility]  Fit Flops      Osteotomy and Ligament or Tendon Repair (Bunion Surgery)  Osteotomy and ligament or tendon repair is a type of bunion surgery. A bunion is a bony bump (growth) at the base of your big toe. This growth can form when your big toe pushes against your next toe. A bunion can cause pain, swelling, redness, and other symptoms. During this surgery, bone is removed from your toe. Nearby tendons and ligaments are made shorter or longer as needed. This allows your big toe to line up (align) properly.    Preparing for surgery  Follow any instructions from your healthcare provider.  Tell your surgeon about any medicines you are taking. You may need to stop taking all or some of these before the procedure. This includes:  · All prescription medicines  · Over-the-counter medicines such as aspirin or ibuprofen  · Street drugs  · Herbs, vitamins, and other supplements  Follow any directions you are given for not eating or drinking before surgery.  The day of surgery  The surgery takes about 60 minutes. You will likely go home the same day.  Before the surgery begins  · An IV (intravenous) line is put into a vein in your arm or hand. This line gives you fluids and medicines.  · You may be given medicine to help you relax (sedation). To keep you free of pain during the surgery, you may have medicine to block the nerves in your foot. Or, you will be given general anesthesia. This puts you into a deep sleep.  During the surgery  · A cut (incision) is made on your foot to expose the bunion bump, and the tendons and ligaments around it. The tendons and ligaments that are tight are cut (released).  · The bunion bump is removed with a bone saw. Your big toe bone or the main bone in your foot is shortened and realigned. A pin, screw, or plate is used to hold your toe and foot bones together.  · The nearby tendons and ligaments may be tightened. If there is extra tissue, it is removed and the ends are  stitched (sutured) together. The incision in your skin is then closed with sutures. Your foot is bandaged.  After the surgery  Youll be taken to a recovery room. You may have medicines to manage pain. You may wear a brace, surgical shoe, or cast to protect your foot while it heals. The surgeon will tell you when you can go home. Have an adult family member or friend drive you.  Recovering at home  Once home, follow any instructions you are given. During your recovery:  · Take pain medicine exactly as directed.  · To prevent swelling, sit or lie with your leg raised on one or more pillows. Do this for the first 2 days.  · Follow your surgeon's instructions about putting weight on your foot after the surgery. You may need to use a walker, cane, or crutches for a time.  · You may wear a brace, surgical shoe, or cast for up to a month or longer. Care for this as instructed. Keep it dry by wrapping it in plastic bags when bathing.  · Avoid sports and other activities until your surgeon says its OK.  · Care for your incision as instructed.  · Don't drive until your surgeon says its OK.  Call your surgeon if you have any of the following:  · Chest pain or trouble breathing  · Fever of 100.4°F (38°C) or higher, or as directed by your surgeon  · Pain that isnt helped by medicine or rest  · Increased swelling not helped by raising or icing your foot  · Signs of infection at any incision site, such as increased redness or swelling, warmth, more pain, or bad-smelling drainage  · Bleeding through the bandages  · Symptoms of poor circulation, such as toes that look blue instead of pinkish  · Numbness that doesnt go away  · Any other signs or symptoms indicated by your surgeon   Follow-up  Keep all follow-up appointments with your surgeon. These are to check that you are healing well from the surgery. You may have X-rays to check how the bone is healing. Physical therapy, foot exercises, and other treatments may be discussed  at follow-up visits. Full recovery can take at least a few months.  Risks and possible complications include:  · Infection  · Bleeding  · Sensitivity at the incision site for months after the surgery  · Foot pain that doesnt go away after surgery  · Numbness in the foot  · Only partial relief of symptoms, or no relief of symptoms  · Return of the bunion  · Risks of anesthesia (the anesthesiologist will discuss these with you)  · Poor wound healing  · Breakage of screws or pins  · A lot of scarring   Date Last Reviewed: 7/28/2015  © 4118-8052 [employer]. [address] [place] [address]. All rights reserved. This information is not intended as a substitute for professional medical care. Always follow your healthcare professional's instructions.          Bunion    You have a bunion. This is a bony bump at the base of your big toe, along the inside edge of your foot. As the bump gets bigger, it can become red, swollen, and painful with shoe wear.  Bunions may occur if you wear shoes that are too tight and pinch your toes together. High heels may make this worse. In some cases a bunion is due to poor alignment of the foot and ankle. This puts extra weight on the instep of each foot.  Once a bunion forms, it changes the way weight is spread all across your foot. This causes the bunion to get worse over time. The big toe will bend more and more toward the other toes.  A minor bunion can be treated by:  · Wearing properly fitting shoes  · Using bunion pads  · Wearing shoe inserts, called orthotics, to better align the foot and ankle  Physical therapy with ultrasound or whirlpool baths can ease pain, redness, and swelling. Severe cases may require surgery. If you dont treat what is causing the bunion, it may get larger and more painful.  Home care  · Limit high heels. These shoes force your foot forward, crowding the toes together.  · Switch to comfortable shoes with a wide toe area. Or have your  existing shoes stretched by a shoe repair shop.  · Avoid shoes that are tight, narrow, or pointed.  · If you are flat-footed, using arch supports may help prevent further deformity. The best shoe inserts are the ones custom made by a foot specialist, called a podiatrist, or other healthcare provider.  · Put a bunion pad over the bunion to ease pressure of your shoe against the bunion. You can buy these pads at most pharmacies without a prescription  · To reduce pain and swelling, apply an ice pack over the injured area for 15 to 20 minutes. Do this every 1 to 2 hours the first day. Keep using ice 3 to 4 times a day until the pain and swelling goes away.  · To make an ice pack, put ice cubes in a sealed zip-lock plastic bag. Wrap the bag in a clean, thin towel or cloth. Never put ice or an ice pack directly on the skin.  · You may use over-the-counter pain medicine to control pain, unless another medicine was prescribed. Talk with your provider before using these medicines if you have chronic liver or kidney disease, or ever had a stomach ulcer or GI (gastrointestinal) bleeding.  Follow-up care  Follow up with a podiatrist or foot doctor, or as advised.  If X-rays were taken, you will be notified of any new findings that may affect your care.  When to seek medical care  Contact your healthcare provider if any of the following occur:  · Increasing pain or redness around the base of the big toe  · Painful ingrown toenail, with redness and swelling or pus around the nail  Date Last Reviewed: 11/21/2015 © 2000-2017 [employer]. [address] [place] [address], [place] [address]. All rights reserved. This information is not intended as a substitute for professional medical care. Always follow your healthcare professional's instructions.

## 2019-08-06 NOTE — PROGRESS NOTES
Subjective:      Patient ID: Brittny Urias is a 48 y.o. female.    Chief Complaint: Bunions (left foot)    Pt presents today c/o pain in her left bunion. Pt states that the bunion is especially painful when she wears shoes.     Review of Systems   Constitution: Negative for chills, fever and malaise/fatigue.   HENT: Negative for hearing loss.    Cardiovascular: Negative for claudication.   Respiratory: Negative for shortness of breath.    Skin: Negative for flushing and rash.   Musculoskeletal: Positive for joint pain and joint swelling. Negative for myalgias.   Neurological: Negative for loss of balance, numbness, paresthesias and sensory change.   Psychiatric/Behavioral: Negative for altered mental status.           Objective:      Physical Exam   Cardiovascular:   Pulses:       Dorsalis pedis pulses are 2+ on the right side, and 2+ on the left side.        Posterior tibial pulses are 2+ on the right side, and 2+ on the left side.   No edema noted to b/L LEs   Musculoskeletal:   Adequate joint ROM noted to all lower extremity muscle groups with no pain or crepitation noted. Muscle strength is 5/5 in all groups bilaterally.    Hallux valgus deformity noted to left foot with dorsal medial eminence. + pain with ROM of 1st MPJ  Hammertoes noted, digits 2-5 left, flexible with adductovarus rotation of left fifth digit.       Feet:   Right Foot:   Protective Sensation: 5 sites tested. 5 sites sensed.   Left Foot:   Protective Sensation: 5 sites tested. 5 sites sensed.   Neurological:   Intact gross sensation noted to b/L LEs   Skin:   Normal skin tugor noted.   No open lesion noted b/L  Skin temp is warm to warm from proximal to distal b/L.  Webspaces clean, dry, and intact  Nails x10 short             Assessment:       Encounter Diagnosis   Name Primary?    Bunion - Left Foot Yes         Plan:       Brittny was seen today for bunions.    Diagnoses and all orders for this visit:    Bunion - Left Foot  -      X-Ray Foot Complete Left; Future      I counseled the patient on her conditions, their implications and medical management.      Pt has a hx of breast CA with several reconstructive surgeries coming up.   Pt advised it would be best to have her bunion corrected after her reconstructive surgeries.   X-rays ordered for pt, for procedure planning.   Shoe modification advised for the pt. Pt was advised to obtain shoes will accommodate foot deformities.     Pt advised to contact the office when she is ready for surgery.   .

## 2019-08-20 ENCOUNTER — PATIENT MESSAGE (OUTPATIENT)
Dept: SURGERY | Facility: OTHER | Age: 49
End: 2019-08-20

## 2019-08-29 ENCOUNTER — HOSPITAL ENCOUNTER (OUTPATIENT)
Dept: RADIOLOGY | Facility: HOSPITAL | Age: 49
Discharge: HOME | End: 2019-08-29
Attending: PODIATRIST
Payer: COMMERCIAL

## 2019-08-29 DIAGNOSIS — M21.619 BUNION: ICD-10-CM

## 2019-08-29 PROCEDURE — 73630 X-RAY EXAM OF FOOT: CPT | Mod: TC,FY,PO,LT

## 2019-09-09 ENCOUNTER — PATIENT MESSAGE (OUTPATIENT)
Dept: PLASTIC SURGERY | Facility: CLINIC | Age: 49
End: 2019-09-09

## 2019-09-20 DIAGNOSIS — Z12.11 COLON CANCER SCREENING: ICD-10-CM

## 2019-09-23 ENCOUNTER — PATIENT MESSAGE (OUTPATIENT)
Dept: PLASTIC SURGERY | Facility: CLINIC | Age: 49
End: 2019-09-23

## 2019-10-02 ENCOUNTER — PATIENT MESSAGE (OUTPATIENT)
Dept: FAMILY MEDICINE | Facility: CLINIC | Age: 49
End: 2019-10-02

## 2019-10-02 DIAGNOSIS — Z12.11 COLON CANCER SCREENING: Primary | ICD-10-CM

## 2019-10-03 ENCOUNTER — PATIENT MESSAGE (OUTPATIENT)
Dept: FAMILY MEDICINE | Facility: CLINIC | Age: 49
End: 2019-10-03

## 2019-10-07 DIAGNOSIS — Z12.11 COLON CANCER SCREENING: Primary | ICD-10-CM

## 2019-10-07 DIAGNOSIS — Z85.3 HISTORY OF BREAST CANCER: Primary | ICD-10-CM

## 2019-10-10 ENCOUNTER — ANESTHESIA (OUTPATIENT)
Dept: ENDOSCOPY | Facility: HOSPITAL | Age: 49
End: 2019-10-10
Payer: COMMERCIAL

## 2019-10-10 ENCOUNTER — ANESTHESIA EVENT (OUTPATIENT)
Dept: ENDOSCOPY | Facility: HOSPITAL | Age: 49
End: 2019-10-10
Payer: COMMERCIAL

## 2019-10-10 ENCOUNTER — HOSPITAL ENCOUNTER (OUTPATIENT)
Facility: HOSPITAL | Age: 49
Discharge: HOME | End: 2019-10-10
Attending: INTERNAL MEDICINE | Admitting: INTERNAL MEDICINE
Payer: COMMERCIAL

## 2019-10-10 VITALS
TEMPERATURE: 98 F | BODY MASS INDEX: 42.14 KG/M2 | WEIGHT: 229 LBS | OXYGEN SATURATION: 99 % | HEART RATE: 69 BPM | HEIGHT: 62 IN | RESPIRATION RATE: 18 BRPM | SYSTOLIC BLOOD PRESSURE: 171 MMHG | DIASTOLIC BLOOD PRESSURE: 88 MMHG

## 2019-10-10 DIAGNOSIS — K57.31 DIVERTICULOSIS LARGE INTESTINE W/O PERFORATION OR ABSCESS W/BLEEDING: Primary | ICD-10-CM

## 2019-10-10 PROCEDURE — 45385 COLONOSCOPY W/LESION REMOVAL: CPT | Performed by: INTERNAL MEDICINE

## 2019-10-10 PROCEDURE — D9220A PRA ANESTHESIA: ICD-10-PCS | Mod: 33,ANES,, | Performed by: ANESTHESIOLOGY

## 2019-10-10 PROCEDURE — 63600175 PHARM REV CODE 636 W HCPCS: Performed by: NURSE ANESTHETIST, CERTIFIED REGISTERED

## 2019-10-10 PROCEDURE — 37000008 HC ANESTHESIA 1ST 15 MINUTES: Performed by: INTERNAL MEDICINE

## 2019-10-10 PROCEDURE — D9220A PRA ANESTHESIA: ICD-10-PCS | Mod: 33,CRNA,, | Performed by: NURSE ANESTHETIST, CERTIFIED REGISTERED

## 2019-10-10 PROCEDURE — 27201089 HC SNARE, DISP (ANY): Performed by: INTERNAL MEDICINE

## 2019-10-10 PROCEDURE — D9220A PRA ANESTHESIA: Mod: 33,ANES,, | Performed by: ANESTHESIOLOGY

## 2019-10-10 PROCEDURE — 45385 COLONOSCOPY W/LESION REMOVAL: CPT | Mod: 33,,, | Performed by: INTERNAL MEDICINE

## 2019-10-10 PROCEDURE — D9220A PRA ANESTHESIA: Mod: 33,CRNA,, | Performed by: NURSE ANESTHETIST, CERTIFIED REGISTERED

## 2019-10-10 PROCEDURE — 45385 PR COLONOSCOPY,REMV LESN,SNARE: ICD-10-PCS | Mod: 33,,, | Performed by: INTERNAL MEDICINE

## 2019-10-10 PROCEDURE — 88305 TISSUE SPECIMEN TO PATHOLOGY - SURGERY: ICD-10-PCS | Mod: 26,,, | Performed by: PATHOLOGY

## 2019-10-10 PROCEDURE — 88305 TISSUE EXAM BY PATHOLOGIST: CPT | Performed by: PATHOLOGY

## 2019-10-10 PROCEDURE — 37000009 HC ANESTHESIA EA ADD 15 MINS: Performed by: INTERNAL MEDICINE

## 2019-10-10 PROCEDURE — 88305 TISSUE EXAM BY PATHOLOGIST: CPT | Mod: 26,,, | Performed by: PATHOLOGY

## 2019-10-10 RX ORDER — PROPOFOL 10 MG/ML
VIAL (ML) INTRAVENOUS
Status: DISCONTINUED
Start: 2019-10-10 | End: 2019-10-10 | Stop reason: HOSPADM

## 2019-10-10 RX ORDER — LIDOCAINE HYDROCHLORIDE 20 MG/ML
INJECTION, SOLUTION EPIDURAL; INFILTRATION; INTRACAUDAL; PERINEURAL
Status: DISCONTINUED
Start: 2019-10-10 | End: 2019-10-10 | Stop reason: HOSPADM

## 2019-10-10 RX ORDER — SODIUM CHLORIDE 9 MG/ML
INJECTION, SOLUTION INTRAVENOUS CONTINUOUS PRN
Status: DISCONTINUED | OUTPATIENT
Start: 2019-10-10 | End: 2019-10-10

## 2019-10-10 RX ORDER — LIDOCAINE HCL/PF 100 MG/5ML
SYRINGE (ML) INTRAVENOUS
Status: DISCONTINUED | OUTPATIENT
Start: 2019-10-10 | End: 2019-10-10

## 2019-10-10 RX ORDER — PROPOFOL 10 MG/ML
VIAL (ML) INTRAVENOUS
Status: DISCONTINUED | OUTPATIENT
Start: 2019-10-10 | End: 2019-10-10

## 2019-10-10 RX ADMIN — PROPOFOL 80 MG: 10 INJECTION, EMULSION INTRAVENOUS at 10:10

## 2019-10-10 RX ADMIN — PROPOFOL 50 MG: 10 INJECTION, EMULSION INTRAVENOUS at 11:10

## 2019-10-10 RX ADMIN — PROPOFOL 20 MG: 10 INJECTION, EMULSION INTRAVENOUS at 10:10

## 2019-10-10 RX ADMIN — LIDOCAINE HYDROCHLORIDE 75 MG: 20 INJECTION, SOLUTION INTRAVENOUS at 10:10

## 2019-10-10 RX ADMIN — PROPOFOL 50 MG: 10 INJECTION, EMULSION INTRAVENOUS at 10:10

## 2019-10-10 RX ADMIN — SODIUM CHLORIDE: 0.9 INJECTION, SOLUTION INTRAVENOUS at 10:10

## 2019-10-10 NOTE — ANESTHESIA POSTPROCEDURE EVALUATION
Anesthesia Post Evaluation    Patient: Brittny Urias    Procedure(s) Performed: Procedure(s) (LRB):  COLONOSCOPY (N/A)    Final Anesthesia Type: general  Patient location during evaluation: GI PACU  Patient participation: Yes- Able to Participate  Level of consciousness: awake and alert  Post-procedure vital signs: reviewed and stable  Pain management: adequate  Airway patency: patent  PONV status at discharge: No PONV  Anesthetic complications: no      Cardiovascular status: hemodynamically stable  Respiratory status: unassisted and spontaneous ventilation  Hydration status: euvolemic  Follow-up not needed.          Vitals Value Taken Time   /88 10/10/2019 11:35 AM   Temp 36.5 °C (97.7 °F) 10/10/2019 11:06 AM   Pulse 69 10/10/2019 11:35 AM   Resp 18 10/10/2019 11:35 AM   SpO2 99 % 10/10/2019 11:35 AM         Event Time     Out of Recovery 11:37:20          Pain/Deep Score: Deep Score: 10 (10/10/2019 11:36 AM)

## 2019-10-10 NOTE — [employer] PATIENT INSTRUCTIONS
Discharge Summary/Instructions after an Endoscopic Procedure  Patient Name: Brittny Urias  Patient MRN: [id number]  Patient YOB: 1970  Thursday, October 10, 2019  Matt Marin MD  RESTRICTIONS:  During your procedure today, you received medications for sedation.  These   medications may affect your judgment, balance and coordination.  Therefore,   for 24 hours, you have the following restrictions:   - DO NOT drive a car, operate machinery, make legal/financial decisions,   sign important papers or drink alcohol.    ACTIVITY:  Today: no heavy lifting, straining or running due to procedural   sedation/anesthesia.  The following day: return to full activity including work.  DIET:  Eat and drink normally unless instructed otherwise.     TREATMENT FOR COMMON SIDE EFFECTS:  - Mild abdominal pain, nausea, belching, bloating or excessive gas:  rest,   eat lightly and use a heating pad.  - Sore Throat: treat with throat lozenges and/or gargle with warm salt   water.  - Because air was used during the procedure, expelling large amounts of air   from your rectum or belching is normal.  - If a bowel prep was taken, you may not have a bowel movement for 1-3 days.    This is normal.  SYMPTOMS TO WATCH FOR AND REPORT TO YOUR PHYSICIAN:  1. Abdominal pain or bloating, other than gas cramps.  2. Chest pain.  3. Back pain.  4. Signs of infection such as: chills or fever occurring within 24 hours   after the procedure.  5. Rectal bleeding, which would show as bright red, maroon, or black stools.   (A tablespoon of blood from the rectum is not serious, especially if   hemorrhoids are present.)  6. Vomiting.  7. Weakness or dizziness.  GO DIRECTLY TO THE NEAREST EMERGENCY ROOM IF YOU HAVE ANY OF THE FOLLOWING:      Difficulty breathing              Chills and/or fever over 101 F   Persistent vomiting and/or vomiting blood   Severe abdominal pain   Severe chest pain   Black, tarry stools   Bleeding- more than one  tablespoon   Any other symptom or condition that you feel may need urgent attention  Your doctor recommends these additional instructions:  If any biopsies were taken, your doctors clinic will contact you in 1 to 2   weeks with any results.  - Discharge patient to home.   - Resume previous diet.   - Continue present medications.   - No aspirin, ibuprofen, naproxen, or other non-steroidal anti-inflammatory   drugs for 7 days after polyp removal.   - Await pathology results.   - Repeat colonoscopy in 5 years for surveillance.   - Return to GI office PRN.  For questions, problems or results please call your physician - Matt Marin MD at Work:  [phone].  [facility] can be reached at [phone]     IF A COMPLICATION OR EMERGENCY SITUATION ARISES AND YOU ARE UNABLE TO REACH   YOUR PHYSICIAN - GO DIRECTLY TO THE EMERGENCY ROOM.  Matt Marin MD  10/10/2019 11:09:21 AM  This report has been verified and signed electronically.  [employer]

## 2019-10-10 NOTE — H&P
[facility]  Gastroenterology  H&P    Patient Name: Brittny Urias  MRN: [id number]  Admission Date: 10/10/2019  Code Status: Prior    Attending Provider:   Primary Care Physician: Charmaine Sargent MD  Principal Problem:<principal problem not specified>    Subjective:     History of Present Illness: This lady is here for a colon screen. She has no GI complaints and no acute cardiac or pulmonary issues. Colonoscopy was discussed in detail.    Past Medical History:   Diagnosis Date    Breast cancer, right breast 11/12/2018    Cancer     Hypertension     Psoriasis     on biologic for 1 year, has been controlling it well       Past Surgical History:   Procedure Laterality Date    DILATION AND CURETTAGE OF UTERUS      HYSTERECTOMY      MASTECTOMY Bilateral 11/14/2018    Procedure: MASTECTOMY;  Surgeon: Marga Cardenas MD;  Location: [facility];  Service: Plastics;  Laterality: Bilateral;    MASTECTOMY WITH SENTINEL NODE BIOPSY AND AXILLARY LYMPH NODE DISSECTION Right 11/14/2018    Procedure: SENTINEL NODE BIOPSY AND AXILLARY LYMPHADENECTOMY;  Surgeon: Marga Cardenas MD;  Location: [facility];  Service: Plastics;  Laterality: Right;    RECONSTRUCTION OF BREAST WITH DEEP INFERIOR EPIGASTRIC ARTERY  (MEGAN) FREE FLAP Bilateral 11/14/2018    Procedure: RECONSTRUCTION, BREAST, USING MEGAN FREE FLAP;  Surgeon: Tye Chapman MD;  Location: [facility];  Service: Plastics;  Laterality: Bilateral;       Review of patient's allergies indicates:  No Known Allergies  Family History     Problem Relation (Age of Onset)    Cancer Paternal Uncle    Diabetes Mother    Hypertension Mother, Brother        Tobacco Use    Smoking status: Never Smoker    Smokeless tobacco: Never Used   Substance and Sexual Activity    Alcohol use: Yes     Alcohol/week: 3.0 standard drinks     Types: 3 Glasses of wine per week     Frequency: 2-4 times a month     Drinks per session: 1 or 2     Binge frequency: Never      Comment: social    Drug use: No    Sexual activity: Yes     Partners: Male     Birth control/protection: Surgical     Review of Systems   Constitutional: Negative.    HENT: Negative.    Eyes: Negative.    Respiratory: Negative.    Cardiovascular: Negative.    Gastrointestinal: Negative.    Endocrine: Negative.    Neurological: Negative.    Psychiatric/Behavioral: Negative.      Objective:     Vital Signs (Most Recent):  Temp: 98.4 °F (36.9 °C) (10/10/19 1004)  Pulse: 72 (10/10/19 1004)  Resp: 18 (10/10/19 1004)  BP: (!) 163/100 (10/10/19 1004)  SpO2: 97 % (10/10/19 1004) Vital Signs (24h Range):  Temp:  [98.4 °F (36.9 °C)] 98.4 °F (36.9 °C)  Pulse:  [72] 72  Resp:  [18] 18  SpO2:  [97 %] 97 %  BP: (163)/(100) 163/100     Weight: 103.9 kg (229 lb) (10/10/19 1004)  Body mass index is 41.88 kg/m².    No intake or output data in the 24 hours ending 10/10/19 1041    Lines/Drains/Airways     Drain                 Closed/Suction Drain 11/07/18 1130 Left Breast Bulb 19 Fr. 336 days         Closed/Suction Drain 11/14/18 1128 Right Abdomen 19 Fr. 329 days         Closed/Suction Drain 11/14/18 1129 Left Abdomen Bulb 19 Fr. 329 days         Closed/Suction Drain 11/14/18 1129 Right Breast Bulb 19 Fr. 329 days          Peripheral Intravenous Line                 Peripheral IV - Single Lumen 11/14/18 0729 Right Wrist 330 days         Peripheral IV - Single Lumen 10/10/19 1015 22 G Left Forearm less than 1 day                Physical Exam   Constitutional: She is oriented to person, place, and time. She appears well-developed and well-nourished.   HENT:   Head: Normocephalic and atraumatic.   Eyes: Pupils are equal, round, and reactive to light. EOM are normal. No scleral icterus.   Neck: Normal range of motion. Neck supple. No JVD present.   Cardiovascular: Normal rate and regular rhythm.   Pulmonary/Chest: Effort normal and breath sounds normal.   Abdominal: Soft. Bowel sounds are normal.   Musculoskeletal: Normal range of  motion.   Neurological: She is alert and oriented to person, place, and time.   Skin: Skin is warm and dry.   Psychiatric: She has a normal mood and affect. Her behavior is normal. Thought content normal.   Nursing note and vitals reviewed.      Significant Labs:  none    Significant Imaging:  none    Assessment/Plan: Stable for colon screen. Will proceed.     There are no hospital problems to display for this patient.          Matt Marin MD  Gastroenterology  [facility]

## 2019-10-10 NOTE — ANESTHESIA PREPROCEDURE EVALUATION
10/10/2019  Brittny Urias is a 49 y.o., female.    Anesthesia Evaluation    I have reviewed the Patient Summary Reports.    I have reviewed the Nursing Notes.      Review of Systems  Anesthesia Hx:  No problems with previous Anesthesia  Denies Family Hx of Anesthesia complications.   Denies Personal Hx of Anesthesia complications.   Social:  Alcohol Use, Non-Smoker    Hematology/Oncology:         -- Cancer in past history: Breast right Oncology Comments: S/p mastectomy and flap     Cardiovascular:   Exercise tolerance: good Hypertension Denies MI.   Denies Dysrhythmias.   Denies Angina.    Pulmonary:  Pulmonary Normal    Renal/:  Renal/ Normal     OB/GYN/PEDS:  S/p hysterectomy   Neurological:  Neurology Normal    Endocrine:  Endocrine Normal        Physical Exam  General:  Well nourished    Airway/Jaw/Neck:  Airway Findings: Mouth Opening: Normal Tongue: Normal  Mallampati: III  TM Distance: 4 - 6 cm  Jaw/Neck Findings:  Neck ROM: Normal ROM      Dental:  Dental Findings: In tact   Chest/Lungs:  Chest/Lungs Findings: Clear to auscultation, Normal Respiratory Rate     Heart/Vascular:  Heart Findings: Rate: Normal  Rhythm: Regular Rhythm        Mental Status:  Mental Status Findings:  Cooperative, Alert and Oriented       Wt Readings from Last 3 Encounters:   08/02/19 103.4 kg (228 lb)   07/23/19 103.4 kg (227 lb 15.3 oz)   06/26/19 103.4 kg (227 lb 15.3 oz)     Temp Readings from Last 3 Encounters:   06/26/19 36.8 °C (98.3 °F) (Oral)   05/09/19 36.8 °C (98.2 °F) (Oral)   03/29/19 36.8 °C (98.3 °F) (Oral)     BP Readings from Last 3 Encounters:   08/02/19 (!) 162/101   06/26/19 (!) 160/91   05/09/19 (!) 174/87     Pulse Readings from Last 3 Encounters:   08/02/19 72   06/26/19 71   05/09/19 84         Anesthesia Plan  Type of Anesthesia, risks & benefits discussed:  Anesthesia Type:  MAC,  general  Patient's Preference:   Intra-op Monitoring Plan: standard ASA monitors  Intra-op Monitoring Plan Comments:   Post Op Pain Control Plan: multimodal analgesia and per primary service following discharge from PACU  Post Op Pain Control Plan Comments:   Induction:   IV  Beta Blocker:  Patient is not currently on a Beta-Blocker (No further documentation required).       Informed Consent: Patient understands risks and agrees with Anesthesia plan.  Questions answered. Anesthesia consent signed with patient.  ASA Score: 2     Day of Surgery Review of History & Physical: I have interviewed and examined the patient. I have reviewed the patient's H&P dated:            Ready For Surgery From Anesthesia Perspective.

## 2019-10-10 NOTE — DISCHARGE SUMMARY
This lady had an uncomplicated colonoscopy this morning. She has pan colonic diverticulosis and a polyp was removed. Colonoscopy in 5 years advised.  Details of the procedure are documented in Provation for review.

## 2019-10-10 NOTE — TRANSFER OF CARE
"Anesthesia Transfer of Care Note    Patient: Brittny Urias    Procedure(s) Performed: Procedure(s) (LRB):  COLONOSCOPY (N/A)    Patient location: GI    Anesthesia Type: general    Transport from OR: Transported from OR on room air with adequate spontaneous ventilation    Post pain: adequate analgesia    Post assessment: no apparent anesthetic complications and tolerated procedure well    Post vital signs: stable    Level of consciousness: awake, alert and oriented    Nausea/Vomiting: no nausea/vomiting    Complications: none    Transfer of care protocol was followed      Last vitals:   Visit Vitals  /80   Pulse 69   Temp 36.5 °C (97.7 °F)   Resp 16   Ht 5' 2" (1.575 m)   Wt 103.9 kg (229 lb)   SpO2 95%   Breastfeeding? No   BMI 41.88 kg/m²     "

## 2019-10-11 ENCOUNTER — PATIENT MESSAGE (OUTPATIENT)
Dept: PLASTIC SURGERY | Facility: CLINIC | Age: 49
End: 2019-10-11

## 2019-10-17 ENCOUNTER — HOSPITAL ENCOUNTER (OUTPATIENT)
Dept: PREADMISSION TESTING | Facility: OTHER | Age: 49
Discharge: HOME | End: 2019-10-17
Attending: PLASTIC SURGERY
Payer: COMMERCIAL

## 2019-10-17 ENCOUNTER — ANESTHESIA EVENT (OUTPATIENT)
Dept: SURGERY | Facility: OTHER | Age: 49
End: 2019-10-17
Payer: COMMERCIAL

## 2019-10-17 VITALS
SYSTOLIC BLOOD PRESSURE: 183 MMHG | WEIGHT: 226 LBS | TEMPERATURE: 98 F | DIASTOLIC BLOOD PRESSURE: 91 MMHG | HEART RATE: 62 BPM | OXYGEN SATURATION: 98 % | HEIGHT: 62 IN | BODY MASS INDEX: 41.59 KG/M2

## 2019-10-17 DIAGNOSIS — Z85.3 HISTORY OF BREAST CANCER: ICD-10-CM

## 2019-10-17 LAB
ANION GAP SERPL CALC-SCNC: 9 MMOL/L (ref 8–16)
BASOPHILS # BLD AUTO: 0.04 K/UL (ref 0–0.2)
BASOPHILS NFR BLD: 1.1 % (ref 0–1.9)
BUN SERPL-MCNC: 16 MG/DL (ref 6–20)
CALCIUM SERPL-MCNC: 10.1 MG/DL (ref 8.7–10.5)
CHLORIDE SERPL-SCNC: 104 MMOL/L (ref 95–110)
CO2 SERPL-SCNC: 28 MMOL/L (ref 23–29)
CREAT SERPL-MCNC: 0.8 MG/DL (ref 0.5–1.4)
DIFFERENTIAL METHOD: ABNORMAL
EOSINOPHIL # BLD AUTO: 0.1 K/UL (ref 0–0.5)
EOSINOPHIL NFR BLD: 2.2 % (ref 0–8)
ERYTHROCYTE [DISTWIDTH] IN BLOOD BY AUTOMATED COUNT: 18.3 % (ref 11.5–14.5)
EST. GFR  (AFRICAN AMERICAN): >60 ML/MIN/1.73 M^2
EST. GFR  (NON AFRICAN AMERICAN): >60 ML/MIN/1.73 M^2
GLUCOSE SERPL-MCNC: 119 MG/DL (ref 70–110)
HCT VFR BLD AUTO: 42.6 % (ref 37–48.5)
HGB BLD-MCNC: 13.1 G/DL (ref 12–16)
IMM GRANULOCYTES # BLD AUTO: 0.01 K/UL (ref 0–0.04)
IMM GRANULOCYTES NFR BLD AUTO: 0.3 % (ref 0–0.5)
LYMPHOCYTES # BLD AUTO: 1 K/UL (ref 1–4.8)
LYMPHOCYTES NFR BLD: 26.1 % (ref 18–48)
MCH RBC QN AUTO: 22.2 PG (ref 27–31)
MCHC RBC AUTO-ENTMCNC: 30.8 G/DL (ref 32–36)
MCV RBC AUTO: 72 FL (ref 82–98)
MONOCYTES # BLD AUTO: 0.4 K/UL (ref 0.3–1)
MONOCYTES NFR BLD: 11.3 % (ref 4–15)
NEUTROPHILS # BLD AUTO: 2.2 K/UL (ref 1.8–7.7)
NEUTROPHILS NFR BLD: 59 % (ref 38–73)
NRBC BLD-RTO: 0 /100 WBC
PLATELET # BLD AUTO: 315 K/UL (ref 150–350)
PMV BLD AUTO: 12 FL (ref 9.2–12.9)
POTASSIUM SERPL-SCNC: 4 MMOL/L (ref 3.5–5.1)
RBC # BLD AUTO: 5.9 M/UL (ref 4–5.4)
SODIUM SERPL-SCNC: 141 MMOL/L (ref 136–145)
WBC # BLD AUTO: 3.72 K/UL (ref 3.9–12.7)

## 2019-10-17 PROCEDURE — 93010 EKG 12-LEAD: ICD-10-PCS | Mod: ,,, | Performed by: INTERNAL MEDICINE

## 2019-10-17 PROCEDURE — 93005 ELECTROCARDIOGRAM TRACING: CPT

## 2019-10-17 PROCEDURE — 80048 BASIC METABOLIC PNL TOTAL CA: CPT

## 2019-10-17 PROCEDURE — 93010 ELECTROCARDIOGRAM REPORT: CPT | Mod: ,,, | Performed by: INTERNAL MEDICINE

## 2019-10-17 PROCEDURE — 85025 COMPLETE CBC W/AUTO DIFF WBC: CPT

## 2019-10-17 PROCEDURE — 36415 COLL VENOUS BLD VENIPUNCTURE: CPT

## 2019-10-17 RX ORDER — LIDOCAINE HYDROCHLORIDE 10 MG/ML
0.5 INJECTION, SOLUTION EPIDURAL; INFILTRATION; INTRACAUDAL; PERINEURAL ONCE
Status: CANCELLED | OUTPATIENT
Start: 2019-10-17 | End: 2019-10-17

## 2019-10-17 RX ORDER — PREGABALIN 75 MG/1
75 CAPSULE ORAL ONCE
Status: CANCELLED | OUTPATIENT
Start: 2019-10-17 | End: 2019-10-17

## 2019-10-17 RX ORDER — SODIUM CHLORIDE, SODIUM LACTATE, POTASSIUM CHLORIDE, CALCIUM CHLORIDE 600; 310; 30; 20 MG/100ML; MG/100ML; MG/100ML; MG/100ML
INJECTION, SOLUTION INTRAVENOUS CONTINUOUS
Status: CANCELLED | OUTPATIENT
Start: 2019-10-17

## 2019-10-17 RX ORDER — ACETAMINOPHEN 500 MG
1000 TABLET ORAL
Status: CANCELLED | OUTPATIENT
Start: 2019-10-17 | End: 2019-10-17

## 2019-10-17 NOTE — ANESTHESIA PREPROCEDURE EVALUATION
10/17/2019  Brittny Urias is a 49 y.o., female.    Anesthesia Evaluation    I have reviewed the Patient Summary Reports.    I have reviewed the Nursing Notes.   I have reviewed the Medications.     Review of Systems  Anesthesia Hx:  History of prior surgery of interest to airway management or planning: Previous anesthesia: General 11/18 mast/flap with general anesthesia.  Airway issues documented on chart review include mask, easy, GETA, videolaryngoscope used , view on video-laryngoscopy Grade I    Denies Family Hx of Anesthesia complications.   Denies Personal Hx of Anesthesia complications.   Social:  Non-Smoker    Hematology/Oncology:  Hematology Normal      Current/Recent Cancer. Breast right axillary node dissection no lymphedema surgery and radiation   EENT/Dental:EENT/Dental Normal   Cardiovascular:   Exercise tolerance: good Hypertension, poorly controlled    Pulmonary:  Pulmonary Normal    Renal/:  Renal/ Normal     Hepatic/GI:  Hepatic/GI Normal    Musculoskeletal:  Musculoskeletal Normal    Neurological:  Neurology Normal    Endocrine:  Endocrine Normal    Dermatological:  Skin Normal psoriasis   Psych:  Psychiatric Normal           Physical Exam  General:  Morbid Obesity    Airway/Jaw/Neck:  Airway Findings: Mouth Opening: Normal Tongue: Normal  General Airway Assessment: Adult  Mallampati: II  TM Distance: Normal, at least 6 cm  Jaw/Neck Findings:     Neck ROM: Normal ROM      Dental:  Dental Findings: In tact             Anesthesia Plan  Type of Anesthesia, risks & benefits discussed:  Anesthesia Type:  general  Patient's Preference:   Intra-op Monitoring Plan: standard ASA monitors  Intra-op Monitoring Plan Comments:   Post Op Pain Control Plan: per primary service following discharge from PACU and multimodal analgesia  Post Op Pain Control Plan Comments:   Induction:   IV  Beta  Blocker:         Informed Consent: Patient understands risks and agrees with Anesthesia plan.  Questions answered. Anesthesia consent signed with patient.  ASA Score: 3     Day of Surgery Review of History & Physical:    H&P update referred to the surgeon.     Anesthesia Plan Notes: Labs ordered today, need review        Ready For Surgery From Anesthesia Perspective.

## 2019-10-17 NOTE — DISCHARGE INSTRUCTIONS
PRE-ADMIT TESTING -  [phone]    [address]  [place] [facility]          Your surgery has been scheduled at [facility]. We are pleased to have the opportunity to serve you. For Further Information please call [phone].    On the day of surgery please report to the Information Desk on the 1st floor.    · CONTACT YOUR PHYSICIAN'S OFFICE THE DAY PRIOR TO YOUR SURGERY TO OBTAIN YOUR ARRIVAL TIME.     · The evening before surgery do not eat anything after 9 p.m. ( this includes hard candy, chewing gum and mints).  You may only have GATORADE, POWERADE AND WATER  from 9 p.m. until you leave your home.   DO NOT DRINK ANY LIQUIDS ON THE WAY TO THE HOSPITAL.      SPECIAL MEDICATION INSTRUCTIONS: TAKE medications checked off by the Anesthesiologist on your Medication List.    Angiogram Patients: Take medications as instructed by your physician, including aspirin.     Surgery Patients:    If you take ASPIRIN - Your PHYSICIAN/SURGEON will need to inform you IF/OR when you need to stop taking aspirin prior to your surgery.     Do Not take any medications containing IBUPROFEN.  Do Not Wear any make-up or dark nail polish   (especially eye make-up) to surgery. If you come to surgery with makeup on you will be required to remove the makeup or nail polish.    Do not shave your surgical area at least 5 days prior to your surgery. The surgical prep will be performed at the hospital according to Infection Control regulations.    Leave all valuables at home.   Do Not wear any jewelry or watches, including any metal in body piercings. Jewelry must be removed prior to coming to the hospital.  There is a possibility that rings that are unable to be removed may be cut off if they are on the surgical extremity.    Contact Lens must be removed before surgery. Either do not wear the contact lens or bring a case and solution for storage.  Please bring a container for eyeglasses or dentures as required.  Bring  any paperwork your physician has provided, such as consent forms,  history and physicals, doctor's orders, etc.   Bring comfortable clothes that are loose fitting to wear upon discharge. Take into consideration the type of surgery being performed.  Maintain your diet as advised per your physician the day prior to surgery.      Adequate rest the night before surgery is advised.   Park in the Parking lot behind the hospital or in the [facility] across the street from the parking lot. Parking is complimentary.  If you will be discharged the same day as your procedure, please arrange for a responsible adult to drive you home or to accompany you if traveling by taxi.   YOU WILL NOT BE PERMITTED TO DRIVE OR TO LEAVE THE HOSPITAL ALONE AFTER SURGERY.   It is strongly recommended that you arrange for someone to remain with you for the first 24 hrs following your surgery.    The Surgeon will speak to your family/visitor after your surgery regarding the outcome of your surgery and post op care.  The Surgeon may speak to you after your surgery, but there is a possibility you may not remember the details.  Please check with your family members regarding the conversation with the Surgeon.    We strongly recommend whoever is bringing you home be present for discharge instructions.  This will ensure a thorough understanding for your post op home care.      Thank you for your cooperation.  The Staff of [facility].                Bathing Instructions with Hibiclens     Shower the evening before and morning of your procedure with Hibiclens:   Wash your face with water and your regular face wash/soap   Apply Hibiclens directly on your skin or on a wet washcloth and wash gently. When showering: Move away from the shower stream when applying Hibiclens to avoid rinsing off too soon.   Rinse thoroughly with warm water   Do not dilute Hibiclens         Dry off as usual, do not use any deodorant,  powder, body lotions, perfume, after shave or cologne.

## 2019-10-18 ENCOUNTER — PATIENT OUTREACH (OUTPATIENT)
Dept: ADMINISTRATIVE | Facility: OTHER | Age: 49
End: 2019-10-18

## 2019-10-21 NOTE — PROGRESS NOTES
Subjective:       Patient ID: Brittny Urias is a 49 y.o. female.    Chief Complaint: No chief complaint on file.    HPI 49-year-old postmenopausal female who returns for follow-up of a diagnosis of right breast cancer, T1cN1a, ER positive, HER2 negative.  She is on letrozole hormonal therapy.    She had colonoscopy on October 10th with Dr Marin.  That showed some benign tubular adenomas.    Overall, she has been doing well.  She does have some achiness in her arms.  Appetite and bowel function has been stable.  She has no shortness of breath.  Energy level has been good.  She has been trying to exercise with walking and has joined a gym.    She has additional reconstructive surgery schedule on October 30th.      Screening mammogram on August 3, 2018 showed architectural distortion in the retroareolar right breast.  Follow-up diagnostic mammogram on August 18th showed architectural distortion in nipple retraction on right.  Ultrasound showed no definite abnormality.    August 21, 2018 a biopsy was performed which showed infiltrating lobular carcinoma (histologic grade 3, nuclear grade 1, mitotic index 1).  ER was 90% positive, CA was 20% positive and HER2 was 0.  Ki-67 was less than 1%.    MRI of the breast on September 4, 2018 showed a 3.2 x 1.8 x 1.4 cm mass with spiculated margins in the subareolar region of the right breast.  The left breast was unremarkable.    On November 14, 2018 bilateral mastectomies were performed.  The right breast showed a 2 cm intermediate grade carcinoma (3+ 2+ 1) 2 of 3 sentinel lymph nodes were positive with some extracapsular extension.  Completion axillary lymph node dissection showed 0 of 12 additional nodes were involved.  Margins were negative.  The left breast showed no abnormalities.   Mammaprint was low risk at + 0.362    She completed radiation on February 26, 2019.      Letrozole was started in March 2019.      Review of Systems   Constitutional: Negative for activity  change, appetite change, fatigue and unexpected weight change.   Eyes: Negative for visual disturbance.   Respiratory: Negative.  Negative for cough and shortness of breath.    Cardiovascular: Negative for chest pain.   Gastrointestinal: Negative.  Negative for abdominal pain and diarrhea.   Musculoskeletal: Positive for arthralgias (Arms). Negative for back pain.   Neurological: Negative.  Negative for headaches.   Hematological: Negative for adenopathy.   Psychiatric/Behavioral: Negative.  The patient is not nervous/anxious.        Objective:      Physical Exam   Constitutional: She is oriented to person, place, and time. She appears well-developed and well-nourished.   HENT:   Mouth/Throat: No oropharyngeal exudate.   Eyes: No scleral icterus.   Cardiovascular: Normal rate and regular rhythm.   Pulmonary/Chest: Effort normal and breath sounds normal. She has no wheezes.       Abdominal: Soft. She exhibits no mass. There is no tenderness.   Lymphadenopathy:     She has no cervical adenopathy.   Neurological: She is alert and oriented to person, place, and time.   Psychiatric: She has a normal mood and affect. Her behavior is normal. Thought content normal.   Vitals reviewed.      Assessment:      1. Malignant neoplasm of central portion of right breast in female, estrogen receptor positive        Plan:       Continue current endocrine therapy and return in 3 months.  I discussed follow-up and provided her with her completion of treatment summary.

## 2019-10-22 ENCOUNTER — OFFICE VISIT (OUTPATIENT)
Dept: PLASTIC SURGERY | Facility: CLINIC | Age: 49
End: 2019-10-22
Payer: COMMERCIAL

## 2019-10-22 ENCOUNTER — OFFICE VISIT (OUTPATIENT)
Dept: HEMATOLOGY/ONCOLOGY | Facility: CLINIC | Age: 49
End: 2019-10-22
Payer: COMMERCIAL

## 2019-10-22 VITALS
WEIGHT: 230.81 LBS | RESPIRATION RATE: 16 BRPM | HEART RATE: 72 BPM | HEIGHT: 62 IN | OXYGEN SATURATION: 95 % | DIASTOLIC BLOOD PRESSURE: 97 MMHG | BODY MASS INDEX: 42.47 KG/M2 | TEMPERATURE: 98 F | SYSTOLIC BLOOD PRESSURE: 161 MMHG

## 2019-10-22 VITALS
BODY MASS INDEX: 42.47 KG/M2 | DIASTOLIC BLOOD PRESSURE: 95 MMHG | HEIGHT: 62 IN | HEART RATE: 70 BPM | WEIGHT: 230.81 LBS | SYSTOLIC BLOOD PRESSURE: 160 MMHG

## 2019-10-22 DIAGNOSIS — C50.111 MALIGNANT NEOPLASM OF CENTRAL PORTION OF RIGHT BREAST IN FEMALE, ESTROGEN RECEPTOR POSITIVE: Primary | ICD-10-CM

## 2019-10-22 DIAGNOSIS — Z51.89 AFTERCARE: Primary | ICD-10-CM

## 2019-10-22 DIAGNOSIS — Z17.0 MALIGNANT NEOPLASM OF CENTRAL PORTION OF RIGHT BREAST IN FEMALE, ESTROGEN RECEPTOR POSITIVE: Primary | ICD-10-CM

## 2019-10-22 PROCEDURE — 99999 PR PBB SHADOW E&M-EST. PATIENT-LVL IV: CPT | Mod: PBBFAC,,, | Performed by: INTERNAL MEDICINE

## 2019-10-22 PROCEDURE — 99499 UNLISTED E&M SERVICE: CPT | Mod: S$GLB,,, | Performed by: PLASTIC SURGERY

## 2019-10-22 PROCEDURE — 3008F BODY MASS INDEX DOCD: CPT | Mod: CPTII,S$GLB,, | Performed by: INTERNAL MEDICINE

## 2019-10-22 PROCEDURE — 99499 NO LOS: ICD-10-PCS | Mod: S$GLB,,, | Performed by: PLASTIC SURGERY

## 2019-10-22 PROCEDURE — 3077F SYST BP >= 140 MM HG: CPT | Mod: CPTII,S$GLB,, | Performed by: INTERNAL MEDICINE

## 2019-10-22 PROCEDURE — 99999 PR PBB SHADOW E&M-EST. PATIENT-LVL IV: ICD-10-PCS | Mod: PBBFAC,,, | Performed by: INTERNAL MEDICINE

## 2019-10-22 PROCEDURE — 99213 OFFICE O/P EST LOW 20 MIN: CPT | Mod: S$GLB,,, | Performed by: INTERNAL MEDICINE

## 2019-10-22 PROCEDURE — 99213 PR OFFICE/OUTPT VISIT, EST, LEVL III, 20-29 MIN: ICD-10-PCS | Mod: S$GLB,,, | Performed by: INTERNAL MEDICINE

## 2019-10-22 PROCEDURE — 3077F PR MOST RECENT SYSTOLIC BLOOD PRESSURE >= 140 MM HG: ICD-10-PCS | Mod: CPTII,S$GLB,, | Performed by: INTERNAL MEDICINE

## 2019-10-22 PROCEDURE — 3080F DIAST BP >= 90 MM HG: CPT | Mod: CPTII,S$GLB,, | Performed by: INTERNAL MEDICINE

## 2019-10-22 PROCEDURE — 3080F PR MOST RECENT DIASTOLIC BLOOD PRESSURE >= 90 MM HG: ICD-10-PCS | Mod: CPTII,S$GLB,, | Performed by: INTERNAL MEDICINE

## 2019-10-22 PROCEDURE — 3008F PR BODY MASS INDEX (BMI) DOCUMENTED: ICD-10-PCS | Mod: CPTII,S$GLB,, | Performed by: INTERNAL MEDICINE

## 2019-10-22 RX ORDER — BETAMETHASONE DIPROPIONATE 0.5 MG/G
CREAM TOPICAL
Refills: 2 | COMMUNITY
Start: 2019-07-26

## 2019-10-22 RX ORDER — USTEKINUMAB 90 MG/ML
INJECTION, SOLUTION SUBCUTANEOUS
COMMUNITY
Start: 2019-07-25 | End: 2020-09-11

## 2019-10-22 RX ORDER — CLOBETASOL PROPIONATE 0.05 G/100ML
SHAMPOO TOPICAL
Refills: 3 | COMMUNITY
Start: 2019-07-25

## 2019-10-22 NOTE — H&P (VIEW-ONLY)
PREOPERATIVE HISTORY AND PHYSICAL    Chief Complaint:  No chief complaint on file.    PCP: Charmaine Sargent MD    HPI  History from chart, patient  Brittny Urias is a 49 y.o. female  S/p bilateral MEGAN followed by XRT to her right breast.  She has now completed therapies, remains on letrazole and has started Stelara for her psoriasis, dosed every 12 weeks, her last dose in August.  She presents today for planned second stage revision next week, plan for fat grafting to the right breast, revision of abdominal scar, left mastopexy and bilateral nipple areola reconstruction.  Will hold letrazole 5 days prior to surgery, photos completed a few weeks ago.  Consents signed today, all questions answered.    [facility]  Patient Active Problem List    Diagnosis Date Noted    Aftercare 06/05/2019    History of mastectomy, left 02/26/2019    History of reconstruction of left breast 02/26/2019    History of radiation therapy 02/26/2019    Cancer of central portion of right female breast 12/13/2018    Class 2 obesity with serious comorbidity and body mass index (BMI) of 38.0 to 38.9 in adult 02/28/2018    Psoriasis 06/26/2017    Depressed mood 06/23/2017    Perimenopausal 06/23/2017     There are no hospital problems to display for this patient.      PSH  Past Surgical History:   Procedure Laterality Date    COLONOSCOPY N/A 10/10/2019    Procedure: COLONOSCOPY;  Surgeon: Matt Marin MD;  Location: [facility];  Service: Endoscopy;  Laterality: N/A;    DILATION AND CURETTAGE OF UTERUS      HYSTERECTOMY      MASTECTOMY Bilateral 11/14/2018    Procedure: MASTECTOMY;  Surgeon: Marga Cardenas MD;  Location: [facility] OR;  Service: Plastics;  Laterality: Bilateral;    MASTECTOMY WITH SENTINEL NODE BIOPSY AND AXILLARY LYMPH NODE DISSECTION Right 11/14/2018    Procedure: SENTINEL NODE BIOPSY AND AXILLARY LYMPHADENECTOMY;  Surgeon: Marga Cardenas MD;  Location: [facility] OR;  Service: Plastics;  Laterality: Right;     RECONSTRUCTION OF BREAST WITH DEEP INFERIOR EPIGASTRIC ARTERY  (MEGAN) FREE FLAP Bilateral 2018    Procedure: RECONSTRUCTION, BREAST, USING MEGAN FREE FLAP;  Surgeon: Tye Chapman MD;  Location: [facility];  Service: Plastics;  Laterality: Bilateral;       FHx  Family History   Problem Relation Age of Onset    Diabetes Mother     Hypertension Mother     Hypertension Brother     Cancer Paternal Uncle        OB-Hx  OB History        3    Para   3    Term   3            AB        Living   3       SAB        TAB        Ectopic        Multiple        Live Births   3                 MEDICATIONS  Current Outpatient Medications   Medication Sig Dispense Refill    betamethasone dipropionate (DIPROLENE) 0.05 % cream APPLY TWICE DAILY TO ALL AFFECTED AREAS.  2    cetirizine (ZYRTEC) 10 MG tablet Take 1 tablet (10 mg total) by mouth daily as needed for Allergies (sneezing). 30 tablet 0    clobetasol (CLOBEX) 0.05 % shampoo USE TOPICALLY 3 TIMES WEEKLY  3    hydroCHLOROthiazide (HYDRODIURIL) 12.5 MG Tab Take 1 tablet (12.5 mg total) by mouth once daily. 90 tablet 2    letrozole (FEMARA) 2.5 mg Tab Take 2.5 mg by mouth once daily.      STELARA 90 mg/mL Syrg syringe       epinephrine (EPIPEN) 0.3 mg/0.3 mL AtIn Inject 0.3 mLs (0.3 mg total) into the muscle as needed. 2 Device 0     No current facility-administered medications for this visit.        ALLERGIES Review of patient's allergies indicates:  No Known Allergies    SOCIAL HISTORY  Tobacco:   Social History     Tobacco Use   Smoking Status Never Smoker   Smokeless Tobacco Never Used     EtOH:   Social History     Substance and Sexual Activity   Alcohol Use Yes    Alcohol/week: 3.0 standard drinks    Types: 3 Glasses of wine per week    Frequency: 2-4 times a month    Drinks per session: 1 or 2    Binge frequency: Never    Comment: social     Drugs:   Social History     Substance and Sexual Activity   Drug Use No  "    ALLERGIES  Review of patient's allergies indicates:  No Known Allergies    REVIEW OF SYSTEMS:  CONSTITUTIONAL: negative for fatigue, chills, fever, weight gain, weight loss  INTEGUMENTARY: negative for rash, pruritis, skin lesions  HENT: negative for congestion, hoarseness, hearing loss, and sore throat  PULMONARY: negative cough, productive sputum, wheezing, shortness of breath  CARDIAC: negative chest pain, palpitations, dyspnea on exertion, orthopnea  GENITOURINARY: negative for dysuria, bloody urine, discharge  ENDOCRINE: negative excessive thirst, frequent urination, unexplained weight loss  HEMATOPOIEOTIC:negative bruising, enlarged lymph nodes, prolonged bleeding  NEUROLOGIC: negative dizziness, loss of consciousness, numbness  PSYCHIATRIC: negative for depression, hearing voices, anxiety    PHYSICAL EXAMINATION  GENERAL APPEARANCE  BP (!) 160/95   Pulse 70   Ht 5' 2" (1.575 m)   Wt 104.7 kg (230 lb 13.2 oz)   BMI 42.22 kg/m²   Well developed, well nourished in no acute distress.    INTEGUMENT  Skin is warm and dry. No erythema, rash.    PSYCHIATRIC  Affect normal.    HEENT  Normocephalic, atraumatic.  Extraocular motions normal. PERRL  Neck: Neck supple.    BREAST  Asymmetry , left breast larger than right  IMF 22 cm from sternal notch    Right Breast  Masses: none   SN- N: 23 cm  Nipple to inframammary fold: 8 cm   Lateral axillary, breast tail fullness moderate  Tattoos on/around breast: no   Lymphadenopathy: absent axillary, supraclavicular fossae  Scars: depressed circumareolar scar    Left Breast  Masses: none   SN- N: 24 cm  Nipple to inframammary fold: 10 cm   Lateral axillary, breast tail fullness moderate  Tattoos on/around breast: no   Lymphadenopathy: absent axillary, supraclavicular fossae  Scars: circumareolar, vertical    LUNGS  Clear, no wheezing, crackles, rhonchi    CARDIOVASCULAR  Regular rate    ABDOMEN  Soft, nontender  Abdominoplasty healed, depressed in midline  Organomegaly " absent  Hernias absent    EXTREMITIES  Peripheral edema absent  Pedal, wrist pulses present    LABS  Lab Results   Component Value Date    WBC 3.72 (L) 10/17/2019    HGB 13.1 10/17/2019    HCT 42.6 10/17/2019    MCV 72 (L) 10/17/2019     10/17/2019         Lab Results   Component Value Date     10/17/2019    K 4.0 10/17/2019     10/17/2019    CO2 28 10/17/2019     Lab Results   Component Value Date    BUN 16 10/17/2019     Lab Results   Component Value Date    CREATININE 0.8 10/17/2019     Lab Results   Component Value Date    ALBUMIN 4.0 11/01/2018     Lab Results   Component Value Date    HGBA1C 5.6 02/28/2018       ASSESSMENT  Encounter Diagnoses   Name Primary?    Aftercare Yes       INFORMED CONSENT  The proposed procedure, any treatment options, expected and possible outcomes including complications, any necessary perioperative medicinal and activity restrictions has, expected recovery and potential disability were fully reviewed with the patient. Permission to obtain photographs before, during, and after surgery was also granted. An opportunity to ask questions was given, and written informed consent was given to proceed. This Informed Consent discussion took place prior to the signing the consent form.    Procedure planned: Bilateral revision of breast reconstruction, left mastopexy, bilateral nipple areola reconstruction, fat grafting to breast, abdominal scar revision    Risks of the procedure:  asymmetry  fat necrosis  seroma  partial or complete flap failure  partial or complete nipple necrosis  contour deformity  incomplete correction, failure to correct problem, worsening of problem  poor cosmetic outcome  need for further surgery  bleeding  infection  thick/poor scarring  wound separation, failure to heal  disability  pain  numbness  problems with anesthesia  blood clot, pulmonary embolus  stroke, heart attack, death    PLAN  Procedure: Bilateral revision of breast reconstruction,  left mastopexy, bilateral nipple areola reconstruction, fat grafting to breast, abdominal scar revision  Status: Hospital ambulatory surgery  Anesthesia: General  Abx: Ancef IV pre-op  DVT prophylaxis: SCDs  Labs: reviewed  Equipment: none specific  Pre-op consults/clearance: none  Medications and luke-op instructions reviewed    Electronically signed by:  Tye Chapman  10/22/2019  10:25 AM

## 2019-10-22 NOTE — H&P
PREOPERATIVE HISTORY AND PHYSICAL    Chief Complaint:  No chief complaint on file.    PCP: Charmaine Sargent MD    HPI  History from chart, patient  Brittny Urias is a 49 y.o. female  S/p bilateral MEGAN followed by XRT to her right breast.  She has now completed therapies, remains on letrazole and has started Stelara for her psoriasis, dosed every 12 weeks, her last dose in August.  She presents today for planned second stage revision next week, plan for fat grafting to the right breast, revision of abdominal scar, left mastopexy and bilateral nipple areola reconstruction.  Will hold letrazole 5 days prior to surgery, photos completed a few weeks ago.  Consents signed today, all questions answered.    [facility]  Patient Active Problem List    Diagnosis Date Noted    Aftercare 06/05/2019    History of mastectomy, left 02/26/2019    History of reconstruction of left breast 02/26/2019    History of radiation therapy 02/26/2019    Cancer of central portion of right female breast 12/13/2018    Class 2 obesity with serious comorbidity and body mass index (BMI) of 38.0 to 38.9 in adult 02/28/2018    Psoriasis 06/26/2017    Depressed mood 06/23/2017    Perimenopausal 06/23/2017     There are no hospital problems to display for this patient.      PSH  Past Surgical History:   Procedure Laterality Date    COLONOSCOPY N/A 10/10/2019    Procedure: COLONOSCOPY;  Surgeon: Matt Marin MD;  Location: [facility];  Service: Endoscopy;  Laterality: N/A;    DILATION AND CURETTAGE OF UTERUS      HYSTERECTOMY      MASTECTOMY Bilateral 11/14/2018    Procedure: MASTECTOMY;  Surgeon: Marga Cardenas MD;  Location: [facility] OR;  Service: Plastics;  Laterality: Bilateral;    MASTECTOMY WITH SENTINEL NODE BIOPSY AND AXILLARY LYMPH NODE DISSECTION Right 11/14/2018    Procedure: SENTINEL NODE BIOPSY AND AXILLARY LYMPHADENECTOMY;  Surgeon: Marga Cardenas MD;  Location: [facility] OR;  Service: Plastics;  Laterality: Right;     RECONSTRUCTION OF BREAST WITH DEEP INFERIOR EPIGASTRIC ARTERY  (MEGAN) FREE FLAP Bilateral 2018    Procedure: RECONSTRUCTION, BREAST, USING MEGAN FREE FLAP;  Surgeon: Tye Chapman MD;  Location: [facility];  Service: Plastics;  Laterality: Bilateral;       FHx  Family History   Problem Relation Age of Onset    Diabetes Mother     Hypertension Mother     Hypertension Brother     Cancer Paternal Uncle        OB-Hx  OB History        3    Para   3    Term   3            AB        Living   3       SAB        TAB        Ectopic        Multiple        Live Births   3                 MEDICATIONS  Current Outpatient Medications   Medication Sig Dispense Refill    betamethasone dipropionate (DIPROLENE) 0.05 % cream APPLY TWICE DAILY TO ALL AFFECTED AREAS.  2    cetirizine (ZYRTEC) 10 MG tablet Take 1 tablet (10 mg total) by mouth daily as needed for Allergies (sneezing). 30 tablet 0    clobetasol (CLOBEX) 0.05 % shampoo USE TOPICALLY 3 TIMES WEEKLY  3    hydroCHLOROthiazide (HYDRODIURIL) 12.5 MG Tab Take 1 tablet (12.5 mg total) by mouth once daily. 90 tablet 2    letrozole (FEMARA) 2.5 mg Tab Take 2.5 mg by mouth once daily.      STELARA 90 mg/mL Syrg syringe       epinephrine (EPIPEN) 0.3 mg/0.3 mL AtIn Inject 0.3 mLs (0.3 mg total) into the muscle as needed. 2 Device 0     No current facility-administered medications for this visit.        ALLERGIES Review of patient's allergies indicates:  No Known Allergies    SOCIAL HISTORY  Tobacco:   Social History     Tobacco Use   Smoking Status Never Smoker   Smokeless Tobacco Never Used     EtOH:   Social History     Substance and Sexual Activity   Alcohol Use Yes    Alcohol/week: 3.0 standard drinks    Types: 3 Glasses of wine per week    Frequency: 2-4 times a month    Drinks per session: 1 or 2    Binge frequency: Never    Comment: social     Drugs:   Social History     Substance and Sexual Activity   Drug Use No  "    ALLERGIES  Review of patient's allergies indicates:  No Known Allergies    REVIEW OF SYSTEMS:  CONSTITUTIONAL: negative for fatigue, chills, fever, weight gain, weight loss  INTEGUMENTARY: negative for rash, pruritis, skin lesions  HENT: negative for congestion, hoarseness, hearing loss, and sore throat  PULMONARY: negative cough, productive sputum, wheezing, shortness of breath  CARDIAC: negative chest pain, palpitations, dyspnea on exertion, orthopnea  GENITOURINARY: negative for dysuria, bloody urine, discharge  ENDOCRINE: negative excessive thirst, frequent urination, unexplained weight loss  HEMATOPOIEOTIC:negative bruising, enlarged lymph nodes, prolonged bleeding  NEUROLOGIC: negative dizziness, loss of consciousness, numbness  PSYCHIATRIC: negative for depression, hearing voices, anxiety    PHYSICAL EXAMINATION  GENERAL APPEARANCE  BP (!) 160/95   Pulse 70   Ht 5' 2" (1.575 m)   Wt 104.7 kg (230 lb 13.2 oz)   BMI 42.22 kg/m²   Well developed, well nourished in no acute distress.    INTEGUMENT  Skin is warm and dry. No erythema, rash.    PSYCHIATRIC  Affect normal.    HEENT  Normocephalic, atraumatic.  Extraocular motions normal. PERRL  Neck: Neck supple.    BREAST  Asymmetry , left breast larger than right  IMF 22 cm from sternal notch    Right Breast  Masses: none   SN- N: 23 cm  Nipple to inframammary fold: 8 cm   Lateral axillary, breast tail fullness moderate  Tattoos on/around breast: no   Lymphadenopathy: absent axillary, supraclavicular fossae  Scars: depressed circumareolar scar    Left Breast  Masses: none   SN- N: 24 cm  Nipple to inframammary fold: 10 cm   Lateral axillary, breast tail fullness moderate  Tattoos on/around breast: no   Lymphadenopathy: absent axillary, supraclavicular fossae  Scars: circumareolar, vertical    LUNGS  Clear, no wheezing, crackles, rhonchi    CARDIOVASCULAR  Regular rate    ABDOMEN  Soft, nontender  Abdominoplasty healed, depressed in midline  Organomegaly " absent  Hernias absent    EXTREMITIES  Peripheral edema absent  Pedal, wrist pulses present    LABS  Lab Results   Component Value Date    WBC 3.72 (L) 10/17/2019    HGB 13.1 10/17/2019    HCT 42.6 10/17/2019    MCV 72 (L) 10/17/2019     10/17/2019         Lab Results   Component Value Date     10/17/2019    K 4.0 10/17/2019     10/17/2019    CO2 28 10/17/2019     Lab Results   Component Value Date    BUN 16 10/17/2019     Lab Results   Component Value Date    CREATININE 0.8 10/17/2019     Lab Results   Component Value Date    ALBUMIN 4.0 11/01/2018     Lab Results   Component Value Date    HGBA1C 5.6 02/28/2018       ASSESSMENT  Encounter Diagnoses   Name Primary?    Aftercare Yes       INFORMED CONSENT  The proposed procedure, any treatment options, expected and possible outcomes including complications, any necessary perioperative medicinal and activity restrictions has, expected recovery and potential disability were fully reviewed with the patient. Permission to obtain photographs before, during, and after surgery was also granted. An opportunity to ask questions was given, and written informed consent was given to proceed. This Informed Consent discussion took place prior to the signing the consent form.    Procedure planned: Bilateral revision of breast reconstruction, left mastopexy, bilateral nipple areola reconstruction, fat grafting to breast, abdominal scar revision    Risks of the procedure:  asymmetry  fat necrosis  seroma  partial or complete flap failure  partial or complete nipple necrosis  contour deformity  incomplete correction, failure to correct problem, worsening of problem  poor cosmetic outcome  need for further surgery  bleeding  infection  thick/poor scarring  wound separation, failure to heal  disability  pain  numbness  problems with anesthesia  blood clot, pulmonary embolus  stroke, heart attack, death    PLAN  Procedure: Bilateral revision of breast reconstruction,  left mastopexy, bilateral nipple areola reconstruction, fat grafting to breast, abdominal scar revision  Status: Hospital ambulatory surgery  Anesthesia: General  Abx: Ancef IV pre-op  DVT prophylaxis: SCDs  Labs: reviewed  Equipment: none specific  Pre-op consults/clearance: none  Medications and luke-op instructions reviewed    Electronically signed by:  Tye Chapman  10/22/2019  10:25 AM

## 2019-10-30 ENCOUNTER — ANESTHESIA (OUTPATIENT)
Dept: SURGERY | Facility: OTHER | Age: 49
End: 2019-10-30
Payer: COMMERCIAL

## 2019-10-30 ENCOUNTER — HOSPITAL ENCOUNTER (OUTPATIENT)
Facility: OTHER | Age: 49
Discharge: HOME | End: 2019-10-31
Attending: PLASTIC SURGERY | Admitting: PLASTIC SURGERY
Payer: COMMERCIAL

## 2019-10-30 DIAGNOSIS — Z51.89 AFTERCARE: ICD-10-CM

## 2019-10-30 DIAGNOSIS — C50.111 MALIGNANT NEOPLASM OF CENTRAL PORTION OF RIGHT BREAST IN FEMALE, ESTROGEN RECEPTOR POSITIVE: ICD-10-CM

## 2019-10-30 DIAGNOSIS — Z17.0 MALIGNANT NEOPLASM OF CENTRAL PORTION OF RIGHT BREAST IN FEMALE, ESTROGEN RECEPTOR POSITIVE: ICD-10-CM

## 2019-10-30 DIAGNOSIS — Z98.890 S/P BREAST RECONSTRUCTION, BILATERAL: Primary | ICD-10-CM

## 2019-10-30 PROCEDURE — 71000039 HC RECOVERY, EACH ADD'L HOUR: Performed by: PLASTIC SURGERY

## 2019-10-30 PROCEDURE — 25000003 PHARM REV CODE 250: Performed by: NURSE ANESTHETIST, CERTIFIED REGISTERED

## 2019-10-30 PROCEDURE — 37000009 HC ANESTHESIA EA ADD 15 MINS: Performed by: PLASTIC SURGERY

## 2019-10-30 PROCEDURE — 25000003 PHARM REV CODE 250: Performed by: ANESTHESIOLOGY

## 2019-10-30 PROCEDURE — 71000033 HC RECOVERY, INTIAL HOUR: Performed by: PLASTIC SURGERY

## 2019-10-30 PROCEDURE — 63600175 PHARM REV CODE 636 W HCPCS: Performed by: ANESTHESIOLOGY

## 2019-10-30 PROCEDURE — C1781 MESH (IMPLANTABLE): HCPCS | Performed by: PLASTIC SURGERY

## 2019-10-30 PROCEDURE — 12037 PR LAYR CLOS WND TRUNK,ARM,LEG >30 CM: ICD-10-PCS | Mod: 59,,, | Performed by: PLASTIC SURGERY

## 2019-10-30 PROCEDURE — 12037 INTMD RPR S/TR/EXT >30.0 CM: CPT | Mod: 59,,, | Performed by: PLASTIC SURGERY

## 2019-10-30 PROCEDURE — 63600175 PHARM REV CODE 636 W HCPCS: Performed by: NURSE ANESTHETIST, CERTIFIED REGISTERED

## 2019-10-30 PROCEDURE — 0437T PR INSERT NON-BIO/SYN IMPLANT, FASCIAL REINFORCEMENTOF ABDOMINAL WALL: ICD-10-PCS | Mod: ,,, | Performed by: PLASTIC SURGERY

## 2019-10-30 PROCEDURE — 63600175 PHARM REV CODE 636 W HCPCS: Performed by: PLASTIC SURGERY

## 2019-10-30 PROCEDURE — 19380 REVJ RECONSTRUCTED BREAST: CPT | Mod: LT,,, | Performed by: PLASTIC SURGERY

## 2019-10-30 PROCEDURE — 20926 PR REMV TISSUE FOR GRAFT OTHR: CPT | Mod: 59,,, | Performed by: PLASTIC SURGERY

## 2019-10-30 PROCEDURE — 20926 PR REMV TISSUE FOR GRAFT OTHR: ICD-10-PCS | Mod: 59,,, | Performed by: PLASTIC SURGERY

## 2019-10-30 PROCEDURE — 19350 PR NIPPLE/AREOLA RECONSTRUCTION: ICD-10-PCS | Mod: 50,51,, | Performed by: PLASTIC SURGERY

## 2019-10-30 PROCEDURE — 19380 PR REVISE BREAST RECONSTRUCTION: ICD-10-PCS | Mod: LT,,, | Performed by: PLASTIC SURGERY

## 2019-10-30 PROCEDURE — 94799 UNLISTED PULMONARY SVC/PX: CPT

## 2019-10-30 PROCEDURE — 63600175 PHARM REV CODE 636 W HCPCS: Performed by: STUDENT IN AN ORGANIZED HEALTH CARE EDUCATION/TRAINING PROGRAM

## 2019-10-30 PROCEDURE — 11406 PR EXC SKIN BENIG >4 CM TRUNK,ARM,LEG: ICD-10-PCS | Mod: 51,,, | Performed by: PLASTIC SURGERY

## 2019-10-30 PROCEDURE — 88305 TISSUE EXAM BY PATHOLOGIST: CPT | Mod: 26,,, | Performed by: PATHOLOGY

## 2019-10-30 PROCEDURE — 0437T IMPLTJ SYNTH RNFCMT ABDL WAL: CPT | Mod: ,,, | Performed by: PLASTIC SURGERY

## 2019-10-30 PROCEDURE — 36000707: Performed by: PLASTIC SURGERY

## 2019-10-30 PROCEDURE — 25000003 PHARM REV CODE 250: Performed by: PLASTIC SURGERY

## 2019-10-30 PROCEDURE — 36000706: Performed by: PLASTIC SURGERY

## 2019-10-30 PROCEDURE — 25000003 PHARM REV CODE 250: Performed by: STUDENT IN AN ORGANIZED HEALTH CARE EDUCATION/TRAINING PROGRAM

## 2019-10-30 PROCEDURE — 19350 NIPPLE/AREOLA RECONSTRUCTION: CPT | Mod: 50,51,, | Performed by: PLASTIC SURGERY

## 2019-10-30 PROCEDURE — 88305 TISSUE SPECIMEN TO PATHOLOGY - SURGERY: ICD-10-PCS | Mod: 26,,, | Performed by: PATHOLOGY

## 2019-10-30 PROCEDURE — 37000008 HC ANESTHESIA 1ST 15 MINUTES: Performed by: PLASTIC SURGERY

## 2019-10-30 PROCEDURE — P9045 ALBUMIN (HUMAN), 5%, 250 ML: HCPCS | Mod: JG | Performed by: NURSE ANESTHETIST, CERTIFIED REGISTERED

## 2019-10-30 PROCEDURE — 11406 EXC TR-EXT B9+MARG >4.0 CM: CPT | Mod: 51,,, | Performed by: PLASTIC SURGERY

## 2019-10-30 PROCEDURE — 94761 N-INVAS EAR/PLS OXIMETRY MLT: CPT

## 2019-10-30 PROCEDURE — 88305 TISSUE EXAM BY PATHOLOGIST: CPT | Performed by: PATHOLOGY

## 2019-10-30 DEVICE — IMPLANTABLE DEVICE: Type: IMPLANTABLE DEVICE | Site: ABDOMEN | Status: FUNCTIONAL

## 2019-10-30 RX ORDER — HEPARIN SODIUM 5000 [USP'U]/ML
5000 INJECTION, SOLUTION INTRAVENOUS; SUBCUTANEOUS EVERY 8 HOURS
Status: DISCONTINUED | OUTPATIENT
Start: 2019-10-30 | End: 2019-10-31 | Stop reason: HOSPADM

## 2019-10-30 RX ORDER — ONDANSETRON 2 MG/ML
INJECTION INTRAMUSCULAR; INTRAVENOUS
Status: DISCONTINUED | OUTPATIENT
Start: 2019-10-30 | End: 2019-10-30

## 2019-10-30 RX ORDER — OXYCODONE HYDROCHLORIDE 5 MG/1
5 TABLET ORAL
Status: DISCONTINUED | OUTPATIENT
Start: 2019-10-30 | End: 2019-10-30 | Stop reason: HOSPADM

## 2019-10-30 RX ORDER — HYDROCODONE BITARTRATE AND ACETAMINOPHEN 10; 325 MG/1; MG/1
1 TABLET ORAL EVERY 4 HOURS PRN
Status: DISCONTINUED | OUTPATIENT
Start: 2019-10-30 | End: 2019-10-31 | Stop reason: HOSPADM

## 2019-10-30 RX ORDER — CEFAZOLIN SODIUM 1 G/3ML
2 INJECTION, POWDER, FOR SOLUTION INTRAMUSCULAR; INTRAVENOUS
Status: COMPLETED | OUTPATIENT
Start: 2019-10-30 | End: 2019-10-30

## 2019-10-30 RX ORDER — CEPHALEXIN 500 MG/1
500 CAPSULE ORAL EVERY 6 HOURS
Qty: 56 CAPSULE | Refills: 0 | Status: SHIPPED | OUTPATIENT
Start: 2019-10-30 | End: 2019-11-13

## 2019-10-30 RX ORDER — SODIUM CHLORIDE 0.9 % (FLUSH) 0.9 %
10 SYRINGE (ML) INJECTION
Status: DISCONTINUED | OUTPATIENT
Start: 2019-10-30 | End: 2019-10-31 | Stop reason: HOSPADM

## 2019-10-30 RX ORDER — MIDAZOLAM HYDROCHLORIDE 1 MG/ML
INJECTION INTRAMUSCULAR; INTRAVENOUS
Status: DISCONTINUED | OUTPATIENT
Start: 2019-10-30 | End: 2019-10-30

## 2019-10-30 RX ORDER — ONDANSETRON 2 MG/ML
4 INJECTION INTRAMUSCULAR; INTRAVENOUS DAILY PRN
Status: DISCONTINUED | OUTPATIENT
Start: 2019-10-30 | End: 2019-10-30 | Stop reason: HOSPADM

## 2019-10-30 RX ORDER — HYDROCODONE BITARTRATE AND ACETAMINOPHEN 5; 325 MG/1; MG/1
1 TABLET ORAL EVERY 4 HOURS PRN
Status: DISCONTINUED | OUTPATIENT
Start: 2019-10-30 | End: 2019-10-31 | Stop reason: HOSPADM

## 2019-10-30 RX ORDER — PREGABALIN 75 MG/1
75 CAPSULE ORAL ONCE
Status: COMPLETED | OUTPATIENT
Start: 2019-10-30 | End: 2019-10-30

## 2019-10-30 RX ORDER — CEFAZOLIN SODIUM 2 G/50ML
2 SOLUTION INTRAVENOUS
Status: DISCONTINUED | OUTPATIENT
Start: 2019-10-30 | End: 2019-10-31 | Stop reason: HOSPADM

## 2019-10-30 RX ORDER — MEPERIDINE HYDROCHLORIDE 25 MG/ML
12.5 INJECTION INTRAMUSCULAR; INTRAVENOUS; SUBCUTANEOUS ONCE AS NEEDED
Status: DISCONTINUED | OUTPATIENT
Start: 2019-10-30 | End: 2019-10-30 | Stop reason: HOSPADM

## 2019-10-30 RX ORDER — IBUPROFEN 600 MG/1
600 TABLET ORAL EVERY 6 HOURS PRN
Status: DISCONTINUED | OUTPATIENT
Start: 2019-10-30 | End: 2019-10-31 | Stop reason: HOSPADM

## 2019-10-30 RX ORDER — FENTANYL CITRATE 50 UG/ML
INJECTION, SOLUTION INTRAMUSCULAR; INTRAVENOUS
Status: DISCONTINUED | OUTPATIENT
Start: 2019-10-30 | End: 2019-10-30

## 2019-10-30 RX ORDER — HYDROCODONE BITARTRATE AND ACETAMINOPHEN 5; 325 MG/1; MG/1
1 TABLET ORAL EVERY 6 HOURS PRN
Qty: 15 TABLET | Refills: 0 | Status: SHIPPED | OUTPATIENT
Start: 2019-10-30 | End: 2019-11-06

## 2019-10-30 RX ORDER — ROCURONIUM BROMIDE 10 MG/ML
INJECTION, SOLUTION INTRAVENOUS
Status: DISCONTINUED | OUTPATIENT
Start: 2019-10-30 | End: 2019-10-30

## 2019-10-30 RX ORDER — LIDOCAINE HCL/PF 100 MG/5ML
SYRINGE (ML) INTRAVENOUS
Status: DISCONTINUED | OUTPATIENT
Start: 2019-10-30 | End: 2019-10-30

## 2019-10-30 RX ORDER — ONDANSETRON 8 MG/1
8 TABLET, ORALLY DISINTEGRATING ORAL EVERY 8 HOURS PRN
Status: DISCONTINUED | OUTPATIENT
Start: 2019-10-30 | End: 2019-10-31 | Stop reason: HOSPADM

## 2019-10-30 RX ORDER — ONDANSETRON HYDROCHLORIDE 2 MG/ML
INJECTION, SOLUTION INTRAMUSCULAR; INTRAVENOUS
Status: DISCONTINUED | OUTPATIENT
Start: 2019-10-30 | End: 2019-10-30

## 2019-10-30 RX ORDER — SODIUM CHLORIDE 0.9 % (FLUSH) 0.9 %
3 SYRINGE (ML) INJECTION
Status: DISCONTINUED | OUTPATIENT
Start: 2019-10-30 | End: 2019-10-31 | Stop reason: HOSPADM

## 2019-10-30 RX ORDER — HYDROCHLOROTHIAZIDE 12.5 MG/1
12.5 TABLET ORAL DAILY
Status: DISCONTINUED | OUTPATIENT
Start: 2019-10-30 | End: 2019-10-31 | Stop reason: HOSPADM

## 2019-10-30 RX ORDER — DEXAMETHASONE SODIUM PHOSPHATE 4 MG/ML
INJECTION, SOLUTION INTRA-ARTICULAR; INTRALESIONAL; INTRAMUSCULAR; INTRAVENOUS; SOFT TISSUE
Status: DISCONTINUED | OUTPATIENT
Start: 2019-10-30 | End: 2019-10-30

## 2019-10-30 RX ORDER — GLYCOPYRROLATE 0.2 MG/ML
INJECTION INTRAMUSCULAR; INTRAVENOUS
Status: DISCONTINUED | OUTPATIENT
Start: 2019-10-30 | End: 2019-10-30

## 2019-10-30 RX ORDER — HYDROMORPHONE HYDROCHLORIDE 2 MG/ML
0.4 INJECTION, SOLUTION INTRAMUSCULAR; INTRAVENOUS; SUBCUTANEOUS EVERY 5 MIN PRN
Status: DISCONTINUED | OUTPATIENT
Start: 2019-10-30 | End: 2019-10-30 | Stop reason: HOSPADM

## 2019-10-30 RX ORDER — SODIUM CHLORIDE, SODIUM LACTATE, POTASSIUM CHLORIDE, CALCIUM CHLORIDE 600; 310; 30; 20 MG/100ML; MG/100ML; MG/100ML; MG/100ML
INJECTION, SOLUTION INTRAVENOUS CONTINUOUS
Status: DISCONTINUED | OUTPATIENT
Start: 2019-10-30 | End: 2019-10-31 | Stop reason: HOSPADM

## 2019-10-30 RX ORDER — ACETAMINOPHEN 500 MG
1000 TABLET ORAL
Status: COMPLETED | OUTPATIENT
Start: 2019-10-30 | End: 2019-10-30

## 2019-10-30 RX ORDER — LIDOCAINE HYDROCHLORIDE 10 MG/ML
1 INJECTION, SOLUTION EPIDURAL; INFILTRATION; INTRACAUDAL; PERINEURAL ONCE
Status: DISCONTINUED | OUTPATIENT
Start: 2019-10-30 | End: 2019-10-30 | Stop reason: HOSPADM

## 2019-10-30 RX ORDER — NEOSTIGMINE METHYLSULFATE 1 MG/ML
INJECTION, SOLUTION INTRAVENOUS
Status: DISCONTINUED | OUTPATIENT
Start: 2019-10-30 | End: 2019-10-30

## 2019-10-30 RX ORDER — BACITRACIN 50000 [IU]/1
INJECTION, POWDER, FOR SOLUTION INTRAMUSCULAR
Status: DISCONTINUED | OUTPATIENT
Start: 2019-10-30 | End: 2019-10-30 | Stop reason: HOSPADM

## 2019-10-30 RX ORDER — ALBUMIN HUMAN 50 G/1000ML
SOLUTION INTRAVENOUS CONTINUOUS PRN
Status: DISCONTINUED | OUTPATIENT
Start: 2019-10-30 | End: 2019-10-30

## 2019-10-30 RX ORDER — PROPOFOL 10 MG/ML
VIAL (ML) INTRAVENOUS
Status: DISCONTINUED | OUTPATIENT
Start: 2019-10-30 | End: 2019-10-30

## 2019-10-30 RX ORDER — PHENYLEPHRINE HYDROCHLORIDE 10 MG/ML
INJECTION INTRAVENOUS
Status: DISCONTINUED | OUTPATIENT
Start: 2019-10-30 | End: 2019-10-30

## 2019-10-30 RX ORDER — LIDOCAINE HYDROCHLORIDE 10 MG/ML
0.5 INJECTION, SOLUTION EPIDURAL; INFILTRATION; INTRACAUDAL; PERINEURAL ONCE
Status: DISCONTINUED | OUTPATIENT
Start: 2019-10-30 | End: 2019-10-30 | Stop reason: HOSPADM

## 2019-10-30 RX ORDER — LIDOCAINE HYDROCHLORIDE AND EPINEPHRINE 10; 10 MG/ML; UG/ML
INJECTION, SOLUTION INFILTRATION; PERINEURAL
Status: DISCONTINUED | OUTPATIENT
Start: 2019-10-30 | End: 2019-10-30 | Stop reason: HOSPADM

## 2019-10-30 RX ADMIN — PHENYLEPHRINE HYDROCHLORIDE 100 MCG: 10 INJECTION INTRAVENOUS at 12:10

## 2019-10-30 RX ADMIN — PHENYLEPHRINE HYDROCHLORIDE 100 MCG: 10 INJECTION INTRAVENOUS at 09:10

## 2019-10-30 RX ADMIN — FENTANYL CITRATE 100 MCG: 50 INJECTION, SOLUTION INTRAMUSCULAR; INTRAVENOUS at 07:10

## 2019-10-30 RX ADMIN — ONDANSETRON 8 MG: 8 TABLET, ORALLY DISINTEGRATING ORAL at 05:10

## 2019-10-30 RX ADMIN — HYDROMORPHONE HYDROCHLORIDE 0.4 MG: 2 INJECTION, SOLUTION INTRAMUSCULAR; INTRAVENOUS; SUBCUTANEOUS at 01:10

## 2019-10-30 RX ADMIN — CEFAZOLIN 2 G: 330 INJECTION, POWDER, FOR SOLUTION INTRAMUSCULAR; INTRAVENOUS at 11:10

## 2019-10-30 RX ADMIN — FENTANYL CITRATE 50 MCG: 50 INJECTION, SOLUTION INTRAMUSCULAR; INTRAVENOUS at 10:10

## 2019-10-30 RX ADMIN — SODIUM CHLORIDE, SODIUM LACTATE, POTASSIUM CHLORIDE, AND CALCIUM CHLORIDE: 600; 310; 30; 20 INJECTION, SOLUTION INTRAVENOUS at 09:10

## 2019-10-30 RX ADMIN — ROCURONIUM BROMIDE 50 MG: 10 INJECTION, SOLUTION INTRAVENOUS at 07:10

## 2019-10-30 RX ADMIN — LIDOCAINE HYDROCHLORIDE 50 MG: 20 INJECTION, SOLUTION INTRAVENOUS at 07:10

## 2019-10-30 RX ADMIN — ROCURONIUM BROMIDE 10 MG: 10 INJECTION, SOLUTION INTRAVENOUS at 08:10

## 2019-10-30 RX ADMIN — HYDROCODONE BITARTRATE AND ACETAMINOPHEN 1 TABLET: 10; 325 TABLET ORAL at 05:10

## 2019-10-30 RX ADMIN — ALBUMIN (HUMAN): 12.5 SOLUTION INTRAVENOUS at 09:10

## 2019-10-30 RX ADMIN — PROPOFOL 200 MG: 10 INJECTION, EMULSION INTRAVENOUS at 07:10

## 2019-10-30 RX ADMIN — ALBUMIN (HUMAN): 12.5 SOLUTION INTRAVENOUS at 07:10

## 2019-10-30 RX ADMIN — GLYCOPYRROLATE 0.6 MG: 0.2 INJECTION, SOLUTION INTRAMUSCULAR; INTRAVENOUS at 12:10

## 2019-10-30 RX ADMIN — CALCIUM CHLORIDE 1 G: 100 INJECTION, SOLUTION INTRAVENOUS at 10:10

## 2019-10-30 RX ADMIN — CEFAZOLIN SODIUM 2 G: 2 SOLUTION INTRAVENOUS at 07:10

## 2019-10-30 RX ADMIN — HYDROCHLOROTHIAZIDE 12.5 MG: 12.5 TABLET ORAL at 05:10

## 2019-10-30 RX ADMIN — PREGABALIN 75 MG: 75 CAPSULE ORAL at 06:10

## 2019-10-30 RX ADMIN — FENTANYL CITRATE 50 MCG: 50 INJECTION, SOLUTION INTRAMUSCULAR; INTRAVENOUS at 08:10

## 2019-10-30 RX ADMIN — HYDROCODONE BITARTRATE AND ACETAMINOPHEN 1 TABLET: 10; 325 TABLET ORAL at 11:10

## 2019-10-30 RX ADMIN — SODIUM CHLORIDE, SODIUM LACTATE, POTASSIUM CHLORIDE, AND CALCIUM CHLORIDE: 600; 310; 30; 20 INJECTION, SOLUTION INTRAVENOUS at 06:10

## 2019-10-30 RX ADMIN — ACETAMINOPHEN 1000 MG: 500 TABLET, FILM COATED ORAL at 06:10

## 2019-10-30 RX ADMIN — ROCURONIUM BROMIDE 20 MG: 10 INJECTION, SOLUTION INTRAVENOUS at 08:10

## 2019-10-30 RX ADMIN — Medication 10 MG: at 12:10

## 2019-10-30 RX ADMIN — CARBOXYMETHYLCELLULOSE SODIUM 2 DROP: 2.5 SOLUTION/ DROPS OPHTHALMIC at 07:10

## 2019-10-30 RX ADMIN — OXYCODONE HYDROCHLORIDE 5 MG: 5 TABLET ORAL at 01:10

## 2019-10-30 RX ADMIN — MIDAZOLAM HYDROCHLORIDE 2 MG: 1 INJECTION, SOLUTION INTRAMUSCULAR; INTRAVENOUS at 07:10

## 2019-10-30 RX ADMIN — CEFAZOLIN 2 G: 330 INJECTION, POWDER, FOR SOLUTION INTRAMUSCULAR; INTRAVENOUS at 07:10

## 2019-10-30 RX ADMIN — DEXAMETHASONE SODIUM PHOSPHATE 8 MG: 4 INJECTION, SOLUTION INTRAMUSCULAR; INTRAVENOUS at 07:10

## 2019-10-30 RX ADMIN — NEOSTIGMINE METHYLSULFATE 5 MG: 1 INJECTION INTRAVENOUS at 12:10

## 2019-10-30 RX ADMIN — HEPARIN SODIUM 5000 UNITS: 5000 INJECTION INTRAVENOUS; SUBCUTANEOUS at 05:10

## 2019-10-30 RX ADMIN — PHENYLEPHRINE HYDROCHLORIDE 100 MCG: 10 INJECTION INTRAVENOUS at 10:10

## 2019-10-30 RX ADMIN — ONDANSETRON 4 MG: 2 INJECTION, SOLUTION INTRAMUSCULAR; INTRAVENOUS at 11:10

## 2019-10-30 NOTE — OP NOTE
OPERATIVE REPORT    Date of Service: 10/30/2019  PATIENT: Brittny Urias  MRN: [id number]    PREOPERATIVE DIAGNOSIS  1. HX OF BREAST CANCER  2. HX OF MASTECTOMY  3. HX OF BREAST RECONSTRUCTION  4. BREAST ASYMMETRY  5. HX OF RIGHT BREAST RADIATION    POSTOPERATIVE DIAGNOSIS  1. HX OF BREAST CANCER  2. HX OF MASTECTOMY  3. HX OF BREAST RECONSTRUCTION  4. BREAST ASYMMETRY  5. HX OF RIGHT BREAST RADIATION    PROCEDURE PERFORMED  1. REVISION OF BREAST RECONSTRUCTION, BILATERAL  2. ABDOMINAL SCAR REVISION, BILATERAL, 40 CM  3. PLACEMENT OF PHASIX MESH, 7X8 CM, LEFT ABDOMINAL WALL  4. FAT GRAFT TO BILATERAL BREAST  5. BILATERAL NIPPLE AREOLA RECONSTRUCTION    SURGEON  GIOVANNY LAURA MD    ASSISTANT  SIL ELIZABETH MD    ANESTHESIA  General Anesthesia    FINDINGS  TUMESCENCE: LR, 1:1,000,000 EP, 0.1% LIDOCAINE 1600 ML  LIPOASPIRATE: 1200 ML  FAT GRAFT, RIGHT BREAST: 210 ML  FAT GRAFT, LEFT BREAST: 0 ML    INTAKE/OUTPUT  EBL- 60 ML  URINE: SEE ANESTHESIA RECORD  FLUID REPLACEMENT  -Crystalloid: SEE ANESTHESIA RECORD  -Colloid: none  -Blood products: none    SPECIMENS: RIGHT AND LEFT BREAST TISSUE  CULTURES: None.  DRAINS AND TUBES: ABDOMEN 15 FR.  DISPOSITION: Good, stable condition to PACU.  COMPLICATIONS: None.  COUNTS: Sponge and needle counts correct.    INDICATIONS  Brittny Urias is a 49 y.o. female who underwent bilateral mastectomy followed by MEGAN flap reconstruction.  She returns today for revisions to symmetrize and lift the breasts, revise abdominal scars and reconstruct the nipple areola complexes.  Revisional surgery was offered which she chose after thorough review of the associated risks, benefits, and alternatives.    OPERATIVE PROCEDURE  The patient was met in the preoperative holding area prior to sedation. An interval screen indicated no changes in her health and preoperative laboratory results were satisfactory. Markings were performed in the upright standing position. She was taken to the  operating room where upon entry a surgical time-out verified her identity, diagnosis, nature and sites of procedures. In the supine position she was anesthetized and the airway controlled with intubation. Prophylactic antibiotics were administered and sequential compressive devices placed were deployed on each lower extremity for venous thromboembolic protection. Her arms were placed away from the body axis and secured to arm boards and her anterior chest and abdomen were sterilely prepped and draped.    Tumescence was performed first, infiltrating LR with 1:1,000,000 epinephrine into the abdomen and bilateral flanks for a total of 1600 ml through small 5 mm incisions in each quadrant.  After 10 min, suction lipectomy with a 3.4 mm mercedes canula was performed through multiple small passes in the deep plane.  Harvested fat was aspirated to a RVX system for later purification and grafting to the breast.  A total of 800 ml of lipoaspirate was removed.  Small canula incisions were closed with 5-0 fast absorbing suture and the surgical team prepared fat for later grafting on the back table.    The breasts and abdomen were then infiltrated with with 1.0 lidocaine and epinephrine 1:100,000 for a field block and along the breast scars.  Attention was first turned to abdominal scar revision given delayed wound healing and abnormal scarring with the initial MEGAN flap.  The abdominal scar was excised and the upper abdominal flap partially re-elevated.  Residual scarring was noted in the suprapubic region and excised.  A bulge on the lateral aspect of the left abdomen was noted and given some fascial attenuation over the lateral aspect, decision was made to place a small piece of mesh to reinforce this region.  A piece of Phasix mesh was opened, irrigated in triple antibiotic solution and trimmed to 7x8 cm. The entire abdominal opening was thoroughly irrigated.  The mesh was sutured in place with 2-0 PDS.  A 15 fr drain  was placed and the abdominal wall was advanced closed with interrupted 2-0 vicryl progressive tension sutures.  Scarpas fascia was closed with interrupted 2-0 vicryl followed by 3-0 monocryl for deep dermis and 2-0 quil for skin.      Attention was first turned to the right breast then the left breast. The newly planned nipple areola complex was first circumscribed on each breast using a 50 mm cookie cutter.  Incisions were made according to a vertical pattern and skin around the NAC was de-epithelized.  The scar along the skin paddle and outer incisions was opened and the native mastectomy skin flap was elevated off the flap.  A vertical skin reduction pattern was closed and around the planned nipple areola complex.  The left breast was then slightly reduced for improvement of volume symmetry through a vertical reduction pattern.  Fat grafting was performed for a total of 210 ml to the right breast for volume symmetry.  CAll incisions were temporarily closed with staples and she was sat up to assess symmetry.   Once final tailoring was complete, she was brought down and hemostasis and irrigation performed. All incisions were closed with 3-0 and 4-0 monocryl. The nipple areola complex was then marked and CV flaps elevated and inset with 3-0 and 4-0 monocryl. Sterile dressings were placed in the abdomen and breast and secured with surgical garments.    The patient was awakened and when breathing spontaneously and able to protect her airway she was extubated and taken to recovery in good condition.     Electronically signed by:  Tye Chapman  10/30/2019  5:03 PM

## 2019-10-30 NOTE — H&P
The patient has been examined and the H&P has been reviewed:     I concur with the findings and no changes have occurred since H&P was written.    The risks, benefits, and alternatives are reviewed and permission is granted to proceed. The consent has been signed, and the informed consent discussion was witnessed and appropriately noted.       PREOPERATIVE HISTORY AND PHYSICAL     Chief Complaint:  No chief complaint on file.     PCP: Charmaine Sargent MD     HPI  History from chart, patient  Brittny Urias is a 49 y.o. female  S/p bilateral MEGAN followed by XRT to her right breast.  She has now completed therapies, remains on letrazole and has started Stelara for her psoriasis, dosed every 12 weeks, her last dose in August.  She presents today for planned second stage revision next week, plan for fat grafting to the right breast, revision of abdominal scar, left mastopexy and bilateral nipple areola reconstruction.  Will hold letrazole 5 days prior to surgery, photos completed a few weeks ago.  Consents signed today, all questions answered.     PMH       Patient Active Problem List     Diagnosis Date Noted    Aftercare 06/05/2019    History of mastectomy, left 02/26/2019    History of reconstruction of left breast 02/26/2019    History of radiation therapy 02/26/2019    Cancer of central portion of right female breast 12/13/2018    Class 2 obesity with serious comorbidity and body mass index (BMI) of 38.0 to 38.9 in adult 02/28/2018    Psoriasis 06/26/2017    Depressed mood 06/23/2017    Perimenopausal 06/23/2017      There are no hospital problems to display for this patient.        PSH        Past Surgical History:   Procedure Laterality Date    COLONOSCOPY N/A 10/10/2019     Procedure: COLONOSCOPY;  Surgeon: Matt Marin MD;  Location: [facility];  Service: Endoscopy;  Laterality: N/A;    DILATION AND CURETTAGE OF UTERUS        HYSTERECTOMY        MASTECTOMY Bilateral 11/14/2018      Procedure: MASTECTOMY;  Surgeon: Marga Cardenas MD;  Location: [facility];  Service: Plastics;  Laterality: Bilateral;    MASTECTOMY WITH SENTINEL NODE BIOPSY AND AXILLARY LYMPH NODE DISSECTION Right 2018     Procedure: SENTINEL NODE BIOPSY AND AXILLARY LYMPHADENECTOMY;  Surgeon: Marga Cardenas MD;  Location: [facility];  Service: Plastics;  Laterality: Right;    RECONSTRUCTION OF BREAST WITH DEEP INFERIOR EPIGASTRIC ARTERY  (MEGAN) FREE FLAP Bilateral 2018     Procedure: RECONSTRUCTION, BREAST, USING MEGAN FREE FLAP;  Surgeon: Tye Chapman MD;  Location: [facility];  Service: Plastics;  Laterality: Bilateral;         FHx  Family History   Problem Relation Age of Onset    Diabetes Mother      Hypertension Mother      Hypertension Brother      Cancer Paternal Uncle           OB-Hx           OB History         3    Para   3    Term   3            AB        Living   3        SAB        TAB        Ectopic        Multiple        Live Births   3                     MEDICATIONS  Current Medications          Current Outpatient Medications   Medication Sig Dispense Refill    betamethasone dipropionate (DIPROLENE) 0.05 % cream APPLY TWICE DAILY TO ALL AFFECTED AREAS.   2    cetirizine (ZYRTEC) 10 MG tablet Take 1 tablet (10 mg total) by mouth daily as needed for Allergies (sneezing). 30 tablet 0    clobetasol (CLOBEX) 0.05 % shampoo USE TOPICALLY 3 TIMES WEEKLY   3    hydroCHLOROthiazide (HYDRODIURIL) 12.5 MG Tab Take 1 tablet (12.5 mg total) by mouth once daily. 90 tablet 2    letrozole (FEMARA) 2.5 mg Tab Take 2.5 mg by mouth once daily.        STELARA 90 mg/mL Syrg syringe          epinephrine (EPIPEN) 0.3 mg/0.3 mL AtIn Inject 0.3 mLs (0.3 mg total) into the muscle as needed. 2 Device 0      No current facility-administered medications for this visit.             ALLERGIES Review of patient's allergies indicates:  No Known Allergies     SOCIAL HISTORY  Tobacco:   Social History  "         Tobacco Use   Smoking Status Never Smoker   Smokeless Tobacco Never Used      EtOH:   Social History           Substance and Sexual Activity   Alcohol Use Yes    Alcohol/week: 3.0 standard drinks    Types: 3 Glasses of wine per week    Frequency: 2-4 times a month    Drinks per session: 1 or 2    Binge frequency: Never     Comment: social      Drugs:   Social History          Substance and Sexual Activity   Drug Use No      ALLERGIES  Review of patient's allergies indicates:  No Known Allergies     REVIEW OF SYSTEMS:  CONSTITUTIONAL: negative for fatigue, chills, fever, weight gain, weight loss  INTEGUMENTARY: negative for rash, pruritis, skin lesions  HENT: negative for congestion, hoarseness, hearing loss, and sore throat  PULMONARY: negative cough, productive sputum, wheezing, shortness of breath  CARDIAC: negative chest pain, palpitations, dyspnea on exertion, orthopnea  GENITOURINARY: negative for dysuria, bloody urine, discharge  ENDOCRINE: negative excessive thirst, frequent urination, unexplained weight loss  HEMATOPOIEOTIC:negative bruising, enlarged lymph nodes, prolonged bleeding  NEUROLOGIC: negative dizziness, loss of consciousness, numbness  PSYCHIATRIC: negative for depression, hearing voices, anxiety     PHYSICAL EXAMINATION  GENERAL APPEARANCE  BP (!) 160/95   Pulse 70   Ht 5' 2" (1.575 m)   Wt 104.7 kg (230 lb 13.2 oz)   BMI 42.22 kg/m²   Well developed, well nourished in no acute distress.     INTEGUMENT  Skin is warm and dry. No erythema, rash.     PSYCHIATRIC  Affect normal.     HEENT  Normocephalic, atraumatic.  Extraocular motions normal. PERRL  Neck: Neck supple.     BREAST  Asymmetry , left breast larger than right  IMF 22 cm from sternal notch     Right Breast  Masses: none   SN- N: 23 cm  Nipple to inframammary fold: 8 cm   Lateral axillary, breast tail fullness moderate  Tattoos on/around breast: no   Lymphadenopathy: absent axillary, supraclavicular fossae  Scars: " depressed circumareolar scar     Left Breast  Masses: none   SN- N: 24 cm  Nipple to inframammary fold: 10 cm   Lateral axillary, breast tail fullness moderate  Tattoos on/around breast: no   Lymphadenopathy: absent axillary, supraclavicular fossae  Scars: circumareolar, vertical     LUNGS  Clear, no wheezing, crackles, rhonchi     CARDIOVASCULAR  Regular rate     ABDOMEN  Soft, nontender  Abdominoplasty healed, depressed in midline  Organomegaly absent  Hernias absent     EXTREMITIES  Peripheral edema absent  Pedal, wrist pulses present     LABS        Lab Results   Component Value Date     WBC 3.72 (L) 10/17/2019     HGB 13.1 10/17/2019     HCT 42.6 10/17/2019     MCV 72 (L) 10/17/2019      10/17/2019                  Lab Results   Component Value Date      10/17/2019     K 4.0 10/17/2019      10/17/2019     CO2 28 10/17/2019      Lab Results   Component Value Date     BUN 16 10/17/2019            Lab Results   Component Value Date     CREATININE 0.8 10/17/2019            Lab Results   Component Value Date     ALBUMIN 4.0 11/01/2018            Lab Results   Component Value Date     HGBA1C 5.6 02/28/2018         ASSESSMENT       Encounter Diagnoses   Name Primary?    Aftercare Yes         INFORMED CONSENT  The proposed procedure, any treatment options, expected and possible outcomes including complications, any necessary perioperative medicinal and activity restrictions has, expected recovery and potential disability were fully reviewed with the patient. Permission to obtain photographs before, during, and after surgery was also granted. An opportunity to ask questions was given, and written informed consent was given to proceed. This Informed Consent discussion took place prior to the signing the consent form.     Procedure planned: Bilateral revision of breast reconstruction, left mastopexy, bilateral nipple areola reconstruction, fat grafting to breast, abdominal scar revision     Risks of  the procedure:  asymmetry  fat necrosis  seroma  partial or complete flap failure  partial or complete nipple necrosis  contour deformity  incomplete correction, failure to correct problem, worsening of problem  poor cosmetic outcome  need for further surgery  bleeding  infection  thick/poor scarring  wound separation, failure to heal  disability  pain  numbness  problems with anesthesia  blood clot, pulmonary embolus  stroke, heart attack, death     PLAN  Procedure: Bilateral revision of breast reconstruction, left mastopexy, bilateral nipple areola reconstruction, fat grafting to breast, abdominal scar revision  Status: Hospital ambulatory surgery  Anesthesia: General  Abx: Ancef IV pre-op  DVT prophylaxis: SCDs  Labs: reviewed  Equipment: none specific  Pre-op consults/clearance: none  Medications and luke-op instructions reviewed

## 2019-10-30 NOTE — TRANSFER OF CARE
"Anesthesia Transfer of Care Note    Patient: Brittny Urias    Procedure(s) Performed: Procedure(s) (LRB):  REVISION, FLAP, PREVIOUSLY CREATED FOR BREAST RECONSTRUCTION, fat grafting to breast, abdominal scar revision (Bilateral)    Patient location: PACU    Anesthesia Type: general    Transport from OR: Transported from OR on 2-3 L/min O2 by NC with adequate spontaneous ventilation    Post pain: adequate analgesia    Post assessment: no apparent anesthetic complications and tolerated procedure well    Post vital signs: stable    Level of consciousness: awake and alert    Nausea/Vomiting: no nausea/vomiting    Complications: none    Transfer of care protocol was followed      Last vitals:   Visit Vitals  BP (!) 164/111 (BP Location: Left arm, Patient Position: Lying)   Pulse 74   Temp 36.8 °C (98.2 °F) (Oral)   Resp 16   Ht 5' 2" (1.575 m)   Wt 104.7 kg (230 lb 13.2 oz)   SpO2 98%   Breastfeeding? No   BMI 42.22 kg/m²     "

## 2019-10-30 NOTE — ANESTHESIA POSTPROCEDURE EVALUATION
Anesthesia Post Evaluation    Patient: Brittny Urias    Procedure(s) Performed: Procedure(s) (LRB):  REVISION, FLAP, PREVIOUSLY CREATED FOR BREAST RECONSTRUCTION, fat grafting to breast, abdominal scar revision (Bilateral)    Final Anesthesia Type: general  Patient location during evaluation: PACU  Patient participation: Yes- Able to Participate  Level of consciousness: awake and alert  Post-procedure vital signs: reviewed and stable  Pain management: adequate  Airway patency: patent  PONV status at discharge: No PONV  Anesthetic complications: no      Cardiovascular status: blood pressure returned to baseline  Respiratory status: unassisted  Hydration status: euvolemic  Follow-up not needed.          Vitals Value Taken Time   /73 10/30/2019  2:16 PM   Temp 36.6 °C (97.8 °F) 10/30/2019  2:16 PM   Pulse 81 10/30/2019  2:19 PM   Resp 16 10/30/2019  2:16 PM   SpO2 99 % 10/30/2019  2:19 PM   Vitals shown include unvalidated device data.      No case tracking events are documented in the log.      Pain/Deep Score: Pain Rating Prior to Med Admin: 6 (10/30/2019  1:22 PM)  Pain Rating Post Med Admin: 5 (10/30/2019  1:58 PM)  Deep Score: 9 (10/30/2019  2:13 PM)

## 2019-10-30 NOTE — INTERVAL H&P NOTE
The patient has been examined and the H&P has been reviewed:    I concur with the findings and no changes have occurred since H&P was written.    Anesthesia/Surgery risks, benefits and alternative options discussed and understood by patient/family.          Active Hospital Problems    Diagnosis  POA    Aftercare [Z51.89]  Not Applicable      Resolved Hospital Problems   No resolved problems to display.

## 2019-10-30 NOTE — BRIEF OP NOTE
[facility]  Brief Operative Note     SUMMARY     Surgery Date: 10/30/2019     Surgeon(s) and Role:     * Tye Chapman MD - Primary    Assisting Surgeon: None    Pre-op Diagnosis:  Malignant neoplasm of central portion of right breast in female, estrogen receptor positive [C50.111, Z17.0]    Post-op Diagnosis:  Post-Op Diagnosis Codes:     * Malignant neoplasm of central portion of right breast in female, estrogen receptor positive [C50.111, Z17.0]    Procedure(s) (LRB):  REVISION, FLAP, PREVIOUSLY CREATED FOR BREAST RECONSTRUCTION, fat grafting to breast, abdominal scar revision (Bilateral), placement of vicryl mesh of lateral fascia defect (external oblique)    Anesthesia: General    Description of the findings of the procedure: healthy flap bilaterally, large amount of scar at abdomen    Findings/Key Components: placement of vicryl mesh for 7x7cm external oblique fascia defect on left    Estimated Blood Loss: 100 mL         Specimens:   Specimen (12h ago, onward)     Start     Ordered    10/30/19 1026  Specimen to Pathology - Surgery  Once     Comments:  1.Right breast tissue 2. Left breast tissue      10/30/19 1025

## 2019-10-31 VITALS
WEIGHT: 234 LBS | OXYGEN SATURATION: 95 % | DIASTOLIC BLOOD PRESSURE: 80 MMHG | TEMPERATURE: 99 F | SYSTOLIC BLOOD PRESSURE: 133 MMHG | BODY MASS INDEX: 43.06 KG/M2 | RESPIRATION RATE: 19 BRPM | HEART RATE: 75 BPM | HEIGHT: 62 IN

## 2019-10-31 PROCEDURE — 25000003 PHARM REV CODE 250: Performed by: STUDENT IN AN ORGANIZED HEALTH CARE EDUCATION/TRAINING PROGRAM

## 2019-10-31 PROCEDURE — 63600175 PHARM REV CODE 636 W HCPCS: Performed by: STUDENT IN AN ORGANIZED HEALTH CARE EDUCATION/TRAINING PROGRAM

## 2019-10-31 RX ORDER — DOCUSATE SODIUM 100 MG/1
100 CAPSULE, LIQUID FILLED ORAL 2 TIMES DAILY
Refills: 0 | COMMUNITY
Start: 2019-10-31 | End: 2020-09-11

## 2019-10-31 RX ORDER — ONDANSETRON 4 MG/1
4 TABLET, ORALLY DISINTEGRATING ORAL EVERY 6 HOURS PRN
Qty: 15 TABLET | Refills: 1 | Status: SHIPPED | OUTPATIENT
Start: 2019-10-31 | End: 2019-11-12

## 2019-10-31 RX ADMIN — HYDROCHLOROTHIAZIDE 12.5 MG: 12.5 TABLET ORAL at 09:10

## 2019-10-31 RX ADMIN — CEFAZOLIN SODIUM 2 G: 2 SOLUTION INTRAVENOUS at 03:10

## 2019-10-31 RX ADMIN — HEPARIN SODIUM 5000 UNITS: 5000 INJECTION INTRAVENOUS; SUBCUTANEOUS at 07:10

## 2019-10-31 RX ADMIN — HYDROCODONE BITARTRATE AND ACETAMINOPHEN 1 TABLET: 10; 325 TABLET ORAL at 09:10

## 2019-10-31 NOTE — PLAN OF CARE
Patient is AAOx4. VSS throughout shift. Patient is repositioning self independently. Patient has a good appetite an tolerating diet well. Patient is voiding accurately up to toilet. Drainage is clear and yellow. Pain is managed with prn p.o medication. LINO drain care maintained this shift. Encourage use of IS throughout shift. Patient remains free from falls and injury this shift. Spouse remains at bedside. All needs and concerns are met. Purposeful rounding done. SCDs and TEDs in place. Bed is locked and in lowest position, side rails up x2, call light in reach. Will continue to monitor.

## 2019-10-31 NOTE — PLAN OF CARE
Discharge Planning:  Patient admitted on 10-30-19  LOS-day 1  Chart reviewed, Care plan discussed with Mrs Urias  Discussed care plan with treatment team,  attending Dr Chapman  Consults following are: case mgt  PCP updated in [facility]: yes  Pharmacy, updated in [facility]: [facility] in [place]  Insurance: bc-  DME at home: n/a  Current dispo:  Home today  Transportation: has reliable  Case management to follow       10/31/19 1019   Discharge Assessment   Assessment Type Discharge Planning Assessment   Confirmed/corrected address and phone number on facesheet? Yes   Assessment information obtained from? Patient;Caregiver;Medical Record   Communicated expected length of stay with patient/caregiver yes   Prior to hospitilization cognitive status: Alert/Oriented   Prior to hospitalization functional status: Independent   Current cognitive status: Alert/Oriented   Current Functional Status: Independent   Lives With spouse   Able to Return to Prior Arrangements yes   Equipment Currently Used at Home none   Do you have any problems affording any of your prescribed medications? No   Is the patient taking medications as prescribed? yes   Does the patient have transportation home? Yes   Transportation Anticipated family or friend will provide   Discharge Plan A Home   DME Needed Upon Discharge  wound care supplies   Patient/Family in Agreement with Plan yes

## 2019-10-31 NOTE — DISCHARGE INSTRUCTIONS
Restart Letrozole 4-5 days after our surgery      Anesthesia: After Your Surgery  Youve just had surgery. During surgery, you received medication called anesthesia to keep you comfortable and pain-free. After surgery, you may experience some pain or nausea. This is common. Here are some tips for feeling better and recovering after surgery.    Going home  Your doctor or nurse will show you how to take care of yourself when you go home. He or she will also answer your questions. Have an adult family member or friend drive you home. For the first 24 hours after your surgery:    · Do not drive or use heavy equipment.  · Do not make important decisions or sign legal documents.  · Avoid alcohol.  · Have someone stay with you, if needed. He or she can watch for problems and help keep you safe.  · Take your time getting up from a seated or lying position. You may experience dizziness for 24 hours.    Be sure to keep all follow-up appointments with your doctor. And rest after your procedure for as long as your doctor tells you to.    Coping with pain  If you have pain after surgery, pain medication will help you feel better. Take it as directed, before pain becomes severe. Also, ask your doctor or pharmacist about other ways to control pain, such as with heat, ice, and relaxation. And follow any other instructions your surgeon or nurse gives you.    URINARY RETENTION  Should you experience a decrease in your urine output or are unable to urinate following surgery, this can be due to the medications given during surgery.  We recommend you going to the nearest Emergency Department.    Tips for taking pain medication  To get the best relief possible, remember these points:    · Pain medications can upset your stomach. Taking them with a little food may help.  · Most pain relievers taken by mouth need at least 20 to 30 minutes to take effect.  · Taking medication on a schedule can help you remember to take it. Try to time your  medication so that you can take it before beginning an activity, such as dressing, walking, or sitting down for dinner.  · Constipation is a common side effect of pain medications. Contact your doctor before taking any medications like laxatives or stool softeners to help relieve constipation. Also ask about any dietary restrictions, because drinking lots of fluids and eating foods like fruits and vegetables that are high in fiber can also help. Remember, dont take laxatives unless your surgeon has prescribed them.  · Mixing alcohol and pain medication can cause dizziness and slow your breathing. It can even be fatal. Dont drink alcohol while taking pain medication.  · Pain medication can slow your reflexes. Dont drive or operate machinery while taking pain medication.    If your health care provider tells you to take acetaminophen to help relieve your pain, ask him or her how much you are supposed to take each day. (Acetaminophen is the generic name for Tylenol and other brand-name pain relievers.) Acetaminophen or other pain relievers may interact with your prescription medicines or other over-the-counter (OTC) drugs. Some prescription medications contain acetaminophen along with other active ingredients. Using both prescription and OTC acetaminophen for pain can cause you to overdose. The FDA recommends that you read the labels on your OTC medications carefully. This will help you to clearly understand the list of active ingredients, dosing instructions, and any warnings. It may also help you avoid taking too much acetaminophen. If you have questions or don't understand the information, ask your pharmacist or health care provider to explain it to you before you take the OTC medication.    Managing nausea  Some people have an upset stomach after surgery. This is often due to anesthesia, pain, pain medications, or the stress of surgery. The following tips will help you manage nausea and get good nutrition as you  recover. If you were on a special diet before surgery, ask your doctor if you should follow it during recovery. These tips may help:    · Dont push yourself to eat. Your body will tell you when to eat and how much.  · Start off with clear liquids and soup. They are easier to digest.  · Progress to semi-solid foods (mashed potatoes, applesauce, and gelatin) as you feel ready.  · Slowly move to solid foods. Dont eat fatty, rich, or spicy foods at first.  · Dont force yourself to have three large meals a day. Instead, eat smaller amounts more often.  · Take pain medications with a small amount of solid food, such as crackers or toast to avoid nausea.      Call your surgeon if    · You feel too sleepy, dizzy, or groggy (medication may be too strong).  · You have side effects like nausea, vomiting, or skin changes (rash, itching, or hives).     © 0244-1917 [employer]. [address] [place] [address]. All rights reserved. This information is not intended as a substitute for professional medical care. Always follow your healthcare professional's instructions.    PLEASE FOLLOW ANY OTHER INSTRUCTIONS PROVIDED TO YOU BY DR. LAURA!        Plastic Surgery Discharge Instructions  Dr. Tye Laura  [address] [place] [address] [address] [place] [address]  [phone]       ?  Name: Brittny Urias  Medical Record Number: [id number]  ALLERGIES: Review of patient's allergies indicates:  No Known Allergies    FOLLOW-UP  You will be scheduled for follow-up on 11/5/19  Please return to have the wound inspected and sutures removed as instructed by Dr. Laura    EMERGENCY NUMBERS   For all life-threatening emergencies, please call 911  For all life-threatening emergencies, please call 911  For all other concerns regarding your plastic surgery, you may call the office from 9 am - 5 pm at: [phone]  For after-hours questions/concerns, please call  [phone]    ACTIVITY:  Avoid:  - Lifting more than 2 lbs  - Reaching above head  - Pullover garments (T-shirt, crew neck sweater or shirt)  - Sitting for prolonged periods of time during daytime hours.  - Bending over, twisting, pushing, pulling, stooping  - Driving until instructed this is safe (generally 1-2 weeks after surgery)    Recommended:  - Walk around your house or apartment occasionally during the day  - When walking, stay slightly bent forward at the hips; as your abdomen relaxes, you will walk more upright; this will take a few weeks  - Breathe deeply and cough frequently to clear your lungs after anesthesia  - Sleep with your head propped up on several pillows for the first few weeks after surgery    Sleeping:  - Keep your back elevated at 30 to 45 degrees with several pillows; place pillows under your knees to keep them bent    BATHING  Do not get incision(s) wet until after your first postoperative appointment, or ok'd by your physician, and while any drains are in.  Sponge bathe until your first post-op visit  In general, you can shower after your first post-op visit     INCISION CARE  Leave surgical garments in place until seen at your first post-operative visit  Keep nipple shields in place. Do not remove until next visit. If the nipple shields happen to fall off, you should place fluffy gauze and ABD pads over the nipple.  Empty and record drain outputs every 8 hours.  You may reinforce or change dressings if they are saturated  It is normal to have slight oozing or drainage along the incisions for a few days to 2 weeks.  Pad your bra with gauze or pads as needed to areas of greater seepage/drainage.  Observe for and report immediately:  - foul smelling drainage from wound,  - color of drainage is bright red,  - sudden dramatic increase in size of breast,  - red and hot skin (mild swelling and bruises are normal)  - chills and fever over 102 degrees.    CLOSED SUCTION DRAIN INSTRUCTIONS  1.  Care of this bulb is very simple. However, it is important that neither the drain site nor bulb be submerged in water; do not take a tub bath, use a hot tub or go swimming. You may shower with your doctor's approval.  2. You will need to empty the bulb itself 2 or 3 times per day or whenever it is 1/3 full.  Measure and record the amount of fluid drained from the bulb if you have been instructed to do so by your doctor.  3. To empty your drain, you will need to:  a. Wash your hands thoroughly.  b. Open the cap on the bulb.  c. Empty the contents into the measuring cup provided.  d. Restore suction by squeezing the bulb and replacing the cap while the bulb is depressed between your fingers.  e. Secure the bulb with tape or pin to your clothing.  4. What to do if your drain is not emptying.  Pinch the tubing with 2 fingers as close as possible to your skin and hold it tightly to prevent pulling on the skin. With your other hand pinch the tube and gently slide your finder toward the bulb. By doing that once a day you will prevent blood clots from obstructing possible drainage. If the tubing remains flat, gently pinch the tube at the flat edges until it is round again.    DIET  After surgery eat and drink lightly: crackers, plain toast, clear soups, water and sport drinks are preferable  Avoid food high in protein and fat until you are tolerating starches and fluids without problem  Resume your normal diet when you feel well. If your stomach is upset, try bland, low-fat foods like plain rice, broiled chicken, toast, and yogurt. Continue to drink plenty of fluids.  Many people are constipated after surgery. This can be due to the pain medicine and a lack of activity. Be sure you get plenty of fluids, and eat high fiber cereal, prunes, or take a fiber supplement such as Citrucel or Metamucil, or a stool softener like Miralax or Colace.    MEDICATIONS  Current Discharge Medication List      START taking these medications     Details   cephALEXin (KEFLEX) 500 MG capsule Take 1 capsule (500 mg total) by mouth every 6 (six) hours. for 14 days  Qty: 56 capsule, Refills: 0      docusate sodium (COLACE) 100 MG capsule Take 1 capsule (100 mg total) by mouth 2 (two) times daily.  Refills: 0      HYDROcodone-acetaminophen (NORCO) 5-325 mg per tablet Take 1 tablet by mouth every 6 (six) hours as needed for Pain.  Qty: 15 tablet, Refills: 0    Comments: Quantity prescribed more than 7 day supply? No      ondansetron (ZOFRAN-ODT) 4 MG TbDL Take 1 tablet (4 mg total) by mouth every 6 (six) hours as needed. Take 1 tablet (4 mg total) by mouth every 6 (six) hours as needed. - Oral  Qty: 15 tablet, Refills: 1         CONTINUE these medications which have NOT CHANGED    Details   betamethasone dipropionate (DIPROLENE) 0.05 % cream APPLY TWICE DAILY TO ALL AFFECTED AREAS.  Refills: 2      cetirizine (ZYRTEC) 10 MG tablet Take 1 tablet (10 mg total) by mouth daily as needed for Allergies (sneezing).  Qty: 30 tablet, Refills: 0      hydroCHLOROthiazide (HYDRODIURIL) 12.5 MG Tab Take 1 tablet (12.5 mg total) by mouth once daily.  Qty: 90 tablet, Refills: 2    Associated Diagnoses: Essential hypertension      clobetasol (CLOBEX) 0.05 % shampoo USE TOPICALLY 3 TIMES WEEKLY  Refills: 3      epinephrine (EPIPEN) 0.3 mg/0.3 mL AtIn Inject 0.3 mLs (0.3 mg total) into the muscle as needed.  Qty: 2 Device, Refills: 0      letrozole (FEMARA) 2.5 mg Tab Take 2.5 mg by mouth once daily.      STELARA 90 mg/mL Syrg syringe              Antibiotics  Take the prescribed oral antibiotics until gone unless you suspect a side effect. Contact your surgeon if you suspect a drug reaction.    Pain Medications  Take pain medicine as needed, following the directions carefully. Plan to take your pain medicine 30 minutes before exercises or therapy. Do not wait until you are in severe pain. You will get better results if you take it sooner. To avoid an upset stomach, take your  pain pills with food.  Do not take plain acetominophen (Tylenol) within 6 hrs of any prescribed narcotic: most narcotic preparations already contain acetaminophen and you may receive a dangerous overdose    DRIVING  It may be 1-2 weeks after surgery before it is safe for you to drive. Ask your doctor or his staff when this will be OK. For your safety, you must not drive until you are no longer taking narcotic pain medicines and you can move and react easily    SCAR MANAGEMENT  Scars may take over 1 year to mature.  Some scars will remain pink, dark purple, and possibly raised for 6-9 months after surgery.  After one year, scars often become flatter, smoother, and may change color.  After removal of the tape, suture removal, or when glue was used, apply a thin layer of Aquaphor (available at any drugstore) or antibiotic ointment to the scars for another 2 weeks.  Begin silicone when scars smooth, generally starting about 6 weeks after surgery.  Medical grade silicone gel is available on companies' web sites or on amazon.com  Brands recommended:  - Scarfade (scarfade.com)  - NewGel (newgelplus.com)  - Kelocote (kelocote.com)  Massage the scar twice daily for about 30 seconds    Sun Screen  The best sunscreens contain zinc oxide and SPF 50+  Use at all times, even when overcast or raining while scar is pink  Recommended brands for face:  Fallene Total Block (available at amazon.com) especially good for the face  Neutrogena Sensitive Skin SPF 60    WHEN TO CALL  Your wound is still draining 1 week after the surgery.  Your wound comes open or begins to bleed larger than a spot 3 inches in diameter  You have signs of infection, such as increasing tenderness, red streaks, pus from your incision, oral fever >101, or shaking chills.  You have pain that does not get better after you take pain pills. Pain pills will make the pain less, but WILL NOT REMOVE ALL THE PAIN    Call or have someone caring for you call 911 immediately  for any medical emergency. Examples of symptoms that may be an emergency include:  - You pass out (lose consciousness).  - You have severe trouble breathing.  - You have chest pain.    WHEN TO GO TO EMERGENCY ROOM  Go to the Emergency Room if:  Your wound comes open and begins to bleed.  You have signs of infection, such as increasing tenderness, red streaks, or pus from your incision.  You are sick to your stomach or cannot keep fluids down.  You have signs of a blood clot, such as unexplained pain or extensive swelling of your leg.  You have increasing numbness or tingling in your leg or foot that is not relieved with elevation.  You have cannot pass urine or stool.    PLASTIC SURGERY FACILITIES  For general inquiries, appointments, or concerns, call the office.    Brittny's Drain Record     Date Emptied Time Emptied Amount in [place]

## 2019-10-31 NOTE — PLAN OF CARE
Discharge Planning:  Final note  Patient admitted on 10-30-19  LOS-day 1  Chart reviewed, Care plan discussed with Mrs Urias  Current dispo:  Home today  Transportation: has reliable  Case management to follow as needed       10/31/19 1022   Final Note   Assessment Type Final Discharge Note   Anticipated Discharge Disposition Home   Hospital Follow Up  Appt(s) scheduled? Yes   Discharge plans and expectations educations in teach back method with documentation complete? Yes   Right Care Referral Info   Post Acute Recommendation No Care   Referral Type see AVS

## 2019-10-31 NOTE — DISCHARGE SUMMARY
[facility]  Discharge Note  Short Stay    Admit Date: 10/30/2019    Discharge: 10/31/2019       Attending Physician: Tye Chapman MD     Discharge Provider: same  Diagnoses:  Active Hospital Problems    Diagnosis  POA    Aftercare [Z51.89]  Not Applicable      Resolved Hospital Problems   No resolved problems to display.       Discharged Condition: good    Hospital Course: Patient was admitted for an outpatient procedure and tolerated the procedure well with no complications. Kept overnight for observation, no issues, incisions intact, drain with serosanguinous output, pain controlled on oral only. Discharge with norco and keflex.    Final Diagnoses: Same as principal problem.    Disposition: Home or Self Care    Follow up/Patient Instructions:    Medications:  Reconciled Home Medications:      Medication List      START taking these medications    cephALEXin 500 MG capsule  Commonly known as:  KEFLEX  Take 1 capsule (500 mg total) by mouth every 6 (six) hours. for 14 days     HYDROcodone-acetaminophen 5-325 mg per tablet  Commonly known as:  NORCO  Take 1 tablet by mouth every 6 (six) hours as needed for Pain.        CONTINUE taking these medications    betamethasone dipropionate 0.05 % cream  Commonly known as:  DIPROLENE  APPLY TWICE DAILY TO ALL AFFECTED AREAS.     cetirizine 10 MG tablet  Commonly known as:  ZYRTEC  Take 1 tablet (10 mg total) by mouth daily as needed for Allergies (sneezing).     clobetasol 0.05 % shampoo  Commonly known as:  CLOBEX  USE TOPICALLY 3 TIMES WEEKLY     EPINEPHrine 0.3 mg/0.3 mL Atin  Commonly known as:  EPIPEN  Inject 0.3 mLs (0.3 mg total) into the muscle as needed.     hydroCHLOROthiazide 12.5 MG Tab  Commonly known as:  HYDRODIURIL  Take 1 tablet (12.5 mg total) by mouth once daily.     letrozole 2.5 mg Tab  Commonly known as:  FEMARA  Take 2.5 mg by mouth once daily.     STELARA 90 mg/mL Syrg syringe  Generic drug:  ustekinumab          Discharge Procedure  Orders   Diet Adult Regular     Lifting restrictions     Other restrictions (specify):   Order Comments: Abdominal binder on at all time not showering  Wireless bra on at all time not showering or laying flat     No driving until:   Order Comments: No longer on narcotic pain medicaitons     Notify your health care provider if you experience any of the following:  temperature >100.4     Notify your health care provider if you experience any of the following:  severe uncontrolled pain     Notify your health care provider if you experience any of the following:  redness, tenderness, or signs of infection (pain, swelling, redness, odor or green/yellow discharge around incision site)     No dressing needed   Order Comments: Use Abd pads to cover incision, change as needed     Nursing communication   Scheduling Instructions: Please teach drain care to patient prior to discharge including use of drain diary     Follow-up Information     Tye Chapman MD. Schedule an appointment as soon as possible for a visit in 1 week.    Specialty:  Plastic Surgery  Contact information:  [address] [place] [address]  [phone]                   Discharge Procedure Orders (must include Diet, Follow-up, Activity):   Discharge Procedure Orders (must include Diet, Follow-up, Activity)   Diet Adult Regular     Lifting restrictions     Other restrictions (specify):   Order Comments: Abdominal binder on at all time not showering  Wireless bra on at all time not showering or laying flat     No driving until:   Order Comments: No longer on narcotic pain medicaitons     Notify your health care provider if you experience any of the following:  temperature >100.4     Notify your health care provider if you experience any of the following:  severe uncontrolled pain     Notify your health care provider if you experience any of the following:  redness, tenderness, or signs of infection (pain, swelling, redness, odor or  green/yellow discharge around incision site)     No dressing needed   Order Comments: Use Abd pads to cover incision, change as needed     Nursing communication   Scheduling Instructions: Please teach drain care to patient prior to discharge including use of drain diary

## 2019-11-04 NOTE — PROGRESS NOTES
Plastic Update  Date of Surgery:  10/31/19   REVISION, FLAP, PREVIOUSLY CREATED FOR BREAST RECONSTRUCTION, fat grafting to breast, abdominal scar revision      Subjective:  Patient here for follow up. Doing well.   Reports ABD drain output is less than 20 cc per day.  Tolerating binder and surgical bra well. Nipple shields left in place.  Pain is well controlled with OTC medications.   Patient is ambulating without difficulty  Denies fever  Denies calf pain or shortness of breath     Objective:  WD WN NAD  VSS  Normal resp effort  R breast - incision CDI, no erythema or drainage, nipple reconstruction healing well and viable.   L breast -  incision CDI, no erythema or drainage, nipple reconstructions healing well viable.    ABD- incision CDI, LION drain in place.  Good symmetry between sides. Breasts are soft bilaterally. Incision line in tact. No evidence of fluid collection or infection bilaterally    Assessment:  1.  one week s/p 2nd stage revision, expected healing.    2.  No evidence of fluid collection or abnormal bleeding     Plan:  - Nipple shields removed. Instructed to place fluffy gauze over NAC.  - Continue surgical bra and ABd binder  - LINO barker removed  - Continue OTC pain control  - OK to shower.   - Ambulate often  - Continue non narcotic pain control  - Advised her to continue no heavy lifting restriction  - Follow up in 3 weeks

## 2019-11-05 ENCOUNTER — OFFICE VISIT (OUTPATIENT)
Dept: PLASTIC SURGERY | Facility: CLINIC | Age: 49
End: 2019-11-05
Payer: COMMERCIAL

## 2019-11-05 VITALS
HEIGHT: 62 IN | HEART RATE: 75 BPM | DIASTOLIC BLOOD PRESSURE: 80 MMHG | SYSTOLIC BLOOD PRESSURE: 147 MMHG | WEIGHT: 233.94 LBS | BODY MASS INDEX: 43.05 KG/M2

## 2019-11-05 DIAGNOSIS — Z51.89 AFTERCARE: Primary | ICD-10-CM

## 2019-11-05 DIAGNOSIS — Z85.3 HISTORY OF BREAST CANCER: ICD-10-CM

## 2019-11-05 PROCEDURE — 99024 PR POST-OP FOLLOW-UP VISIT: ICD-10-PCS | Mod: S$GLB,,, | Performed by: PLASTIC SURGERY

## 2019-11-05 PROCEDURE — 99024 POSTOP FOLLOW-UP VISIT: CPT | Mod: S$GLB,,, | Performed by: PLASTIC SURGERY

## 2019-11-25 NOTE — PROGRESS NOTES
Plastic Update  Date of Surgery:  10/31/19   REVISION, FLAP, PREVIOUSLY CREATED FOR BREAST RECONSTRUCTION, fat grafting to breast, abdominal scar revision    Subjective:  Patient here for follow up. Doing well.   Tolerating binder and surgical bra well.  Denies issues with nipple recons   Pain is well controlled with OTC medications.   Patient is ambulating without difficulty  Denies fever, calf pain or shortness of breath     Objective:  WD WN NAD  VSS  Normal resp effort  R breast - incision CDI, no erythema or drainage, nipple reconstruction healing well and viable.   L breast -  incision CDI, no erythema or drainage, nipple reconstruction healing well viable.    ABD- incision CDI  Good symmetry between sides. Breasts are soft bilaterally. Incision line in tact. No evidence of fluid collection or infection bilaterally    Assessment:  1.  3 weeks s/p 2nd stage revision, expected healing.    2.  No evidence of fluid collection or abnormal bleeding     Plan:  - Continue surgical bra and ABD binder  - LINO barker removed  - Continue OTC pain control  - OK to shower.   - Ambulate often  - Continue non narcotic pain control  - Advised her to continue no heavy lifting restriction      Electronically signed by:  Tye Chapman  11/26/2019  1:18 PM

## 2019-11-26 ENCOUNTER — OFFICE VISIT (OUTPATIENT)
Dept: PLASTIC SURGERY | Facility: CLINIC | Age: 49
End: 2019-11-26
Payer: COMMERCIAL

## 2019-11-26 VITALS
BODY MASS INDEX: 43.05 KG/M2 | DIASTOLIC BLOOD PRESSURE: 93 MMHG | SYSTOLIC BLOOD PRESSURE: 139 MMHG | WEIGHT: 233.94 LBS | HEIGHT: 62 IN | HEART RATE: 95 BPM

## 2019-11-26 DIAGNOSIS — Z85.3 HISTORY OF BREAST CANCER: Primary | ICD-10-CM

## 2019-11-26 DIAGNOSIS — Z51.89 AFTERCARE: Primary | ICD-10-CM

## 2019-11-26 PROCEDURE — 99024 PR POST-OP FOLLOW-UP VISIT: ICD-10-PCS | Mod: S$GLB,,, | Performed by: PLASTIC SURGERY

## 2019-11-26 PROCEDURE — 99024 POSTOP FOLLOW-UP VISIT: CPT | Mod: S$GLB,,, | Performed by: PLASTIC SURGERY

## 2019-11-26 NOTE — PATIENT INSTRUCTIONS
[employer]   Spencer Vargas   [address] [address]   [phone]     [employer]   [facility]   [address] [place] [address] [place]   [phone]     [facility]   Faye Haney   [address] [address]   Phone: [phone]     [facility] [employer] forum and website:   [email or web address]

## 2019-12-07 ENCOUNTER — PATIENT OUTREACH (OUTPATIENT)
Dept: ADMINISTRATIVE | Facility: OTHER | Age: 49
End: 2019-12-07

## 2019-12-11 ENCOUNTER — TELEPHONE (OUTPATIENT)
Dept: PODIATRY | Facility: CLINIC | Age: 49
End: 2019-12-11

## 2019-12-11 NOTE — TELEPHONE ENCOUNTER
----- Message from Shruthi Feliz sent at 12/11/2019  3:44 PM CST -----  Contact: Self  Pt called and stated that she is returning a missed call from the clinic     Pt can be reached @ [phone]

## 2019-12-20 DIAGNOSIS — I10 ESSENTIAL HYPERTENSION: ICD-10-CM

## 2019-12-20 RX ORDER — HYDROCHLOROTHIAZIDE 12.5 MG/1
TABLET ORAL
Qty: 90 TABLET | Refills: 2 | Status: SHIPPED | OUTPATIENT
Start: 2019-12-20 | End: 2020-07-21 | Stop reason: SDUPTHER

## 2020-01-06 ENCOUNTER — HOSPITAL ENCOUNTER (EMERGENCY)
Facility: HOSPITAL | Age: 50
Discharge: HOME | End: 2020-01-07
Attending: EMERGENCY MEDICINE
Payer: COMMERCIAL

## 2020-01-06 ENCOUNTER — PATIENT MESSAGE (OUTPATIENT)
Dept: FAMILY MEDICINE | Facility: CLINIC | Age: 50
End: 2020-01-06

## 2020-01-06 DIAGNOSIS — M25.561 KNEE PAIN, RIGHT: Primary | ICD-10-CM

## 2020-01-06 DIAGNOSIS — M25.539 WRIST PAIN: ICD-10-CM

## 2020-01-06 DIAGNOSIS — I10 ESSENTIAL HYPERTENSION: ICD-10-CM

## 2020-01-06 PROCEDURE — 63600175 PHARM REV CODE 636 W HCPCS: Performed by: PHYSICIAN ASSISTANT

## 2020-01-06 PROCEDURE — 96372 THER/PROPH/DIAG INJ SC/IM: CPT

## 2020-01-06 PROCEDURE — 99285 EMERGENCY DEPT VISIT HI MDM: CPT | Mod: 25

## 2020-01-06 RX ORDER — PREDNISONE 20 MG/1
60 TABLET ORAL
Status: COMPLETED | OUTPATIENT
Start: 2020-01-06 | End: 2020-01-06

## 2020-01-06 RX ORDER — KETOROLAC TROMETHAMINE 30 MG/ML
15 INJECTION, SOLUTION INTRAMUSCULAR; INTRAVENOUS
Status: COMPLETED | OUTPATIENT
Start: 2020-01-06 | End: 2020-01-06

## 2020-01-06 RX ORDER — PREDNISONE 10 MG/1
TABLET ORAL
Qty: 21 TABLET | Refills: 0 | Status: SHIPPED | OUTPATIENT
Start: 2020-01-06 | End: 2020-01-07 | Stop reason: CLARIF

## 2020-01-06 RX ADMIN — KETOROLAC TROMETHAMINE 15 MG: 30 INJECTION, SOLUTION INTRAMUSCULAR at 11:01

## 2020-01-06 RX ADMIN — PREDNISONE 60 MG: 20 TABLET ORAL at 11:01

## 2020-01-07 VITALS
HEIGHT: 62 IN | BODY MASS INDEX: 42.33 KG/M2 | RESPIRATION RATE: 18 BRPM | OXYGEN SATURATION: 98 % | SYSTOLIC BLOOD PRESSURE: 148 MMHG | WEIGHT: 230 LBS | HEART RATE: 69 BPM | TEMPERATURE: 99 F | DIASTOLIC BLOOD PRESSURE: 79 MMHG

## 2020-01-07 LAB
ANION GAP SERPL CALC-SCNC: 7 MMOL/L (ref 8–16)
BASOPHILS # BLD AUTO: 0.04 K/UL (ref 0–0.2)
BASOPHILS NFR BLD: 0.8 % (ref 0–1.9)
BUN SERPL-MCNC: 16 MG/DL (ref 6–20)
CALCIUM SERPL-MCNC: 9.7 MG/DL (ref 8.7–10.5)
CHLORIDE SERPL-SCNC: 100 MMOL/L (ref 95–110)
CO2 SERPL-SCNC: 30 MMOL/L (ref 23–29)
CREAT SERPL-MCNC: 0.8 MG/DL (ref 0.5–1.4)
CRP SERPL-MCNC: 15.9 MG/L (ref 0–8.2)
DIFFERENTIAL METHOD: ABNORMAL
EOSINOPHIL # BLD AUTO: 0.1 K/UL (ref 0–0.5)
EOSINOPHIL NFR BLD: 2.7 % (ref 0–8)
ERYTHROCYTE [DISTWIDTH] IN BLOOD BY AUTOMATED COUNT: 16.6 % (ref 11.5–14.5)
ERYTHROCYTE [SEDIMENTATION RATE] IN BLOOD BY WESTERGREN METHOD: 10 MM/HR (ref 0–20)
EST. GFR  (AFRICAN AMERICAN): >60 ML/MIN/1.73 M^2
EST. GFR  (NON AFRICAN AMERICAN): >60 ML/MIN/1.73 M^2
GLUCOSE SERPL-MCNC: 126 MG/DL (ref 70–110)
HCT VFR BLD AUTO: 39.7 % (ref 37–48.5)
HGB BLD-MCNC: 12.3 G/DL (ref 12–16)
IMM GRANULOCYTES # BLD AUTO: 0.01 K/UL (ref 0–0.04)
IMM GRANULOCYTES NFR BLD AUTO: 0.2 % (ref 0–0.5)
LYMPHOCYTES # BLD AUTO: 1.3 K/UL (ref 1–4.8)
LYMPHOCYTES NFR BLD: 26.7 % (ref 18–48)
MCH RBC QN AUTO: 22.2 PG (ref 27–31)
MCHC RBC AUTO-ENTMCNC: 31 G/DL (ref 32–36)
MCV RBC AUTO: 72 FL (ref 82–98)
MONOCYTES # BLD AUTO: 0.6 K/UL (ref 0.3–1)
MONOCYTES NFR BLD: 12.7 % (ref 4–15)
NEUTROPHILS # BLD AUTO: 2.7 K/UL (ref 1.8–7.7)
NEUTROPHILS NFR BLD: 56.9 % (ref 38–73)
NRBC BLD-RTO: 0 /100 WBC
PLATELET # BLD AUTO: 310 K/UL (ref 150–350)
PMV BLD AUTO: 10.8 FL (ref 9.2–12.9)
POTASSIUM SERPL-SCNC: 3.9 MMOL/L (ref 3.5–5.1)
PROCALCITONIN SERPL IA-MCNC: 0.02 NG/ML
RBC # BLD AUTO: 5.55 M/UL (ref 4–5.4)
SODIUM SERPL-SCNC: 137 MMOL/L (ref 136–145)
URATE SERPL-MCNC: 6 MG/DL (ref 2.4–5.7)
WBC # BLD AUTO: 4.8 K/UL (ref 3.9–12.7)

## 2020-01-07 PROCEDURE — 86140 C-REACTIVE PROTEIN: CPT

## 2020-01-07 PROCEDURE — 85652 RBC SED RATE AUTOMATED: CPT

## 2020-01-07 PROCEDURE — 84550 ASSAY OF BLOOD/URIC ACID: CPT

## 2020-01-07 PROCEDURE — 84145 PROCALCITONIN (PCT): CPT

## 2020-01-07 PROCEDURE — 80048 BASIC METABOLIC PNL TOTAL CA: CPT

## 2020-01-07 PROCEDURE — 85025 COMPLETE CBC W/AUTO DIFF WBC: CPT

## 2020-01-07 PROCEDURE — 25000003 PHARM REV CODE 250: Performed by: PHYSICIAN ASSISTANT

## 2020-01-07 RX ORDER — HYDROCODONE BITARTRATE AND ACETAMINOPHEN 5; 325 MG/1; MG/1
1 TABLET ORAL EVERY 4 HOURS PRN
Qty: 12 TABLET | Refills: 0 | Status: SHIPPED | OUTPATIENT
Start: 2020-01-07 | End: 2020-09-11

## 2020-01-07 RX ORDER — HYDROCODONE BITARTRATE AND ACETAMINOPHEN 5; 325 MG/1; MG/1
1 TABLET ORAL
Status: COMPLETED | OUTPATIENT
Start: 2020-01-07 | End: 2020-01-07

## 2020-01-07 RX ORDER — PREDNISONE 20 MG/1
60 TABLET ORAL DAILY
Qty: 15 TABLET | Refills: 0 | Status: SHIPPED | OUTPATIENT
Start: 2020-01-07 | End: 2020-01-12

## 2020-01-07 RX ORDER — NAPROXEN 500 MG/1
500 TABLET ORAL 2 TIMES DAILY WITH MEALS
Qty: 20 TABLET | Refills: 0 | Status: SHIPPED | OUTPATIENT
Start: 2020-01-07 | End: 2020-09-11

## 2020-01-07 RX ADMIN — HYDROCODONE BITARTRATE AND ACETAMINOPHEN 1 TABLET: 5; 325 TABLET ORAL at 02:01

## 2020-01-07 NOTE — ED PROVIDER NOTES
"Encounter Date: 1/6/2020    SCRIBE #1 NOTE: I, Karthik Fuller, am scribing for, and in the presence of,  Carly Fernando PA-C. I have scribed the following portions of the note - Other sections scribed: HPI, ROS.       History     Chief Complaint   Patient presents with    Knee Pain     pt reports RIGHT knee pain ongoing intermittently for 2 weeks but worse today; pt reports difficulty walking due to pain; pt denies any known injuries to knee or hx of gout; pt reports taking 600mg Ibuprofen at 2pm today with no relief     CC: Knee Pain    HPI: This 49 y.o. Female with a past medical history of R breast cancer, hypertension and psoriasis presents to the ED for an evaluation of progressively worsening "throbbing" R knee pain for the past 2 weeks. Pt reports her pain is worse with walking and movement. She denies trauma or injury. Pt has been talking Ibuprofen 600mg, most recently at 2pm today with no relief. Patient also reports pain to the radial aspect of the L wrist beginning 2 days ago, denies wrist trauma. She denies fever/chills, chest pain, SOB, abdominal pain, nausea, vomiting, numbness/tingling, or extremity weakness. Denies history of gout.     The history is provided by the patient. No  was used.     Review of patient's allergies indicates:  No Known Allergies  Past Medical History:   Diagnosis Date    Breast cancer, right breast 11/12/2018    Cancer     Hypertension     Psoriasis     on biologic for 1 year, has been controlling it well     Past Surgical History:   Procedure Laterality Date    COLONOSCOPY N/A 10/10/2019    Procedure: COLONOSCOPY;  Surgeon: Matt Marin MD;  Location: [facility] ENDO;  Service: Endoscopy;  Laterality: N/A;    DILATION AND CURETTAGE OF UTERUS      HYSTERECTOMY      MASTECTOMY Bilateral 11/14/2018    Procedure: MASTECTOMY;  Surgeon: Marga Cardenas MD;  Location: [facility] OR;  Service: Plastics;  Laterality: Bilateral;    MASTECTOMY WITH SENTINEL NODE " BIOPSY AND AXILLARY LYMPH NODE DISSECTION Right 11/14/2018    Procedure: SENTINEL NODE BIOPSY AND AXILLARY LYMPHADENECTOMY;  Surgeon: Marga Cardenas MD;  Location: [facility] OR;  Service: Plastics;  Laterality: Right;    RECONSTRUCTION OF BREAST WITH DEEP INFERIOR EPIGASTRIC ARTERY  (MEGAN) FREE FLAP Bilateral 11/14/2018    Procedure: RECONSTRUCTION, BREAST, USING MEGAN FREE FLAP;  Surgeon: Tye Chapman MD;  Location: [facility] OR;  Service: Plastics;  Laterality: Bilateral;     Family History   Problem Relation Age of Onset    Diabetes Mother     Hypertension Mother     Hypertension Brother     Cancer Paternal Uncle      Social History     Tobacco Use    Smoking status: Never Smoker    Smokeless tobacco: Never Used   Substance Use Topics    Alcohol use: Yes     Alcohol/week: 3.0 standard drinks     Types: 3 Glasses of wine per week     Frequency: 2-4 times a month     Drinks per session: 1 or 2     Binge frequency: Never     Comment: social    Drug use: No     Review of Systems   Constitutional: Negative for fever.   HENT: Negative for sore throat.    Respiratory: Negative for shortness of breath.    Cardiovascular: Negative for chest pain.   Gastrointestinal: Negative for nausea.   Genitourinary: Negative for dysuria.   Musculoskeletal: Positive for arthralgias (R knee, L wrist). Negative for back pain.   Skin: Negative for rash.   Neurological: Negative for weakness.   Hematological: Does not bruise/bleed easily.       Physical Exam     Initial Vitals [01/06/20 1951]   BP Pulse Resp Temp SpO2   (!) 158/98 76 18 98.6 °F (37 °C) 100 %      MAP       --         Physical Exam    Nursing note and vitals reviewed.  Constitutional: She appears well-developed and well-nourished. She is not diaphoretic. No distress.   HENT:   Head: Normocephalic and atraumatic.   Mouth/Throat: Oropharynx is clear and moist. No oropharyngeal exudate.   Eyes: Conjunctivae and EOM are normal. Pupils are equal, round, and reactive  to light.   Neck: Normal range of motion. Neck supple.   Cardiovascular: Normal rate, regular rhythm, normal heart sounds and intact distal pulses.   Distal pulses intact.   Pulmonary/Chest: Breath sounds normal. No respiratory distress. She has no wheezes. She has no rales.   Musculoskeletal: Normal range of motion. She exhibits tenderness. She exhibits no edema.   Right knee with mild erythema, warmth and nonfocal TTP to the anterior knee. Difficult to assess swelling d/t body habitus. No palpaple gaps in patellar tendon. No posterior knee TTP. No calf tenderness. She demonstrates FROM of B/L knees.    Neurological: She is alert and oriented to person, place, and time. She has normal strength. GCS score is 15. GCS eye subscore is 4. GCS verbal subscore is 5. GCS motor subscore is 6.   5/5 strength upon knee flexion and extension. Sensation intact bilaterally. Antalgic gait.    Skin: Skin is warm and dry.   Psychiatric: She has a normal mood and affect. Thought content normal.         ED Course   Procedures  Labs Reviewed   C-REACTIVE PROTEIN - Abnormal; Notable for the following components:       Result Value    CRP 15.9 (*)     All other components within normal limits   URIC ACID - Abnormal; Notable for the following components:    Uric Acid 6.0 (*)     All other components within normal limits   CBC W/ AUTO DIFFERENTIAL - Abnormal; Notable for the following components:    RBC 5.55 (*)     Mean Corpuscular Volume 72 (*)     Mean Corpuscular Hemoglobin 22.2 (*)     Mean Corpuscular Hemoglobin Conc 31.0 (*)     RDW 16.6 (*)     All other components within normal limits   BASIC METABOLIC PANEL - Abnormal; Notable for the following components:    CO2 30 (*)     Glucose 126 (*)     Anion Gap 7 (*)     All other components within normal limits   PROCALCITONIN   SEDIMENTATION RATE          Imaging Results          X-Ray Knee 3 View Right (Final result)  Result time 01/06/20 21:07:02    Final result by Carla Loyola,  MD (01/06/20 21:07:02)                 Impression:      No acute osseous abnormality identified.      Electronically signed by: Carla Loyola MD  Date:    01/06/2020  Time:    21:07             Narrative:    EXAMINATION:  XR KNEE 3 VIEW RIGHT    CLINICAL HISTORY:  Pain in right knee    TECHNIQUE:  AP, lateral, and Merchant views of the right knee were performed.    COMPARISON:  None    FINDINGS:  No evidence of acute displaced fracture, dislocation, or osseous destructive process.  Mild joint space narrowing is visualized involving the medial tibiofemoral compartment.  No significant suprapatellar joint effusion.                               X-Ray Wrist Complete Left (Final result)  Result time 01/06/20 21:06:02    Final result by Carla Loyola MD (01/06/20 21:06:02)                 Impression:      No acute osseous abnormality identified.      Electronically signed by: Carla Loyola MD  Date:    01/06/2020  Time:    21:06             Narrative:    EXAMINATION:  XR WRIST COMPLETE 3 VIEWS LEFT    CLINICAL HISTORY:  Pain in unspecified wrist    TECHNIQUE:  PA, lateral, and oblique views of the left wrist were performed.    COMPARISON:  None    FINDINGS:  No evidence of acute displaced fracture, dislocation, or osseous destructive process.  No radiopaque retained foreign body seen.                                 Medical Decision Making:   Clinical Tests:   Lab Tests: Ordered and Reviewed  The following lab test(s) were unremarkable: CBC and CMP       <> Summary of Lab: CRP minimally elevated.  Uric acid elevated.  Radiological Study: Ordered and Reviewed  ED Management:  Suspect gouty arthritis given the uric acid is elevated.  Will treat with corticosteroids and NSAIDs.       APC / Resident Notes:   Patient is a 49-year-old female with a PMH of breast cancer status post right mastectomy, hypertension, and psoriasis who presents to the ED for urgent evaluation of a 2 week history of progressively worsening  right knee pain.  Denies trauma.    PE reveals a nontoxic, afebrile, well-appearing female in mild discomfort.  /98.  MSK exam as depicted above.    Differential diagnosis included but was not limited to: fracture, sprain, septic joint, gonoccal arthropathy, gout, psoriatic arthritis, lupus.    X-ray of the knee and wrist without acute osseous abnormality.     Will obtain basic labs, uric acid, ESR/CRP to r/o infectious process.    Patient signed out to Tavia Tran PA-C who will determine final disposition of patient pending lab results. Patient was given Toradol and Prednisone 60 mg PO in ED.        Scribe Attestation:   Scribe #1: I performed the above scribed service and the documentation accurately describes the services I performed. I attest to the accuracy of the note.    Attending Attestation:     Physician Attestation Statement for NP/PA:   I have conducted a face to face encounter with this patient in addition to the NP/PA, due to NP/PA Request    Other NP/PA Attestation Additions:    History of Present Illness: Patient complains right knee pain with swelling has been present for 2 weeks.  Patient denies trauma. States she is difficult to bear weight at this time.  She denies fever.  Denies rash. No recent medical procedures or dental procedures.  No history of diabetes.  General urinary symptoms.   Physical Exam: Moderate effusion of the right knee on exam.  Knee is slightly warm and mildly tender to palpation. Mild pain with range of motion. No bony tenderness. Remainder of orthopedic exam is normal.   Medical Decision Making: Suspect inflammatory arthritis.  Will check lab work to rule out occult septic arthritis.                               Clinical Impression:       ICD-10-CM ICD-9-CM   1. Knee pain, right M25.561 719.46   2. Wrist pain M25.539 719.43   3. Inflammatory arthritis M19.90 714.9            Scribe attestation: I, Carly Fernando, personally performed the services described in  this documentation. All medical record entries made by the scribe were at my direction and in my presence.  I have reviewed the chart and agree that the record reflects my personal performance and is accurate and complete.                 Carly Fernando PA-C  01/07/20 0012       Earl Healy MD  01/07/20 0047

## 2020-01-07 NOTE — ED TRIAGE NOTES
The patient reports right knee pain x 2 weeks that has gotten progressively worse. Denies trauma to it. Denies hx of arthritis or gout. Patient also reports pain and a small mass to left wrist x 1 week. Denies fevers, chills, drainage from left wrist mass. Site is not reddened or warm to the touch.

## 2020-01-07 NOTE — PROGRESS NOTES
Subjective:       Patient ID: Brittny Urias is a 49 y.o. female.    Chief Complaint: No chief complaint on file.    HPI 49-year-old postmenopausal female who returns for follow-up of a diagnosis of right breast cancer, T1cN1a, ER positive, HER2 negative.  She is on letrozole hormonal therapy. She was having pains in the arms when starting the letrozole, this has resolved. She notes that she has had some pain in her left knee, for which she was seen in the ER and treated for gout. This did not resolve, she was then seen by orthopedics and her knee was drained and treated. She will be starting with PT and an exercise program. Nauseated intermittently.      She had colonoscopy on October 10th with Dr Marin.  That showed some benign tubular adenomas.    Overall, she has been doing well.  Appetite and bowel function has been stable.  She has no shortness of breath.  Energy level has been good.      She has additional reconstructive surgery schedule on October 30th.    Screening mammogram on August 3, 2018 showed architectural distortion in the retroareolar right breast.  Follow-up diagnostic mammogram on August 18th showed architectural distortion in nipple retraction on right.  Ultrasound showed no definite abnormality.    August 21, 2018 a biopsy was performed which showed infiltrating lobular carcinoma (histologic grade 3, nuclear grade 1, mitotic index 1).  ER was 90% positive, CA was 20% positive and HER2 was 0.  Ki-67 was less than 1%.    MRI of the breast on September 4, 2018 showed a 3.2 x 1.8 x 1.4 cm mass with spiculated margins in the subareolar region of the right breast.  The left breast was unremarkable.    On November 14, 2018 bilateral mastectomies were performed.  The right breast showed a 2 cm intermediate grade carcinoma (3+ 2+ 1) 2 of 3 sentinel lymph nodes were positive with some extracapsular extension.  Completion axillary lymph node dissection showed 0 of 12 additional nodes were involved.   Margins were negative.  The left breast showed no abnormalities.   Mammaprint was low risk at + 0.362    She completed radiation on February 26, 2019.      Letrozole was started in March 2019.      Review of Systems   Constitutional: Negative for activity change, appetite change, chills, diaphoresis, fatigue, fever and unexpected weight change.   HENT: Negative for nosebleeds.    Eyes: Negative for visual disturbance.   Respiratory: Negative.  Negative for cough and shortness of breath.    Cardiovascular: Negative for chest pain, palpitations and leg swelling.   Gastrointestinal: Negative.  Negative for abdominal distention, abdominal pain, blood in stool, constipation, diarrhea, nausea and vomiting.   Endocrine: Positive for heat intolerance.        Hot flashes   Genitourinary: Negative for hematuria and vaginal bleeding.   Musculoskeletal: Positive for arthralgias (Knee). Negative for back pain and myalgias.   Skin: Negative for pallor and rash.   Allergic/Immunologic: Negative for immunocompromised state.   Neurological: Negative.  Negative for dizziness, weakness, light-headedness, numbness and headaches.   Hematological: Negative for adenopathy. Does not bruise/bleed easily.   Psychiatric/Behavioral: Negative.  Negative for confusion. The patient is not nervous/anxious.        Objective:      Physical Exam   Constitutional: She is oriented to person, place, and time. She appears well-developed and well-nourished. No distress.   HENT:   Head: Normocephalic.   Mouth/Throat: Oropharynx is clear and moist. No oropharyngeal exudate.   Eyes: Pupils are equal, round, and reactive to light. No scleral icterus.   Neck: Normal range of motion.   Cardiovascular: Normal rate, regular rhythm and normal heart sounds.   Pulmonary/Chest: Effort normal and breath sounds normal. She has no wheezes.   No masses, no tenderness. Skin and nipple are normal  No axillary lymph nodes palpated.         Abdominal: Soft. Normal appearance  and bowel sounds are normal. She exhibits no distension and no mass. There is no tenderness.   Musculoskeletal: She exhibits no edema.   Lymphadenopathy:     She has no cervical adenopathy.   Neurological: She is alert and oriented to person, place, and time.   Skin: Skin is warm and dry. No rash noted.   Psychiatric: She has a normal mood and affect. Her behavior is normal. Thought content normal.   Nursing note and vitals reviewed.      Assessment:      1. Malignant neoplasm of central portion of right breast in female, estrogen receptor positive    2. History of radiation therapy    3. History of mastectomy, left    4. History of reconstruction of left breast        Plan:       1-4. Continue current endocrine therapy and return in 3 months.    Return to clinic in 3 months with MD appointment.     Patient is in agreement with the proposed treatment plan. All questions were answered to the patient's satisfaction. Patient knows to call clinic for any new or worsening symptoms and if anything is needed before the next clinic visit.          Lubna Jamison, FNP-C  Hematology & Medical Oncology   [address] [place] [address]  ph. [phone]  Fax. [phone]    Patient dicussed with collaborating physician, Dr. Woods

## 2020-01-07 NOTE — PROVIDER PROGRESS NOTES - EMERGENCY DEPT.
Encounter Date: 1/6/2020    ED Physician Progress Notes        Physician Note:   Sign out taken pending lab results.    Laboratory studies show normal white count of 4.8.  Hemoglobin is stable.  Chemistries unremarkable. Uric acid elevated to 6.0.  CRP slightly elevated to 15.9.  Procalcitonin unremarkable.  X-ray of knee reveals some joint space narrowing otherwise unremarkable.    Upon my reassessment patient is resting comfortably.  She has full range of motion of the right knee.  Less concerning for septic arthritis or infectious etiology at this time.  I suspect that given the elevated uric acid and complain this may be secondary to gout.  Also concerning for inflammatory arthritis.  Patient will be given Orthopedics information for close follow-up.  Will prescribed home on prednisone, naproxen and Norco to use as directed.  She was given strict return precautions ED which was agreeable to.  Patient is otherwise stable for discharge and close follow-up.

## 2020-01-07 NOTE — DISCHARGE INSTRUCTIONS
Please follow up with family doctor this week. Take medication as prescribed. Return to the ED if your symptoms worsen.

## 2020-01-20 PROBLEM — Z51.89 AFTERCARE: Status: RESOLVED | Noted: 2019-06-05 | Resolved: 2020-01-20

## 2020-01-21 ENCOUNTER — OFFICE VISIT (OUTPATIENT)
Dept: HEMATOLOGY/ONCOLOGY | Facility: CLINIC | Age: 50
End: 2020-01-21
Payer: COMMERCIAL

## 2020-01-21 VITALS
BODY MASS INDEX: 43.41 KG/M2 | RESPIRATION RATE: 18 BRPM | HEIGHT: 62 IN | TEMPERATURE: 98 F | OXYGEN SATURATION: 97 % | WEIGHT: 235.88 LBS | DIASTOLIC BLOOD PRESSURE: 90 MMHG | SYSTOLIC BLOOD PRESSURE: 160 MMHG | HEART RATE: 72 BPM

## 2020-01-21 DIAGNOSIS — Z92.3 HISTORY OF RADIATION THERAPY: ICD-10-CM

## 2020-01-21 DIAGNOSIS — Z90.12 HISTORY OF MASTECTOMY, LEFT: ICD-10-CM

## 2020-01-21 DIAGNOSIS — Z17.0 MALIGNANT NEOPLASM OF CENTRAL PORTION OF RIGHT BREAST IN FEMALE, ESTROGEN RECEPTOR POSITIVE: Primary | ICD-10-CM

## 2020-01-21 DIAGNOSIS — Z98.890 HISTORY OF RECONSTRUCTION OF LEFT BREAST: ICD-10-CM

## 2020-01-21 DIAGNOSIS — C50.111 MALIGNANT NEOPLASM OF CENTRAL PORTION OF RIGHT BREAST IN FEMALE, ESTROGEN RECEPTOR POSITIVE: Primary | ICD-10-CM

## 2020-01-21 PROCEDURE — 99214 PR OFFICE/OUTPT VISIT, EST, LEVL IV, 30-39 MIN: ICD-10-PCS | Mod: S$GLB,,, | Performed by: NURSE PRACTITIONER

## 2020-01-21 PROCEDURE — 99214 OFFICE O/P EST MOD 30 MIN: CPT | Mod: S$GLB,,, | Performed by: NURSE PRACTITIONER

## 2020-01-21 PROCEDURE — 99999 PR PBB SHADOW E&M-EST. PATIENT-LVL V: ICD-10-PCS | Mod: PBBFAC,,, | Performed by: NURSE PRACTITIONER

## 2020-01-21 PROCEDURE — 99999 PR PBB SHADOW E&M-EST. PATIENT-LVL V: CPT | Mod: PBBFAC,,, | Performed by: NURSE PRACTITIONER

## 2020-01-22 ENCOUNTER — TELEPHONE (OUTPATIENT)
Dept: OBSTETRICS AND GYNECOLOGY | Facility: CLINIC | Age: 50
End: 2020-01-22

## 2020-01-23 ENCOUNTER — TELEPHONE (OUTPATIENT)
Dept: OBSTETRICS AND GYNECOLOGY | Facility: CLINIC | Age: 50
End: 2020-01-23

## 2020-01-23 NOTE — TELEPHONE ENCOUNTER
RN Navigator left a voicemail for patient in regards to scheduling an appt with Dr. Martin and [facility]. RN provided contact information requesting return call, PATRICE Coffman

## 2020-01-23 NOTE — TELEPHONE ENCOUNTER
Appt confirmed with Dr. Martin for 01/30/2020 at 1430, [facility] location. Patient mentions irritability, sexual dysfxn, fatigue, VMS, poor self-image secondary to breast surgery, difficulty with intimacy. RN provided active listening and her support. RN noted helpful resources for the time being until her appt. RN offered her support and contact information. PATRICE Coffman.

## 2020-01-28 ENCOUNTER — PATIENT MESSAGE (OUTPATIENT)
Dept: PLASTIC SURGERY | Facility: CLINIC | Age: 50
End: 2020-01-28

## 2020-01-29 ENCOUNTER — PATIENT OUTREACH (OUTPATIENT)
Dept: ADMINISTRATIVE | Facility: OTHER | Age: 50
End: 2020-01-29

## 2020-01-29 NOTE — PROGRESS NOTES
Care Everywhere: n/a  Immunization: reviewed, no profile in links   Health Maintenance: updated  Media Review: n/a  Legacy Review: n/a  Order placed: n/a  Upcoming appts:n/a

## 2020-01-30 ENCOUNTER — LAB VISIT (OUTPATIENT)
Dept: LAB | Facility: OTHER | Age: 50
End: 2020-01-30
Attending: OBSTETRICS & GYNECOLOGY
Payer: COMMERCIAL

## 2020-01-30 ENCOUNTER — OFFICE VISIT (OUTPATIENT)
Dept: OBSTETRICS AND GYNECOLOGY | Facility: CLINIC | Age: 50
End: 2020-01-30
Attending: OBSTETRICS & GYNECOLOGY
Payer: COMMERCIAL

## 2020-01-30 VITALS
DIASTOLIC BLOOD PRESSURE: 88 MMHG | SYSTOLIC BLOOD PRESSURE: 148 MMHG | HEIGHT: 62 IN | WEIGHT: 241.63 LBS | BODY MASS INDEX: 44.46 KG/M2

## 2020-01-30 DIAGNOSIS — F41.9 ANXIETY: ICD-10-CM

## 2020-01-30 DIAGNOSIS — Z13.21 ENCOUNTER FOR VITAMIN DEFICIENCY SCREENING: ICD-10-CM

## 2020-01-30 DIAGNOSIS — Z79.811 USE OF AROMATASE INHIBITORS: ICD-10-CM

## 2020-01-30 DIAGNOSIS — N95.1 MENOPAUSAL SYMPTOM: Primary | ICD-10-CM

## 2020-01-30 DIAGNOSIS — N95.1 MENOPAUSAL SYMPTOM: ICD-10-CM

## 2020-01-30 DIAGNOSIS — C50.919 MALIGNANT NEOPLASM OF BREAST IN FEMALE, ESTROGEN RECEPTOR POSITIVE, UNSPECIFIED LATERALITY, UNSPECIFIED SITE OF BREAST: ICD-10-CM

## 2020-01-30 DIAGNOSIS — Z17.0 MALIGNANT NEOPLASM OF BREAST IN FEMALE, ESTROGEN RECEPTOR POSITIVE, UNSPECIFIED LATERALITY, UNSPECIFIED SITE OF BREAST: ICD-10-CM

## 2020-01-30 LAB
25(OH)D3+25(OH)D2 SERPL-MCNC: 20 NG/ML (ref 30–96)
ESTRADIOL SERPL-MCNC: <10 PG/ML

## 2020-01-30 PROCEDURE — 3008F PR BODY MASS INDEX (BMI) DOCUMENTED: ICD-10-PCS | Mod: CPTII,S$GLB,, | Performed by: OBSTETRICS & GYNECOLOGY

## 2020-01-30 PROCEDURE — 36415 COLL VENOUS BLD VENIPUNCTURE: CPT

## 2020-01-30 PROCEDURE — 84402 ASSAY OF FREE TESTOSTERONE: CPT

## 2020-01-30 PROCEDURE — 82306 VITAMIN D 25 HYDROXY: CPT

## 2020-01-30 PROCEDURE — 3079F DIAST BP 80-89 MM HG: CPT | Mod: CPTII,S$GLB,, | Performed by: OBSTETRICS & GYNECOLOGY

## 2020-01-30 PROCEDURE — 82670 ASSAY OF TOTAL ESTRADIOL: CPT

## 2020-01-30 PROCEDURE — 3077F SYST BP >= 140 MM HG: CPT | Mod: CPTII,S$GLB,, | Performed by: OBSTETRICS & GYNECOLOGY

## 2020-01-30 PROCEDURE — 99204 PR OFFICE/OUTPT VISIT, NEW, LEVL IV, 45-59 MIN: ICD-10-PCS | Mod: S$GLB,,, | Performed by: OBSTETRICS & GYNECOLOGY

## 2020-01-30 PROCEDURE — 3079F PR MOST RECENT DIASTOLIC BLOOD PRESSURE 80-89 MM HG: ICD-10-PCS | Mod: CPTII,S$GLB,, | Performed by: OBSTETRICS & GYNECOLOGY

## 2020-01-30 PROCEDURE — 99204 OFFICE O/P NEW MOD 45 MIN: CPT | Mod: S$GLB,,, | Performed by: OBSTETRICS & GYNECOLOGY

## 2020-01-30 PROCEDURE — 3008F BODY MASS INDEX DOCD: CPT | Mod: CPTII,S$GLB,, | Performed by: OBSTETRICS & GYNECOLOGY

## 2020-01-30 PROCEDURE — 3077F PR MOST RECENT SYSTOLIC BLOOD PRESSURE >= 140 MM HG: ICD-10-PCS | Mod: CPTII,S$GLB,, | Performed by: OBSTETRICS & GYNECOLOGY

## 2020-01-30 RX ORDER — VENLAFAXINE HYDROCHLORIDE 37.5 MG/1
37.5 CAPSULE, EXTENDED RELEASE ORAL DAILY
Qty: 30 CAPSULE | Refills: 6 | Status: SHIPPED | OUTPATIENT
Start: 2020-01-30 | End: 2020-08-13 | Stop reason: SDUPTHER

## 2020-01-30 NOTE — PROGRESS NOTES
SUBJECTIVE:   49 y.o. female   presents today to establish care in survivor ship and discussed menopausal symptoms. . No LMP recorded. Patient has had a hysterectomy..  She   Reports menopausal symptoms.  She reports sometimes having up to 10 hot flashes a day and waking up multiple times at night with night sweats.  She reports feeling very tired during the day.  She did have hot flashes prior to her cancer treatment but these are worse  She reports that she is not having sex because she does not feel good about herself.  She struggles with intimacy and has a poor body image.  She has not seen Dr. large earlier  She had infiltrating lobular carcinoma ER positive [place] positive her 2-of the right breast.  She had bilateral mastectomy with sentinel node biopsy to a 3 lymph nodes were positive.  She had adjuvant radiation is now on aromatase inhibitor  She lost 10 the 12 lb when she was exercising but has gained it back.  She desires to start exercising again  She is due to see her primary care.        Past Medical History:   Diagnosis Date    Breast cancer, right breast 2018    Cancer     Hypertension     Psoriasis     on biologic for 1 year, has been controlling it well     Past Surgical History:   Procedure Laterality Date    COLONOSCOPY N/A 10/10/2019    Procedure: COLONOSCOPY;  Surgeon: Matt Marin MD;  Location: [facility];  Service: Endoscopy;  Laterality: N/A;    DILATION AND CURETTAGE OF UTERUS      HYSTERECTOMY      partial hyst for prolapse    MASTECTOMY Bilateral 2018    Procedure: MASTECTOMY;  Surgeon: Marga Cardenas MD;  Location: [facility];  Service: Plastics;  Laterality: Bilateral;    MASTECTOMY WITH SENTINEL NODE BIOPSY AND AXILLARY LYMPH NODE DISSECTION Right 2018    Procedure: SENTINEL NODE BIOPSY AND AXILLARY LYMPHADENECTOMY;  Surgeon: Marga Cardenas MD;  Location: [facility];  Service: Plastics;  Laterality: Right;    RECONSTRUCTION OF BREAST WITH DEEP INFERIOR  EPIGASTRIC ARTERY  (MEGAN) FREE FLAP Bilateral 2018    Procedure: RECONSTRUCTION, BREAST, USING MEGNA FREE FLAP;  Surgeon: Tye Chapman MD;  Location: [facility];  Service: Plastics;  Laterality: Bilateral;     Social History     Socioeconomic History    Marital status:      Spouse name: Not on file    Number of children: Not on file    Years of education: Not on file    Highest education level: Not on file   Occupational History    Not on file   Social Needs    Financial resource strain: Not hard at all    Food insecurity:     Worry: Never true     Inability: Never true    Transportation needs:     Medical: No     Non-medical: No   Tobacco Use    Smoking status: Never Smoker    Smokeless tobacco: Never Used   Substance and Sexual Activity    Alcohol use: Yes     Alcohol/week: 3.0 standard drinks     Types: 3 Glasses of wine per week     Frequency: 2-4 times a month     Drinks per session: 1 or 2     Binge frequency: Never     Comment: social    Drug use: No    Sexual activity: Yes     Partners: Male     Birth control/protection: Surgical   Lifestyle    Physical activity:     Days per week: 4 days     Minutes per session: 30 min    Stress: Not at all   Relationships    Social connections:     Talks on phone: More than three times a week     Gets together: More than three times a week     Attends Faith service: Not on file     Active member of club or organization: Yes     Attends meetings of clubs or organizations: More than 4 times per year     Relationship status:    Other Topics Concern    Not on file   Social History Narrative    Not on file     Family History   Problem Relation Age of Onset    Diabetes Mother     Hypertension Mother     Hypertension Brother     Cancer Paternal Uncle         leukemia    Breast cancer Neg Hx     Ovarian cancer Neg Hx     Colon cancer Neg Hx      OB History    Para Term  AB Living   3 3 3     3   SAB TAB  Ectopic Multiple Live Births           3      # Outcome Date GA Lbr Bowen/2nd Weight Sex Delivery Anes PTL Lv   3 Term 09/16/97 40w0d   M Vag-Spont EPI N KAIT   2 Term 09/18/95 40w0d   F Vag-Spont EPI N KAIT   1 Term 04/18/90 40w0d   F CS-LTranv EPI N KAIT           Current Outpatient Medications   Medication Sig Dispense Refill    betamethasone dipropionate (DIPROLENE) 0.05 % cream APPLY TWICE DAILY TO ALL AFFECTED AREAS.  2    cetirizine (ZYRTEC) 10 MG tablet Take 1 tablet (10 mg total) by mouth daily as needed for Allergies (sneezing). 30 tablet 0    clobetasol (CLOBEX) 0.05 % shampoo USE TOPICALLY 3 TIMES WEEKLY  3    hydroCHLOROthiazide (HYDRODIURIL) 12.5 MG Tab TAKE 1 TABLET BY MOUTH EVERY DAY 90 tablet 2    letrozole (FEMARA) 2.5 mg Tab Take 2.5 mg by mouth once daily.      naproxen (NAPROSYN) 500 MG tablet Take 1 tablet (500 mg total) by mouth 2 (two) times daily with meals. 20 tablet 0    STELARA 90 mg/mL Syrg syringe       docusate sodium (COLACE) 100 MG capsule Take 1 capsule (100 mg total) by mouth 2 (two) times daily.  0    epinephrine (EPIPEN) 0.3 mg/0.3 mL AtIn Inject 0.3 mLs (0.3 mg total) into the muscle as needed. 2 Device 0    HYDROcodone-acetaminophen (NORCO) 5-325 mg per tablet Take 1 tablet by mouth every 4 (four) hours as needed for Pain. (Patient not taking: Reported on 1/21/2020) 12 tablet 0    venlafaxine (EFFEXOR XR) 37.5 MG 24 hr capsule Take 1 capsule (37.5 mg total) by mouth once daily. 30 capsule 6     No current facility-administered medications for this visit.      Facility-Administered Medications Ordered in Other Visits   Medication Dose Route Frequency Provider Last Rate Last Dose    EPINEPHrine 1,000 mcg in lactated Ringers 1,000 mL irrigation   Irrigation On Call Procedure Tye Chapman MD        EPINEPHrine 1,000 mcg in lactated Ringers 1,000 mL irrigation   Irrigation On Call Procedure Tye Chapman MD         Allergies: Patient has no known allergies.     The  10-year ASCVD risk score ([place] WILKES Jr., et al., 2013) is: 4.5%    Values used to calculate the score:      Age: 49 years      Sex: Female      Is Non- : Yes      Diabetic: No      Tobacco smoker: No      Systolic Blood Pressure: 148 mmHg      Is BP treated: Yes      HDL Cholesterol: 88 mg/dL      Total Cholesterol: 289 mg/dL      ROS:  Constitutional: no weight loss, weight gain, fever, fatigue  Eyes:  No vision changes, glasses/contacts  ENT/Mouth: No ulcers, sinus problems, ears ringing, headache  Cardiovascular: No inability to lie flat, chest pain, exercise intolerance, swelling, heart palpitations  Respiratory: No wheezing, coughing blood, shortness of breath, or cough  Gastrointestinal: No diarrhea, bloody stool, nausea/vomiting, constipation, gas, hemorrhoids  Genitourinary: No blood in urine, painful urination, urgency of urination, frequency of urination, incomplete emptying, incontinence, abnormal bleeding, painful periods, heavy periods, vaginal discharge, vaginal odor, painful intercourse, sexual problems, bleeding after intercourse.  Musculoskeletal: No muscle weakness  Skin/Breast: +breast cancer  Neurological: No passing out, seizures, numbness, headache  Endocrine: No diabetes, hypothyroid, hyperthyroid, +hot flashes, hair loss, abnormal hair growth, acne  Psychiatric: No depression, +crying  Hematologic: No bruises, bleeding, swollen lymph nodes, anemia.      Physical Exam  deferred    ASSESSMENT:   Breast cancer  Use of aromatase inhibitor  menopausal symptoms  Weight gain    PLAN:   Face-to-face time 45 min majority spent in counseling and arranging follow-up  Counseled her on risks benefits and possible side effects of Effexor-counseled her that this could help her menopausal symptoms and her depression.  Will send a prescription for 37.5 mg daily  Counseled her that I recommend that she meet with Dr. Foley  Will schedule appointment-with MAISHA Dey-  to discuss  weight loss in nutrition  Counseled her on risks benefits and possible side effects of testosterone-will consider this in the future if no improvement with above modalities  Counseled her on diet and exercise  Labs today to obtain baseline

## 2020-01-30 NOTE — LETTER
January 30, 2020      Lubna Jamison, CARMELA  [address]  [facility] [address]           [facility] [address] [facility] [address]  [address], [address]  [facility] [address]  Phone: [phone]  Fax: [phone]          Patient: Brittny Urias   MR Number: [id number]   YOB: 1970   Date of Visit: 1/30/2020       Dear Lubna Jamison:    Thank you for referring Brittny Urias to me for evaluation. Attached you will find relevant portions of my assessment and plan of care.    If you have questions, please do not hesitate to call me. I look forward to following Brittny Urias along with you.    Sincerely,    Awilda Martin MD    Enclosure  CC:  No Recipients    If you would like to receive this communication electronically, please contact [email or web address] or [phone] to request more information on [employer] access.    For providers and/or their staff who would like to refer a patient to [facility], please contact us through our one-stop-shop provider referral line, [facility], at [phone].    If you feel you have received this communication in error or would no longer like to receive these types of communications, please e-mail [email or web address]

## 2020-02-03 LAB — TESTOST FREE SERPL-MCNC: 0.3 PG/ML

## 2020-02-04 ENCOUNTER — TELEPHONE (OUTPATIENT)
Dept: OBSTETRICS AND GYNECOLOGY | Facility: CLINIC | Age: 50
End: 2020-02-04

## 2020-02-04 ENCOUNTER — PATIENT OUTREACH (OUTPATIENT)
Dept: ADMINISTRATIVE | Facility: OTHER | Age: 50
End: 2020-02-04

## 2020-02-04 NOTE — TELEPHONE ENCOUNTER
----- Message from Merissa Hill sent at 2/4/2020  1:45 PM CST -----  Contact: self  Patient is needing to r/s her appt that was today for 3:00PM. Patient had a work emergency and was unable to get off of work. The patient can be reached at [phone].

## 2020-02-04 NOTE — PROGRESS NOTES
Care Everywhere: n/a  Immunization: updated, no profile in links   Health Maintenance: n/a  Media Review: n/a  Legacy Review: n/a  Order placed: n/a  Upcoming appts:n/a

## 2020-02-05 ENCOUNTER — OFFICE VISIT (OUTPATIENT)
Dept: OBSTETRICS AND GYNECOLOGY | Facility: CLINIC | Age: 50
End: 2020-02-05
Payer: COMMERCIAL

## 2020-02-05 VITALS — SYSTOLIC BLOOD PRESSURE: 132 MMHG | BODY MASS INDEX: 43.71 KG/M2 | DIASTOLIC BLOOD PRESSURE: 88 MMHG | WEIGHT: 239 LBS

## 2020-02-05 DIAGNOSIS — C50.919 MALIGNANT NEOPLASM OF BREAST IN FEMALE, ESTROGEN RECEPTOR POSITIVE, UNSPECIFIED LATERALITY, UNSPECIFIED SITE OF BREAST: ICD-10-CM

## 2020-02-05 DIAGNOSIS — Z17.0 MALIGNANT NEOPLASM OF BREAST IN FEMALE, ESTROGEN RECEPTOR POSITIVE, UNSPECIFIED LATERALITY, UNSPECIFIED SITE OF BREAST: ICD-10-CM

## 2020-02-05 DIAGNOSIS — E66.9 OBESITY, UNSPECIFIED CLASSIFICATION, UNSPECIFIED OBESITY TYPE, UNSPECIFIED WHETHER SERIOUS COMORBIDITY PRESENT: Primary | ICD-10-CM

## 2020-02-05 PROCEDURE — 99215 OFFICE O/P EST HI 40 MIN: CPT | Mod: S$GLB,,, | Performed by: PHYSICIAN ASSISTANT

## 2020-02-05 PROCEDURE — 99215 PR OFFICE/OUTPT VISIT, EST, LEVL V, 40-54 MIN: ICD-10-PCS | Mod: S$GLB,,, | Performed by: PHYSICIAN ASSISTANT

## 2020-02-05 PROCEDURE — 3075F PR MOST RECENT SYSTOLIC BLOOD PRESS GE 130-139MM HG: ICD-10-PCS | Mod: CPTII,S$GLB,, | Performed by: PHYSICIAN ASSISTANT

## 2020-02-05 PROCEDURE — 3079F PR MOST RECENT DIASTOLIC BLOOD PRESSURE 80-89 MM HG: ICD-10-PCS | Mod: CPTII,S$GLB,, | Performed by: PHYSICIAN ASSISTANT

## 2020-02-05 PROCEDURE — 3008F BODY MASS INDEX DOCD: CPT | Mod: CPTII,S$GLB,, | Performed by: PHYSICIAN ASSISTANT

## 2020-02-05 PROCEDURE — 3075F SYST BP GE 130 - 139MM HG: CPT | Mod: CPTII,S$GLB,, | Performed by: PHYSICIAN ASSISTANT

## 2020-02-05 PROCEDURE — 3008F PR BODY MASS INDEX (BMI) DOCUMENTED: ICD-10-PCS | Mod: CPTII,S$GLB,, | Performed by: PHYSICIAN ASSISTANT

## 2020-02-05 PROCEDURE — 3079F DIAST BP 80-89 MM HG: CPT | Mod: CPTII,S$GLB,, | Performed by: PHYSICIAN ASSISTANT

## 2020-02-05 RX ORDER — PEN NEEDLE, DIABETIC 30 GX3/16"
1 NEEDLE, DISPOSABLE MISCELLANEOUS DAILY
Qty: 100 EACH | Refills: 3 | Status: SHIPPED | OUTPATIENT
Start: 2020-02-05 | End: 2020-09-11

## 2020-02-05 NOTE — PROGRESS NOTES
Subjective:      Brittny Urias is a 49 y.o. female who presents to discuss weight loss. Diagnosed with right breast cancer ER/[place] + in 8/2018. Is s/p bilateral mastectomy and has also had partial hysterectomy. Currently taking Femara daily.  Weight has always been up and down, but seems worse since breast cancer diagnosis and surgery. Has been unable to exercise recently due to recovering from surgery and then had some right knee pain that she is working with ortho for. Struggles with emotional and social over eating. Trying to go to a more plant based diet.    Sleep: Poor due to hot flashes in the night. Getting about 4-5 hours of sleep a night. Struggles to stay asleep. Just started on Effexor with Dr. Martin for hot flashes.     Stress: Moved into new house but over all managing stress well     A typical day consists of:  Breakfast: Banana; sometimes oatmeal; or nothing- decreased appetite in AM.   Lunch: Frozen protein veggie bowl; [employer] chicken tenders with coleslaw  Dinner: Dinner with - eating out more due to move and kitchen not working. [employer] Mexican bowl with shrimp  Snack: Rare and usually after dinner. Almonds, popcorn, Molasses chews from smoothie daniel  Beverages: Coffee with creamer and water. Rare social alcohol     Exercise: Currently none due to knee pain.    Lab Visit on 01/30/2020   Component Date Value Ref Range Status    Vit D, 25-Hydroxy 01/30/2020 20* 30 - 96 ng/mL Final    Testosterone, Free 01/30/2020 0.3  pg/mL Final    Estradiol 01/30/2020 <10* See Text pg/mL Final   Admission on 01/06/2020, Discharged on 01/07/2020   Component Date Value Ref Range Status    CRP 01/07/2020 15.9* 0.0 - 8.2 mg/L Final    Uric Acid 01/07/2020 6.0* 2.4 - 5.7 mg/dL Final    Procalcitonin 01/07/2020 0.02  <0.25 ng/mL Final    WBC 01/07/2020 4.80  3.90 - 12.70 K/uL Final    RBC 01/07/2020 5.55* 4.00 - 5.40 M/uL Final    Hemoglobin 01/07/2020 12.3  12.0 - 16.0 g/dL Final    Hematocrit  01/07/2020 39.7  37.0 - 48.5 % Final    Mean Corpuscular Volume 01/07/2020 72* 82 - 98 fL Final    Mean Corpuscular Hemoglobin 01/07/2020 22.2* 27.0 - 31.0 pg Final    Mean Corpuscular Hemoglobin Conc 01/07/2020 31.0* 32.0 - 36.0 g/dL Final    RDW 01/07/2020 16.6* 11.5 - 14.5 % Final    Platelets 01/07/2020 310  150 - 350 K/uL Final    MPV 01/07/2020 10.8  9.2 - 12.9 fL Final    Immature Granulocytes 01/07/2020 0.2  0.0 - 0.5 % Final    Gran # (ANC) 01/07/2020 2.7  1.8 - 7.7 K/uL Final    Immature Grans (Abs) 01/07/2020 0.01  0.00 - 0.04 K/uL Final    Lymph # 01/07/2020 1.3  1.0 - 4.8 K/uL Final    Mono # 01/07/2020 0.6  0.3 - 1.0 K/uL Final    Eos # 01/07/2020 0.1  0.0 - 0.5 K/uL Final    Baso # 01/07/2020 0.04  0.00 - 0.20 K/uL Final    nRBC 01/07/2020 0  0 /100 WBC Final    Gran% 01/07/2020 56.9  38.0 - 73.0 % Final    Lymph% 01/07/2020 26.7  18.0 - 48.0 % Final    Mono% 01/07/2020 12.7  4.0 - 15.0 % Final    Eosinophil% 01/07/2020 2.7  0.0 - 8.0 % Final    Basophil% 01/07/2020 0.8  0.0 - 1.9 % Final    Differential Method 01/07/2020 Automated   Final    Sed Rate 01/07/2020 10  0 - 20 mm/Hr Final    Sodium 01/07/2020 137  136 - 145 mmol/L Final    Potassium 01/07/2020 3.9  3.5 - 5.1 mmol/L Final    Chloride 01/07/2020 100  95 - 110 mmol/L Final    CO2 01/07/2020 30* 23 - 29 mmol/L Final    Glucose 01/07/2020 126* 70 - 110 mg/dL Final    BUN, Bld 01/07/2020 16  6 - 20 mg/dL Final    Creatinine 01/07/2020 0.8  0.5 - 1.4 mg/dL Final    Calcium 01/07/2020 9.7  8.7 - 10.5 mg/dL Final    Anion Gap 01/07/2020 7* 8 - 16 mmol/L Final    eGFR if African American 01/07/2020 >60  >60 mL/min/1.73 m^2 Final    eGFR if non African American 01/07/2020 >60  >60 mL/min/1.73 m^2 Final       Past Medical History:   Diagnosis Date    Breast cancer, right breast 11/12/2018    Cancer     Hypertension     Psoriasis     on biologic for 1 year, has been controlling it well     Past Surgical History:    Procedure Laterality Date    COLONOSCOPY N/A 10/10/2019    Procedure: COLONOSCOPY;  Surgeon: Matt Marin MD;  Location: [facility];  Service: Endoscopy;  Laterality: N/A;    DILATION AND CURETTAGE OF UTERUS      HYSTERECTOMY      partial hyst for prolapse    MASTECTOMY Bilateral 2018    Procedure: MASTECTOMY;  Surgeon: Marga Cardensa MD;  Location: [facility];  Service: Plastics;  Laterality: Bilateral;    MASTECTOMY WITH SENTINEL NODE BIOPSY AND AXILLARY LYMPH NODE DISSECTION Right 2018    Procedure: SENTINEL NODE BIOPSY AND AXILLARY LYMPHADENECTOMY;  Surgeon: Marga Cardenas MD;  Location: [facility];  Service: Plastics;  Laterality: Right;    RECONSTRUCTION OF BREAST WITH DEEP INFERIOR EPIGASTRIC ARTERY  (MEGAN) FREE FLAP Bilateral 2018    Procedure: RECONSTRUCTION, BREAST, USING MEGAN FREE FLAP;  Surgeon: Tye Chapman MD;  Location: [facility];  Service: Plastics;  Laterality: Bilateral;     Social History     Tobacco Use    Smoking status: Never Smoker    Smokeless tobacco: Never Used   Substance Use Topics    Alcohol use: Yes     Alcohol/week: 3.0 standard drinks     Types: 3 Glasses of wine per week     Frequency: 2-4 times a month     Drinks per session: 1 or 2     Binge frequency: Never     Comment: social    Drug use: No     Family History   Problem Relation Age of Onset    Diabetes Mother     Hypertension Mother     Hypertension Brother     Cancer Paternal Uncle         leukemia    Breast cancer Neg Hx     Ovarian cancer Neg Hx     Colon cancer Neg Hx      OB History    Para Term  AB Living   3 3 3     3   SAB TAB Ectopic Multiple Live Births           3      # Outcome Date GA Lbr Bowen/2nd Weight Sex Delivery Anes PTL Lv   3 Term 97 40w0d   M Vag-Spont EPI N AKIT   2 Term 95 40w0d   F Vag-Spont EPI N KAIT   1 Term 90 40w0d   F CS-LTranv EPI N KAIT       Current Outpatient Medications:     hydroCHLOROthiazide (HYDRODIURIL) 12.5 MG Tab,  "TAKE 1 TABLET BY MOUTH EVERY DAY, Disp: 90 tablet, Rfl: 2    letrozole (FEMARA) 2.5 mg Tab, Take 2.5 mg by mouth once daily., Disp: , Rfl:     venlafaxine (EFFEXOR XR) 37.5 MG 24 hr capsule, Take 1 capsule (37.5 mg total) by mouth once daily., Disp: 30 capsule, Rfl: 6    betamethasone dipropionate (DIPROLENE) 0.05 % cream, APPLY TWICE DAILY TO ALL AFFECTED AREAS., Disp: , Rfl: 2    cetirizine (ZYRTEC) 10 MG tablet, Take 1 tablet (10 mg total) by mouth daily as needed for Allergies (sneezing). (Patient not taking: Reported on 2/5/2020), Disp: 30 tablet, Rfl: 0    clobetasol (CLOBEX) 0.05 % shampoo, USE TOPICALLY 3 TIMES WEEKLY, Disp: , Rfl: 3    docusate sodium (COLACE) 100 MG capsule, Take 1 capsule (100 mg total) by mouth 2 (two) times daily. (Patient not taking: Reported on 2/5/2020), Disp: , Rfl: 0    epinephrine (EPIPEN) 0.3 mg/0.3 mL AtIn, Inject 0.3 mLs (0.3 mg total) into the muscle as needed., Disp: 2 Device, Rfl: 0    HYDROcodone-acetaminophen (NORCO) 5-325 mg per tablet, Take 1 tablet by mouth every 4 (four) hours as needed for Pain. (Patient not taking: Reported on 1/21/2020), Disp: 12 tablet, Rfl: 0    liraglutide, weight loss, (SAXENDA) 3 mg/0.5 mL (18 mg/3 mL) PnIj, Inject 3 mg into the skin once daily., Disp: 1 Syringe, Rfl: 11    naproxen (NAPROSYN) 500 MG tablet, Take 1 tablet (500 mg total) by mouth 2 (two) times daily with meals. (Patient not taking: Reported on 2/5/2020), Disp: 20 tablet, Rfl: 0    pen needle, diabetic (BD ULTRA-FINE ENOCH PEN NEEDLE) 32 gauge x 5/32" Ndle, 1 each by Misc.(Non-Drug; Combo Route) route once daily., Disp: 100 each, Rfl: 3    STELARA 90 mg/mL Syrg syringe, , Disp: , Rfl:   No current facility-administered medications for this visit.     Facility-Administered Medications Ordered in Other Visits:     EPINEPHrine 1,000 mcg in lactated Ringers 1,000 mL irrigation, , Irrigation, On Call Procedure, Tye Chapman MD    EPINEPHrine 1,000 mcg in lactated " "Ringers 1,000 mL irrigation, , Irrigation, On Call Procedure, Tye Chapman MD    Review of Systems:  General: No fever, chills, or weight loss.  Chest: No chest pain, shortness of breath, or palpitations.  Breast: No pain, masses, or nipple discharge.  Vulva: No pain, lesions, or itching.  Vagina: No relaxation, itching, discharge, or lesions.  Abdomen: No pain, nausea, vomiting, diarrhea, or constipation.  Urinary: No incontinence, nocturia, frequency, or dysuria.  Extremities:  No leg cramps, edema, or calf pain.  Neurologic: No headaches, dizziness, or visual changes.    Objective:     Vitals:    02/05/20 1446   BP: 132/88   Weight: 108.4 kg (238 lb 15.7 oz)   PainSc: 0-No pain     Body mass index is 43.71 kg/m².    PHYSICAL EXAM:  APPEARANCE: Well nourished, well developed, in no acute distress.  AFFECT: WNL, alert and oriented x 3  SKIN: No acne or hirsutism  CHEST: Good respiratory effect    Assessment:      Obesity/History of breast cancer: Over eating in the evening and not eating enough protein during the day. With breast cancer history, would benefit from avoiding processed foods, limiting red meat and increasing exercise as tolerated. Will start behavioral modifications slowly and then build.  -     liraglutide, weight loss, (SAXENDA) 3 mg/0.5 mL (18 mg/3 mL) PnIj; Inject 3 mg into the skin once daily.  Dispense: 1 Syringe; Refill: 11  -     pen needle, diabetic (BD ULTRA-FINE ENOCH PEN NEEDLE) 32 gauge x 5/32" Ndle; 1 each by Misc.(Non-Drug; Combo Route) route once daily.  Dispense: 100 each; Refill: 3    Plan:     Developed meal plan with handout of preferred foods:  Breakfast: Eggs + Egg whites + veggies or "egg muffins;" Plain non-fat greek yogurt with berries and one serving of nut butter; if in rush can have banana with Tbsp of nut butter.  Lunch: Salad with protein (vinegar with EVOO), open faced sandwich; turkey roll ups; left overs; Frozen Veggie Protein Bowls (she will check ingredients for " preservatives)  Dinner: Protein and vegetables (optional one serving of carb)  Snacks: humus with vegetables, handful of raw almonds, turkey roll ups; apple with nutbutter     Goals for the next month include: Lunch is biggest meal of the day and dinner is smaller (reduce dinner portion). Only one starch per day.  Set bedtime alarm for 9PM.  Log in [employer] with goal of 1200 calories a day (35% protein, 35% carbs (mostly vegetables/fruits), 30% fat)  Start Saxenda 0.6mg SC QD and increase weekly by 0.6mg as needed. First injection was administered in office.   Reviewed side effects and risks of medications. No family or personal history of thyroid cancer or pancreatitis.      Instructed patient to call if she experiences any side effects or has any questions.  I spent 60 minutes with this patient today, >50% counseling.     Follow up in 4 weeks.

## 2020-04-20 ENCOUNTER — TELEPHONE (OUTPATIENT)
Dept: HEMATOLOGY/ONCOLOGY | Facility: CLINIC | Age: 50
End: 2020-04-20

## 2020-04-20 NOTE — PROGRESS NOTES
Subjective:       Patient ID: Brittny Urias is a 49 y.o. female.    Chief Complaint: No chief complaint on file.    HPI 49-year-old postmenopausal female who returns for follow-up of a diagnosis of right breast cancer, T1cN1a, ER positive, HER2 negative.  She is on letrozole hormonal therapy.    The patient location is:  home.  The chief complaint leading to consultation is:  Breast cancer.  Visit type: audiovisual  Total time spent with patient:  10 min.  Each patient to whom he or she provides medical services by telemedicine is:  (1) informed of the relationship between the physician and patient and the respective role of any other health care provider with respect to management of the patient; and (2) notified that he or she may decline to receive medical services by telemedicine and may withdraw from such care at any time.    Notes:  Overall, she has been doing well.  She does have some ongoing hot flashes.  Appetite and bowel function has been good.  Her only pain is some left wrist pain did been present for the last several months and has gotten a bit worse.  She is not taking any medication for that.  That has not prevented her from doing her normal activities.  She has no shortness of breath.  She has been walking and doing the elliptical for exercise and she recently moved into a new house which has kept her very busy.    She had additional reconstructive surgery schedule on October 30th, 2019  -scar revision and nipple reconstruction.      Breast History: Screening mammogram on August 3, 2018 showed architectural distortion in the retroareolar right breast.  Follow-up diagnostic mammogram on August 18th showed architectural distortion and nipple retraction on the right.  Ultrasound showed no definite abnormality.    August 21, 2018 a biopsy was performed which showed infiltrating lobular carcinoma (histologic grade 3, nuclear grade 1, mitotic index 1).  ER was 90% positive, [place] was 20% positive and  HER2 was 0.  Ki-67 was less than 1%.    MRI of the breast on September 4, 2018 showed a 3.2 x 1.8 x 1.4 cm mass with spiculated margins in the subareolar region of the right breast.  The left breast was unremarkable.    On November 14, 2018 bilateral mastectomies were performed.  The right breast showed a 2 cm intermediate grade carcinoma (3+ 2+ 1) 2 of 3 sentinel lymph nodes were positive with some extracapsular extension.  Completion axillary lymph node dissection showed 0 of 12 additional nodes were involved.  Margins were negative.  The left breast showed no abnormalities.     Mammaprint was low risk at + 0.362    She completed radiation on February 26, 2019.      Letrozole was started in March 2019.  Review of Systems   Constitutional: Negative.  Negative for appetite change and unexpected weight change.        Hot flashes   HENT: Negative.    Eyes: Negative for visual disturbance.   Respiratory: Negative.  Negative for cough and shortness of breath.    Cardiovascular: Negative.  Negative for chest pain.   Gastrointestinal: Negative.  Negative for abdominal pain and diarrhea.   Genitourinary: Negative.  Negative for frequency.   Musculoskeletal: Positive for arthralgias (Left wrist). Negative for back pain.   Skin: Negative for rash.   Neurological: Negative for headaches.   Hematological: Negative for adenopathy.   Psychiatric/Behavioral: Negative.  The patient is not nervous/anxious.        Objective:      Physical Exam   Constitutional: She is oriented to person, place, and time. She appears well-developed and well-nourished. She appears distressed.   Neurological: She is alert and oriented to person, place, and time.   Psychiatric: She has a normal mood and affect. Her behavior is normal. Thought content normal.       Assessment:       1. Malignant neoplasm of central portion of right breast in female, estrogen receptor positive        Plan:       She will continue letrozole and return to clinic in 3 months  time.

## 2020-04-21 ENCOUNTER — OFFICE VISIT (OUTPATIENT)
Dept: HEMATOLOGY/ONCOLOGY | Facility: CLINIC | Age: 50
End: 2020-04-21
Payer: COMMERCIAL

## 2020-04-21 DIAGNOSIS — C50.111 MALIGNANT NEOPLASM OF CENTRAL PORTION OF RIGHT BREAST IN FEMALE, ESTROGEN RECEPTOR POSITIVE: Primary | ICD-10-CM

## 2020-04-21 DIAGNOSIS — Z17.0 MALIGNANT NEOPLASM OF CENTRAL PORTION OF RIGHT BREAST IN FEMALE, ESTROGEN RECEPTOR POSITIVE: Primary | ICD-10-CM

## 2020-04-21 PROCEDURE — 99212 PR OFFICE/OUTPT VISIT, EST, LEVL II, 10-19 MIN: ICD-10-PCS | Mod: 95,,, | Performed by: INTERNAL MEDICINE

## 2020-04-21 PROCEDURE — 99212 OFFICE O/P EST SF 10 MIN: CPT | Mod: 95,,, | Performed by: INTERNAL MEDICINE

## 2020-05-07 DIAGNOSIS — Z17.0 MALIGNANT NEOPLASM OF CENTRAL PORTION OF RIGHT BREAST IN FEMALE, ESTROGEN RECEPTOR POSITIVE: Primary | ICD-10-CM

## 2020-05-07 DIAGNOSIS — C50.111 MALIGNANT NEOPLASM OF CENTRAL PORTION OF RIGHT BREAST IN FEMALE, ESTROGEN RECEPTOR POSITIVE: Primary | ICD-10-CM

## 2020-05-07 RX ORDER — LETROZOLE 2.5 MG/1
TABLET, FILM COATED ORAL
Qty: 90 TABLET | Refills: 3 | Status: SHIPPED | OUTPATIENT
Start: 2020-05-07 | End: 2020-11-24 | Stop reason: CLARIF

## 2020-06-23 NOTE — PLAN OF CARE
Left message on om's identified voicemail to call back  Left message on dad's number only identified voicemail to call back   Patient is resting comfortably in bed with dinner tray. Abd pads and surgical bra in place, abdominal binder in place. LINO drains monitored and output recorded throughout shift. Reports that pain is well controlled at this time. Patient is ambulating independently to the restroom. Tolerating ordered regular diet. Purposeful hourly rounding maintained throughout shift.

## 2020-07-09 ENCOUNTER — PATIENT MESSAGE (OUTPATIENT)
Dept: ADMINISTRATIVE | Facility: HOSPITAL | Age: 50
End: 2020-07-09

## 2020-07-09 RX ORDER — RISANKIZUMAB-RZAA 75 MG/0.83
KIT SUBCUTANEOUS
COMMUNITY
Start: 2020-06-01 | End: 2022-06-30 | Stop reason: SDUPTHER

## 2020-07-17 ENCOUNTER — OFFICE VISIT (OUTPATIENT)
Dept: OBSTETRICS AND GYNECOLOGY | Facility: CLINIC | Age: 50
End: 2020-07-17
Payer: COMMERCIAL

## 2020-07-17 VITALS
DIASTOLIC BLOOD PRESSURE: 90 MMHG | WEIGHT: 233.38 LBS | BODY MASS INDEX: 42.68 KG/M2 | SYSTOLIC BLOOD PRESSURE: 142 MMHG

## 2020-07-17 DIAGNOSIS — Z78.0 MENOPAUSE: Primary | ICD-10-CM

## 2020-07-17 DIAGNOSIS — E88.810 METABOLIC SYNDROME: ICD-10-CM

## 2020-07-17 PROCEDURE — 3077F SYST BP >= 140 MM HG: CPT | Mod: CPTII,S$GLB,, | Performed by: PHYSICIAN ASSISTANT

## 2020-07-17 PROCEDURE — 3008F BODY MASS INDEX DOCD: CPT | Mod: CPTII,S$GLB,, | Performed by: PHYSICIAN ASSISTANT

## 2020-07-17 PROCEDURE — 99214 OFFICE O/P EST MOD 30 MIN: CPT | Mod: S$GLB,,, | Performed by: PHYSICIAN ASSISTANT

## 2020-07-17 PROCEDURE — 3080F PR MOST RECENT DIASTOLIC BLOOD PRESSURE >= 90 MM HG: ICD-10-PCS | Mod: CPTII,S$GLB,, | Performed by: PHYSICIAN ASSISTANT

## 2020-07-17 PROCEDURE — 3077F PR MOST RECENT SYSTOLIC BLOOD PRESSURE >= 140 MM HG: ICD-10-PCS | Mod: CPTII,S$GLB,, | Performed by: PHYSICIAN ASSISTANT

## 2020-07-17 PROCEDURE — 3008F PR BODY MASS INDEX (BMI) DOCUMENTED: ICD-10-PCS | Mod: CPTII,S$GLB,, | Performed by: PHYSICIAN ASSISTANT

## 2020-07-17 PROCEDURE — 3080F DIAST BP >= 90 MM HG: CPT | Mod: CPTII,S$GLB,, | Performed by: PHYSICIAN ASSISTANT

## 2020-07-17 PROCEDURE — 99214 PR OFFICE/OUTPT VISIT, EST, LEVL IV, 30-39 MIN: ICD-10-PCS | Mod: S$GLB,,, | Performed by: PHYSICIAN ASSISTANT

## 2020-07-17 RX ORDER — CLOBETASOL PROPIONATE 0.46 MG/ML
SOLUTION TOPICAL
COMMUNITY
Start: 2020-07-13

## 2020-07-17 RX ORDER — METFORMIN HYDROCHLORIDE 500 MG/1
500 TABLET, EXTENDED RELEASE ORAL
Qty: 30 TABLET | Refills: 11 | Status: SHIPPED | OUTPATIENT
Start: 2020-07-17 | End: 2021-11-01

## 2020-07-17 NOTE — PROGRESS NOTES
Subjective:      Brittny Urias is a 49 y.o. female who presents for follow up. Diagnosed with right breast cancer ER/VT + in 8/2018. Is s/p bilateral mastectomy and has also had partial hysterectomy. Currently taking Femara daily. Taking Effexor 37.5mg daily and helps with hot flashes and mood. Feeling much better, sleep has improved and feeling great about increased exercise. Walking every morning with friends for about an hour and sometimes riding bike in the evenings. Still having hot flashes but not as bad as prior. Has lost about 5 pounds with diet and exercise. Struggling to stick with 1200 calories a day. Weakness is daiquiris on the weekend. Tried saxenda sample but not sure it helped and then insurance would not cover. Decreased libido but  has ED, so not as worried about it as much. Denies vaginal dryness or pelvic pain.    No visits with results within 3 Month(s) from this visit.   Latest known visit with results is:   Lab Visit on 01/30/2020   Component Date Value Ref Range Status    Vit D, 25-Hydroxy 01/30/2020 20* 30 - 96 ng/mL Final    Testosterone, Free 01/30/2020 0.3  pg/mL Final    Estradiol 01/30/2020 <10* See Text pg/mL Final       Past Medical History:   Diagnosis Date    Breast cancer, right breast 11/12/2018    Cancer     Hypertension     Psoriasis     on biologic for 1 year, has been controlling it well     Past Surgical History:   Procedure Laterality Date    COLONOSCOPY N/A 10/10/2019    Procedure: COLONOSCOPY;  Surgeon: Matt Marin MD;  Location: [facility] ENDO;  Service: Endoscopy;  Laterality: N/A;    DILATION AND CURETTAGE OF UTERUS      HYSTERECTOMY      partial hyst for prolapse    MASTECTOMY Bilateral 11/14/2018    Procedure: MASTECTOMY;  Surgeon: Marga Cardenas MD;  Location: [facility] OR;  Service: Plastics;  Laterality: Bilateral;    MASTECTOMY WITH SENTINEL NODE BIOPSY AND AXILLARY LYMPH NODE DISSECTION Right 11/14/2018    Procedure: SENTINEL NODE BIOPSY AND  "AXILLARY LYMPHADENECTOMY;  Surgeon: Marga Cardenas MD;  Location: [facility];  Service: Plastics;  Laterality: Right;    RECONSTRUCTION OF BREAST WITH DEEP INFERIOR EPIGASTRIC ARTERY  (MEGAN) FREE FLAP Bilateral 2018    Procedure: RECONSTRUCTION, BREAST, USING MEGAN FREE FLAP;  Surgeon: Tye Chapman MD;  Location: [facility];  Service: Plastics;  Laterality: Bilateral;     Social History     Tobacco Use    Smoking status: Never Smoker    Smokeless tobacco: Never Used   Substance Use Topics    Alcohol use: Yes     Alcohol/week: 3.0 standard drinks     Types: 3 Glasses of wine per week     Frequency: 2-4 times a month     Drinks per session: 1 or 2     Binge frequency: Less than monthly     Comment: social    Drug use: No     Family History   Problem Relation Age of Onset    Diabetes Mother     Hypertension Mother     Hypertension Brother     Cancer Paternal Uncle         leukemia    Breast cancer Neg Hx     Ovarian cancer Neg Hx     Colon cancer Neg Hx      OB History    Para Term  AB Living   3 3 3     3   SAB TAB Ectopic Multiple Live Births           3      # Outcome Date GA Lbr Bowen/2nd Weight Sex Delivery Anes PTL Lv   3 Term 97 40w0d   M Vag-Spont EPI N KAIT   2 Term 95 40w0d   F Vag-Spont EPI N KAIT   1 Term 90 40w0d   F CS-LTranv EPI N KAIT       Current Outpatient Medications:     clobetasol (CLOBEX) 0.05 % shampoo, USE TOPICALLY 3 TIMES WEEKLY, Disp: , Rfl: 3    clobetasoL (TEMOVATE) 0.05 % external solution, , Disp: , Rfl:     hydroCHLOROthiazide (HYDRODIURIL) 12.5 MG Tab, TAKE 1 TABLET BY MOUTH EVERY DAY, Disp: 90 tablet, Rfl: 2    letrozole (FEMARA) 2.5 mg Tab, TAKE 1 TABLET BY MOUTH EVERY DAY, Disp: 90 tablet, Rfl: 3    pen needle, diabetic (BD ULTRA-FINE ENOCH PEN NEEDLE) 32 gauge x 5/32" Ndle, 1 each by Misc.(Non-Drug; Combo Route) route once daily., Disp: 100 each, Rfl: 3    SKYRIZI 150mg/1.66mL(75 mg/0.83 mL x2) subcutaneous injection, , Disp: " , Rfl:     venlafaxine (EFFEXOR XR) 37.5 MG 24 hr capsule, Take 1 capsule (37.5 mg total) by mouth once daily., Disp: 30 capsule, Rfl: 6    betamethasone dipropionate (DIPROLENE) 0.05 % cream, APPLY TWICE DAILY TO ALL AFFECTED AREAS., Disp: , Rfl: 2    cetirizine (ZYRTEC) 10 MG tablet, Take 1 tablet (10 mg total) by mouth daily as needed for Allergies (sneezing). (Patient not taking: Reported on 2/5/2020), Disp: 30 tablet, Rfl: 0    docusate sodium (COLACE) 100 MG capsule, Take 1 capsule (100 mg total) by mouth 2 (two) times daily. (Patient not taking: Reported on 2/5/2020), Disp: , Rfl: 0    epinephrine (EPIPEN) 0.3 mg/0.3 mL AtIn, Inject 0.3 mLs (0.3 mg total) into the muscle as needed., Disp: 2 Device, Rfl: 0    HYDROcodone-acetaminophen (NORCO) 5-325 mg per tablet, Take 1 tablet by mouth every 4 (four) hours as needed for Pain. (Patient not taking: Reported on 1/21/2020), Disp: 12 tablet, Rfl: 0    metFORMIN (GLUCOPHAGE XR) 500 MG XR 24hr tablet, Take 1 tablet (500 mg total) by mouth daily with breakfast., Disp: 30 tablet, Rfl: 11    naproxen (NAPROSYN) 500 MG tablet, Take 1 tablet (500 mg total) by mouth 2 (two) times daily with meals. (Patient not taking: Reported on 2/5/2020), Disp: 20 tablet, Rfl: 0    STELARA 90 mg/mL Syrg syringe, , Disp: , Rfl:   No current facility-administered medications for this visit.     Facility-Administered Medications Ordered in Other Visits:     EPINEPHrine 1,000 mcg in lactated Ringers 1,000 mL irrigation, , Irrigation, On Call Procedure, Tye Chapman MD    EPINEPHrine 1,000 mcg in lactated Ringers 1,000 mL irrigation, , Irrigation, On Call Procedure, Tye Chapman MD    Review of Systems:  General: No fever, chills, or weight loss.  Chest: No chest pain, shortness of breath, or palpitations.  Breast: No pain, masses, or nipple discharge.  Vulva: No pain, lesions, or itching.  Vagina: No relaxation, itching, discharge, or lesions.  Abdomen: No pain, nausea,  vomiting, diarrhea, or constipation.  Urinary: No incontinence, nocturia, frequency, or dysuria.  Extremities:  No leg cramps, edema, or calf pain.  Neurologic: No headaches, dizziness, or visual changes.    Objective:     Vitals:    07/17/20 1039   BP: (!) 142/90   Weight: 105.8 kg (233 lb 5.7 oz)   PainSc: 0-No pain     Body mass index is 42.68 kg/m².    PHYSICAL EXAM:  APPEARANCE: Well nourished, well developed, in no acute distress.  AFFECT: WNL, alert and oriented x 3    Assessment:      Menopause: Much improved with Effexor and increasing exercise. Reviewed treatment options for hot flashes. She would like to try acupuncture prior to starting testosterone. Also willing to consider therapy with her  with Dr. Swanson.   -     Acupuncture; Future    Metabolic syndrome: Doing fantastic with exercise and making diet changes. Would benefit from fasting to reduce risk and avoid late night eating. Elevated glucose on recent labs. Saxenda not covered, but would like her to use Victoza for metabolic syndrome. She would prefer metformin first.   -     metFORMIN (GLUCOPHAGE XR) 500 MG XR 24hr tablet; Take 1 tablet (500 mg total) by mouth daily with breakfast.  Dispense: 30 tablet; Refill: 11    Plan:   Continue Effexor 37.5mg QAM.  Referral to Acupuncture for hot flashes.  Gave info for Infratel to start using.  She will discuss with  possibly seeing Dr. Swanson.    Continue daily exercise with walking in AM with friends and bike riding in PM.   Log everything and goal of 1200 calories per day.  Reduce daiquiri consumption by buying cup vs gallon.   Fasting for 13 hours over night.   Start Metformin 500mg XR QAM.  Counseled on side effects.  Follow up in 3 months or sooner PRN.  Instructed patient to call if she experiences any side effects or has any questions.  I spent 25 minutes with this patient today, >50% counseling.

## 2020-07-20 NOTE — PROGRESS NOTES
Subjective:       Patient ID: Brittny Urias is a 49 y.o. female.    Chief Complaint: No chief complaint on file.    HPI 49-year-old postmenopausal female who returns for follow-up of a diagnosis of right breast cancer, T1cN1a, ER positive, HER2 negative.  She is on letrozole hormonal therapy.    Other than some left wrist pain , she is doing well. She has been excercizing on a regular basis.    She had additional reconstructive surgery schedule on October 30th, 2019  -scar revision and nipple reconstruction.      Breast History: Screening mammogram on August 3, 2018 showed architectural distortion in the retroareolar right breast.  Follow-up diagnostic mammogram on August 18th showed architectural distortion and nipple retraction on the right.  Ultrasound showed no definite abnormality.    August 21, 2018 a biopsy was performed which showed infiltrating lobular carcinoma (histologic grade 3, nuclear grade 1, mitotic index 1).  ER was 90% positive, AR was 20% positive and HER2 was 0.  Ki-67 was less than 1%.    MRI of the breast on September 4, 2018 showed a 3.2 x 1.8 x 1.4 cm mass with spiculated margins in the subareolar region of the right breast.  The left breast was unremarkable.    On November 14, 2018 bilateral mastectomies were performed.  The right breast showed a 2 cm intermediate grade carcinoma (3+ 2+ 1) 2 of 3 sentinel lymph nodes were positive with some extracapsular extension.  Completion axillary lymph node dissection showed 0 of 12 additional nodes were involved.  Margins were negative.  The left breast showed no abnormalities.     Mammaprint was low risk at + 0.362    She completed radiation on February 26, 2019.      Letrozole was started in March 2019.  Review of Systems   Constitutional: Negative.  Negative for appetite change and unexpected weight change.        Hot flashes   HENT: Negative.    Eyes: Negative for visual disturbance.   Respiratory: Negative.  Negative for cough and  shortness of breath.    Cardiovascular: Negative.  Negative for chest pain.   Gastrointestinal: Negative.  Negative for abdominal pain and diarrhea.   Genitourinary: Negative.  Negative for frequency.   Musculoskeletal: Positive for arthralgias (Left wrist). Negative for back pain.   Skin: Negative for rash.   Neurological: Negative for headaches.   Hematological: Negative for adenopathy.   Psychiatric/Behavioral: Negative.  The patient is not nervous/anxious.        Objective:      Physical Exam  Vitals signs reviewed.   Constitutional:       General: She is not in acute distress.     Appearance: Normal appearance. She is well-developed.   Cardiovascular:      Rate and Rhythm: Normal rate and regular rhythm.   Pulmonary:      Effort: Pulmonary effort is normal. No respiratory distress.      Breath sounds: Normal breath sounds. No wheezing.   Chest:       Abdominal:      Palpations: Abdomen is soft. There is no mass.      Tenderness: There is no abdominal tenderness.   Neurological:      Mental Status: She is alert and oriented to person, place, and time.   Psychiatric:         Behavior: Behavior normal.         Thought Content: Thought content normal.         Assessment:       1. Malignant neoplasm of central portion of right breast in female, estrogen receptor positive        Plan:       She will continue letrozole and return to clinic in 3 months time.

## 2020-07-21 ENCOUNTER — OFFICE VISIT (OUTPATIENT)
Dept: FAMILY MEDICINE | Facility: CLINIC | Age: 50
End: 2020-07-21
Payer: COMMERCIAL

## 2020-07-21 ENCOUNTER — OFFICE VISIT (OUTPATIENT)
Dept: HEMATOLOGY/ONCOLOGY | Facility: CLINIC | Age: 50
End: 2020-07-21
Payer: COMMERCIAL

## 2020-07-21 VITALS
TEMPERATURE: 98 F | WEIGHT: 229.94 LBS | DIASTOLIC BLOOD PRESSURE: 90 MMHG | SYSTOLIC BLOOD PRESSURE: 120 MMHG | HEIGHT: 62 IN | OXYGEN SATURATION: 98 % | HEART RATE: 73 BPM | RESPIRATION RATE: 18 BRPM | BODY MASS INDEX: 42.31 KG/M2

## 2020-07-21 VITALS
HEIGHT: 62 IN | SYSTOLIC BLOOD PRESSURE: 120 MMHG | OXYGEN SATURATION: 98 % | BODY MASS INDEX: 42.19 KG/M2 | DIASTOLIC BLOOD PRESSURE: 90 MMHG | HEART RATE: 73 BPM | WEIGHT: 229.25 LBS | TEMPERATURE: 98 F

## 2020-07-21 DIAGNOSIS — Z23 NEED FOR TDAP VACCINATION: ICD-10-CM

## 2020-07-21 DIAGNOSIS — Z23 NEED FOR 23-POLYVALENT PNEUMOCOCCAL POLYSACCHARIDE VACCINE: ICD-10-CM

## 2020-07-21 DIAGNOSIS — I10 ESSENTIAL HYPERTENSION: ICD-10-CM

## 2020-07-21 DIAGNOSIS — C50.111 MALIGNANT NEOPLASM OF CENTRAL PORTION OF RIGHT BREAST IN FEMALE, ESTROGEN RECEPTOR POSITIVE: Primary | ICD-10-CM

## 2020-07-21 DIAGNOSIS — Z23 NEED FOR SHINGLES VACCINE: ICD-10-CM

## 2020-07-21 DIAGNOSIS — Z17.0 MALIGNANT NEOPLASM OF CENTRAL PORTION OF RIGHT BREAST IN FEMALE, ESTROGEN RECEPTOR POSITIVE: Primary | ICD-10-CM

## 2020-07-21 DIAGNOSIS — Z00.00 ANNUAL PHYSICAL EXAM: Primary | ICD-10-CM

## 2020-07-21 PROCEDURE — 99999 PR PBB SHADOW E&M-EST. PATIENT-LVL III: ICD-10-PCS | Mod: PBBFAC,,, | Performed by: FAMILY MEDICINE

## 2020-07-21 PROCEDURE — 99213 PR OFFICE/OUTPT VISIT, EST, LEVL III, 20-29 MIN: ICD-10-PCS | Mod: S$GLB,,, | Performed by: INTERNAL MEDICINE

## 2020-07-21 PROCEDURE — 99999 PR PBB SHADOW E&M-EST. PATIENT-LVL III: CPT | Mod: PBBFAC,,, | Performed by: FAMILY MEDICINE

## 2020-07-21 PROCEDURE — 90732 PPSV23 VACC 2 YRS+ SUBQ/IM: CPT | Mod: S$GLB,,, | Performed by: FAMILY MEDICINE

## 2020-07-21 PROCEDURE — 90472 IMMUNIZATION ADMIN EACH ADD: CPT | Mod: S$GLB,,, | Performed by: FAMILY MEDICINE

## 2020-07-21 PROCEDURE — 3008F PR BODY MASS INDEX (BMI) DOCUMENTED: ICD-10-PCS | Mod: CPTII,S$GLB,, | Performed by: INTERNAL MEDICINE

## 2020-07-21 PROCEDURE — 3074F PR MOST RECENT SYSTOLIC BLOOD PRESSURE < 130 MM HG: ICD-10-PCS | Mod: CPTII,S$GLB,, | Performed by: FAMILY MEDICINE

## 2020-07-21 PROCEDURE — 3008F BODY MASS INDEX DOCD: CPT | Mod: CPTII,S$GLB,, | Performed by: FAMILY MEDICINE

## 2020-07-21 PROCEDURE — 90732 PNEUMOCOCCAL POLYSACCHARIDE VACCINE 23-VALENT =>2YO SQ IM: ICD-10-PCS | Mod: S$GLB,,, | Performed by: FAMILY MEDICINE

## 2020-07-21 PROCEDURE — 3008F PR BODY MASS INDEX (BMI) DOCUMENTED: ICD-10-PCS | Mod: CPTII,S$GLB,, | Performed by: FAMILY MEDICINE

## 2020-07-21 PROCEDURE — 3074F SYST BP LT 130 MM HG: CPT | Mod: CPTII,S$GLB,, | Performed by: INTERNAL MEDICINE

## 2020-07-21 PROCEDURE — 90715 TDAP VACCINE 7 YRS/> IM: CPT | Mod: S$GLB,,, | Performed by: FAMILY MEDICINE

## 2020-07-21 PROCEDURE — 90750 ZOSTER RECOMBINANT VACCINE: ICD-10-PCS | Mod: S$GLB,,, | Performed by: FAMILY MEDICINE

## 2020-07-21 PROCEDURE — 99396 PREV VISIT EST AGE 40-64: CPT | Mod: 25,S$GLB,, | Performed by: FAMILY MEDICINE

## 2020-07-21 PROCEDURE — 3080F DIAST BP >= 90 MM HG: CPT | Mod: CPTII,S$GLB,, | Performed by: FAMILY MEDICINE

## 2020-07-21 PROCEDURE — 3080F PR MOST RECENT DIASTOLIC BLOOD PRESSURE >= 90 MM HG: ICD-10-PCS | Mod: CPTII,S$GLB,, | Performed by: INTERNAL MEDICINE

## 2020-07-21 PROCEDURE — 99213 OFFICE O/P EST LOW 20 MIN: CPT | Mod: S$GLB,,, | Performed by: INTERNAL MEDICINE

## 2020-07-21 PROCEDURE — 3080F PR MOST RECENT DIASTOLIC BLOOD PRESSURE >= 90 MM HG: ICD-10-PCS | Mod: CPTII,S$GLB,, | Performed by: FAMILY MEDICINE

## 2020-07-21 PROCEDURE — 3008F BODY MASS INDEX DOCD: CPT | Mod: CPTII,S$GLB,, | Performed by: INTERNAL MEDICINE

## 2020-07-21 PROCEDURE — 90750 HZV VACC RECOMBINANT IM: CPT | Mod: S$GLB,,, | Performed by: FAMILY MEDICINE

## 2020-07-21 PROCEDURE — 99999 PR PBB SHADOW E&M-EST. PATIENT-LVL IV: ICD-10-PCS | Mod: PBBFAC,,, | Performed by: INTERNAL MEDICINE

## 2020-07-21 PROCEDURE — 90471 IMMUNIZATION ADMIN: CPT | Mod: S$GLB,,, | Performed by: FAMILY MEDICINE

## 2020-07-21 PROCEDURE — 3080F DIAST BP >= 90 MM HG: CPT | Mod: CPTII,S$GLB,, | Performed by: INTERNAL MEDICINE

## 2020-07-21 PROCEDURE — 90471 PNEUMOCOCCAL POLYSACCHARIDE VACCINE 23-VALENT =>2YO SQ IM: ICD-10-PCS | Mod: S$GLB,,, | Performed by: FAMILY MEDICINE

## 2020-07-21 PROCEDURE — 3074F SYST BP LT 130 MM HG: CPT | Mod: CPTII,S$GLB,, | Performed by: FAMILY MEDICINE

## 2020-07-21 PROCEDURE — 99999 PR PBB SHADOW E&M-EST. PATIENT-LVL IV: CPT | Mod: PBBFAC,,, | Performed by: INTERNAL MEDICINE

## 2020-07-21 PROCEDURE — 90715 TDAP VACCINE GREATER THAN OR EQUAL TO 7YO IM: ICD-10-PCS | Mod: S$GLB,,, | Performed by: FAMILY MEDICINE

## 2020-07-21 PROCEDURE — 3074F PR MOST RECENT SYSTOLIC BLOOD PRESSURE < 130 MM HG: ICD-10-PCS | Mod: CPTII,S$GLB,, | Performed by: INTERNAL MEDICINE

## 2020-07-21 PROCEDURE — 99396 PR PREVENTIVE VISIT,EST,40-64: ICD-10-PCS | Mod: 25,S$GLB,, | Performed by: FAMILY MEDICINE

## 2020-07-21 PROCEDURE — 90472 TDAP VACCINE GREATER THAN OR EQUAL TO 7YO IM: ICD-10-PCS | Mod: S$GLB,,, | Performed by: FAMILY MEDICINE

## 2020-07-21 RX ORDER — HYDROCHLOROTHIAZIDE 12.5 MG/1
12.5 TABLET ORAL DAILY
Qty: 90 TABLET | Refills: 2 | Status: SHIPPED | OUTPATIENT
Start: 2020-07-21 | End: 2020-07-21

## 2020-07-21 RX ORDER — LOSARTAN POTASSIUM AND HYDROCHLOROTHIAZIDE 12.5; 5 MG/1; MG/1
1 TABLET ORAL DAILY
Qty: 90 TABLET | Refills: 3 | Status: SHIPPED | OUTPATIENT
Start: 2020-07-21 | End: 2021-07-13 | Stop reason: DRUGHIGH

## 2020-07-21 NOTE — PROGRESS NOTES
Patient received Pneumococcal 23, Tdap, and Shingrix #1 IM vaccine and tolerated it well. Patient received VIS information.

## 2020-07-21 NOTE — PROGRESS NOTES
Assessment & Plan  Problem List Items Addressed This Visit     None      Visit Diagnoses     Annual physical exam    -  Primary    Relevant Orders    Lipid Panel    TSH    Essential hypertension        Relevant Medications    losartan-hydrochlorothiazide 50-12.5 mg (HYZAAR) 50-12.5 mg per tablet    Other Relevant Orders    Lipid Panel    TSH    Need for Tdap vaccination        Relevant Orders    (In Office Administered) Tdap Vaccine (Completed)    Need for 23-polyvalent pneumococcal polysaccharide vaccine        Relevant Orders    (In Office Administered) Pneumococcal Polysaccharide Vaccine (23 Valent) (SQ/IM) (Completed)    Need for shingles vaccine        Relevant Orders    (In Office Administered) Zoster Recombinant Vaccine (Completed)      I addressed all major concerns as it related to health maintenance.  All were ordered and scheduled based on the patients wishes.  Any additional health maintenance will be readdressed at the next physical if declined or deferred by the patient.  Addition of losartan to blood pressure medication .       Health Maintenance reviewed.    Follow-up: Follow up in about 1 year (around 7/21/2021) for annual exam.    ______________________________________________________________________    Chief Complaint  Chief Complaint   Patient presents with    Annual Exam       HPI  Brittny Urias is a 49 y.o. female with multiple medical diagnoses as listed in the medical history and problem list that presents for annual exam.  Pt is known to me with last appointment 3/29/2019.    Patient denies any new symptoms including chest pain, SOB, blurry vision, N/V, diarrhea.        PAST MEDICAL HISTORY:  Past Medical History:   Diagnosis Date    Breast cancer, right breast 11/12/2018    Cancer     Hypertension     Psoriasis     on biologic for 1 year, has been controlling it well       PAST SURGICAL HISTORY:  Past Surgical History:   Procedure Laterality Date    COLONOSCOPY N/A 10/10/2019     Procedure: COLONOSCOPY;  Surgeon: Matt Marin MD;  Location: [facility];  Service: Endoscopy;  Laterality: N/A;    DILATION AND CURETTAGE OF UTERUS      HYSTERECTOMY      partial hyst for prolapse    MASTECTOMY Bilateral 11/14/2018    Procedure: MASTECTOMY;  Surgeon: Marga Cardenas MD;  Location: [facility];  Service: Plastics;  Laterality: Bilateral;    MASTECTOMY WITH SENTINEL NODE BIOPSY AND AXILLARY LYMPH NODE DISSECTION Right 11/14/2018    Procedure: SENTINEL NODE BIOPSY AND AXILLARY LYMPHADENECTOMY;  Surgeon: Marga Cardenas MD;  Location: [facility];  Service: Plastics;  Laterality: Right;    RECONSTRUCTION OF BREAST WITH DEEP INFERIOR EPIGASTRIC ARTERY  (MEGAN) FREE FLAP Bilateral 11/14/2018    Procedure: RECONSTRUCTION, BREAST, USING MEGAN FREE FLAP;  Surgeon: Tye Chapman MD;  Location: [facility];  Service: Plastics;  Laterality: Bilateral;       SOCIAL HISTORY:  Social History     Socioeconomic History    Marital status:      Spouse name: Not on file    Number of children: Not on file    Years of education: Not on file    Highest education level: Not on file   Occupational History    Not on file   Social Needs    Financial resource strain: Not hard at all    Food insecurity     Worry: Never true     Inability: Never true    Transportation needs     Medical: No     Non-medical: No   Tobacco Use    Smoking status: Never Smoker    Smokeless tobacco: Never Used   Substance and Sexual Activity    Alcohol use: Yes     Alcohol/week: 3.0 standard drinks     Types: 3 Glasses of wine per week     Frequency: 2-4 times a month     Drinks per session: 1 or 2     Binge frequency: Less than monthly     Comment: social    Drug use: No    Sexual activity: Yes     Partners: Male     Birth control/protection: Surgical   Lifestyle    Physical activity     Days per week: 5 days     Minutes per session: 30 min    Stress: Not at all   Relationships    Social connections     Talks on phone:  "More than three times a week     Gets together: Once a week     Attends Buddhism service: Not on file     Active member of club or organization: Yes     Attends meetings of clubs or organizations: More than 4 times per year     Relationship status:    Other Topics Concern    Not on file   Social History Narrative    Not on file       FAMILY HISTORY:  Family History   Problem Relation Age of Onset    Diabetes Mother     Hypertension Mother     Hypertension Brother     Cancer Paternal Uncle         leukemia    Breast cancer Neg Hx     Ovarian cancer Neg Hx     Colon cancer Neg Hx        ALLERGIES AND MEDICATIONS: updated and reviewed.  Review of patient's allergies indicates:  No Known Allergies  Current Outpatient Medications   Medication Sig Dispense Refill    betamethasone dipropionate (DIPROLENE) 0.05 % cream APPLY TWICE DAILY TO ALL AFFECTED AREAS.  2    clobetasol (CLOBEX) 0.05 % shampoo USE TOPICALLY 3 TIMES WEEKLY  3    clobetasoL (TEMOVATE) 0.05 % external solution       docusate sodium (COLACE) 100 MG capsule Take 1 capsule (100 mg total) by mouth 2 (two) times daily.  0    HYDROcodone-acetaminophen (NORCO) 5-325 mg per tablet Take 1 tablet by mouth every 4 (four) hours as needed for Pain. 12 tablet 0    letrozole (FEMARA) 2.5 mg Tab TAKE 1 TABLET BY MOUTH EVERY DAY 90 tablet 3    metFORMIN (GLUCOPHAGE XR) 500 MG XR 24hr tablet Take 1 tablet (500 mg total) by mouth daily with breakfast. 30 tablet 11    naproxen (NAPROSYN) 500 MG tablet Take 1 tablet (500 mg total) by mouth 2 (two) times daily with meals. 20 tablet 0    pen needle, diabetic (BD ULTRA-FINE ENOCH PEN NEEDLE) 32 gauge x 5/32" Ndle 1 each by Misc.(Non-Drug; Combo Route) route once daily. 100 each 3    SKYRIZI 150mg/1.66mL(75 mg/0.83 mL x2) subcutaneous injection       STELARA 90 mg/mL Syrg syringe       venlafaxine (EFFEXOR XR) 37.5 MG 24 hr capsule Take 1 capsule (37.5 mg total) by mouth once daily. 30 capsule 6    " "cetirizine (ZYRTEC) 10 MG tablet Take 1 tablet (10 mg total) by mouth daily as needed for Allergies (sneezing). (Patient not taking: Reported on 2/5/2020) 30 tablet 0    epinephrine (EPIPEN) 0.3 mg/0.3 mL AtIn Inject 0.3 mLs (0.3 mg total) into the muscle as needed. 2 Device 0    losartan-hydrochlorothiazide 50-12.5 mg (HYZAAR) 50-12.5 mg per tablet Take 1 tablet by mouth once daily. 90 tablet 3     No current facility-administered medications for this visit.      Facility-Administered Medications Ordered in Other Visits   Medication Dose Route Frequency Provider Last Rate Last Dose    EPINEPHrine 1,000 mcg in lactated Ringers 1,000 mL irrigation   Irrigation On Call Procedure Tye Chapman MD        EPINEPHrine 1,000 mcg in lactated Ringers 1,000 mL irrigation   Irrigation On Call Procedure Tye Chapman MD             ROS  Review of Systems   Constitutional: Negative for activity change and unexpected weight change.   HENT: Negative for hearing loss, rhinorrhea and trouble swallowing.    Eyes: Negative for discharge and visual disturbance.   Respiratory: Negative for chest tightness and wheezing.    Cardiovascular: Negative for chest pain and palpitations.   Gastrointestinal: Negative for blood in stool, constipation, diarrhea and vomiting.   Endocrine: Negative for polydipsia and polyuria.   Genitourinary: Negative for difficulty urinating, dysuria, hematuria and menstrual problem.   Musculoskeletal: Positive for arthralgias. Negative for joint swelling and neck pain.   Neurological: Negative for weakness and headaches.   Psychiatric/Behavioral: Negative for confusion and dysphoric mood.           Physical Exam  Vitals:    07/21/20 0720   BP: (!) 120/90   BP Location: Left arm   Patient Position: Sitting   BP Method: Large (Manual)   Pulse: 73   Temp: 97.9 °F (36.6 °C)   TempSrc: Temporal   SpO2: 98%   Weight: 104 kg (229 lb 4.5 oz)   Height: 5' 2" (1.575 m)    Body mass index is 41.94 kg/m².  Weight: " "104 kg (229 lb 4.5 oz)   Height: 5' 2" (157.5 cm)   Physical Exam  Vitals signs reviewed.   Constitutional:       Appearance: She is well-developed.   HENT:      Head: Normocephalic and atraumatic.      Right Ear: External ear normal.      Left Ear: External ear normal.      Nose: Nose normal.   Eyes:      Conjunctiva/sclera: Conjunctivae normal.      Pupils: Pupils are equal, round, and reactive to light.   Cardiovascular:      Rate and Rhythm: Normal rate and regular rhythm.      Heart sounds: Normal heart sounds.   Pulmonary:      Effort: Pulmonary effort is normal.      Breath sounds: Normal breath sounds.   Skin:     General: Skin is warm and dry.   Neurological:      Mental Status: She is alert and oriented to person, place, and time.           Health Maintenance       Date Due Completion Date    TETANUS VACCINE 09/11/1987 ---    Pneumococcal Vaccine (Highest Risk) (1 of 3 - PCV13) 09/11/1988 ---    Shingles Vaccine (1 of 2) 09/11/2019 ---    Influenza Vaccine (1) 09/01/2020 10/11/2019    Lipid Panel 02/28/2023 2/28/2018    Colorectal Cancer Screening 10/10/2024 10/10/2019              Patient note was created using [email or web address].  Any errors in syntax or even information may not have been identified and edited on initial review prior to signing this note.    "

## 2020-08-13 DIAGNOSIS — C50.919 MALIGNANT NEOPLASM OF BREAST IN FEMALE, ESTROGEN RECEPTOR POSITIVE, UNSPECIFIED LATERALITY, UNSPECIFIED SITE OF BREAST: ICD-10-CM

## 2020-08-13 DIAGNOSIS — F41.9 ANXIETY: ICD-10-CM

## 2020-08-13 DIAGNOSIS — Z17.0 MALIGNANT NEOPLASM OF BREAST IN FEMALE, ESTROGEN RECEPTOR POSITIVE, UNSPECIFIED LATERALITY, UNSPECIFIED SITE OF BREAST: ICD-10-CM

## 2020-08-13 RX ORDER — VENLAFAXINE HYDROCHLORIDE 37.5 MG/1
37.5 CAPSULE, EXTENDED RELEASE ORAL DAILY
Qty: 30 CAPSULE | Refills: 6 | Status: SHIPPED | OUTPATIENT
Start: 2020-08-13 | End: 2020-11-10

## 2020-08-14 DIAGNOSIS — Z11.59 NEED FOR HEPATITIS C SCREENING TEST: ICD-10-CM

## 2020-09-09 ENCOUNTER — PATIENT OUTREACH (OUTPATIENT)
Dept: ADMINISTRATIVE | Facility: OTHER | Age: 50
End: 2020-09-09

## 2020-09-11 ENCOUNTER — OFFICE VISIT (OUTPATIENT)
Dept: PODIATRY | Facility: CLINIC | Age: 50
End: 2020-09-11
Payer: COMMERCIAL

## 2020-09-11 ENCOUNTER — HOSPITAL ENCOUNTER (OUTPATIENT)
Dept: RADIOLOGY | Facility: HOSPITAL | Age: 50
Discharge: HOME | End: 2020-09-11
Attending: PODIATRIST
Payer: COMMERCIAL

## 2020-09-11 VITALS
HEIGHT: 62 IN | BODY MASS INDEX: 42.31 KG/M2 | SYSTOLIC BLOOD PRESSURE: 140 MMHG | DIASTOLIC BLOOD PRESSURE: 88 MMHG | HEART RATE: 70 BPM | WEIGHT: 229.94 LBS

## 2020-09-11 DIAGNOSIS — M21.6X2 ACQUIRED PRONATION DEFORMITY OF FOOT, LEFT: ICD-10-CM

## 2020-09-11 DIAGNOSIS — M25.572 PAIN OF JOINT OF LEFT ANKLE AND FOOT: ICD-10-CM

## 2020-09-11 DIAGNOSIS — M76.822 POSTERIOR TIBIAL TENDON DYSFUNCTION (PTTD) OF LEFT LOWER EXTREMITY: ICD-10-CM

## 2020-09-11 DIAGNOSIS — M25.572 CHRONIC PAIN OF LEFT ANKLE: ICD-10-CM

## 2020-09-11 DIAGNOSIS — M76.822 POSTERIOR TIBIAL TENDON DYSFUNCTION (PTTD) OF LEFT LOWER EXTREMITY: Primary | ICD-10-CM

## 2020-09-11 DIAGNOSIS — M24.572 EQUINUS CONTRACTURE OF LEFT ANKLE: ICD-10-CM

## 2020-09-11 DIAGNOSIS — G89.29 CHRONIC PAIN OF LEFT ANKLE: ICD-10-CM

## 2020-09-11 DIAGNOSIS — M20.12 HALLUX ABDUCTOVALGUS, LEFT: ICD-10-CM

## 2020-09-11 PROCEDURE — 73630 XR FOOT COMPLETE 3 VIEW LEFT: ICD-10-PCS | Mod: 26,LT,, | Performed by: RADIOLOGY

## 2020-09-11 PROCEDURE — 99214 OFFICE O/P EST MOD 30 MIN: CPT | Mod: S$GLB,,, | Performed by: PODIATRIST

## 2020-09-11 PROCEDURE — 73630 X-RAY EXAM OF FOOT: CPT | Mod: 26,LT,, | Performed by: RADIOLOGY

## 2020-09-11 PROCEDURE — 3079F PR MOST RECENT DIASTOLIC BLOOD PRESSURE 80-89 MM HG: ICD-10-PCS | Mod: CPTII,S$GLB,, | Performed by: PODIATRIST

## 2020-09-11 PROCEDURE — 99999 PR PBB SHADOW E&M-EST. PATIENT-LVL IV: ICD-10-PCS | Mod: PBBFAC,,, | Performed by: PODIATRIST

## 2020-09-11 PROCEDURE — 73610 X-RAY EXAM OF ANKLE: CPT | Mod: TC,PN,LT

## 2020-09-11 PROCEDURE — 99999 PR PBB SHADOW E&M-EST. PATIENT-LVL IV: CPT | Mod: PBBFAC,,, | Performed by: PODIATRIST

## 2020-09-11 PROCEDURE — 73610 XR ANKLE COMPLETE 3 VIEW LEFT: ICD-10-PCS | Mod: 26,LT,, | Performed by: RADIOLOGY

## 2020-09-11 PROCEDURE — 73610 X-RAY EXAM OF ANKLE: CPT | Mod: 26,LT,, | Performed by: RADIOLOGY

## 2020-09-11 PROCEDURE — 99214 PR OFFICE/OUTPT VISIT, EST, LEVL IV, 30-39 MIN: ICD-10-PCS | Mod: S$GLB,,, | Performed by: PODIATRIST

## 2020-09-11 PROCEDURE — 73630 X-RAY EXAM OF FOOT: CPT | Mod: TC,PN,LT

## 2020-09-11 PROCEDURE — 3077F SYST BP >= 140 MM HG: CPT | Mod: CPTII,S$GLB,, | Performed by: PODIATRIST

## 2020-09-11 PROCEDURE — 3077F PR MOST RECENT SYSTOLIC BLOOD PRESSURE >= 140 MM HG: ICD-10-PCS | Mod: CPTII,S$GLB,, | Performed by: PODIATRIST

## 2020-09-11 PROCEDURE — 3008F BODY MASS INDEX DOCD: CPT | Mod: CPTII,S$GLB,, | Performed by: PODIATRIST

## 2020-09-11 PROCEDURE — 3079F DIAST BP 80-89 MM HG: CPT | Mod: CPTII,S$GLB,, | Performed by: PODIATRIST

## 2020-09-11 PROCEDURE — 3008F PR BODY MASS INDEX (BMI) DOCUMENTED: ICD-10-PCS | Mod: CPTII,S$GLB,, | Performed by: PODIATRIST

## 2020-09-11 RX ORDER — MELOXICAM 15 MG/1
15 TABLET ORAL DAILY
Qty: 30 TABLET | Refills: 1 | Status: SHIPPED | OUTPATIENT
Start: 2020-09-11 | End: 2020-11-06

## 2020-09-11 RX ORDER — HYDROCHLOROTHIAZIDE 12.5 MG/1
TABLET ORAL
COMMUNITY
Start: 2020-08-03 | End: 2020-11-24 | Stop reason: CLARIF

## 2020-09-11 NOTE — PROGRESS NOTES
"Subjective:      Patient ID: Brittny Urias is a 50 y.o. female.    Chief Complaint: Ankle Pain (left) and Bunions (left)    Patient presents with left bunion and ankle pain. She reports over a year history of left bunion pain. She had seen Dr. Gupta for surgical evaluation but already had several procedures arranged for unrelated pathologies. She returns today for re-evaluation of the bunion. For the bunion, she has had improvement in the bunion pain after she modified her shoe gear; however, over the last 2 months, she has had worsening ankle and heel pain. She denies any traumatic event but does admit to walking 3-4 miles everyday.     Vitals:    09/11/20 0836   BP: (!) 140/88   Pulse: 70   Weight: 104.3 kg (229 lb 15 oz)   Height: 5' 2" (1.575 m)   PainSc:   6      Past Medical History:   Diagnosis Date    Breast cancer, right breast 11/12/2018    Cancer     Hypertension     Psoriasis     on biologic for 1 year, has been controlling it well       Past Surgical History:   Procedure Laterality Date    COLONOSCOPY N/A 10/10/2019    Procedure: COLONOSCOPY;  Surgeon: Matt Marin MD;  Location: [facility];  Service: Endoscopy;  Laterality: N/A;    DILATION AND CURETTAGE OF UTERUS      HYSTERECTOMY      partial hyst for prolapse    MASTECTOMY Bilateral 11/14/2018    Procedure: MASTECTOMY;  Surgeon: Marga Cardenas MD;  Location: [facility];  Service: Plastics;  Laterality: Bilateral;    MASTECTOMY WITH SENTINEL NODE BIOPSY AND AXILLARY LYMPH NODE DISSECTION Right 11/14/2018    Procedure: SENTINEL NODE BIOPSY AND AXILLARY LYMPHADENECTOMY;  Surgeon: Marga Cardenas MD;  Location: [facility];  Service: Plastics;  Laterality: Right;    RECONSTRUCTION OF BREAST WITH DEEP INFERIOR EPIGASTRIC ARTERY  (MEGAN) FREE FLAP Bilateral 11/14/2018    Procedure: RECONSTRUCTION, BREAST, USING MEGAN FREE FLAP;  Surgeon: Tye Chapman MD;  Location: [facility];  Service: Plastics;  Laterality: Bilateral;       Family " History   Problem Relation Age of Onset    Diabetes Mother     Hypertension Mother     Hypertension Brother     Cancer Paternal Uncle         leukemia    Breast cancer Neg Hx     Ovarian cancer Neg Hx     Colon cancer Neg Hx        Social History     Socioeconomic History    Marital status:      Spouse name: Not on file    Number of children: Not on file    Years of education: Not on file    Highest education level: Not on file   Occupational History    Not on file   Social Needs    Financial resource strain: Not hard at all    Food insecurity     Worry: Never true     Inability: Never true    Transportation needs     Medical: No     Non-medical: No   Tobacco Use    Smoking status: Never Smoker    Smokeless tobacco: Never Used   Substance and Sexual Activity    Alcohol use: Yes     Alcohol/week: 3.0 standard drinks     Types: 3 Glasses of wine per week     Frequency: 2-4 times a month     Drinks per session: 1 or 2     Binge frequency: Less than monthly     Comment: social    Drug use: No    Sexual activity: Yes     Partners: Male     Birth control/protection: Surgical   Lifestyle    Physical activity     Days per week: 5 days     Minutes per session: 30 min    Stress: Not at all   Relationships    Social connections     Talks on phone: More than three times a week     Gets together: Once a week     Attends Orthodoxy service: Not on file     Active member of club or organization: Yes     Attends meetings of clubs or organizations: More than 4 times per year     Relationship status:    Other Topics Concern    Not on file   Social History Narrative    Not on file       Current Outpatient Medications   Medication Sig Dispense Refill    betamethasone dipropionate (DIPROLENE) 0.05 % cream APPLY TWICE DAILY TO ALL AFFECTED AREAS.  2    clobetasol (CLOBEX) 0.05 % shampoo USE TOPICALLY 3 TIMES WEEKLY  3    clobetasoL (TEMOVATE) 0.05 % external solution       hydroCHLOROthiazide  (HYDRODIURIL) 12.5 MG Tab       letrozole (FEMARA) 2.5 mg Tab TAKE 1 TABLET BY MOUTH EVERY DAY 90 tablet 3    losartan-hydrochlorothiazide 50-12.5 mg (HYZAAR) 50-12.5 mg per tablet Take 1 tablet by mouth once daily. 90 tablet 3    metFORMIN (GLUCOPHAGE XR) 500 MG XR 24hr tablet Take 1 tablet (500 mg total) by mouth daily with breakfast. 30 tablet 11    SKYRIZI 150mg/1.66mL(75 mg/0.83 mL x2) subcutaneous injection       venlafaxine (EFFEXOR XR) 37.5 MG 24 hr capsule Take 1 capsule (37.5 mg total) by mouth once daily. 30 capsule 6    meloxicam (MOBIC) 15 MG tablet Take 1 tablet (15 mg total) by mouth once daily. 30 tablet 1     No current facility-administered medications for this visit.      Facility-Administered Medications Ordered in Other Visits   Medication Dose Route Frequency Provider Last Rate Last Dose    EPINEPHrine 1,000 mcg in lactated Ringers 1,000 mL irrigation   Irrigation On Call Procedure Tye Chapman MD        EPINEPHrine 1,000 mcg in lactated Ringers 1,000 mL irrigation   Irrigation On Call Procedure Tye Chapman MD           Review of patient's allergies indicates:  No Known Allergies      Review of Systems   Constitution: Negative for chills, decreased appetite, fever and night sweats.   HENT: Negative for congestion and hearing loss.    Eyes: Negative for vision loss in left eye and vision loss in right eye.   Cardiovascular: Negative for chest pain, claudication, cyanosis, irregular heartbeat and leg swelling.   Respiratory: Negative for cough and shortness of breath.    Skin: Negative for color change, dry skin, itching, nail changes, poor wound healing, rash, suspicious lesions and unusual hair distribution.   Musculoskeletal: Positive for joint pain and joint swelling. Negative for arthritis, back pain, falls, gout and muscle weakness.        Left foot pain   Gastrointestinal: Negative for nausea and vomiting.   Neurological: Negative for dizziness, headaches, loss of  balance, numbness and weakness.   Psychiatric/Behavioral: Negative for altered mental status. The patient is not nervous/anxious.            Objective:      Physical Exam  Constitutional:       General: She is not in acute distress.     Appearance: She is not ill-appearing.   Cardiovascular:      Pulses:           Popliteal pulses are 2+ on the right side and 2+ on the left side.        Dorsalis pedis pulses are 2+ on the right side and 2+ on the left side.      Comments: CFT brisk < 3s  Localized edema to the medial left hindfoot/ankle region.  Hair growth and distribution is normal.    Musculoskeletal:      Comments: Patient has overall flatfoot bilateral.  The left foot is much more severe than the right.  The right foot has no significant HAV deformity or digital contractures.  She does have some equinus to the right lower extremity with knee extended she gets -8 degrees and with knee flexed she reduces to about 10° of dorsiflexion.    Left foot:  She has significant equinus to the left lower extremity.  She reaches - 15° dorsiflexion with knee extended and reaches -5 degrees of dorsiflexion with knee flexed.  She has collapse along the medial column as extensive pronation.  She has a severe HAV deformity with large medial eminence.  The bunion deformity is tracking and track bound.  She has hallux limitus with roughly 10° of dorsiflexion of the hallux and slight developing reducible digital contractures to the lesser toes.  She has tenderness to palpation along the posterior tibial tendon and at the insertion of the plantar fascia.  She is able to do single limb heel raise with severe pain.  During heel raise her left heel does not invert.  She has significant calcaneal eversion upon stance with too many toe sign.   Skin:     Comments: Examination of the skin reveals no evidence of significant rashes, open lesions, suspicious appearing nevi or other concerning lesions.      Neurological:      Mental Status: She  is alert.      Comments: (-) Tinel's sign  Light touch intact.  MMT 5/5 DF, PF, Inv, Ev  Normal muscle tone.  No signs of atrophy.  No tremor or spasticity.       Biomechanical Exam:  Non weight bearing assessment:   Right (degrees) Left (degrees)   Malleolar position 15-20 15-20   Ankle DF (knee extended) -8    -15         Ankle DF (knee flexed) 10       -5         Heel Inversion 20 20   Heel Eversion 10 10   STJ Neutral Position 0 0   Forefoot to rearfoot (1-5) perp perp   Forefoot to Rearfoot (2-5) perp perp   First Ray Dorsiflextion 5mm 5mm   First ray plantarflextion 5mm 5mm   First ray Neutral Position 0mm 0mm   Hallux Dorsiflexion 15 10   Hallux plantarflexion 30 30      Musculoskeletal evaluation, Range of Motion    Normal Limited Excessive pain   Ankle DF  R/L     Ankle PF R/L      STJ supination R/L      STJ pronation R/L      Hallux DF  R/L     Hallux PF R/L      Lesser digits DF R/L      Lesser Digits PF R/L        Muscle Strength   Right Left   Gastrocnemius 5 5   Soleus 5 5   Tib. Posterior 5 4-   FDL 5 5   FHL 5 5   FDB 5 5   Tib Anterior 5 5   EDL 5 5   EHL 5 5   EDB 5 5   Peroneus Longus 5 5   Peroneus Brevis 5 5         Sagittal Plane Planovalgus Left    Transverse Plane Forefoot Abducted Left    Ankle Morphology Equinus B/L      Gait Analysis  Gait Pattern: Antalgic      Right Left   Angle of Gait 8 8   Base of Gait 5cm 5cm       Heel Position Right Left   Contact sup sup   Mid-stance Pro Pro   Propulsion sup sup   Swing sup sup       Heel off: Early Both  Abductory twist?  Yes Left                      Assessment:       Encounter Diagnoses   Name Primary?    Posterior tibial tendon dysfunction (PTTD) of left lower extremity Yes    Chronic pain of left ankle     Hallux abductovalgus, left     Acquired pronation deformity of foot, left     Equinus contracture of left ankle     Pain of joint of left ankle and foot          Plan:       Brittny was seen today for ankle pain and  camelia.    Diagnoses and all orders for this visit:    Posterior tibial tendon dysfunction (PTTD) of left lower extremity  -     X-Ray Foot Complete Left; Future  -     X-Ray Ankle Complete Left; Future    Chronic pain of left ankle  -     X-Ray Foot Complete Left; Future  -     X-Ray Ankle Complete Left; Future    Hallux abductovalgus, left    Acquired pronation deformity of foot, left  -     X-Ray Foot Complete Left; Future  -     X-Ray Ankle Complete Left; Future    Equinus contracture of left ankle    Pain of joint of left ankle and foot  -     MRI Ankle Without Contrast Left; Future    Other orders  -     meloxicam (MOBIC) 15 MG tablet; Take 1 tablet (15 mg total) by mouth once daily.      I counseled the patient on her conditions, their implications and medical management.  Standing x-ray of left lower extremity ordered.    MRI ordered of the left ankle.  She denies being claustrophobic.  Denies having a metal implants.    Ankle brace was dispensed to her today.  A prescription given for Mobic to take once daily as needed.    Educated patient on proper shoe gear including a shoe with a stiff supportive sole and an elevated heel to offload the Achilles tendon and the plantar fascia.    We discussed at length her plan of care going forward.  The risk and benefits of conservative versus surgical treatment were discussed with the patient.  We believe that this warrants further workup but will treat the patient conservatively for now. We will obtain and review advanced imaging for surgical planning.     Bennie Mar DPM, PGY-3    I have personally taken the history and examined this patient and agree with the resident's note as stated as above.   Uriah Veras DPM, FACFAS

## 2020-09-17 ENCOUNTER — HOSPITAL ENCOUNTER (OUTPATIENT)
Dept: RADIOLOGY | Facility: HOSPITAL | Age: 50
Discharge: HOME | End: 2020-09-17
Attending: PODIATRIST
Payer: COMMERCIAL

## 2020-09-17 DIAGNOSIS — M25.572 PAIN OF JOINT OF LEFT ANKLE AND FOOT: ICD-10-CM

## 2020-09-17 PROCEDURE — 73721 MRI JNT OF LWR EXTRE W/O DYE: CPT | Mod: 26,LT,, | Performed by: RADIOLOGY

## 2020-09-17 PROCEDURE — 73721 MRI ANKLE WITHOUT CONTRAST LEFT: ICD-10-PCS | Mod: 26,LT,, | Performed by: RADIOLOGY

## 2020-09-17 PROCEDURE — 73721 MRI JNT OF LWR EXTRE W/O DYE: CPT | Mod: TC,LT

## 2020-09-21 ENCOUNTER — CLINICAL SUPPORT (OUTPATIENT)
Dept: FAMILY MEDICINE | Facility: CLINIC | Age: 50
End: 2020-09-21
Payer: COMMERCIAL

## 2020-09-21 DIAGNOSIS — Z23 NEED FOR SHINGLES VACCINE: Primary | ICD-10-CM

## 2020-09-21 PROCEDURE — 90750 HZV VACC RECOMBINANT IM: CPT | Mod: S$GLB,,, | Performed by: FAMILY MEDICINE

## 2020-09-21 PROCEDURE — 90471 IMMUNIZATION ADMIN: CPT | Mod: S$GLB,,, | Performed by: FAMILY MEDICINE

## 2020-09-21 PROCEDURE — 90471 ZOSTER RECOMBINANT VACCINE: ICD-10-PCS | Mod: S$GLB,,, | Performed by: FAMILY MEDICINE

## 2020-09-21 PROCEDURE — 99499 NO LOS: ICD-10-PCS | Mod: S$GLB,,, | Performed by: FAMILY MEDICINE

## 2020-09-21 PROCEDURE — 90750 ZOSTER RECOMBINANT VACCINE: ICD-10-PCS | Mod: S$GLB,,, | Performed by: FAMILY MEDICINE

## 2020-09-21 PROCEDURE — 99499 UNLISTED E&M SERVICE: CPT | Mod: S$GLB,,, | Performed by: FAMILY MEDICINE

## 2020-09-21 NOTE — PROGRESS NOTES
SHINGRIX Lot# [id number], dose#2  Given IM in Left Deltoid, tolerated well. Patient instructed to waited 15 minutes ,VIS form given

## 2020-09-30 ENCOUNTER — TELEPHONE (OUTPATIENT)
Dept: OBSTETRICS AND GYNECOLOGY | Facility: CLINIC | Age: 50
End: 2020-09-30

## 2020-10-01 ENCOUNTER — OFFICE VISIT (OUTPATIENT)
Dept: PODIATRY | Facility: CLINIC | Age: 50
End: 2020-10-01
Payer: COMMERCIAL

## 2020-10-01 VITALS
HEIGHT: 62 IN | WEIGHT: 229.94 LBS | SYSTOLIC BLOOD PRESSURE: 163 MMHG | DIASTOLIC BLOOD PRESSURE: 96 MMHG | BODY MASS INDEX: 42.31 KG/M2 | HEART RATE: 67 BPM

## 2020-10-01 DIAGNOSIS — M21.6X2 ACQUIRED PRONATION DEFORMITY OF FOOT, LEFT: ICD-10-CM

## 2020-10-01 DIAGNOSIS — M20.12 HALLUX ABDUCTOVALGUS, LEFT: ICD-10-CM

## 2020-10-01 DIAGNOSIS — M24.572 EQUINUS CONTRACTURE OF LEFT ANKLE: ICD-10-CM

## 2020-10-01 DIAGNOSIS — M65.9 TENOSYNOVITIS OF LEFT ANKLE: ICD-10-CM

## 2020-10-01 DIAGNOSIS — M76.822 POSTERIOR TIBIAL TENDON DYSFUNCTION (PTTD) OF LEFT LOWER EXTREMITY: Primary | ICD-10-CM

## 2020-10-01 PROCEDURE — 99213 OFFICE O/P EST LOW 20 MIN: CPT | Mod: S$GLB,,, | Performed by: PODIATRIST

## 2020-10-01 PROCEDURE — 3077F SYST BP >= 140 MM HG: CPT | Mod: CPTII,S$GLB,, | Performed by: PODIATRIST

## 2020-10-01 PROCEDURE — 3080F DIAST BP >= 90 MM HG: CPT | Mod: CPTII,S$GLB,, | Performed by: PODIATRIST

## 2020-10-01 PROCEDURE — 3008F BODY MASS INDEX DOCD: CPT | Mod: CPTII,S$GLB,, | Performed by: PODIATRIST

## 2020-10-01 PROCEDURE — 3077F PR MOST RECENT SYSTOLIC BLOOD PRESSURE >= 140 MM HG: ICD-10-PCS | Mod: CPTII,S$GLB,, | Performed by: PODIATRIST

## 2020-10-01 PROCEDURE — 3080F PR MOST RECENT DIASTOLIC BLOOD PRESSURE >= 90 MM HG: ICD-10-PCS | Mod: CPTII,S$GLB,, | Performed by: PODIATRIST

## 2020-10-01 PROCEDURE — 99213 PR OFFICE/OUTPT VISIT, EST, LEVL III, 20-29 MIN: ICD-10-PCS | Mod: S$GLB,,, | Performed by: PODIATRIST

## 2020-10-01 PROCEDURE — 3008F PR BODY MASS INDEX (BMI) DOCUMENTED: ICD-10-PCS | Mod: CPTII,S$GLB,, | Performed by: PODIATRIST

## 2020-10-01 PROCEDURE — 99999 PR PBB SHADOW E&M-EST. PATIENT-LVL IV: ICD-10-PCS | Mod: PBBFAC,,, | Performed by: PODIATRIST

## 2020-10-01 PROCEDURE — 99999 PR PBB SHADOW E&M-EST. PATIENT-LVL IV: CPT | Mod: PBBFAC,,, | Performed by: PODIATRIST

## 2020-10-01 NOTE — PROGRESS NOTES
"Subjective:      Patient ID: Brittny Urias is a 50 y.o. female.    Chief Complaint: Follow-up (3 wk)    Patient presents with left bunion and ankle pain. She reports over a year history of left bunion pain. She had seen Dr. Gupta for surgical evaluation but already had several procedures arranged for unrelated pathologies. She returns today for re-evaluation of the bunion. For the bunion, she has had improvement in the bunion pain after she modified her shoe gear; however, over the last 2 months, she has had worsening ankle and heel pain. She denies any traumatic event but does admit to walking 3-4 miles everyday.     10/01/2020:  Follow-up from wearing it left ankle brace and MRI to left ankle.  She relates that she has been feeling better overall with wearing the brace and taking the anti-inflammatory.  She does note some occasional pain or pressure on top of the foot.    Vitals:    10/01/20 0828   BP: (!) 163/96   Pulse: 67   Weight: 104.3 kg (229 lb 15 oz)   Height: 5' 2" (1.575 m)   PainSc: 0-No pain      Past Medical History:   Diagnosis Date    Breast cancer, right breast 11/12/2018    Cancer     Hypertension     Psoriasis     on biologic for 1 year, has been controlling it well       Past Surgical History:   Procedure Laterality Date    COLONOSCOPY N/A 10/10/2019    Procedure: COLONOSCOPY;  Surgeon: Matt Marin MD;  Location: [facility];  Service: Endoscopy;  Laterality: N/A;    DILATION AND CURETTAGE OF UTERUS      HYSTERECTOMY      partial hyst for prolapse    MASTECTOMY Bilateral 11/14/2018    Procedure: MASTECTOMY;  Surgeon: Marga Cardenas MD;  Location: [facility] OR;  Service: Plastics;  Laterality: Bilateral;    MASTECTOMY WITH SENTINEL NODE BIOPSY AND AXILLARY LYMPH NODE DISSECTION Right 11/14/2018    Procedure: SENTINEL NODE BIOPSY AND AXILLARY LYMPHADENECTOMY;  Surgeon: Marga Cardenas MD;  Location: [facility] OR;  Service: Plastics;  Laterality: Right;    RECONSTRUCTION OF BREAST WITH DEEP " INFERIOR EPIGASTRIC ARTERY  (MEGAN) FREE FLAP Bilateral 11/14/2018    Procedure: RECONSTRUCTION, BREAST, USING MEGAN FREE FLAP;  Surgeon: Tye Chapman MD;  Location: [facility];  Service: Plastics;  Laterality: Bilateral;       Family History   Problem Relation Age of Onset    Diabetes Mother     Hypertension Mother     Hypertension Brother     Cancer Paternal Uncle         leukemia    Breast cancer Neg Hx     Ovarian cancer Neg Hx     Colon cancer Neg Hx        Social History     Socioeconomic History    Marital status:      Spouse name: Not on file    Number of children: Not on file    Years of education: Not on file    Highest education level: Not on file   Occupational History    Not on file   Social Needs    Financial resource strain: Not hard at all    Food insecurity     Worry: Never true     Inability: Never true    Transportation needs     Medical: No     Non-medical: No   Tobacco Use    Smoking status: Never Smoker    Smokeless tobacco: Never Used   Substance and Sexual Activity    Alcohol use: Yes     Alcohol/week: 3.0 standard drinks     Types: 3 Glasses of wine per week     Frequency: 2-4 times a month     Drinks per session: 1 or 2     Binge frequency: Less than monthly     Comment: social    Drug use: No    Sexual activity: Yes     Partners: Male     Birth control/protection: Surgical   Lifestyle    Physical activity     Days per week: 5 days     Minutes per session: 30 min    Stress: Not at all   Relationships    Social connections     Talks on phone: More than three times a week     Gets together: Once a week     Attends [employer] service: Not on file     Active member of club or organization: Yes     Attends meetings of clubs or organizations: More than 4 times per year     Relationship status:    Other Topics Concern    Not on file   Social History Narrative    Not on file       Current Outpatient Medications   Medication Sig Dispense Refill     betamethasone dipropionate (DIPROLENE) 0.05 % cream APPLY TWICE DAILY TO ALL AFFECTED AREAS.  2    clobetasol (CLOBEX) 0.05 % shampoo USE TOPICALLY 3 TIMES WEEKLY  3    clobetasoL (TEMOVATE) 0.05 % external solution       hydroCHLOROthiazide (HYDRODIURIL) 12.5 MG Tab       letrozole (FEMARA) 2.5 mg Tab TAKE 1 TABLET BY MOUTH EVERY DAY 90 tablet 3    losartan-hydrochlorothiazide 50-12.5 mg (HYZAAR) 50-12.5 mg per tablet Take 1 tablet by mouth once daily. 90 tablet 3    meloxicam (MOBIC) 15 MG tablet Take 1 tablet (15 mg total) by mouth once daily. 30 tablet 1    metFORMIN (GLUCOPHAGE XR) 500 MG XR 24hr tablet Take 1 tablet (500 mg total) by mouth daily with breakfast. 30 tablet 11    SKYRIZI 150mg/1.66mL(75 mg/0.83 mL x2) subcutaneous injection       venlafaxine (EFFEXOR XR) 37.5 MG 24 hr capsule Take 1 capsule (37.5 mg total) by mouth once daily. 30 capsule 6     No current facility-administered medications for this visit.      Facility-Administered Medications Ordered in Other Visits   Medication Dose Route Frequency Provider Last Rate Last Dose    EPINEPHrine 1,000 mcg in lactated Ringers 1,000 mL irrigation   Irrigation On Call Procedure Tye Chapman MD        EPINEPHrine 1,000 mcg in lactated Ringers 1,000 mL irrigation   Irrigation On Call Procedure Tye Chapman MD           Review of patient's allergies indicates:  No Known Allergies      Review of Systems   Constitution: Negative for chills, decreased appetite, fever and night sweats.   HENT: Negative for congestion and hearing loss.    Eyes: Negative for vision loss in left eye and vision loss in right eye.   Cardiovascular: Negative for chest pain, claudication, cyanosis, irregular heartbeat and leg swelling.   Respiratory: Negative for cough and shortness of breath.    Skin: Negative for color change, dry skin, itching, nail changes, poor wound healing, rash, suspicious lesions and unusual hair distribution.   Musculoskeletal: Positive  for joint pain and joint swelling. Negative for arthritis, back pain, falls, gout and muscle weakness.        Left foot pain   Gastrointestinal: Negative for nausea and vomiting.   Neurological: Negative for dizziness, headaches, loss of balance, numbness and weakness.   Psychiatric/Behavioral: Negative for altered mental status. The patient is not nervous/anxious.            Objective:      Physical Exam  Constitutional:       General: She is not in acute distress.     Appearance: She is not ill-appearing.   Cardiovascular:      Pulses:           Popliteal pulses are 2+ on the right side and 2+ on the left side.        Dorsalis pedis pulses are 2+ on the right side and 2+ on the left side.      Comments: CFT brisk < 3s  Localized edema to the medial left hindfoot/ankle region.  Hair growth and distribution is normal.    Musculoskeletal:      Comments: Patient has overall flatfoot bilateral.  The left foot is much more severe than the right.  The right foot has no significant HAV deformity or digital contractures.  She does have some equinus to the right lower extremity with knee extended she gets -8 degrees and with knee flexed she reduces to about 10° of dorsiflexion.    Left foot:  She has significant equinus to the left lower extremity.  She reaches - 15° dorsiflexion with knee extended and reaches -5 degrees of dorsiflexion with knee flexed.  She has collapse along the medial column as extensive pronation.  She has a severe HAV deformity with large medial eminence.  The bunion deformity is tracking and track bound.  She has hallux limitus with roughly 10° of dorsiflexion of the hallux and slight developing reducible digital contractures to the lesser toes.  She has tenderness to palpation along the posterior tibial tendon and at the insertion of the plantar fascia.  She is able to do single limb heel raise with severe pain.  During heel raise her left heel does not invert.  She has significant calcaneal  eversion upon stance with too many toe sign.   Skin:     Comments: Examination of the skin reveals no evidence of significant rashes, open lesions, suspicious appearing nevi or other concerning lesions.      Neurological:      Mental Status: She is alert.      Comments: (-) Tinel's sign  Light touch intact.  MMT 5/5 DF, PF, Inv, Ev  Normal muscle tone.  No signs of atrophy.  No tremor or spasticity.                               Assessment:       Encounter Diagnoses   Name Primary?    Posterior tibial tendon dysfunction (PTTD) of left lower extremity Yes    Tenosynovitis of left ankle     Hallux abductovalgus, left     Acquired pronation deformity of foot, left     Equinus contracture of left ankle          Plan:       Brittny was seen today for follow-up.    Diagnoses and all orders for this visit:    Posterior tibial tendon dysfunction (PTTD) of left lower extremity  -     Ambulatory referral/consult to Physical/Occupational Therapy; Future    Tenosynovitis of left ankle  -     Ambulatory referral/consult to Physical/Occupational Therapy; Future    Hallux abductovalgus, left    Acquired pronation deformity of foot, left  -     Ambulatory referral/consult to Physical/Occupational Therapy; Future    Equinus contracture of left ankle      I counseled the patient on her conditions, their implications and medical management.    Reviewed left foot x-ray noting complete collapse of the medial longitudinal arch left foot with greater than 50% subluxation of the talonavicular joint.  There is an increase in the cuboid abduction angle.  There is decreased calcaneal inclination and increased talar declination.  There is increase in tight angle right foot.  1-2 intermetatarsal angle is moderately increased with partial subluxation of the 1st metatarsal phalangeal joint.  There is increase in the hallux valgus angle right foot.  Tibial sesamoid position of 4.    MRI findings were reviewed in detail.  EXAMINATION:  MRI  ANKLE WITHOUT CONTRAST LEFT     CLINICAL HISTORY:  Ankle pain, tendon abnormality suspected, neg xray;  Pain in left ankle and joints of left foot     TECHNIQUE:  MRI ankle performed per routine protocol without contrast.     COMPARISON:  09/11/2020     FINDINGS:  The distal tibia, distal fibula, talus, calcaneus, cuboid, cuneiforms, navicular and proximal metatarsal bones are intact.  No fracture, no osseous lesions or abnormal osseous edema.     The tibiotalar joint appears normal.     The subtalar joint appears normal.     The Achilles tendon appear normal.     There is mild thickening at the origin of the plantar fascia which could be associated with symptoms of plantar fasciitis, this can also be seen in asymptomatic patient.     The anterior and posterior tibiofibular ligaments are intact.     The anterior and posterior talofibular ligaments are intact.     The calcaneofibular ligament is intact.     The deltoid ligament complex demonstrate nothing unusual.     There is thickening of the posterior tibialis tendon with mild tenosynovitis.     There is a trace of fluid in the peroneus longus tendon sheath, the tendon appears within normal limits.     The peroneus brevis tendon appears normal.     The remainder of the flexor tendons are normal.     The extensor tendons are normal.     There is a small lobulated cystic structure just dorsal and lateral to the talonavicular joint most suggestive of a small synovial cyst or minimal fluid arising from this joint, it measures about 2.7 x 1.9 cm.  Just underneath the flexor retinaculum.     There is mild subcutaneous soft tissue edema overlying the medial malleolus.     Impression:     Posterior tibialis tendinosis and tenosynovitis.     Mild tenosynovitis of the peroneus longus tendon.     Mild subcutaneous soft tissue edema overlying the medial malleolus.     Minimal cystic changes dorsal to the talonavicular joint underlying the inferior flexor retinaculum, possibly  a small ganglion cyst.        Electronically signed by: Kathie Merchant MD  Date:                                            09/17/2020  Time:                                           14:33    We discussed continued conservative care such as activity shoe gear restrictions, further bracing, orthotics, physical therapy versus surgical intervention potential medial double arthrodesis with Lapidus bunionectomy and Achilles tendon lengthening versus gastrocnemius recession in detail.  Risks, benefits anticipate postoperative course were discussed in detail today.  No guarantees were given or implied.  Patient like to continue conservative care at this time and further consider her options.    Referred to physical therapy for strengthening to left lower extremity, joint mobilization and gait training.    RTC 2 months or p.r.n. as discussed.    A portion of this note was generated by voice recognition software and may contain spelling and grammar errors.

## 2020-10-15 ENCOUNTER — PATIENT MESSAGE (OUTPATIENT)
Dept: PODIATRY | Facility: CLINIC | Age: 50
End: 2020-10-15

## 2020-10-21 ENCOUNTER — PATIENT OUTREACH (OUTPATIENT)
Dept: ADMINISTRATIVE | Facility: OTHER | Age: 50
End: 2020-10-21

## 2020-10-21 NOTE — PROGRESS NOTES
Health Maintenance Due   Topic Date Due    Hepatitis C Screening  1970    Influenza Vaccine (1) 08/01/2020     Updates were requested from care everywhere.  Chart was reviewed for overdue Proactive [facility] Encounters (MARIA[facility]) topics (CRS, Breast Cancer Screening, Eye exam)  Health Maintenance has been updated.  [place] immunization registry triggered.  Immunizations were reconciled.

## 2020-10-22 ENCOUNTER — OFFICE VISIT (OUTPATIENT)
Dept: PODIATRY | Facility: CLINIC | Age: 50
End: 2020-10-22
Payer: COMMERCIAL

## 2020-10-22 VITALS
WEIGHT: 229.94 LBS | BODY MASS INDEX: 42.31 KG/M2 | SYSTOLIC BLOOD PRESSURE: 145 MMHG | HEIGHT: 62 IN | DIASTOLIC BLOOD PRESSURE: 90 MMHG | HEART RATE: 78 BPM

## 2020-10-22 DIAGNOSIS — M20.12 HALLUX VALGUS WITH BUNIONS, LEFT: ICD-10-CM

## 2020-10-22 DIAGNOSIS — M21.6X2 ACQUIRED PRONATION DEFORMITY OF FOOT, LEFT: ICD-10-CM

## 2020-10-22 DIAGNOSIS — M24.572 EQUINUS CONTRACTURE OF LEFT ANKLE: ICD-10-CM

## 2020-10-22 DIAGNOSIS — Z01.818 PREOP TESTING: Primary | ICD-10-CM

## 2020-10-22 DIAGNOSIS — M76.822 POSTERIOR TIBIAL TENDON DYSFUNCTION (PTTD) OF LEFT LOWER EXTREMITY: ICD-10-CM

## 2020-10-22 DIAGNOSIS — M21.612 HALLUX VALGUS WITH BUNIONS, LEFT: ICD-10-CM

## 2020-10-22 PROCEDURE — 3077F PR MOST RECENT SYSTOLIC BLOOD PRESSURE >= 140 MM HG: ICD-10-PCS | Mod: CPTII,S$GLB,, | Performed by: PODIATRIST

## 2020-10-22 PROCEDURE — 99214 OFFICE O/P EST MOD 30 MIN: CPT | Mod: 57,S$GLB,, | Performed by: PODIATRIST

## 2020-10-22 PROCEDURE — 3077F SYST BP >= 140 MM HG: CPT | Mod: CPTII,S$GLB,, | Performed by: PODIATRIST

## 2020-10-22 PROCEDURE — 99999 PR PBB SHADOW E&M-EST. PATIENT-LVL V: ICD-10-PCS | Mod: PBBFAC,,, | Performed by: PODIATRIST

## 2020-10-22 PROCEDURE — 3080F PR MOST RECENT DIASTOLIC BLOOD PRESSURE >= 90 MM HG: ICD-10-PCS | Mod: CPTII,S$GLB,, | Performed by: PODIATRIST

## 2020-10-22 PROCEDURE — 99214 PR OFFICE/OUTPT VISIT, EST, LEVL IV, 30-39 MIN: ICD-10-PCS | Mod: 57,S$GLB,, | Performed by: PODIATRIST

## 2020-10-22 PROCEDURE — 3080F DIAST BP >= 90 MM HG: CPT | Mod: CPTII,S$GLB,, | Performed by: PODIATRIST

## 2020-10-22 PROCEDURE — 99999 PR PBB SHADOW E&M-EST. PATIENT-LVL V: CPT | Mod: PBBFAC,,, | Performed by: PODIATRIST

## 2020-10-22 PROCEDURE — 3008F BODY MASS INDEX DOCD: CPT | Mod: CPTII,S$GLB,, | Performed by: PODIATRIST

## 2020-10-22 PROCEDURE — 3008F PR BODY MASS INDEX (BMI) DOCUMENTED: ICD-10-PCS | Mod: CPTII,S$GLB,, | Performed by: PODIATRIST

## 2020-10-22 RX ORDER — CHOLECALCIFEROL (VITAMIN D3) 1250 MCG
1 TABLET ORAL
Qty: 12 TABLET | Refills: 1 | Status: SHIPPED | OUTPATIENT
Start: 2020-10-22 | End: 2021-03-23

## 2020-10-22 RX ORDER — SODIUM CHLORIDE 0.9 % (FLUSH) 0.9 %
10 SYRINGE (ML) INJECTION
Status: SHIPPED | OUTPATIENT
Start: 2020-10-22

## 2020-10-23 NOTE — PROGRESS NOTES
"Subjective:      Patient ID: Brittny Urias is a 50 y.o. female.    Chief Complaint: Surgical Consult    Patient presents with left bunion and ankle pain. She reports over a year history of left bunion pain. She had seen Dr. Gupta for surgical evaluation but already had several procedures arranged for unrelated pathologies. She returns today for re-evaluation of the bunion. For the bunion, she has had improvement in the bunion pain after she modified her shoe gear; however, over the last 2 months, she has had worsening ankle and heel pain. She denies any traumatic event but does admit to walking 3-4 miles everyday.     10/01/2020:  Follow-up from wearing it left ankle brace and MRI to left ankle.  She relates that she has been feeling better overall with wearing the brace and taking the anti-inflammatory.  She does note some occasional pain or pressure on top of the foot.    10/22/2020:  Presents today accompanied by her daughter for surgical consultation in order to proceed with the previously discussed surgery for the left foot.  No new complaints.    Vitals:    10/22/20 0903   BP: (!) 145/90   Pulse: 78   Weight: 104.3 kg (229 lb 15 oz)   Height: 5' 2" (1.575 m)   PainSc:   4   PainLoc: Ankle      Past Medical History:   Diagnosis Date    Breast cancer, right breast 11/12/2018    Cancer     Hypertension     Psoriasis     on biologic for 1 year, has been controlling it well       Past Surgical History:   Procedure Laterality Date    COLONOSCOPY N/A 10/10/2019    Procedure: COLONOSCOPY;  Surgeon: Matt Marin MD;  Location: [facility] ENDO;  Service: Endoscopy;  Laterality: N/A;    DILATION AND CURETTAGE OF UTERUS      HYSTERECTOMY      partial hyst for prolapse    MASTECTOMY Bilateral 11/14/2018    Procedure: MASTECTOMY;  Surgeon: Marga Cardenas MD;  Location: [facility] OR;  Service: Plastics;  Laterality: Bilateral;    MASTECTOMY WITH SENTINEL NODE BIOPSY AND AXILLARY LYMPH NODE DISSECTION Right " 11/14/2018    Procedure: SENTINEL NODE BIOPSY AND AXILLARY LYMPHADENECTOMY;  Surgeon: Marga Cardenas MD;  Location: [facility];  Service: Plastics;  Laterality: Right;    RECONSTRUCTION OF BREAST WITH DEEP INFERIOR EPIGASTRIC ARTERY  (MEGAN) FREE FLAP Bilateral 11/14/2018    Procedure: RECONSTRUCTION, BREAST, USING MEGAN FREE FLAP;  Surgeon: Tye Chapman MD;  Location: [facility];  Service: Plastics;  Laterality: Bilateral;       Family History   Problem Relation Age of Onset    Diabetes Mother     Hypertension Mother     Hypertension Brother     Cancer Paternal Uncle         leukemia    Breast cancer Neg Hx     Ovarian cancer Neg Hx     Colon cancer Neg Hx        Social History     Socioeconomic History    Marital status:      Spouse name: Not on file    Number of children: Not on file    Years of education: Not on file    Highest education level: Not on file   Occupational History    Not on file   Social Needs    Financial resource strain: Not hard at all    Food insecurity     Worry: Never true     Inability: Never true    Transportation needs     Medical: No     Non-medical: No   Tobacco Use    Smoking status: Never Smoker    Smokeless tobacco: Never Used   Substance and Sexual Activity    Alcohol use: Yes     Alcohol/week: 3.0 standard drinks     Types: 3 Glasses of wine per week     Frequency: 2-4 times a month     Drinks per session: 1 or 2     Binge frequency: Less than monthly     Comment: social    Drug use: No    Sexual activity: Yes     Partners: Male     Birth control/protection: Surgical   Lifestyle    Physical activity     Days per week: 5 days     Minutes per session: 30 min    Stress: Not at all   Relationships    Social connections     Talks on phone: More than three times a week     Gets together: Once a week     Attends [employer] service: Not on file     Active member of club or organization: Yes     Attends meetings of clubs or organizations: More than 4  times per year     Relationship status:    Other Topics Concern    Not on file   Social History Narrative    Not on file       Current Outpatient Medications   Medication Sig Dispense Refill    betamethasone dipropionate (DIPROLENE) 0.05 % cream APPLY TWICE DAILY TO ALL AFFECTED AREAS.  2    clobetasol (CLOBEX) 0.05 % shampoo USE TOPICALLY 3 TIMES WEEKLY  3    clobetasoL (TEMOVATE) 0.05 % external solution       hydroCHLOROthiazide (HYDRODIURIL) 12.5 MG Tab       letrozole (FEMARA) 2.5 mg Tab TAKE 1 TABLET BY MOUTH EVERY DAY 90 tablet 3    losartan-hydrochlorothiazide 50-12.5 mg (HYZAAR) 50-12.5 mg per tablet Take 1 tablet by mouth once daily. 90 tablet 3    meloxicam (MOBIC) 15 MG tablet Take 1 tablet (15 mg total) by mouth once daily. 30 tablet 1    metFORMIN (GLUCOPHAGE XR) 500 MG XR 24hr tablet Take 1 tablet (500 mg total) by mouth daily with breakfast. 30 tablet 11    SKYRIZI 150mg/1.66mL(75 mg/0.83 mL x2) subcutaneous injection       venlafaxine (EFFEXOR XR) 37.5 MG 24 hr capsule Take 1 capsule (37.5 mg total) by mouth once daily. 30 capsule 6    cholecalciferol, vitamin D3, 1,250 mcg (50,000 unit) Tab Take 1 tablet by mouth every 7 days. 12 tablet 1     Current Facility-Administered Medications   Medication Dose Route Frequency Provider Last Rate Last Dose    sodium chloride 0.9% flush 10 mL  10 mL Intravenous PRN Uriah Veras DPM         Facility-Administered Medications Ordered in Other Visits   Medication Dose Route Frequency Provider Last Rate Last Dose    EPINEPHrine 1,000 mcg in lactated Ringers 1,000 mL irrigation   Irrigation On Call Procedure Tye Chapman MD        EPINEPHrine 1,000 mcg in lactated Ringers 1,000 mL irrigation   Irrigation On Call Procedure Tye Chapman MD           Review of patient's allergies indicates:  No Known Allergies      Review of Systems   Constitution: Negative for chills, decreased appetite, fever and night sweats.   HENT: Negative  for congestion and hearing loss.    Eyes: Negative for vision loss in left eye and vision loss in right eye.   Cardiovascular: Negative for chest pain, claudication, cyanosis, irregular heartbeat and leg swelling.   Respiratory: Negative for cough and shortness of breath.    Skin: Negative for color change, dry skin, itching, nail changes, poor wound healing, rash, suspicious lesions and unusual hair distribution.   Musculoskeletal: Positive for joint pain and joint swelling. Negative for arthritis, back pain, falls, gout and muscle weakness.        Left foot pain   Gastrointestinal: Negative for nausea and vomiting.   Neurological: Negative for dizziness, headaches, loss of balance, numbness and weakness.   Psychiatric/Behavioral: Negative for altered mental status. The patient is not nervous/anxious.            Objective:      Physical Exam  Constitutional:       General: She is not in acute distress.     Appearance: She is not ill-appearing.   Cardiovascular:      Pulses:           Popliteal pulses are 2+ on the right side and 2+ on the left side.        Dorsalis pedis pulses are 2+ on the right side and 2+ on the left side.      Comments: CFT brisk < 3s  Localized edema to the medial left hindfoot/ankle region.  Hair growth and distribution is normal.    Musculoskeletal:      Comments: Patient has overall flatfoot bilateral.  The left foot is much more severe than the right.  The right foot has no significant HAV deformity or digital contractures.  She does have some equinus to the right lower extremity with knee extended she gets -8 degrees and with knee flexed she reduces to about 10° of dorsiflexion.    Left foot:  She has significant equinus to the left lower extremity.  She reaches - 15° dorsiflexion with knee extended and reaches -5 degrees of dorsiflexion with knee flexed.  She has collapse along the medial column as extensive pronation.  She has a severe track bound HAV deformity with large medial  eminence.  The 1st ray is flexible and does reduce when the hallux is manipulated.  The bunion deformity is tracking and track bound.  She has hallux limitus with roughly 10° of dorsiflexion of the hallux and slight developing reducible digital contractures to the lesser toes.  She has tenderness to palpation along the posterior tibial tendon and at the insertion of the plantar fascia.  She is able to do single limb heel raise with severe pain.  During heel raise her left heel does not invert.  She has significant calcaneal eversion upon stance with too many toe sign.   Skin:     Comments: Examination of the skin reveals no evidence of significant rashes, open lesions, suspicious appearing nevi or other concerning lesions.      Neurological:      Mental Status: She is alert.      Comments: (-) Tinel's sign  Light touch intact.  MMT 5/5 DF, PF, Inv, Ev  Normal muscle tone.  No signs of atrophy.  No tremor or spasticity.                               Assessment:       Encounter Diagnoses   Name Primary?    Preop testing Yes    Acquired pronation deformity of foot, left     Posterior tibial tendon dysfunction (PTTD) of left lower extremity     Equinus contracture of left ankle     Hallux valgus with bunions, left          Plan:       Brittny was seen today for surgical consult.    Diagnoses and all orders for this visit:    Preop testing  -     Ambulatory referral/consult to Family Practice; Future  -     Comprehensive Metabolic Panel; Future  -     CBC auto differential; Future  -     COVID-19 Routine Screening; Future  -     Ambulatory referral/consult to Physical/Occupational Therapy; Future    Acquired pronation deformity of foot, left  -     Ambulatory referral/consult to Family Practice; Future  -     Ambulatory referral/consult to Physical/Occupational Therapy; Future  -     Case Request Operating Room: FUSION, FOOT, RESECTION, MUSCLE, GASTROCNEMIUS  -     Full code; Standing  -     Place in Outpatient;  Standing  -     Insert peripheral IV; Standing  -     Verify surgical site; Standing  -     Verify informed consent; Standing  -     Full code  -     Insert peripheral IV    Posterior tibial tendon dysfunction (PTTD) of left lower extremity  -     Ambulatory referral/consult to Physical/Occupational Therapy; Future  -     Case Request Operating Room: FUSION, FOOT, RESECTION, MUSCLE, GASTROCNEMIUS  -     Full code; Standing  -     Place in Outpatient; Standing  -     Insert peripheral IV; Standing  -     Verify surgical site; Standing  -     Verify informed consent; Standing  -     Full code  -     Insert peripheral IV    Equinus contracture of left ankle  -     Case Request Operating Room: FUSION, FOOT, RESECTION, MUSCLE, GASTROCNEMIUS  -     Full code; Standing  -     Place in Outpatient; Standing  -     Insert peripheral IV; Standing  -     Verify surgical site; Standing  -     Verify informed consent; Standing  -     Full code  -     Insert peripheral IV    Hallux valgus with bunions, left  -     Ambulatory referral/consult to Family Practice; Future  -     Case Request Operating Room: FUSION, FOOT, RESECTION, MUSCLE, GASTROCNEMIUS  -     Full code; Standing  -     Place in Outpatient; Standing  -     Insert peripheral IV; Standing  -     Verify surgical site; Standing  -     Verify informed consent; Standing  -     Full code  -     Insert peripheral IV    Other orders  -     cholecalciferol, vitamin D3, 1,250 mcg (50,000 unit) Tab; Take 1 tablet by mouth every 7 days.  -     Progressive Mobility Protocol (mobilize patient to their highest level of functioning at least twice daily); Standing  -     sodium chloride 0.9% flush 10 mL  -     Weight bearing Non Weight Bearing; Lower Extremity; Left; Standing  -     ceFAZolin (ANCEF) 2 g in dextrose 5 % 50 mL IVPB  -     Progressive Mobility Protocol (mobilize patient to their highest level of functioning at least twice daily)  -     Progressive Mobility Protocol  (mobilize patient to their highest level of functioning at least twice daily)  -     Weight bearing Non Weight Bearing; Lower Extremity; Left      I counseled the patient on her conditions, their implications and medical management.    Reviewed left foot x-ray noting complete collapse of the medial longitudinal arch left foot with greater than 50% subluxation of the talonavicular joint.  There is an increase in the cuboid abduction angle.  There is decreased calcaneal inclination and increased talar declination.  There is increase in tight angle right foot.  1-2 intermetatarsal angle is moderately increased with partial subluxation of the 1st metatarsal phalangeal joint.  There is increase in the hallux valgus angle right foot.  Tibial sesamoid position of 4.    MRI findings were reviewed in detail.  EXAMINATION:  MRI ANKLE WITHOUT CONTRAST LEFT     CLINICAL HISTORY:  Ankle pain, tendon abnormality suspected, neg xray;  Pain in left ankle and joints of left foot     TECHNIQUE:  MRI ankle performed per routine protocol without contrast.     COMPARISON:  09/11/2020     FINDINGS:  The distal tibia, distal fibula, talus, calcaneus, cuboid, cuneiforms, navicular and proximal metatarsal bones are intact.  No fracture, no osseous lesions or abnormal osseous edema.     The tibiotalar joint appears normal.     The subtalar joint appears normal.     The Achilles tendon appear normal.     There is mild thickening at the origin of the plantar fascia which could be associated with symptoms of plantar fasciitis, this can also be seen in asymptomatic patient.     The anterior and posterior tibiofibular ligaments are intact.     The anterior and posterior talofibular ligaments are intact.     The calcaneofibular ligament is intact.     The deltoid ligament complex demonstrate nothing unusual.     There is thickening of the posterior tibialis tendon with mild tenosynovitis.     There is a trace of fluid in the peroneus longus tendon  sheath, the tendon appears within normal limits.     The peroneus brevis tendon appears normal.     The remainder of the flexor tendons are normal.     The extensor tendons are normal.     There is a small lobulated cystic structure just dorsal and lateral to the talonavicular joint most suggestive of a small synovial cyst or minimal fluid arising from this joint, it measures about 2.7 x 1.9 cm.  Just underneath the flexor retinaculum.     There is mild subcutaneous soft tissue edema overlying the medial malleolus.     Impression:     Posterior tibialis tendinosis and tenosynovitis.     Mild tenosynovitis of the peroneus longus tendon.     Mild subcutaneous soft tissue edema overlying the medial malleolus.     Minimal cystic changes dorsal to the talonavicular joint underlying the inferior flexor retinaculum, possibly a small ganglion cyst.        Electronically signed by: Kathie Merchant MD  Date:                                            09/17/2020  Time:                                           14:33    We discussed continued conservative care such as activity shoe gear restrictions, further bracing, orthotics, physical therapy versus surgical intervention potential medial double arthrodesis with bunionectomy with 1 MTP arthrodesis and Achilles tendon lengthening versus gastrocnemius recession in detail.  Risks, benefits anticipate postoperative course were discussed in detail today.  No guarantees were given or implied.  Patient like to proceed surgical intervention as discussed above.    Referred to physical therapy for gait training nonweightbearing to left foot.    Referred to PCP for medical optimization and risk stratification    Prior history vitamin D deficient therefore begin on cholicalciferol 60762 units once weekly.    RTC 1 week postop or p.r.n. as discussed.  Surgical intervention scheduled for 12/02/2020 as mac with regional anesthetic.  A portion of this note was generated by voice  recognition software and may contain spelling and grammar errors.

## 2020-11-02 ENCOUNTER — CLINICAL SUPPORT (OUTPATIENT)
Dept: REHABILITATION | Facility: HOSPITAL | Age: 50
End: 2020-11-02
Attending: PODIATRIST
Payer: COMMERCIAL

## 2020-11-02 DIAGNOSIS — M79.672 PAIN IN LEFT FOOT: ICD-10-CM

## 2020-11-02 DIAGNOSIS — Z01.818 PREOP TESTING: ICD-10-CM

## 2020-11-02 DIAGNOSIS — M76.822 POSTERIOR TIBIAL TENDON DYSFUNCTION (PTTD) OF LEFT LOWER EXTREMITY: ICD-10-CM

## 2020-11-02 DIAGNOSIS — M21.6X2 ACQUIRED PRONATION DEFORMITY OF FOOT, LEFT: ICD-10-CM

## 2020-11-02 PROCEDURE — 97116 GAIT TRAINING THERAPY: CPT | Mod: PO

## 2020-11-02 PROCEDURE — 97161 PT EVAL LOW COMPLEX 20 MIN: CPT | Mod: PO

## 2020-11-02 NOTE — PATIENT INSTRUCTIONS
Crutch Walking  Crutch adjustment    Make sure the crutches you use are adjusted to fit you. When you stand, there should be room to fit 2 to 3 fingers between the top of the crutch and your armpit. Your elbow should be slightly bent when holding the hand . When your arms hang down, the crutch handle should be at the top of your hip.   Crutch walking  Place the crutches forward about 1 foot in front of you. The crutches should be a little farther apart than your body. Lean your weight forward as you push down on the hand . Make sure your weight is on your hands and your strong leg, not your armpits. Let your body swing forward, landing on the strong leg. Move the crutches forward again. The crutch and your injured leg should move together.  Going up steps with no handrails  (Up with the good leg)  · With both crutches (under each armpit) on the same step as your feet, push down on the hand .  · Balancing with very light pressure on the weak leg, let your hands support your weight. Raise your strong leg onto the next higher step.  · Transfer all your weight to your strong leg (still bent). Move the crutches up to the next step, next to your strong leg.  · Keep your weight evenly balanced on the two crutches and your strong leg. Straighten your strong knee as you raise your weak leg up to the next step.  Going down steps with no handrails  (Down with the bad leg)  · With both crutches (under each armpit) on the same step as your feet, push down on the hand .  · Keep your weight evenly balanced on the two crutches and your strong leg. Bend your strong knee as you lower your weak leg down to the next step.  · Let your strong leg support you (still bent) as you move the crutches down next to the weak leg.  · Transfer your weight to your hands. Balance with very light pressure on your weak leg as you lower your strong leg next to your weak leg  Going up steps with handrails  (Up with the good  leg)  · Face the stairs, holding the handrail with one hand. Place both crutches under your armpit on the opposite side. Push down on the hand .  · Balancing with very light pressure on the weak leg, let your hands support your weight. Raise your strong leg onto the next higher step.  · Transfer all your weight to your strong leg (still bent) as you move the crutches up (while holding on to the handrail) to the next step next to the strong leg.  · Keep your weight evenly balanced on the handrail, the crutches (still under the same armpit opposite the handrail), and your strong leg. Straighten your strong knee as you raise the weak leg up to the next step.  Going down steps with handrails  (Down with the bad leg)  · Face the stairs, holding the handrail with one hand. Place both crutches under your armpit on the opposite side. Push down on the hand .  · Balance your weight evenly on the crutches, handrail, and your strong leg. Then bend your strong knee as you lower the weak leg down to the next step.  · Let the handrail and your strong leg support you (still bent) as you move the crutches down alongside the weak leg.  · While holding on to the handrail and crutches (under the same armpit on the other side), transfer your weight to your hands, balancing with very light pressure on the weak leg as you lower your strong leg alongside your weak leg  Tip: If you are worried about falling or you feel unsteady, try sitting when going up or down stairs instead. Sit on the bottom step and keep your injured leg out in front of you. Hold your crutches flat against the stairs. Then slide up to the next step on your bottom. Use your free hand and good leg for support. Face the same way when going down stairs.  Date Last Reviewed: 10/6/2015  © [address] [employer]. [address] [place] [address], [place] [address]. All rights reserved. This information is not intended as a substitute for professional medical  care. Always follow your healthcare professional's instructions.

## 2020-11-02 NOTE — PROGRESS NOTES
Subjective:       Patient ID: Brittny Urias is a 50 y.o. female.    Chief Complaint: Follow-up (Discuss upcoming left foort surgery) and Malignant neoplasm of central portion of right breast in fem    HPI 49-year-old postmenopausal female who returns for follow-up of a diagnosis of right breast cancer, T1cN1a, ER positive, HER2 negative.  She is on letrozole hormonal therapy.    Left ankle and foot pain. Left foot surgery coming up, fusion of bones, bunion removal and tendon stretching.   Hot flashes were better on effexor, seem to be increasing again.     She had additional reconstructive surgery schedule on October 30th, 2019  -scar revision and nipple reconstruction.      Per Dr. Woods's previous note:  Breast History: Screening mammogram on August 3, 2018 showed architectural distortion in the retroareolar right breast.  Follow-up diagnostic mammogram on August 18th showed architectural distortion and nipple retraction on the right.  Ultrasound showed no definite abnormality.    August 21, 2018 a biopsy was performed which showed infiltrating lobular carcinoma (histologic grade 3, nuclear grade 1, mitotic index 1).  ER was 90% positive, SD was 20% positive and HER2 was 0.  Ki-67 was less than 1%.    MRI of the breast on September 4, 2018 showed a 3.2 x 1.8 x 1.4 cm mass with spiculated margins in the subareolar region of the right breast.  The left breast was unremarkable.    On November 14, 2018 bilateral mastectomies were performed.  The right breast showed a 2 cm intermediate grade carcinoma (3+ 2+ 1) 2 of 3 sentinel lymph nodes were positive with some extracapsular extension.  Completion axillary lymph node dissection showed 0 of 12 additional nodes were involved.  Margins were negative.  The left breast showed no abnormalities.     Mammaprint was low risk at + 0.362    She completed radiation on February 26, 2019.      Letrozole was started in March 2019.     Review of Systems   Constitutional:  Negative.  Negative for activity change, appetite change, chills, diaphoresis, fatigue, fever and unexpected weight change.   HENT: Negative.  Negative for nosebleeds.    Eyes: Negative for visual disturbance.   Respiratory: Negative.  Negative for cough and shortness of breath.    Cardiovascular: Negative.  Negative for chest pain, palpitations and leg swelling.   Gastrointestinal: Negative.  Negative for abdominal distention, abdominal pain, blood in stool, constipation, diarrhea, nausea and vomiting.   Endocrine: Positive for heat intolerance (hot flashes).   Genitourinary: Negative.  Negative for frequency, hematuria and vaginal bleeding.   Musculoskeletal: Negative for arthralgias, back pain and myalgias.        Left ankle and foot   Skin: Negative for pallor and rash.   Allergic/Immunologic: Negative for immunocompromised state.   Neurological: Negative for dizziness, weakness, light-headedness, numbness and headaches.   Hematological: Negative for adenopathy. Does not bruise/bleed easily.   Psychiatric/Behavioral: Negative.  Negative for confusion. The patient is not nervous/anxious.        Objective:      Physical Exam  Vitals signs and nursing note reviewed.   Constitutional:       General: She is not in acute distress.     Appearance: Normal appearance. She is well-developed.   HENT:      Head: Normocephalic.      Mouth/Throat:      Pharynx: No oropharyngeal exudate.   Eyes:      Pupils: Pupils are equal, round, and reactive to light.   Neck:      Musculoskeletal: Normal range of motion.   Cardiovascular:      Rate and Rhythm: Normal rate and regular rhythm.      Heart sounds: Normal heart sounds.   Pulmonary:      Effort: Pulmonary effort is normal. No respiratory distress.      Breath sounds: Normal breath sounds. No wheezing.   Chest:       Abdominal:      General: Bowel sounds are normal. There is no distension.      Palpations: Abdomen is soft. There is no mass.      Tenderness: There is no abdominal  tenderness.   Skin:     General: Skin is warm and dry.      Findings: No rash.   Neurological:      Mental Status: She is alert and oriented to person, place, and time.   Psychiatric:         Behavior: Behavior normal.         Thought Content: Thought content normal.         Assessment:       1. Malignant neoplasm of central portion of right breast in female, estrogen receptor positive    2. History of mastectomy, left    3. History of reconstruction of left breast        Plan:       1-3. She will continue letrozole and return to clinic in 3 months time.    Return to clinic in 3 months with MD appointment and imaging.     Patient is in agreement with the proposed treatment plan. All questions were answered to the patient's satisfaction. Patient knows to call clinic for any new or worsening symptoms and if anything is needed before the next clinic visit.          Lubna Jamison, DANII-C  Hematology & Medical Oncology   [facility] [place] [address]  ph. [phone]  Fax. [phone]    Patient dicussed with collaborating physician, Dr. Woods.

## 2020-11-09 ENCOUNTER — LAB VISIT (OUTPATIENT)
Dept: LAB | Facility: HOSPITAL | Age: 50
End: 2020-11-09
Attending: PODIATRIST
Payer: COMMERCIAL

## 2020-11-09 DIAGNOSIS — Z01.818 PREOP TESTING: ICD-10-CM

## 2020-11-09 LAB
ALBUMIN SERPL BCP-MCNC: 4.4 G/DL (ref 3.5–5.2)
ALP SERPL-CCNC: 90 U/L (ref 38–126)
ALT SERPL W/O P-5'-P-CCNC: 37 U/L (ref 10–44)
ANION GAP SERPL CALC-SCNC: 5 MMOL/L (ref 8–16)
AST SERPL-CCNC: 93 U/L (ref 15–46)
BASOPHILS # BLD AUTO: 0.05 K/UL (ref 0–0.2)
BASOPHILS NFR BLD: 1.1 % (ref 0–1.9)
BILIRUB SERPL-MCNC: 0.5 MG/DL (ref 0.1–1)
BUN SERPL-MCNC: 15 MG/DL (ref 7–17)
CALCIUM SERPL-MCNC: 9.9 MG/DL (ref 8.7–10.5)
CHLORIDE SERPL-SCNC: 101 MMOL/L (ref 95–110)
CO2 SERPL-SCNC: 32 MMOL/L (ref 23–29)
CREAT SERPL-MCNC: 0.65 MG/DL (ref 0.5–1.4)
DIFFERENTIAL METHOD: ABNORMAL
EOSINOPHIL # BLD AUTO: 0.1 K/UL (ref 0–0.5)
EOSINOPHIL NFR BLD: 2.6 % (ref 0–8)
ERYTHROCYTE [DISTWIDTH] IN BLOOD BY AUTOMATED COUNT: 17.3 % (ref 11.5–14.5)
EST. GFR  (AFRICAN AMERICAN): >60 ML/MIN/1.73 M^2
EST. GFR  (NON AFRICAN AMERICAN): >60 ML/MIN/1.73 M^2
GLUCOSE SERPL-MCNC: 133 MG/DL (ref 70–110)
HCT VFR BLD AUTO: 41.2 % (ref 37–48.5)
HGB BLD-MCNC: 12.6 G/DL (ref 12–16)
IMM GRANULOCYTES # BLD AUTO: 0 K/UL (ref 0–0.04)
IMM GRANULOCYTES NFR BLD AUTO: 0 % (ref 0–0.5)
LYMPHOCYTES # BLD AUTO: 1.8 K/UL (ref 1–4.8)
LYMPHOCYTES NFR BLD: 39 % (ref 18–48)
MCH RBC QN AUTO: 22.3 PG (ref 27–31)
MCHC RBC AUTO-ENTMCNC: 30.6 G/DL (ref 32–36)
MCV RBC AUTO: 73 FL (ref 82–98)
MONOCYTES # BLD AUTO: 0.5 K/UL (ref 0.3–1)
MONOCYTES NFR BLD: 10.2 % (ref 4–15)
NEUTROPHILS # BLD AUTO: 2.2 K/UL (ref 1.8–7.7)
NEUTROPHILS NFR BLD: 47.1 % (ref 38–73)
NRBC BLD-RTO: 0 /100 WBC
PLATELET # BLD AUTO: 287 K/UL (ref 150–350)
PMV BLD AUTO: 10.8 FL (ref 9.2–12.9)
POTASSIUM SERPL-SCNC: 4.4 MMOL/L (ref 3.5–5.1)
PROT SERPL-MCNC: 7.9 G/DL (ref 6–8.4)
RBC # BLD AUTO: 5.64 M/UL (ref 4–5.4)
SODIUM SERPL-SCNC: 138 MMOL/L (ref 136–145)
WBC # BLD AUTO: 4.69 K/UL (ref 3.9–12.7)

## 2020-11-09 PROCEDURE — 85025 COMPLETE CBC W/AUTO DIFF WBC: CPT | Mod: PO

## 2020-11-09 PROCEDURE — 80053 COMPREHEN METABOLIC PANEL: CPT | Mod: PO

## 2020-11-09 PROCEDURE — 36415 COLL VENOUS BLD VENIPUNCTURE: CPT | Mod: PO

## 2020-11-10 ENCOUNTER — OFFICE VISIT (OUTPATIENT)
Dept: PLASTIC SURGERY | Facility: CLINIC | Age: 50
End: 2020-11-10
Payer: COMMERCIAL

## 2020-11-10 ENCOUNTER — OFFICE VISIT (OUTPATIENT)
Dept: HEMATOLOGY/ONCOLOGY | Facility: CLINIC | Age: 50
End: 2020-11-10
Payer: COMMERCIAL

## 2020-11-10 ENCOUNTER — OFFICE VISIT (OUTPATIENT)
Dept: OBSTETRICS AND GYNECOLOGY | Facility: CLINIC | Age: 50
End: 2020-11-10
Payer: COMMERCIAL

## 2020-11-10 VITALS — HEART RATE: 80 BPM | SYSTOLIC BLOOD PRESSURE: 160 MMHG | DIASTOLIC BLOOD PRESSURE: 90 MMHG

## 2020-11-10 VITALS
HEART RATE: 67 BPM | BODY MASS INDEX: 42.31 KG/M2 | HEIGHT: 62 IN | DIASTOLIC BLOOD PRESSURE: 97 MMHG | SYSTOLIC BLOOD PRESSURE: 168 MMHG | OXYGEN SATURATION: 98 % | WEIGHT: 229.94 LBS

## 2020-11-10 VITALS
HEIGHT: 62 IN | WEIGHT: 231.25 LBS | SYSTOLIC BLOOD PRESSURE: 124 MMHG | DIASTOLIC BLOOD PRESSURE: 78 MMHG | BODY MASS INDEX: 42.55 KG/M2

## 2020-11-10 DIAGNOSIS — Z90.12 HISTORY OF MASTECTOMY, LEFT: ICD-10-CM

## 2020-11-10 DIAGNOSIS — Z17.0 MALIGNANT NEOPLASM OF CENTRAL PORTION OF RIGHT BREAST IN FEMALE, ESTROGEN RECEPTOR POSITIVE: ICD-10-CM

## 2020-11-10 DIAGNOSIS — Z17.0 MALIGNANT NEOPLASM OF CENTRAL PORTION OF RIGHT BREAST IN FEMALE, ESTROGEN RECEPTOR POSITIVE: Primary | ICD-10-CM

## 2020-11-10 DIAGNOSIS — C50.111 MALIGNANT NEOPLASM OF CENTRAL PORTION OF RIGHT BREAST IN FEMALE, ESTROGEN RECEPTOR POSITIVE: Primary | ICD-10-CM

## 2020-11-10 DIAGNOSIS — C50.111 MALIGNANT NEOPLASM OF CENTRAL PORTION OF RIGHT BREAST IN FEMALE, ESTROGEN RECEPTOR POSITIVE: ICD-10-CM

## 2020-11-10 DIAGNOSIS — N95.1 MENOPAUSAL SYMPTOMS: Primary | ICD-10-CM

## 2020-11-10 DIAGNOSIS — Z98.890 HISTORY OF RECONSTRUCTION OF LEFT BREAST: ICD-10-CM

## 2020-11-10 DIAGNOSIS — Z85.3 HISTORY OF BREAST CANCER: Primary | ICD-10-CM

## 2020-11-10 PROCEDURE — 3074F SYST BP LT 130 MM HG: CPT | Mod: CPTII,S$GLB,, | Performed by: PHYSICIAN ASSISTANT

## 2020-11-10 PROCEDURE — 3078F DIAST BP <80 MM HG: CPT | Mod: CPTII,S$GLB,, | Performed by: PHYSICIAN ASSISTANT

## 2020-11-10 PROCEDURE — 3074F PR MOST RECENT SYSTOLIC BLOOD PRESSURE < 130 MM HG: ICD-10-PCS | Mod: CPTII,S$GLB,, | Performed by: PHYSICIAN ASSISTANT

## 2020-11-10 PROCEDURE — 99999 PR PBB SHADOW E&M-EST. PATIENT-LVL III: ICD-10-PCS | Mod: PBBFAC,,, | Performed by: PHYSICIAN ASSISTANT

## 2020-11-10 PROCEDURE — 99999 PR PBB SHADOW E&M-EST. PATIENT-LVL IV: ICD-10-PCS | Mod: PBBFAC,,, | Performed by: NURSE PRACTITIONER

## 2020-11-10 PROCEDURE — 3078F PR MOST RECENT DIASTOLIC BLOOD PRESSURE < 80 MM HG: ICD-10-PCS | Mod: CPTII,S$GLB,, | Performed by: PHYSICIAN ASSISTANT

## 2020-11-10 PROCEDURE — 3008F BODY MASS INDEX DOCD: CPT | Mod: CPTII,S$GLB,, | Performed by: PHYSICIAN ASSISTANT

## 2020-11-10 PROCEDURE — 99213 OFFICE O/P EST LOW 20 MIN: CPT | Mod: S$GLB,,, | Performed by: PHYSICIAN ASSISTANT

## 2020-11-10 PROCEDURE — 1126F PR PAIN SEVERITY QUANTIFIED, NO PAIN PRESENT: ICD-10-PCS | Mod: S$GLB,,, | Performed by: PHYSICIAN ASSISTANT

## 2020-11-10 PROCEDURE — 99214 PR OFFICE/OUTPT VISIT, EST, LEVL IV, 30-39 MIN: ICD-10-PCS | Mod: S$GLB,,, | Performed by: NURSE PRACTITIONER

## 2020-11-10 PROCEDURE — 3008F PR BODY MASS INDEX (BMI) DOCUMENTED: ICD-10-PCS | Mod: CPTII,S$GLB,, | Performed by: PHYSICIAN ASSISTANT

## 2020-11-10 PROCEDURE — 1126F AMNT PAIN NOTED NONE PRSNT: CPT | Mod: S$GLB,,, | Performed by: PHYSICIAN ASSISTANT

## 2020-11-10 PROCEDURE — 99999 PR PBB SHADOW E&M-EST. PATIENT-LVL IV: CPT | Mod: PBBFAC,,, | Performed by: NURSE PRACTITIONER

## 2020-11-10 PROCEDURE — 99213 PR OFFICE/OUTPT VISIT, EST, LEVL III, 20-29 MIN: ICD-10-PCS | Mod: S$GLB,,, | Performed by: PHYSICIAN ASSISTANT

## 2020-11-10 PROCEDURE — 99999 PR PBB SHADOW E&M-EST. PATIENT-LVL III: CPT | Mod: PBBFAC,,, | Performed by: PHYSICIAN ASSISTANT

## 2020-11-10 PROCEDURE — 99214 OFFICE O/P EST MOD 30 MIN: CPT | Mod: S$GLB,,, | Performed by: NURSE PRACTITIONER

## 2020-11-10 RX ORDER — VENLAFAXINE HYDROCHLORIDE 75 MG/1
75 CAPSULE, EXTENDED RELEASE ORAL DAILY
Qty: 30 CAPSULE | Refills: 2 | Status: SHIPPED | OUTPATIENT
Start: 2020-11-10 | End: 2021-02-07

## 2020-11-10 NOTE — PROGRESS NOTES
Subjective:      Brittny Urias is a 50 y.o. female who presents for follow up of menopausal symptoms and weight loss. Walking daily and improved portion control. Taking metformin and has noticed a decreased appetite. Is over all feeling much better. Taking Effexor 37.5mg QAM for hot flashes. Was working well to control symptoms, but starting to have hot flashes again. Referred to acupuncture, but insurance did not cover and was too expensive out of pocket. Planning to have foot surgery early 12/2020.    Lab Visit on 11/09/2020   Component Date Value Ref Range Status    Sodium 11/09/2020 138  136 - 145 mmol/L Final    Potassium 11/09/2020 4.4  3.5 - 5.1 mmol/L Final    Chloride 11/09/2020 101  95 - 110 mmol/L Final    CO2 11/09/2020 32* 23 - 29 mmol/L Final    Glucose 11/09/2020 133* 70 - 110 mg/dL Final    BUN 11/09/2020 15  7 - 17 mg/dL Final    Creatinine 11/09/2020 0.65  0.50 - 1.40 mg/dL Final    Calcium 11/09/2020 9.9  8.7 - 10.5 mg/dL Final    Total Protein 11/09/2020 7.9  6.0 - 8.4 g/dL Final    Albumin 11/09/2020 4.4  3.5 - 5.2 g/dL Final    Total Bilirubin 11/09/2020 0.5  0.1 - 1.0 mg/dL Final    Alkaline Phosphatase 11/09/2020 90  38 - 126 U/L Final    AST 11/09/2020 93* 15 - 46 U/L Final    ALT 11/09/2020 37  10 - 44 U/L Final    Anion Gap 11/09/2020 5* 8 - 16 mmol/L Final    eGFR if African American 11/09/2020 >60.0  >60 mL/min/1.73 m^2 Final    eGFR if non African American 11/09/2020 >60.0  >60 mL/min/1.73 m^2 Final    WBC 11/09/2020 4.69  3.90 - 12.70 K/uL Final    RBC 11/09/2020 5.64* 4.00 - 5.40 M/uL Final    Hemoglobin 11/09/2020 12.6  12.0 - 16.0 g/dL Final    Hematocrit 11/09/2020 41.2  37.0 - 48.5 % Final    MCV 11/09/2020 73* 82 - 98 fL Final    MCH 11/09/2020 22.3* 27.0 - 31.0 pg Final    MCHC 11/09/2020 30.6* 32.0 - 36.0 g/dL Final    RDW 11/09/2020 17.3* 11.5 - 14.5 % Final    Platelets 11/09/2020 287  150 - 350 K/uL Final    MPV 11/09/2020 10.8  9.2 - 12.9  fL Final    Immature Granulocytes 11/09/2020 0.0  0.0 - 0.5 % Final    Gran # (ANC) 11/09/2020 2.2  1.8 - 7.7 K/uL Final    Immature Grans (Abs) 11/09/2020 0.00  0.00 - 0.04 K/uL Final    Lymph # 11/09/2020 1.8  1.0 - 4.8 K/uL Final    Mono # 11/09/2020 0.5  0.3 - 1.0 K/uL Final    Eos # 11/09/2020 0.1  0.0 - 0.5 K/uL Final    Baso # 11/09/2020 0.05  0.00 - 0.20 K/uL Final    nRBC 11/09/2020 0  0 /100 WBC Final    Gran % 11/09/2020 47.1  38.0 - 73.0 % Final    Lymph % 11/09/2020 39.0  18.0 - 48.0 % Final    Mono % 11/09/2020 10.2  4.0 - 15.0 % Final    Eosinophil % 11/09/2020 2.6  0.0 - 8.0 % Final    Basophil % 11/09/2020 1.1  0.0 - 1.9 % Final    Differential Method 11/09/2020 Automated   Final       Past Medical History:   Diagnosis Date    Breast cancer, right breast 11/12/2018    Cancer     Hypertension     Psoriasis     on biologic for 1 year, has been controlling it well     Past Surgical History:   Procedure Laterality Date    COLONOSCOPY N/A 10/10/2019    Procedure: COLONOSCOPY;  Surgeon: Matt Marin MD;  [facility];  Service: Endoscopy;  Laterality: N/A;    DILATION AND CURETTAGE OF UTERUS      HYSTERECTOMY      partial hyst for prolapse    MASTECTOMY Bilateral 11/14/2018    Procedure: MASTECTOMY;  Surgeon: Marga Cardenas MD;  Location: [facility];  Service: Plastics;  Laterality: Bilateral;    MASTECTOMY WITH SENTINEL NODE BIOPSY AND AXILLARY LYMPH NODE DISSECTION Right 11/14/2018    Procedure: SENTINEL NODE BIOPSY AND AXILLARY LYMPHADENECTOMY;  Surgeon: Marga Cardenas MD;  Location: [facility];  Service: Plastics;  Laterality: Right;    RECONSTRUCTION OF BREAST WITH DEEP INFERIOR EPIGASTRIC ARTERY  (MEGAN) FREE FLAP Bilateral 11/14/2018    Procedure: RECONSTRUCTION, BREAST, USING MEGAN FREE FLAP;  Surgeon: Tye Chapman MD;  Location: [facility];  Service: Plastics;  Laterality: Bilateral;     Social History     Tobacco Use    Smoking status: Never Smoker     Smokeless tobacco: Never Used   Substance Use Topics    Alcohol use: Yes     Alcohol/week: 3.0 standard drinks     Types: 3 Glasses of wine per week     Frequency: 2-4 times a month     Drinks per session: 1 or 2     Binge frequency: Less than monthly     Comment: social    Drug use: No     Family History   Problem Relation Age of Onset    Diabetes Mother     Hypertension Mother     Hypertension Brother     Cancer Paternal Uncle         leukemia    Breast cancer Neg Hx     Ovarian cancer Neg Hx     Colon cancer Neg Hx      OB History    Para Term  AB Living   3 3 3     3   SAB TAB Ectopic Multiple Live Births           3      # Outcome Date GA Lbr Bowen/2nd Weight Sex Delivery Anes PTL Lv   3 Term 97 40w0d   M Vag-Spont EPI N KAIT   2 Term 95 40w0d   F Vag-Spont EPI N KAIT   1 Term 90 40w0d   F CS-LTranv EPI N KAIT       Current Outpatient Medications:     betamethasone dipropionate (DIPROLENE) 0.05 % cream, APPLY TWICE DAILY TO ALL AFFECTED AREAS., Disp: , Rfl: 2    cholecalciferol, vitamin D3, 1,250 mcg (50,000 unit) Tab, Take 1 tablet by mouth every 7 days., Disp: 12 tablet, Rfl: 1    clobetasol (CLOBEX) 0.05 % shampoo, USE TOPICALLY 3 TIMES WEEKLY, Disp: , Rfl: 3    clobetasoL (TEMOVATE) 0.05 % external solution, , Disp: , Rfl:     hydroCHLOROthiazide (HYDRODIURIL) 12.5 MG Tab, , Disp: , Rfl:     letrozole (FEMARA) 2.5 mg Tab, TAKE 1 TABLET BY MOUTH EVERY DAY, Disp: 90 tablet, Rfl: 3    losartan-hydrochlorothiazide 50-12.5 mg (HYZAAR) 50-12.5 mg per tablet, Take 1 tablet by mouth once daily., Disp: 90 tablet, Rfl: 3    meloxicam (MOBIC) 15 MG tablet, TAKE 1 TABLET BY MOUTH EVERY DAY, Disp: 30 tablet, Rfl: 1    metFORMIN (GLUCOPHAGE XR) 500 MG XR 24hr tablet, Take 1 tablet (500 mg total) by mouth daily with breakfast., Disp: 30 tablet, Rfl: 11    SKYRIZI 150mg/1.66mL(75 mg/0.83 mL x2) subcutaneous injection, , Disp: , Rfl:     venlafaxine (EFFEXOR XR) 37.5 MG 24 hr  "capsule, Take 1 capsule (37.5 mg total) by mouth once daily., Disp: 30 capsule, Rfl: 6    Current Facility-Administered Medications:     sodium chloride 0.9% flush 10 mL, 10 mL, Intravenous, PRN, Uriah Veras DPM    Facility-Administered Medications Ordered in Other Visits:     EPINEPHrine 1,000 mcg in lactated Ringers 1,000 mL irrigation, , Irrigation, On Call Procedure, Tye Chapman MD    EPINEPHrine 1,000 mcg in lactated Ringers 1,000 mL irrigation, , Irrigation, On Call Procedure, Tye Chapman MD    The 10-year ASCVD risk score (Carylrochelle WILKES JrYusra, et al., 2013) is: 6.8%    Values used to calculate the score:      Age: 50 years      Sex: Female      Is Non- : Yes      Diabetic: No      Tobacco smoker: No      Systolic Blood Pressure: 160 mmHg      Is BP treated: Yes      HDL Cholesterol: 88 mg/dL      Total Cholesterol: 289 mg/dL    Review of Systems:  General: No fever, chills, or weight loss.  Chest: No chest pain, shortness of breath, or palpitations.  Breast: No pain, masses, or nipple discharge.  Vulva: No pain, lesions, or itching.  Vagina: No relaxation, itching, discharge, or lesions.  Abdomen: No pain, nausea, vomiting, diarrhea, or constipation.  Urinary: No incontinence, nocturia, frequency, or dysuria.  Extremities:  No leg cramps, edema, or calf pain.  Neurologic: No headaches, dizziness, or visual changes.    Objective:     Vitals:    11/10/20 1308   BP: 124/78   Weight: 104.9 kg (231 lb 4.2 oz)   Height: 5' 2" (1.575 m)   PainSc: 0-No pain     Body mass index is 42.3 kg/m².    PHYSICAL EXAM:  APPEARANCE: Well nourished, well developed, in no acute distress.  AFFECT: WNL, alert and oriented x 3      Assessment:      Menopausal symptoms: Discussed treatment options for hot flashes, including increasing effexor, adding acupuncture or possibly testosterone therapy since taking Femara orally daily. Counseled on side effects and benefits of testosterone. She would " like to try increasing effexor first. I will reach out to her  to see if there is financial support for acupuncture as well. If symptoms are not controlled, she will meet with Dr. Martin to consider adding testosterone after her foot surgery.  -     venlafaxine (EFFEXOR XR) 75 MG 24 hr capsule; Take 1 capsule (75 mg total) by mouth once daily.  Dispense: 30 capsule; Refill: 2  -     Testosterone, free; Future; Expected date: 11/10/2020  -     Estradiol; Future; Expected date: 11/10/2020        Plan:   Increase Effexor to 75mg QAM.  I will message her  about financial assistance for acupuncture.  Follow up with Dr. Martin in 1/2021 to consider testosterone for hot flashes if not improving.  Obtain hormone labs 1 week prior to follow up.    Instructed patient to call if she experiences any side effects or has any questions.  I spent 25 minutes with this patient today, >50% counseling and coordinating care.

## 2020-11-13 ENCOUNTER — DOCUMENTATION ONLY (OUTPATIENT)
Dept: HEMATOLOGY/ONCOLOGY | Facility: CLINIC | Age: 50
End: 2020-11-13

## 2020-11-13 NOTE — PROGRESS NOTES
In response to in basket message from Lin Mccormick informing that pt could benefit from financial assistance, Sw called pt to assess need.  Left detailed VM.  If pt doesn't call back before, SW will call pt on 11/17/20.  SW remains available.

## 2020-11-16 ENCOUNTER — DOCUMENTATION ONLY (OUTPATIENT)
Dept: HEMATOLOGY/ONCOLOGY | Facility: CLINIC | Age: 50
End: 2020-11-16

## 2020-11-16 NOTE — PROGRESS NOTES
SW called pt and left second detailed VM regarding financial assistance.  SW remains available.

## 2020-11-16 NOTE — PROGRESS NOTES
Brittny Urias [facility] for consultation regarding 3 dimensional nipple tattoo for breast reconstruction. She is status post Bilateral breast reconstruction using autologous breast reconstruction by Dr. Chapman. She has a nice result and is pleased with the results of her breast reconstruction. She denies fever, chills, nausea, vomiting, chest pain or shortness of breath.     Review of patient's allergies indicates:  No Known Allergies  Current Outpatient Medications on File Prior to Visit   Medication Sig Dispense Refill    betamethasone dipropionate (DIPROLENE) 0.05 % cream APPLY TWICE DAILY TO ALL AFFECTED AREAS.  2    cholecalciferol, vitamin D3, 1,250 mcg (50,000 unit) Tab Take 1 tablet by mouth every 7 days. 12 tablet 1    clobetasol (CLOBEX) 0.05 % shampoo USE TOPICALLY 3 TIMES WEEKLY  3    clobetasoL (TEMOVATE) 0.05 % external solution       hydroCHLOROthiazide (HYDRODIURIL) 12.5 MG Tab       letrozole (FEMARA) 2.5 mg Tab TAKE 1 TABLET BY MOUTH EVERY DAY 90 tablet 3    losartan-hydrochlorothiazide 50-12.5 mg (HYZAAR) 50-12.5 mg per tablet Take 1 tablet by mouth once daily. 90 tablet 3    meloxicam (MOBIC) 15 MG tablet TAKE 1 TABLET BY MOUTH EVERY DAY 30 tablet 1    metFORMIN (GLUCOPHAGE XR) 500 MG XR 24hr tablet Take 1 tablet (500 mg total) by mouth daily with breakfast. 30 tablet 11    SKYRIZI 150mg/1.66mL(75 mg/0.83 mL x2) subcutaneous injection       venlafaxine (EFFEXOR XR) 75 MG 24 hr capsule Take 1 capsule (75 mg total) by mouth once daily. 30 capsule 2     Current Facility-Administered Medications on File Prior to Visit   Medication Dose Route Frequency Provider Last Rate Last Dose    EPINEPHrine 1,000 mcg in lactated Ringers 1,000 mL irrigation   Irrigation On Call Procedure Tye Chapman MD        EPINEPHrine 1,000 mcg in lactated Ringers 1,000 mL irrigation   Irrigation On Call Procedure Tye Chapman MD        sodium chloride  0.9% flush 10 mL  10 mL Intravenous PRN Uriah Veras DPM         Patient Active Problem List   Diagnosis    Depressed mood    Perimenopausal    Psoriasis    Class 2 obesity with serious comorbidity and body mass index (BMI) of 38.0 to 38.9 in adult    Cancer of central portion of right female breast    History of mastectomy, left    History of reconstruction of left breast    History of radiation therapy    Pain in left foot     Past Surgical History:   Procedure Laterality Date    COLONOSCOPY N/A 10/10/2019    Procedure: COLONOSCOPY;  Surgeon: Matt Marin MD;  [facility];  Service: Endoscopy;  Laterality: N/A;    DILATION AND CURETTAGE OF UTERUS      HYSTERECTOMY      partial hyst for prolapse    MASTECTOMY Bilateral 11/14/2018    Procedure: MASTECTOMY;  Surgeon: Marga Cardenas MD;  Location: [facility];  Service: Plastics;  Laterality: Bilateral;    MASTECTOMY WITH SENTINEL NODE BIOPSY AND AXILLARY LYMPH NODE DISSECTION Right 11/14/2018    Procedure: SENTINEL NODE BIOPSY AND AXILLARY LYMPHADENECTOMY;  Surgeon: Marga Cardenas MD;  Location[facility];  Service: Plastics;  Laterality: Right;    RECONSTRUCTION OF BREAST WITH DEEP INFERIOR EPIGASTRIC ARTERY  (MEGAN) FREE FLAP Bilateral 11/14/2018    Procedure: RECONSTRUCTION, BREAST, USING MEGAN FREE FLAP;  Surgeon: Tye Chapman MD;  Location: [facility];  Service: Plastics;  Laterality: Bilateral;     Social History     Socioeconomic History    Marital status:      Spouse name: Not on file    Number of children: Not on file    Years of education: Not on file    Highest education level: Not on file   Occupational History    Not on file   Social Needs    Financial resource strain: Not hard at all    Food insecurity     Worry: Never true     Inability: Never true    Transportation needs     Medical: No     Non-medical: No   Tobacco Use    Smoking status: Never Smoker    Smokeless tobacco: Never Used   Substance  and Sexual Activity    Alcohol use: Yes     Alcohol/week: 3.0 standard drinks     Types: 3 Glasses of wine per week     Frequency: 2-4 times a month     Drinks per session: 1 or 2     Binge frequency: Less than monthly     Comment: social    Drug use: No    Sexual activity: Yes     Partners: Male     Birth control/protection: Surgical   Lifestyle    Physical activity     Days per week: 5 days     Minutes per session: 30 min    Stress: Not at all   Relationships    Social connections     Talks on phone: More than three times a week     Gets together: Once a week     Attends Yarsanism service: Not on file     Active member of club or organization: Yes     Attends meetings of clubs or organizations: More than 4 times per year     Relationship status:    Other Topics Concern    Not on file   Social History Narrative    Not on file       ROS: status post breast reconstruction      PHYSICAL EXAMINATION:   Vitals:    11/10/20 1002   BP: (!) 160/90   Pulse: 80     WD WN NAD  VSS  Normal respiratory effort  R breast - incision healed, no erythema or drainage, skin paddle viable  L breast - incisions healed, no erythema or drainage, skin paddle viable    ASSESSMENT/PLAN  50 y.o. female status post breast reconstruction, desires tattoo  - Doing well, no issues. Pleased with outcome from her breast reconstruction  - Will need 3D nipple tattoo  - Procedure discussed in detail with the patient as were the risks and possible complications. She understands that the risks include but are not limited to bleeding, scarring, infection, pain, numbness, asymmetry, deformity, allergic reaction, failure to take, infection requiring removal of breast implant, skin necrosis, wound dehiscence and need for second stage tattoo.   - Post op instructions were reviewed with verbal understanding.   - Patient would like to proceed, office staff to coordinate tattoo procedure date at patients convenience.  - All questions were  answered. All questions were answered. The patient was advised to call the clinic with any questions or concerns prior to their next visit.

## 2020-11-18 ENCOUNTER — ANESTHESIA EVENT (OUTPATIENT)
Dept: SURGERY | Facility: HOSPITAL | Age: 50
End: 2020-11-18
Payer: COMMERCIAL

## 2020-11-19 ENCOUNTER — PATIENT MESSAGE (OUTPATIENT)
Dept: OBSTETRICS AND GYNECOLOGY | Facility: CLINIC | Age: 50
End: 2020-11-19

## 2020-11-20 ENCOUNTER — DOCUMENTATION ONLY (OUTPATIENT)
Dept: HEMATOLOGY/ONCOLOGY | Facility: CLINIC | Age: 50
End: 2020-11-20

## 2020-11-20 ENCOUNTER — PATIENT MESSAGE (OUTPATIENT)
Dept: SURGERY | Facility: HOSPITAL | Age: 50
End: 2020-11-20

## 2020-11-20 NOTE — PROGRESS NOTES
Pt returned call to SW.  SW outlined patient assistance as it relates to acupuncture and in general. Pt verbalized understanding and informed that she is currently on a med that is helping with hot flashes so does not feel like acupuncture is urgent.  She also informed SW that she will be having foot surgery soon and does not want to add another appt to her list of appts, indicating that acupuncture is something she may try in the future but doesn't want to try right now.  She said her  is out of work due to health issues.  SW encouraged pt to reach out with any future needs and remains available.

## 2020-11-24 ENCOUNTER — HOSPITAL ENCOUNTER (OUTPATIENT)
Dept: PREADMISSION TESTING | Facility: HOSPITAL | Age: 50
Discharge: HOME | End: 2020-11-24
Attending: PODIATRIST
Payer: COMMERCIAL

## 2020-11-24 ENCOUNTER — CLINICAL SUPPORT (OUTPATIENT)
Dept: LAB | Facility: HOSPITAL | Age: 50
End: 2020-11-24
Attending: PODIATRIST
Payer: COMMERCIAL

## 2020-11-24 VITALS
WEIGHT: 231 LBS | HEIGHT: 62 IN | BODY MASS INDEX: 42.51 KG/M2 | DIASTOLIC BLOOD PRESSURE: 94 MMHG | SYSTOLIC BLOOD PRESSURE: 171 MMHG | HEART RATE: 63 BPM | OXYGEN SATURATION: 97 % | RESPIRATION RATE: 16 BRPM

## 2020-11-24 DIAGNOSIS — Z01.818 PREOP EXAMINATION: Primary | ICD-10-CM

## 2020-11-24 DIAGNOSIS — Z01.818 PREOP EXAMINATION: ICD-10-CM

## 2020-11-24 PROCEDURE — 93010 ELECTROCARDIOGRAM REPORT: CPT | Mod: ,,, | Performed by: INTERNAL MEDICINE

## 2020-11-24 PROCEDURE — 93005 ELECTROCARDIOGRAM TRACING: CPT

## 2020-11-24 PROCEDURE — 93010 EKG 12-LEAD: ICD-10-PCS | Mod: ,,, | Performed by: INTERNAL MEDICINE

## 2020-11-24 RX ORDER — SODIUM CHLORIDE, SODIUM LACTATE, POTASSIUM CHLORIDE, CALCIUM CHLORIDE 600; 310; 30; 20 MG/100ML; MG/100ML; MG/100ML; MG/100ML
INJECTION, SOLUTION INTRAVENOUS CONTINUOUS
Status: CANCELLED | OUTPATIENT
Start: 2020-11-24

## 2020-11-24 RX ORDER — LIDOCAINE HYDROCHLORIDE 10 MG/ML
1 INJECTION, SOLUTION EPIDURAL; INFILTRATION; INTRACAUDAL; PERINEURAL ONCE
Status: CANCELLED | OUTPATIENT
Start: 2020-11-24 | End: 2020-11-24

## 2020-11-24 NOTE — ANESTHESIA PREPROCEDURE EVALUATION
11/24/2020  Brittny Urias is a 50 y.o., female scheduled for left foot fusion and left gastrocnemius resection on 12/2/2020.    Past Medical History:   Diagnosis Date    Breast cancer, right breast 11/12/2018    Cancer     Hypertension     Psoriasis     on biologic for 1 year, has been controlling it well     Past Surgical History:   Procedure Laterality Date    COLONOSCOPY N/A 10/10/2019    Procedure: COLONOSCOPY;  Surgeon: Matt Marin MD;  Location: [facility];  Service: Endoscopy;  Laterality: N/A;    DILATION AND CURETTAGE OF UTERUS      HYSTERECTOMY      partial hyst for prolapse    MASTECTOMY Bilateral 11/14/2018    Procedure: MASTECTOMY;  Surgeon: Marga Cardenas MD;  Location: [facility];  Service: Plastics;  Laterality: Bilateral;    MASTECTOMY WITH SENTINEL NODE BIOPSY AND AXILLARY LYMPH NODE DISSECTION Right 11/14/2018    Procedure: SENTINEL NODE BIOPSY AND AXILLARY LYMPHADENECTOMY;  Surgeon: Marga Cardenas MD;  Location: [facility];  Service: Plastics;  Laterality: Right;    RECONSTRUCTION OF BREAST WITH DEEP INFERIOR EPIGASTRIC ARTERY  (MEGAN) FREE FLAP Bilateral 11/14/2018    Procedure: RECONSTRUCTION, BREAST, USING MEGAN FREE FLAP;  Surgeon: Tye Chapman MD;  Location: [facility];  Service: Plastics;  Laterality: Bilateral;       Anesthesia Evaluation    I have reviewed the Patient Summary Reports.    I have reviewed the Nursing Notes.    I have reviewed the Medications.     Review of Systems  Anesthesia Hx:  No problems with previous Anesthesia  Denies Family Hx of Anesthesia complications.    Social:  Non-Smoker    Hematology/Oncology:  Hematology Normal      Current/Recent Cancer. Breast right axillary node dissection no lymphedema surgery and radiation   EENT/Dental:EENT/Dental Normal   Cardiovascular:   Exercise tolerance: good Hypertension, poorly controlled     Pulmonary:  Pulmonary Normal    Renal/:  Renal/ Normal     Hepatic/GI:  Hepatic/GI Normal    Musculoskeletal:  Musculoskeletal Normal    Neurological:  Neurology Normal    Endocrine:  Endocrine Normal    Dermatological:  Skin Normal psoriasis   Psych:  Psychiatric Normal           Physical Exam  General:  Morbid Obesity    Airway/Jaw/Neck:  Airway Findings: Mouth Opening: Normal Tongue: Normal  General Airway Assessment: Adult  Mallampati: II  TM Distance: Normal, at least 6 cm  Jaw/Neck Findings:     Neck ROM: Normal ROM      Dental:  Dental Findings: In tact   Chest/Lungs:  Chest/Lungs Findings: Clear to auscultation, Normal Respiratory Rate     Heart/Vascular:  Heart Findings: Rate: Normal  Rhythm: Regular Rhythm  Sounds: Normal        Mental Status:  Mental Status Findings:  Cooperative, Alert and Oriented         Anesthesia Plan  Type of Anesthesia, risks & benefits discussed:  Anesthesia Type:  general, regional, MAC  Patient's Preference:   Intra-op Monitoring Plan: standard ASA monitors  Intra-op Monitoring Plan Comments:   Post Op Pain Control Plan: multimodal analgesia, per primary service following discharge from PACU and peripheral nerve block  Post Op Pain Control Plan Comments:   Induction:   IV  Beta Blocker:         Informed Consent: Patient understands risks and agrees with Anesthesia plan.  Questions answered. Anesthesia consent signed with patient.  ASA Score: 3     Day of Surgery Review of History & Physical:  There are no significant changes.      Anesthesia Plan Notes: Anesthesia consent to be signed prior to procedure on 12/2/2020  Covid testing scheduled 11/29  Pending PCP H&P and optimization, appointment scheduled 11/30        Ready For Surgery From Anesthesia Perspective.

## 2020-11-24 NOTE — DISCHARGE INSTRUCTIONS
Your surgery is scheduled for 12/2/20.    Please report to Front Lobby on the 1st Floor at 5:30 a.m.    THIS TIME IS SUBJECT TO CHANGE.  YOU WILL RECEIVE A PHONE CALL THE DAY BEFORE SURGERY BY 3:30 PM TO CONFIRM YOUR TIME OF ARRIVAL.  IF YOU HAVE NOT RECEIVED A PHONE CALL BY 3:30 PM THE DAY BEFORE YOUR SURGERY PLEASE CALL [phone]     INSTRUCTIONS IMPORTANT!!!  ¨ Do not eat or drink after 12 midnight-including water. OK to brush teeth, no   gum, candy or mints!      ____  Do not wear makeup, including mascara.  ____  No powder, lotions or creams to surgical area.  ____  Please remove all jewelry, including piercings and leave at home.  ____  No money or valuables needed. Please leave at home.  ____  Please bring any documents given by your doctor.  ____  If going home the same day, arrange for a ride home. You will not be able to             drive if Anesthesia was used.  ____  Wear loose fitting clothing. Allow for dressings, bandages.  ____  Stop Aspirin, Ibuprofen, Motrin and Aleve at least 3-5 days before surgery, unless otherwise instructed by your doctor, or the nurse.   You MAY use Tylenol/acetaminophen until day of surgery.  ____  Wash the surgical area with Hibiclens the night before surgery, and again the             morning of surgery.  Be sure to rinse hibiclens off completely (if instructed by   nurse).  ____  If you take diabetic medication, do not take am of surgery unless instructed by Doctor.  ____  Call MD for temperature above 101 degrees or any other signs of infection such as Urinary (bladder) infection, Upper respiratory infection, skin boils, etc.  ____ Stop taking any Fish Oil supplement or any Vitamins that contain Vitamin E at least 5 days prior to surgery.  ____ Do Not wear your contact lenses the day of your procedure.  You may wear your glasses.      ____Do not shave surgical site for 3 days prior to surgery.  ____ Practice Good hand washing before, during, and after procedure.       I have read or had read and explained to me, and understand the above information.  Additional comments or instructions:  For additional questions call [phone]      ANESTHESIA SIDE EFFECTS  -For the first 24 hours after surgery:  Do not drive, use heavy equipment, make important decisions, or drink alcohol  -It is normal to feel sleepy for several hours.  Rest until you are more awake.  -Have someone stay with you, if needed.  They can watch for problems and help keep you safe.  -Some possible post anesthesia side effects include: nausea and vomiting, sore throat and hoarseness, sleepiness, and dizziness.        Pre-Op Bathing Instructions    Before surgery, you can play an important role in your own health.    Because skin is not sterile, we need to be sure that your skin is as free of germs as possible. By following the instructions below, you can reduce the number of germs on your skin before surgery.    IMPORTANT: You will need to shower with a special soap called Hibiclens*, available at any pharmacy.  If you are allergic to Chlorhexidine (the antiseptic in Hibiclens), use an antibacterial soap such as Dial Soap for your preoperative shower.  You will shower with Hibiclens both the night before your surgery and the morning of your surgery.  Do not use Hibiclens on the head, face or genitals to avoid injury to those areas.    STEP #1: THE NIGHT BEFORE YOUR SURGERY     1. Do not shave the area of your body where your surgery will be performed.  2. Shower and wash your hair and body as usual with your normal soap and shampoo.  3. Rinse your hair and body thoroughly after you shower to remove all soap residue.  4. With your hand, apply one packet of Hibiclens soap to the surgical site.   5. Wash the site gently for five (5) minutes. Do not scrub your skin too hard.   6. Do not wash with your regular soap after Hibiclens is used.  7. Rinse your body thoroughly.  8. Pat yourself dry with a clean, soft  towel.  9. Do not use lotion, cream, or powder.  10. Wear clean clothes.    STEP #2: THE MORNING OF YOUR SURGERY     1. Repeat Step #1.    * Not to be used by people allergic to Chlorhexidine.

## 2020-11-29 ENCOUNTER — LAB VISIT (OUTPATIENT)
Dept: URGENT CARE | Facility: CLINIC | Age: 50
End: 2020-11-29
Payer: COMMERCIAL

## 2020-11-29 DIAGNOSIS — Z01.818 PREOP TESTING: ICD-10-CM

## 2020-11-29 LAB — SARS-COV-2 RNA RESP QL NAA+PROBE: NOT DETECTED

## 2020-11-29 PROCEDURE — U0003 INFECTIOUS AGENT DETECTION BY NUCLEIC ACID (DNA OR RNA); SEVERE ACUTE RESPIRATORY SYNDROME CORONAVIRUS 2 (SARS-COV-2) (CORONAVIRUS DISEASE [COVID-19]), AMPLIFIED PROBE TECHNIQUE, MAKING USE OF HIGH THROUGHPUT TECHNOLOGIES AS DESCRIBED BY CMS-2020-01-R: HCPCS

## 2020-11-30 ENCOUNTER — OFFICE VISIT (OUTPATIENT)
Dept: FAMILY MEDICINE | Facility: CLINIC | Age: 50
End: 2020-11-30
Payer: COMMERCIAL

## 2020-11-30 VITALS
WEIGHT: 231.06 LBS | SYSTOLIC BLOOD PRESSURE: 140 MMHG | BODY MASS INDEX: 42.52 KG/M2 | HEIGHT: 62 IN | DIASTOLIC BLOOD PRESSURE: 90 MMHG | OXYGEN SATURATION: 99 % | HEART RATE: 77 BPM | TEMPERATURE: 98 F

## 2020-11-30 DIAGNOSIS — I10 ESSENTIAL HYPERTENSION: ICD-10-CM

## 2020-11-30 DIAGNOSIS — M20.12 HALLUX VALGUS WITH BUNIONS OF LEFT FOOT: ICD-10-CM

## 2020-11-30 DIAGNOSIS — R73.03 PRE-DIABETES: ICD-10-CM

## 2020-11-30 DIAGNOSIS — M21.612 HALLUX VALGUS WITH BUNIONS OF LEFT FOOT: ICD-10-CM

## 2020-11-30 DIAGNOSIS — M76.822 POSTERIOR TIBIAL TENDON DYSFUNCTION (PTTD) OF LEFT LOWER EXTREMITY: ICD-10-CM

## 2020-11-30 DIAGNOSIS — Z85.3 HISTORY OF BREAST CANCER: ICD-10-CM

## 2020-11-30 DIAGNOSIS — M24.572 EQUINUS CONTRACTURE OF LEFT ANKLE: ICD-10-CM

## 2020-11-30 DIAGNOSIS — M21.6X2 ACQUIRED PRONATION DEFORMITY OF LEFT FOOT: ICD-10-CM

## 2020-11-30 DIAGNOSIS — N95.1 HOT FLASHES DUE TO MENOPAUSE: ICD-10-CM

## 2020-11-30 DIAGNOSIS — Z01.818 PRE-OP EXAMINATION: Primary | ICD-10-CM

## 2020-11-30 PROCEDURE — 3080F PR MOST RECENT DIASTOLIC BLOOD PRESSURE >= 90 MM HG: ICD-10-PCS | Mod: CPTII,S$GLB,, | Performed by: FAMILY MEDICINE

## 2020-11-30 PROCEDURE — 99999 PR PBB SHADOW E&M-EST. PATIENT-LVL IV: ICD-10-PCS | Mod: PBBFAC,,, | Performed by: FAMILY MEDICINE

## 2020-11-30 PROCEDURE — 3077F SYST BP >= 140 MM HG: CPT | Mod: CPTII,S$GLB,, | Performed by: FAMILY MEDICINE

## 2020-11-30 PROCEDURE — 99213 OFFICE O/P EST LOW 20 MIN: CPT | Mod: S$GLB,,, | Performed by: FAMILY MEDICINE

## 2020-11-30 PROCEDURE — 1125F AMNT PAIN NOTED PAIN PRSNT: CPT | Mod: S$GLB,,, | Performed by: FAMILY MEDICINE

## 2020-11-30 PROCEDURE — 3077F PR MOST RECENT SYSTOLIC BLOOD PRESSURE >= 140 MM HG: ICD-10-PCS | Mod: CPTII,S$GLB,, | Performed by: FAMILY MEDICINE

## 2020-11-30 PROCEDURE — 3080F DIAST BP >= 90 MM HG: CPT | Mod: CPTII,S$GLB,, | Performed by: FAMILY MEDICINE

## 2020-11-30 PROCEDURE — 99999 PR PBB SHADOW E&M-EST. PATIENT-LVL IV: CPT | Mod: PBBFAC,,, | Performed by: FAMILY MEDICINE

## 2020-11-30 PROCEDURE — 3008F BODY MASS INDEX DOCD: CPT | Mod: CPTII,S$GLB,, | Performed by: FAMILY MEDICINE

## 2020-11-30 PROCEDURE — 99213 PR OFFICE/OUTPT VISIT, EST, LEVL III, 20-29 MIN: ICD-10-PCS | Mod: S$GLB,,, | Performed by: FAMILY MEDICINE

## 2020-11-30 PROCEDURE — 1125F PR PAIN SEVERITY QUANTIFIED, PAIN PRESENT: ICD-10-PCS | Mod: S$GLB,,, | Performed by: FAMILY MEDICINE

## 2020-11-30 PROCEDURE — 3008F PR BODY MASS INDEX (BMI) DOCUMENTED: ICD-10-PCS | Mod: CPTII,S$GLB,, | Performed by: FAMILY MEDICINE

## 2020-11-30 NOTE — Clinical Note
Good morning,     I saw this patient yesterday for a pre-op assessment. No significant concerns from my end, other than she is at high risk of TRAVIS, so she may eventually need a sleep study after surgery. Just wanted to make you aware - otherwise she is good for surgery.     Thanks,  Anya Stevens, DO  Family Medicine

## 2020-11-30 NOTE — PATIENT INSTRUCTIONS
· Follow up with your primary care doctor next July  · I will send our pre-op history and physical to your surgeon

## 2020-11-30 NOTE — PROGRESS NOTES
"     Subjective:      Brittny Urias is a 50 y.o. female with PMHx significant for various pathologies of the left foot as stated below, breast cancer s/p double mastectomy on maintenance chemotherapy, HTN, and pre-DM (last A1c 5.6 in 2018), who presents to the office today for a preoperative consultation at the request of surgeon Dr. Uriah Veras who plans on performing a left bunionectomy, gastrocnemius resection, and bone fusion on December 2, 2020. This consultation is requested for the specific conditions prompting preoperative evaluation (i.e. because of potential affect on operative risk): history of hypertension, pre-DM. Planned anesthesia: general. The patient has the following known anesthesia issues: none. Patients bleeding risk: no recent abnormal bleeding.     The following portions of the patient's history were reviewed and updated as appropriate: allergies, current medications, past family history, past medical history, past social history, past surgical history and problem list.    Review of Systems  A comprehensive review of systems was negative.      Objective:        BP (!) 140/90   Pulse 77   Temp 97.8 °F (36.6 °C)   Ht 5' 2" (1.575 m)   Wt 104.8 kg (231 lb 0.7 oz)   SpO2 99%   BMI 42.26 kg/m²     General Appearance:    Alert, cooperative, no distress, appears stated age   Head:    Normocephalic, without obvious abnormality, atraumatic   Eyes:    PERRL, conjunctiva/corneas clear, EOM's intact   Ears:    Normal TM's and external ear canals, both ears   Nose:   Nares normal, septum midline, mucosa normal, no drainage    or sinus tenderness   Throat:   Lips, mucosa, and tongue normal; teeth and gums normal   Neck:   Supple, symmetrical, trachea midline, no adenopathy;     thyroid:  no enlargement/tenderness/nodules   Back:     Symmetric, no curvature, ROM normal   Lungs:     Clear to auscultation bilaterally, respirations unlabored    Heart:    Regular rate and rhythm, S1 and S2 normal, " no murmur, rub   or gallop   Abdomen:     Soft, non-tender, bowel sounds active all four quadrants,     no masses, no organomegaly   Extremities:   Extremities normal, atraumatic, no cyanosis or edema   Pulses:   2+ and symmetric all extremities   Skin:   Skin color, texture, turgor normal, no rashes or lesions   Lymph nodes:   Cervical nodes normal   Neurologic:   CNII-XII intact, normal strength, sensation and reflexes     throughout       Predictors of intubation difficulty:   Morbid obesity? yes - BMI 42.26   Anatomically abnormal facies? no   Prominent incisors? no   Receding mandible? no   Short, thick neck? no   Neck range of motion: normal   Dentition: No chipped, loose, or missing teeth.     Assessment:        50 y.o. female with planned surgery as above.    Known risk factors for perioperative complications: Diabetes mellitus  Hypertension    Difficulty with intubation is not anticipated.    Cardiac Risk Estimation: 0.1%, as estimated by the ACS Surgical Risk Calculator    Current medications which may produce withdrawal symptoms if withheld perioperatively:  Effexor     Plan:     Pre-op examination    Acquired pronation deformity of left foot    Posterior tibial tendon dysfunction (PTTD) of left lower extremity    Equinus contracture of left ankle    Hallux valgus with bunions of left foot    Essential hypertension  Comments:  Controlled. Continue current anti-hypertensive regimen until surgery.    Pre-diabetes  Comments:  Controlled. Continue metformin until surgery.     History of breast cancer  Comments:  Management per Oncology team.    Hot flashes due to menopause  Comments:  Controlled. Continue Effexor until surgery.        1. Preoperative workup as follows none.  2. Change in medication regimen before surgery: at the discretion of surgical team - Per patient, she is holding her chemotherapy 2 weeks prior to and 2 weeks after surgery.  3. Prophylaxis for cardiac events with perioperative  beta-blockers: not indicated.  4. Invasive hemodynamic monitoring perioperatively: not indicated.  5. Deep vein thrombosis prophylaxis postoperatively:regimen to be chosen by surgical team.  6. Surveillance for postoperative MI with ECG immediately postoperatively and on postoperative days 1 and 2 AND troponin levels 24 hours postoperatively and on day 4 or hospital discharge (whichever comes first): not indicated.  7. Other measures: none      Patient is physically active 3-4 days per week. She denies known history of TRAVIS, however STOP-BANG 6 - high risk of TRAVIS. She is overdue to have her A1c checked, however her most recent BG was 133 on CMP on 11/9 and it appears that she primarily takes metformin for weight loss, so I do not suspect hyperglycemia to be a significant issue moving forward - therefore we can postpone routine blood work until after surgery, given that she presented to the office for a pre-op 2 days before surgery. She has tolerated general anesthesia in the past and reports no post-op complications from previous double mastectomy and hysterectomy. According to the ACS Surgical Risk Calculator, she is at 2.8% risk of intra- or post-operative complications. Therefore, it is medically appropriate to proceed with surgery. Recommend polysomnography after surgery, as well as complete routine labs appropriate for her age and co-morbidities at her new wellness visit after surgery.

## 2020-12-02 ENCOUNTER — HOSPITAL ENCOUNTER (OUTPATIENT)
Facility: HOSPITAL | Age: 50
Discharge: HOME | End: 2020-12-02
Attending: PODIATRIST | Admitting: PODIATRIST
Payer: COMMERCIAL

## 2020-12-02 ENCOUNTER — ANESTHESIA (OUTPATIENT)
Dept: SURGERY | Facility: HOSPITAL | Age: 50
End: 2020-12-02
Payer: COMMERCIAL

## 2020-12-02 VITALS
HEART RATE: 81 BPM | BODY MASS INDEX: 42.33 KG/M2 | HEIGHT: 62 IN | WEIGHT: 230 LBS | RESPIRATION RATE: 18 BRPM | DIASTOLIC BLOOD PRESSURE: 78 MMHG | OXYGEN SATURATION: 96 % | TEMPERATURE: 98 F | SYSTOLIC BLOOD PRESSURE: 127 MMHG

## 2020-12-02 DIAGNOSIS — M21.6X2 ACQUIRED PRONATION DEFORMITY OF FOOT, LEFT: ICD-10-CM

## 2020-12-02 DIAGNOSIS — M76.822 POSTERIOR TIBIAL TENDON DYSFUNCTION (PTTD) OF LEFT LOWER EXTREMITY: ICD-10-CM

## 2020-12-02 DIAGNOSIS — M24.572 EQUINUS CONTRACTURE OF LEFT ANKLE: ICD-10-CM

## 2020-12-02 DIAGNOSIS — Z01.818 PREOP TESTING: ICD-10-CM

## 2020-12-02 DIAGNOSIS — M21.612 HALLUX VALGUS WITH BUNIONS, LEFT: ICD-10-CM

## 2020-12-02 DIAGNOSIS — Z98.890 POSTOPERATIVE STATE: Primary | ICD-10-CM

## 2020-12-02 DIAGNOSIS — M20.12 HALLUX VALGUS WITH BUNIONS, LEFT: ICD-10-CM

## 2020-12-02 LAB — POCT GLUCOSE: 113 MG/DL (ref 70–110)

## 2020-12-02 PROCEDURE — 63600175 PHARM REV CODE 636 W HCPCS: Performed by: STUDENT IN AN ORGANIZED HEALTH CARE EDUCATION/TRAINING PROGRAM

## 2020-12-02 PROCEDURE — 36000709 HC OR TIME LEV III EA ADD 15 MIN: Performed by: PODIATRIST

## 2020-12-02 PROCEDURE — 28725 ARTHRODESIS SUBTALAR: CPT | Mod: LT,,, | Performed by: PODIATRIST

## 2020-12-02 PROCEDURE — 28740 PR FUSION FOOT BONE,MIDTARSAL,1 JT: ICD-10-PCS | Mod: 51,LT,, | Performed by: PODIATRIST

## 2020-12-02 PROCEDURE — 71000016 HC POSTOP RECOV ADDL HR: Performed by: PODIATRIST

## 2020-12-02 PROCEDURE — 37000008 HC ANESTHESIA 1ST 15 MINUTES: Performed by: PODIATRIST

## 2020-12-02 PROCEDURE — 27201423 OPTIME MED/SURG SUP & DEVICES STERILE SUPPLY: Performed by: PODIATRIST

## 2020-12-02 PROCEDURE — C1769 GUIDE WIRE: HCPCS | Performed by: PODIATRIST

## 2020-12-02 PROCEDURE — 28740 FUSION OF FOOT BONES: CPT | Mod: 51,LT,, | Performed by: PODIATRIST

## 2020-12-02 PROCEDURE — 63600175 PHARM REV CODE 636 W HCPCS: Performed by: PODIATRIST

## 2020-12-02 PROCEDURE — 64445 NJX AA&/STRD SCIATIC NRV IMG: CPT | Performed by: STUDENT IN AN ORGANIZED HEALTH CARE EDUCATION/TRAINING PROGRAM

## 2020-12-02 PROCEDURE — 28750 PR FUSION BIG TOE,MT-P JT: ICD-10-PCS | Mod: 51,LT,, | Performed by: PODIATRIST

## 2020-12-02 PROCEDURE — 28750 FUSION OF BIG TOE JOINT: CPT | Mod: 51,LT,, | Performed by: PODIATRIST

## 2020-12-02 PROCEDURE — 29999 UNLISTED PX ARTHROSCOPY: CPT | Mod: ,,, | Performed by: PODIATRIST

## 2020-12-02 PROCEDURE — 29999 PR ARTHOSCOPIC GASTROCNEMIUS RECESSION: ICD-10-PCS | Mod: ,,, | Performed by: PODIATRIST

## 2020-12-02 PROCEDURE — 25000003 PHARM REV CODE 250: Performed by: PODIATRIST

## 2020-12-02 PROCEDURE — C1713 ANCHOR/SCREW BN/BN,TIS/BN: HCPCS | Performed by: PODIATRIST

## 2020-12-02 PROCEDURE — 63600175 PHARM REV CODE 636 W HCPCS: Performed by: NURSE PRACTITIONER

## 2020-12-02 PROCEDURE — 28725 PR FUSION FOOT BONES,SUBTALAR: ICD-10-PCS | Mod: LT,,, | Performed by: PODIATRIST

## 2020-12-02 PROCEDURE — 37000009 HC ANESTHESIA EA ADD 15 MINS: Performed by: PODIATRIST

## 2020-12-02 PROCEDURE — 71000015 HC POSTOP RECOV 1ST HR: Performed by: PODIATRIST

## 2020-12-02 PROCEDURE — 25000003 PHARM REV CODE 250: Performed by: STUDENT IN AN ORGANIZED HEALTH CARE EDUCATION/TRAINING PROGRAM

## 2020-12-02 PROCEDURE — 36000708 HC OR TIME LEV III 1ST 15 MIN: Performed by: PODIATRIST

## 2020-12-02 DEVICE — SCREW BONE VAL 3X18MM TI: Type: IMPLANTABLE DEVICE | Site: FOOT | Status: FUNCTIONAL

## 2020-12-02 DEVICE — SCREW LP VA 3X16MM LOK TI: Type: IMPLANTABLE DEVICE | Site: FOOT | Status: FUNCTIONAL

## 2020-12-02 DEVICE — SCREW LP VA 3X12MM LOK TI: Type: IMPLANTABLE DEVICE | Site: FOOT | Status: FUNCTIONAL

## 2020-12-02 DEVICE — SCREW COMPRESSION FT 3.5X24MM: Type: IMPLANTABLE DEVICE | Site: FOOT | Status: FUNCTIONAL

## 2020-12-02 DEVICE — SCREW LP VA 3X14MM LOK TI: Type: IMPLANTABLE DEVICE | Site: FOOT | Status: FUNCTIONAL

## 2020-12-02 RX ORDER — ROPIVACAINE HYDROCHLORIDE 5 MG/ML
INJECTION, SOLUTION EPIDURAL; INFILTRATION; PERINEURAL
Status: DISCONTINUED | OUTPATIENT
Start: 2020-12-02 | End: 2020-12-02

## 2020-12-02 RX ORDER — CEFAZOLIN SODIUM 2 G/50ML
2 SOLUTION INTRAVENOUS
Status: COMPLETED | OUTPATIENT
Start: 2020-12-02 | End: 2020-12-02

## 2020-12-02 RX ORDER — ACETAMINOPHEN 10 MG/ML
INJECTION, SOLUTION INTRAVENOUS
Status: DISCONTINUED | OUTPATIENT
Start: 2020-12-02 | End: 2020-12-02

## 2020-12-02 RX ORDER — SODIUM CHLORIDE 0.9 % (FLUSH) 0.9 %
10 SYRINGE (ML) INJECTION
Status: DISCONTINUED | OUTPATIENT
Start: 2020-12-02 | End: 2020-12-02 | Stop reason: HOSPADM

## 2020-12-02 RX ORDER — LIDOCAINE HYDROCHLORIDE 20 MG/ML
INJECTION INTRAVENOUS
Status: DISCONTINUED | OUTPATIENT
Start: 2020-12-02 | End: 2020-12-02

## 2020-12-02 RX ORDER — HYDROMORPHONE HYDROCHLORIDE 2 MG/ML
0.5 INJECTION, SOLUTION INTRAMUSCULAR; INTRAVENOUS; SUBCUTANEOUS EVERY 5 MIN PRN
Status: DISCONTINUED | OUTPATIENT
Start: 2020-12-02 | End: 2020-12-02 | Stop reason: HOSPADM

## 2020-12-02 RX ORDER — ONDANSETRON 2 MG/ML
4 INJECTION INTRAMUSCULAR; INTRAVENOUS DAILY PRN
Status: DISCONTINUED | OUTPATIENT
Start: 2020-12-02 | End: 2020-12-02 | Stop reason: HOSPADM

## 2020-12-02 RX ORDER — LIDOCAINE HYDROCHLORIDE 10 MG/ML
1 INJECTION, SOLUTION EPIDURAL; INFILTRATION; INTRACAUDAL; PERINEURAL ONCE
Status: DISCONTINUED | OUTPATIENT
Start: 2020-12-02 | End: 2020-12-02 | Stop reason: HOSPADM

## 2020-12-02 RX ORDER — CELECOXIB 200 MG/1
200 CAPSULE ORAL 2 TIMES DAILY
Qty: 30 CAPSULE | Refills: 0 | Status: SHIPPED | OUTPATIENT
Start: 2020-12-02 | End: 2021-11-01

## 2020-12-02 RX ORDER — SODIUM CHLORIDE, SODIUM LACTATE, POTASSIUM CHLORIDE, CALCIUM CHLORIDE 600; 310; 30; 20 MG/100ML; MG/100ML; MG/100ML; MG/100ML
INJECTION, SOLUTION INTRAVENOUS CONTINUOUS
Status: DISCONTINUED | OUTPATIENT
Start: 2020-12-02 | End: 2020-12-02 | Stop reason: HOSPADM

## 2020-12-02 RX ORDER — FENTANYL CITRATE 50 UG/ML
INJECTION, SOLUTION INTRAMUSCULAR; INTRAVENOUS
Status: DISCONTINUED | OUTPATIENT
Start: 2020-12-02 | End: 2020-12-02

## 2020-12-02 RX ORDER — PROPOFOL 10 MG/ML
VIAL (ML) INTRAVENOUS CONTINUOUS PRN
Status: DISCONTINUED | OUTPATIENT
Start: 2020-12-02 | End: 2020-12-02

## 2020-12-02 RX ORDER — MIDAZOLAM HYDROCHLORIDE 1 MG/ML
INJECTION INTRAMUSCULAR; INTRAVENOUS
Status: DISCONTINUED | OUTPATIENT
Start: 2020-12-02 | End: 2020-12-02

## 2020-12-02 RX ORDER — ONDANSETRON 2 MG/ML
INJECTION INTRAMUSCULAR; INTRAVENOUS
Status: DISCONTINUED | OUTPATIENT
Start: 2020-12-02 | End: 2020-12-02

## 2020-12-02 RX ORDER — HYDROCODONE BITARTRATE AND ACETAMINOPHEN 5; 325 MG/1; MG/1
1 TABLET ORAL EVERY 4 HOURS PRN
Status: DISCONTINUED | OUTPATIENT
Start: 2020-12-02 | End: 2020-12-02 | Stop reason: HOSPADM

## 2020-12-02 RX ORDER — PROPOFOL 10 MG/ML
VIAL (ML) INTRAVENOUS
Status: DISCONTINUED | OUTPATIENT
Start: 2020-12-02 | End: 2020-12-02

## 2020-12-02 RX ORDER — OXYCODONE AND ACETAMINOPHEN 10; 325 MG/1; MG/1
1 TABLET ORAL EVERY 4 HOURS PRN
Qty: 30 TABLET | Refills: 0 | Status: SHIPPED | OUTPATIENT
Start: 2020-12-02 | End: 2021-01-28

## 2020-12-02 RX ADMIN — SODIUM CHLORIDE, SODIUM LACTATE, POTASSIUM CHLORIDE, AND CALCIUM CHLORIDE: .6; .31; .03; .02 INJECTION, SOLUTION INTRAVENOUS at 06:12

## 2020-12-02 RX ADMIN — FENTANYL CITRATE 25 MCG: 50 INJECTION, SOLUTION INTRAMUSCULAR; INTRAVENOUS at 10:12

## 2020-12-02 RX ADMIN — FENTANYL CITRATE 25 MCG: 50 INJECTION, SOLUTION INTRAMUSCULAR; INTRAVENOUS at 09:12

## 2020-12-02 RX ADMIN — CEFAZOLIN SODIUM 2 G: 2 SOLUTION INTRAVENOUS at 11:12

## 2020-12-02 RX ADMIN — LIDOCAINE HYDROCHLORIDE 60 MG: 20 INJECTION, SOLUTION INTRAVENOUS at 07:12

## 2020-12-02 RX ADMIN — PROPOFOL 100 MCG/KG/MIN: 10 INJECTION, EMULSION INTRAVENOUS at 07:12

## 2020-12-02 RX ADMIN — PROPOFOL 50 MG: 10 INJECTION, EMULSION INTRAVENOUS at 07:12

## 2020-12-02 RX ADMIN — HYDROCODONE BITARTRATE AND ACETAMINOPHEN 1 TABLET: 5; 325 TABLET ORAL at 01:12

## 2020-12-02 RX ADMIN — FENTANYL CITRATE 25 MCG: 50 INJECTION, SOLUTION INTRAMUSCULAR; INTRAVENOUS at 08:12

## 2020-12-02 RX ADMIN — MIDAZOLAM HYDROCHLORIDE 5 MG: 1 INJECTION, SOLUTION INTRAMUSCULAR; INTRAVENOUS at 06:12

## 2020-12-02 RX ADMIN — ONDANSETRON 4 MG: 2 INJECTION, SOLUTION INTRAMUSCULAR; INTRAVENOUS at 11:12

## 2020-12-02 RX ADMIN — PROPOFOL 30 MG: 10 INJECTION, EMULSION INTRAVENOUS at 10:12

## 2020-12-02 RX ADMIN — PROPOFOL 30 MG: 10 INJECTION, EMULSION INTRAVENOUS at 08:12

## 2020-12-02 RX ADMIN — ROPIVACAINE HYDROCHLORIDE 60 ML: 5 INJECTION, SOLUTION EPIDURAL; INFILTRATION; PERINEURAL at 06:12

## 2020-12-02 RX ADMIN — PROPOFOL 60 MG: 10 INJECTION, EMULSION INTRAVENOUS at 11:12

## 2020-12-02 RX ADMIN — FENTANYL CITRATE 25 MCG: 50 INJECTION, SOLUTION INTRAMUSCULAR; INTRAVENOUS at 11:12

## 2020-12-02 RX ADMIN — PROPOFOL 20 MG: 10 INJECTION, EMULSION INTRAVENOUS at 08:12

## 2020-12-02 RX ADMIN — ACETAMINOPHEN 1000 MG: 10 INJECTION, SOLUTION INTRAVENOUS at 10:12

## 2020-12-02 RX ADMIN — CEFAZOLIN SODIUM 2 G: 2 SOLUTION INTRAVENOUS at 07:12

## 2020-12-02 NOTE — DISCHARGE INSTRUCTIONS
Discharge Instructions for Foot Surgery  Arrange to have an adult drive you home after surgery. If you had general anesthesia, it may take a day or more to fully recover. So, for at least the next 24 hours: Do not drive or use machinery or power tools; do not drink alcohol; and do not make any major decisions.  Diet  Here are some dietary suggestions following surgery:   · Start with liquids and light foods (like dry toast, bananas, and applesauce). As you feel up to it, slowly return to your normal diet.  · Drink at least 6 to 8 glasses of water or other nonalcoholic fluids a day.  · To avoid nausea, eat before taking narcotic pain medicines.  Medicines  It is important to follow these directions:   · Take all medicines as instructed.  · Take pain medicines on time. Do not wait until the pain is bad before taking your medicines.  · Avoid alcohol while on pain medicines.  Activity  These instructions are to help with your recovery:   · Sit or lie down when possible. Put a pillow under your heel to raise your foot above the level of your heart.  · Wrap an ice pack or bag of frozen peas in a thin cloth. Place it over your bandaged foot for no longer than 20 minutes. Do this three times a day.  · You can drive again in seven days or as instructed by your healthcare provider.  · Wear your surgical shoe at all times unless told otherwise by your healthcare provider.  · Use crutches or a cane as directed.  · Follow your healthcare providers instructions about putting weight on your foot.  NO WEIGHT TO FOOT AT ALL UNTIL TOLD OTHERWISE BY DR MORALES  Bandage and cast care  Here are tips to follow:   · Do not shower for 48 hours.  · When you can shower again, cover the bandage or cast with a plastic bag to keep it dry.  · Dont remove your bandage until your healthcare provider tells you to. If your bandage gets wet or dirty, check with your healthcare provider.   What to expect  It is normal to have the  following:  · Bruising and slight swelling of the foot and toes  · A small amount of blood on the dressing  Call your healthcare provider   Contact your healthcare provider right away if you have any of the following:   · Continuous bleeding through the bandage  · Excessive swelling, increased bleeding, or redness  · Fever over 100.4°F (38°C) or chills  · Pain unrelieved by pain medicines  · Foot feels cold to the touch or numb  · Increased ache in your leg or foot  · Chest pain or shortness of breath  · Anything unusual that concerns you   Date Last Reviewed: 9/26/2015  © 0557-5797 [employer]. [address] [place] [address], [place] [address]. All rights reserved. This information is not intended as a substitute for professional medical care. Always follow your healthcare professional's instructions.  ANESTHESIA  -For the first 24 hours after surgery:  Do not drive, use heavy equipment, make important decisions, or drink alcohol  -It is normal to feel sleepy for several hours.  Rest until you are more awake.  -Have someone stay with you, if needed.  They can watch for problems and help keep you safe.  -Some possible post anesthesia side effects include: nausea and vomiting, sore throat and hoarseness, sleepiness, and dizziness.    PAIN  -If you have pain after surgery, pain medicine will help you feel better.  Take it as directed, before pain becomes severe.  Most pain relievers taken by mouth need at least 20-30 minutes to start working.  -Do not drive or drink alcohol while taking pain medicine.  -Pain medication can upset your stomach.  Taking them with a little food may help.  -Other ways to help control pain: elevation, ice, and relaxation  -Call your surgeon if still having unmanageable pain an hour after taking pain medicine.  -Pain medicine can cause constipation.  Taking an over-the counter stool softener while on prescription pain medicine and drinking plenty of fluids can prevent this side  effect.  -Call your surgeon if you have severe side effects like: breathing problems, trouble waking up, dizziness, confusion, or severe constipation.    NAUSEA  -Some people have nausea after surgery.  This is often because of anesthesia, pain, pain medicine, or the stress of surgery.  -Do not push yourself to eat.  Start off with clear liquids and soup.  Slowly move to solid foods.  Don't eat fatty, rich, spicy foods at first.  Eat smaller amounts.  -If you develop persistent nausea and vomiting please notify your surgeon immediately.    BLEEDING  -Different types of surgery require different types of care and dressing changes.  It is important to follow all instructions and advice from your surgeon.  Change dressing as directed.  Call your surgeon for any concerns regarding postop bleeding.    SIGNS OF INFECTION  -Signs of infection include: fever, swelling, drainage, and redness  -Notify your surgeon if you have a fever of 100.4 F (38.0 C) or higher.  -Notify your surgeon if you notice redness, swelling, increased pain, pus, or a foul smell at the incision site.  Acetaminophen; Oxycodone tablets  What is this medicine?  ACETAMINOPHEN; OXYCODONE (a set a TIGRE sebastian fen; ox i KOE done) is a pain reliever. It is used to treat moderate to severe pain.  How should I use this medicine?  Take this medicine by mouth with a full glass of water. Follow the directions on the prescription label. You can take it with or without food. If it upsets your stomach, take it with food. Take your medicine at regular intervals. Do not take it more often than directed.  A special MedGuide will be given to you by the pharmacist with each prescription and refill. Be sure to read this information carefully each time.  Talk to your pediatrician regarding the use of this medicine in children. Special care may be needed.  What side effects may I notice from receiving this medicine?  Side effects that you should report to your doctor or health  care professional as soon as possible:  · allergic reactions like skin rash, itching or hives, swelling of the face, lips, or tongue  · breathing problems  · confusion  · redness, blistering, peeling or loosening of the skin, including inside the mouth  · signs and symptoms of liver injury like dark yellow or brown urine; general ill feeling or flu-like symptoms; light-colored stools; loss of appetite; nausea; right upper belly pain; unusually weak or tired; yellowing of the eyes or skin  · signs and symptoms of low blood pressure like dizziness; feeling faint or lightheaded, falls; unusually weak or tired  · trouble passing urine or change in the amount of urine  Side effects that usually do not require medical attention (report to your doctor or health care professional if they continue or are bothersome):  · constipation  · dry mouth  · nausea, vomiting  · tiredness  What may interact with this medicine?  This medicine may interact with the following medications:  · alcohol  · antihistamines for allergy, cough and cold  · antiviral medicines for HIV or AIDS  · atropine  · certain antibiotics like clarithromycin, erythromycin, linezolid, rifampin  · certain medicines for anxiety or sleep  · certain medicines for bladder problems like oxybutynin, tolterodine  · certain medicines for depression like amitriptyline, fluoxetine, sertraline  · certain medicines for fungal infections like ketoconazole, itraconazole, voriconazole  · certain medicines for migraine headache like almotriptan, eletriptan, frovatriptan, naratriptan, rizatriptan, sumatriptan, zolmitriptan  · certain medicines for nausea or vomiting like dolasetron, ondansetron, palonosetron  · certain medicines for Parkinson's disease like benztropine, trihexyphenidyl  · certain medicines for seizures like phenobarbital, phenytoin, primidone  · certain medicines for stomach problems like dicyclomine, hyoscyamine  · certain medicines for travel sickness like  scopolamine  · diuretics  · general anesthetics like halothane, isoflurane, methoxyflurane, propofol  · ipratropium  · local anesthetics like lidocaine, pramoxine, tetracaine  · MAOIs like Carbex, Eldepryl, Marplan, Nardil, and Parnate  · medicines that relax muscles for surgery  · methylene blue  · nilotinib  · other medicines with acetaminophen  · other narcotic medicines for pain or cough  · phenothiazines like chlorpromazine, mesoridazine, prochlorperazine, thioridazine  What if I miss a dose?  If you miss a dose, take it as soon as you can. If it is almost time for your next dose, take only that dose. Do not take double or extra doses.  Where should I keep my medicine?  Keep out of the reach of children. This medicine can be abused. Keep your medicine in a safe place to protect it from theft. Do not share this medicine with anyone. Selling or giving away this medicine is dangerous and against the law.  This medicine may cause accidental overdose and death if it taken by other adults, children, or pets. Mix any unused medicine with a substance like cat litter or coffee grounds. Then throw the medicine away in a sealed container like a sealed bag or a coffee can with a lid. Do not use the medicine after the expiration date.  Store at room temperature between 20 and 25 degrees C (68 and 77 degrees F).  What should I tell my health care provider before I take this medicine?  They need to know if you have any of these conditions:  · brain tumor  · Crohn's disease, inflammatory bowel disease, or ulcerative colitis  · drug abuse or addiction  · head injury  · heart or circulation problems  · if you often drink alcohol  · kidney disease or problems going to the bathroom  · liver disease  · lung disease, asthma, or breathing problems  · an unusual or allergic reaction to acetaminophen, oxycodone, other opioid analgesics, other medicines, foods, dyes, or preservatives  · pregnant or trying to get  pregnant  · breast-feeding  What should I watch for while using this medicine?  Tell your doctor or health care professional if your pain does not go away, if it gets worse, or if you have new or a different type of pain. You may develop tolerance to the medicine. Tolerance means that you will need a higher dose of the medication for pain relief. Tolerance is normal and is expected if you take this medicine for a long time.  Do not suddenly stop taking your medicine because you may develop a severe reaction. Your body becomes used to the medicine. This does NOT mean you are addicted. Addiction is a behavior related to getting and using a drug for a non-medical reason. If you have pain, you have a medical reason to take pain medicine. Your doctor will tell you how much medicine to take. If your doctor wants you to stop the medicine, the dose will be slowly lowered over time to avoid any side effects.  There are different types of narcotic medicines (opiates). If you take more than one type at the same time or if you are taking another medicine that also causes drowsiness, you may have more side effects. Give your health care provider a list of all medicines you use. Your doctor will tell you how much medicine to take. Do not take more medicine than directed. Call emergency for help if you have problems breathing or unusual sleepiness.  Do not take other medicines that contain acetaminophen with this medicine. Always read labels carefully. If you have questions, ask your doctor or pharmacist.  If you take too much acetaminophen get medical help right away. Too much acetaminophen can be very dangerous and cause liver damage. Even if you do not have symptoms, it is important to get help right away.  You may get drowsy or dizzy. Do not drive, use machinery, or do anything that needs mental alertness until you know how this medicine affects you. Do not stand or sit up quickly, especially if you are an older patient. This  reduces the risk of dizzy or fainting spells. Alcohol may interfere with the effect of this medicine. Avoid alcoholic drinks.  The medicine will cause constipation. Try to have a bowel movement at least every 2 to 3 days. If you do not have a bowel movement for 3 days, call your doctor or health care professional.  Your mouth may get dry. Chewing sugarless gum or sucking hard candy, and drinking plenty or water may help. Contact your doctor if the problem does not go away or is severe.  NOTE:This sheet is a summary. It may not cover all possible information. If you have questions about this medicine, talk to your doctor, pharmacist, or health care provider. Copyright© 2017 [employer]          Apixaban oral tablets  What is this medicine?  APIXABAN (a PIX a ban) is an anticoagulant (blood thinner). It is used to lower the chance of stroke in people with a medical condition called atrial fibrillation. It is also used to treat or prevent blood clots in the lungs or in the veins.  How should I use this medicine?  Take this medicine by mouth with a glass of water. Follow the directions on the prescription label. You can take it with or without food. If it upsets your stomach, take it with food. Take your medicine at regular intervals. Do not take it more often than directed. Do not stop taking except on your doctor's advice. Stopping this medicine may increase your risk of a blot clot. Be sure to refill your prescription before you run out of medicine.  Talk to your pediatrician regarding the use of this medicine in children. Special care may be needed.  What side effects may I notice from receiving this medicine?  Side effects that you should report to your doctor or health care professional as soon as possible:  · allergic reactions like skin rash, itching or hives, swelling of the face, lips, or tongue  · signs and symptoms of bleeding such as bloody or black, tarry stools; red or dark-brown urine; spitting up  blood or brown material that looks like coffee grounds; red spots on the skin; unusual bruising or bleeding from the eye, gums, or nose  What may interact with this medicine?  This medicine may interact with the following:  · aspirin and aspirin-like medicines  · certain medicines for fungal infections like ketoconazole and itraconazole  · certain medicines for seizures like carbamazepine and phenytoin  · certain medicines that treat or prevent blood clots like warfarin, enoxaparin, and dalteparin  · clarithromycin  · NSAIDs, medicines for pain and inflammation, like ibuprofen or naproxen  · rifampin  · ritonavir  · [place]'s wort  What if I miss a dose?  If you miss a dose, take it as soon as you can. If it is almost time for your next dose, take only that dose. Do not take double or extra doses.  Where should I keep my medicine?  Keep out of the reach of children.  Store at room temperature between 20 and 25 degrees C (68 and 77 degrees F). Throw away any unused medicine after the expiration date.  What should I tell my health care provider before I take this medicine?  They need to know if you have any of these conditions:  · bleeding disorders  · bleeding in the brain  · blood in your stools (black or tarry stools) or if you have blood in your vomit  · history of stomach bleeding  · kidney disease  · liver disease  · mechanical heart valve  · an unusual or allergic reaction to apixaban, other medicines, foods, dyes, or preservatives  · pregnant or trying to get pregnant  · breast-feeding  What should I watch for while using this medicine?  Notify your doctor or health care professional and seek emergency treatment if you develop breathing problems; changes in vision; chest pain; severe, sudden headache; pain, swelling, warmth in the leg; trouble speaking; sudden numbness or weakness of the face, arm, or leg. These can be signs that your condition has gotten worse.  If you are going to have surgery, tell your  doctor or health care professional that you are taking this medicine.  Tell your health care professional that you use this medicine before you have a spinal or epidural procedure. Sometimes people who take this medicine have bleeding problems around the spine when they have a spinal or epidural procedure. This bleeding is very rare. If you have a spinal or epidural procedure while on this medicine, call your health care professional immediately if you have back pain, numbness or tingling (especially in your legs and feet), muscle weakness, paralysis, or loss of bladder or bowel control.  Avoid sports and activities that might cause injury while you are using this medicine. Severe falls or injuries can cause unseen bleeding. Be careful when using sharp tools or knives. Consider using an electric razor. Take special care brushing or flossing your teeth. Report any injuries, bruising, or red spots on the skin to your doctor or health care professional.  NOTE:This sheet is a summary. It may not cover all possible information. If you have questions about this medicine, talk to your doctor, pharmacist, or health care provider. Copyright© 2017 [employer]        Celecoxib capsules  What is this medicine?  CELECOXIB (sell a KOX ib) is a non-steroidal anti-inflammatory drug (NSAID). This medicine is used to treat arthritis and ankylosing spondylitis. It may be also used for pain or painful monthly periods.  How should I use this medicine?  Take this medicine by mouth with a full glass of water. Follow the directions on the prescription label. Take it with food if it upsets your stomach or if you take 400 mg at one time. Try to not lie down for at least 10 minutes after you take the medicine. Take the medicine at the same time each day. Do not take more medicine than you are told to take. Long-term, continuous use may increase the risk of heart attack or stroke.  A special MedGuide will be given to you by the pharmacist  with each prescription and refill. Be sure to read this information carefully each time.  Talk to your pediatrician regarding the use of this medicine in children. Special care may be needed.  What side effects may I notice from receiving this medicine?  Side effects that you should report to your doctor or health care professional as soon as possible:  · allergic reactions like skin rash, itching or hives, swelling of the face, lips, or tongue  · black or bloody stools, blood in the urine or vomit  · blurred vision  · breathing problems  · chest pain  · nausea, vomiting  · problems with balance, talking, walking  · redness, blistering, peeling or loosening of the skin, including inside the mouth  · unexplained weight gain or swelling  · unusually weak or tired  · yellowing of eyes, skin  Side effects that usually do not require medical attention (report to your doctor or health care professional if they continue or are bothersome):  · constipation or diarrhea  · dizziness  · gas or heartburn  · upset stomach  What may interact with this medicine?  Do not take this medicine with any of the following medications:  · cidofovir  · methotrexate  · other NSAIDs, medicines for pain and inflammation, like ibuprofen or naproxen  · pemetrexed  This medicine may also interact with the following medications:  · alcohol  · aspirin and aspirin-like drugs  · diuretics  · fluconazole  · lithium  · medicines for high blood pressure  · steroid medicines like prednisone or cortisone  · warfarin  What if I miss a dose?  If you miss a dose, take it as soon as you can. If it is almost time for your next dose, take only that dose. Do not take double or extra doses.  Where should I keep my medicine?  Keep out of the reach of children.  Store at room temperature between 15 and 30 degrees C (59 and 86 degrees F). Keep container tightly closed. Throw away any unused medicine after the expiration date.  What should I tell my health care  provider before I take this medicine?  They need to know if you have any of these conditions:  · asthma  · coronary artery bypass graft (CABG) surgery within the past 2 weeks  · drink more than 3 alcohol-containing drinks a day  · heart disease or circulation problems like heart failure or leg edema (fluid retention)  · high blood pressure  · kidney disease  · liver disease  · stomach bleeding or ulcers  · an unusual or allergic reaction to celecoxib, sulfa drugs, aspirin, other NSAIDs, other medicines, foods, dyes, or preservatives  · pregnant or trying to get pregnant  · breast-feeding  What should I watch for while using this medicine?  Tell your doctor or health care professional if your pain does not get better. Talk to your doctor before taking another medicine for pain. Do not treat yourself.  This medicine does not prevent heart attack or stroke. In fact, this medicine may increase the chance of a heart attack or stroke. The chance may increase with longer use of this medicine and in people who have heart disease. If you take aspirin to prevent heart attack or stroke, talk with your doctor or health care professional.  Do not take medicines such as ibuprofen and naproxen with this medicine. Side effects such as stomach upset, nausea, or ulcers may be more likely to occur. Many medicines available without a prescription should not be taken with this medicine.  This medicine can cause ulcers and bleeding in the stomach and intestines at any time during treatment. Ulcers and bleeding can happen without warning symptoms and can cause death.  NOTE:This sheet is a summary. It may not cover all possible information. If you have questions about this medicine, talk to your doctor, pharmacist, or health care provider. Copyright© 2017 [employer]

## 2020-12-02 NOTE — TRANSFER OF CARE
"Anesthesia Transfer of Care Note    Patient: Brittny Urias    Procedure(s) Performed: Procedure(s) (LRB):  FUSION, FOOT (Left)  RESECTION, MUSCLE, GASTROCNEMIUS (Left)    Patient location: [facility]    Anesthesia Type: MAC and regional    Transport from OR: Transported from OR on 100% O2 by closed face mask with adequate spontaneous ventilation    Post pain: adequate analgesia    Post assessment: no apparent anesthetic complications    Post vital signs: stable    Level of consciousness: awake, alert and oriented    Nausea/Vomiting: no nausea/vomiting    Complications: none    Transfer of care protocol was followed      Last vitals:   Visit Vitals  BP (!) 152/66 (BP Location: Left arm, Patient Position: Lying)   Pulse 69   Temp 36.5 °C (97.7 °F)   Resp 10   Ht 5' 2" (1.575 m)   Wt 104.3 kg (230 lb)   SpO2 97%   Breastfeeding No   BMI 42.07 kg/m²     "

## 2020-12-02 NOTE — H&P
SUBJECTIVE:     Procedure: Gastrocnemius recession left leg with medial double arthrodesis and 1 MTP arthrodesis left foot.    Chief Complaint/Diagnosis: failed conservative care for painful flat foot and bunion deformity left foot.    Facility-Administered Medications Prior to Admission   Medication    sodium chloride 0.9% flush 10 mL     PTA Medications   Medication Sig    betamethasone dipropionate (DIPROLENE) 0.05 % cream APPLY TWICE DAILY TO ALL AFFECTED AREAS.    cholecalciferol, vitamin D3, 1,250 mcg (50,000 unit) Tab Take 1 tablet by mouth every 7 days.    clobetasol (CLOBEX) 0.05 % shampoo USE TOPICALLY 3 TIMES WEEKLY    clobetasoL (TEMOVATE) 0.05 % external solution     losartan-hydrochlorothiazide 50-12.5 mg (HYZAAR) 50-12.5 mg per tablet Take 1 tablet by mouth once daily.    meloxicam (MOBIC) 15 MG tablet TAKE 1 TABLET BY MOUTH EVERY DAY    metFORMIN (GLUCOPHAGE XR) 500 MG XR 24hr tablet Take 1 tablet (500 mg total) by mouth daily with breakfast.    SKYRIZI 150mg/1.66mL(75 mg/0.83 mL x2) subcutaneous injection     venlafaxine (EFFEXOR XR) 75 MG 24 hr capsule Take 1 capsule (75 mg total) by mouth once daily.       Review of patient's allergies indicates:  No Known Allergies    Past Medical History:   Diagnosis Date    Breast cancer, right breast 11/12/2018    Cancer     Hypertension     Psoriasis     on biologic for 1 year, has been controlling it well     Past Surgical History:   Procedure Laterality Date    COLONOSCOPY N/A 10/10/2019    Procedure: COLONOSCOPY;  Surgeon: Matt Marin MD;  Location: [facility] ENDO;  Service: Endoscopy;  Laterality: N/A;    DILATION AND CURETTAGE OF UTERUS      HYSTERECTOMY      partial hyst for prolapse    MASTECTOMY Bilateral 11/14/2018    Procedure: MASTECTOMY;  Surgeon: Marga Cardenas MD;  Location: [facility] OR;  Service: Plastics;  Laterality: Bilateral;    MASTECTOMY WITH SENTINEL NODE BIOPSY AND AXILLARY LYMPH NODE DISSECTION Right 11/14/2018     Procedure: SENTINEL NODE BIOPSY AND AXILLARY LYMPHADENECTOMY;  Surgeon: Marga Cardenas MD;  Location: [facility];  Service: Plastics;  Laterality: Right;    RECONSTRUCTION OF BREAST WITH DEEP INFERIOR EPIGASTRIC ARTERY  (MEGAN) FREE FLAP Bilateral 11/14/2018    Procedure: RECONSTRUCTION, BREAST, USING MEGAN FREE FLAP;  Surgeon: Tye Chapman MD;  Location: [facility];  Service: Plastics;  Laterality: Bilateral;     Family History   Problem Relation Age of Onset    Diabetes Mother     Hypertension Mother     Hypertension Brother     Cancer Paternal Uncle         leukemia    Breast cancer Neg Hx     Ovarian cancer Neg Hx     Colon cancer Neg Hx      Social History     Tobacco Use    Smoking status: Never Smoker    Smokeless tobacco: Never Used   Substance Use Topics    Alcohol use: Yes     Alcohol/week: 3.0 standard drinks     Types: 3 Glasses of wine per week     Frequency: 2-4 times a month     Drinks per session: 1 or 2     Binge frequency: Less than monthly     Comment: social    Drug use: No        Review of Systems:  Constitutional: no fever or chills, pain well controlled  Eyes: no visual changes  ENT: no nasal congestion or sore throat  Respiratory: no cough or shortness of breath  Cardiovascular: no chest pain or palpitations  Gastrointestinal: no nausea or vomiting, tolerating diet  Genitourinary: no hematuria or dysuria  Integument/Breast: no rash or pruritis  Hematologic/Lymphatic: no easy bruising or lymphadenopathy  Musculoskeletal: no arthralgias or myalgias  Neurological: no seizures or tremors  Behavioral/Psych: no auditory or visual hallucinations  Endocrine: no heat or cold intolerance    OBJECTIVE:     Vital Signs (Most Recent)  Temp: 98.1 °F (36.7 °C) (12/02/20 0629)  Pulse: 68 (12/02/20 0629)  Resp: 18 (12/02/20 0629)  BP: (!) 171/88 (12/02/20 0629)  SpO2: 98 % (12/02/20 0629)    Physical Exam:  General: well developed, well nourished, no distress, moderately obese  Extremities:  warm, well perfused and no cyanosis or edema, or clubbing  Pulses: 2+ and symmetric  Skin: Skin color, texture, turgor normal. No rashes or lesions  Neurologic: Alert and oriented. Thought content appropriate  No focal numbness or weakness     Patient has overall flatfoot bilateral.  The left foot is much more severe than the right.  The right foot has no significant HAV deformity or digital contractures.  She does have some equinus to the right lower extremity with knee extended she gets -8 degrees and with knee flexed she reduces to about 10° of dorsiflexion.    Left foot:  She has significant equinus to the left lower extremity.  She reaches - 15° dorsiflexion with knee extended and reaches -5 degrees of dorsiflexion with knee flexed.  She has collapse along the medial column as extensive pronation.  She has a severe track bound HAV deformity with large medial eminence.  The 1st ray is flexible and does reduce when the hallux is manipulated.  The bunion deformity is tracking and track bound.  She has hallux limitus with roughly 10° of dorsiflexion of the hallux and slight developing reducible digital contractures to the lesser toes.  She has tenderness to palpation along the posterior tibial tendon and at the insertion of the plantar fascia.  She is able to do single limb heel raise with severe pain.  During heel raise her left heel does not invert.  She has significant calcaneal eversion upon stance with too many toe sign.

## 2020-12-02 NOTE — H&P
11/24/2020  Brittny Urias is a 50 y.o., female scheduled for left foot fusion and left gastrocnemius resection on 12/2/2020.    Past Medical History:   Diagnosis Date    Breast cancer, right breast 11/12/2018    Cancer     Hypertension     Psoriasis     on biologic for 1 year, has been controlling it well     Past Surgical History:   Procedure Laterality Date    COLONOSCOPY N/A 10/10/2019    Procedure: COLONOSCOPY;  Surgeon: Matt Marin MD;  Location: [facility];  Service: Endoscopy;  Laterality: N/A;    DILATION AND CURETTAGE OF UTERUS      HYSTERECTOMY      partial hyst for prolapse    MASTECTOMY Bilateral 11/14/2018    Procedure: MASTECTOMY;  Surgeon: Marga Cardenas MD;  Location: [facility];  Service: Plastics;  Laterality: Bilateral;    MASTECTOMY WITH SENTINEL NODE BIOPSY AND AXILLARY LYMPH NODE DISSECTION Right 11/14/2018    Procedure: SENTINEL NODE BIOPSY AND AXILLARY LYMPHADENECTOMY;  Surgeon: Marga Cardenas MD;  Location: [facility];  Service: Plastics;  Laterality: Right;    RECONSTRUCTION OF BREAST WITH DEEP INFERIOR EPIGASTRIC ARTERY  (MEGAN) FREE FLAP Bilateral 11/14/2018    Procedure: RECONSTRUCTION, BREAST, USING MEGAN FREE FLAP;  Surgeon: Tye Chapman MD;  Location: [facility];  Service: Plastics;  Laterality: Bilateral;       Anesthesia Evaluation    I have reviewed the Patient Summary Reports.    I have reviewed the Nursing Notes.    I have reviewed the Medications.     Review of Systems  Anesthesia Hx:  No problems with previous Anesthesia  Denies Family Hx of Anesthesia complications.    Social:  Non-Smoker    Hematology/Oncology:  Hematology Normal      Current/Recent Cancer. Breast right axillary node dissection no lymphedema surgery and radiation   EENT/Dental:EENT/Dental Normal   Cardiovascular:   Exercise tolerance: good Hypertension, poorly controlled     Pulmonary:  Pulmonary Normal    Renal/:  Renal/ Normal     Hepatic/GI:  Hepatic/GI Normal    Musculoskeletal:  Musculoskeletal Normal    Neurological:  Neurology Normal    Endocrine:  Endocrine Normal    Dermatological:  Skin Normal psoriasis   Psych:  Psychiatric Normal           Physical Exam  General:  Morbid Obesity    Airway/Jaw/Neck:  Airway Findings: Mouth Opening: Normal Tongue: Normal  General Airway Assessment: Adult  Mallampati: II  TM Distance: Normal, at least 6 cm  Jaw/Neck Findings:     Neck ROM: Normal ROM      Dental:  Dental Findings: In tact   Chest/Lungs:  Chest/Lungs Findings: Clear to auscultation, Normal Respiratory Rate     Heart/Vascular:  Heart Findings: Rate: Normal  Rhythm: Regular Rhythm  Sounds: Normal        Mental Status:  Mental Status Findings:  Cooperative, Alert and Oriented         Anesthesia Plan  Proceed with surgery

## 2020-12-02 NOTE — ANESTHESIA PROCEDURE NOTES
Peripheral Block    Patient location during procedure: pre-op   Block not for primary anesthetic.  Reason for block: at surgeon's request and post-op pain management   Post-op Pain Location: L Pop/Saph  Start time: 12/2/2020 6:48 AM  Timeout: 12/2/2020 6:47 AM   End time: 12/2/2020 6:52 AM    Staffing  Authorizing Provider: Marc Magallanes MD  Performing Provider: Milton Shepherd MD    Preanesthetic Checklist  Completed: patient identified, site marked, surgical consent, pre-op evaluation, timeout performed, IV checked, risks and benefits discussed and monitors and equipment checked  Peripheral Block  Patient position: supine  Prep: ChloraPrep  Patient monitoring: heart rate, continuous pulse ox and frequent blood pressure checks  Block type: popliteal and saphenous  Laterality: left  Injection technique: single shot  Needle  Needle type: Stimuplex   Needle gauge: 21 G  Needle length: 4 in  Needle localization: anatomical landmarks and ultrasound guidance     Assessment  Injection assessment: negative aspiration and local visualized surrounding nerve  Paresthesia pain: none  Heart rate change: no  Slow fractionated injection: yes

## 2020-12-02 NOTE — BRIEF OP NOTE
[facility]  Brief Operative Note    Surgery Date: 12/2/2020     Surgeon(s) and Role:     * Uriah Veras DPM - Primary    Assisting Surgeon: None    Pre-op Diagnosis:  Acquired pronation deformity of foot, left [M21.6X2]  Posterior tibial tendon dysfunction (PTTD) of left lower extremity [M76.822]  Equinus contracture of left ankle [M24.572]  Hallux valgus with bunions, left [M20.12, M21.612]    Post-op Diagnosis:  Post-Op Diagnosis Codes:     * Acquired pronation deformity of foot, left [M21.6X2]     * Posterior tibial tendon dysfunction (PTTD) of left lower extremity [M76.822]     * Equinus contracture of left ankle [M24.572]     * Hallux valgus with bunions, left [M20.12, M21.612]    Procedure(s) (LRB):  Arthrodesis of the subtalar joint and talonavicular joints (Left) foot  Arthrodesis first metatarsal phalangeal joint left foot  RESECTION, MUSCLE, GASTROCNEMIUS (Left)    Anesthesia: Regional    Description of the findings of the procedure(s): moderate amount of synovial tissue and fluid within the STJ and TNJ. Hypertrophied PT tendon at attachment. Moderate erosion of the articular cartilage 1 MTP left foot.    Estimated Blood Loss: 75 mL         Specimens:   Specimen (12h ago, onward)    None            Discharge Note    OUTCOME: Patient tolerated treatment/procedure well without complication and is now ready for discharge.    DISPOSITION: Home or Self Care    FINAL DIAGNOSIS:  Posterior tibial tendon dysfunction (PTTD) of left lower extremity    FOLLOWUP: In clinic    DISCHARGE INSTRUCTIONS:    Discharge Procedure Orders   Diet general     Keep surgical extremity elevated   Order Comments: Above heart level     Ice to affected area   Order Comments: Applied behind left knee for 20-30 minute intervals throughout the day x 3-5 days as needed     Call MD for:  temperature >100.4     Call MD for:  persistent nausea and vomiting     Call MD for:  severe uncontrolled pain     Call MD for:   difficulty breathing, headache or visual disturbances     Leave dressing on - Keep it clean, dry, and intact until clinic visit     Weight bearing restrictions (specify)   Order Comments: Non-weightbearing left foot, please keep orthopedic boot on at all times

## 2020-12-03 NOTE — OP NOTE
[facility]  Operative Note      Date of Procedure: 12/2/2020     Procedure: Procedure(s) (LRB):  Arthrodesis of the subtalar joint and talonavicular joints (Left) foot  Arthrodesis first metatarsal phalangeal joint left foot  RESECTION, MUSCLE, GASTROCNEMIUS (Left)    Surgeon(s) and Role:     * Uriah Veras DPM - Primary    Assisting Surgeon: Thu Johnson DPM PGY2    Pre-Operative Diagnosis: Acquired pronation deformity of foot, left [M21.6X2]  Posterior tibial tendon dysfunction (PTTD) of left lower extremity [M76.822]  Equinus contracture of left ankle [M24.572]  Hallux valgus with bunions, left [M20.12, M21.612]    Post-Operative Diagnosis: Post-Op Diagnosis Codes:     * Acquired pronation deformity of foot, left [M21.6X2]     * Posterior tibial tendon dysfunction (PTTD) of left lower extremity [M76.822]     * Equinus contracture of left ankle [M24.572]     * Hallux valgus with bunions, left [M20.12, M21.612]    Anesthesia: Regional    Procedure in detail:   The patient was seen in the preop area. The risks, benefits, complications, treatment options, and expected outcomes were discussed with the patient. The risks and potential complications of their problem and purposed treatment include but are not limited to infection, nerve injury, vascular injury, nonunion, persistent pain, potential skin necrosis, deep vein thrombosis, possible pulmonary embolus, complications of the anesthetics and failure of the implant. The patient concurred with the proposed plan, giving informed consent.  The site of surgery properly noted/marked. Patient received a left lower extremity popliteal saphenous regional block preoperatively per anesthesia team.      The patient was brought to the operating room on a stretcher and transferred onto the operating table in a supine position. Following the successful induction of MAC anesthesia, a well padded pneumatic tourniquet was placed on the left thigh. The left  foot and lower extremity was prepped and draped in a sterile manner. A timeout was performed verifying the patient's identity, surgical site, allergies, and medications. All were in agreement. An esmarch was used to exsanguinate the left lower extremity. Tourniquet was inflated to 300mmHg.    Attention was directed to the left posterior leg, where a endoscopic gastrocnemius recession was performed. A small incision was created medially between the distal end of the gastroc muscle belly and achilles tendon, as a medial endoscopic portal. Using a #15 blade, the incision was deepened to the superficial subcutaneous layer. The incision was then deepened using blunt dissection to the level of the gastrocnemius aponeurosis. Using Arthrex technique, the scope was then inserted into the medial portal and the gastrocnemius fascia was identified. This was further verified by dorsiflexing the foot up and down. The hook blade was then advanced from lateral to medial engaging the edge of the gastrocnemius aponeurosis. The fibers of the muscle belly were then visualized beneath the gastrocnemius.  No neurovascular structures were encountered or damaged. A #15 blade was used to resect remaining intact superficial gastrocnemius fibers along the entire width of the muscle from medial to lateral and adequate dorsiflexion was noted. The surgical site was then irrigated with copious amounts of sterile normal saline and closed with 4-0 nylon.    Attention was then directed to the left foot in which a #15 blade was used to create a medial curvilinear incision beginning just posterior to the medial malleolus and extending to the medial cuneiform. Blunt dissection was performed down to the level of posterior tibial tendon while protecting all neurovascular and tendinous structures. Small vessels were cauterized and ligated as necessary. The sheath of the posterior tibial tendon was incised and the posterior tibial tendon was noted to be  thickened with significant fatty infiltrate. The posterior tibial tendon was resected just proximal to its insertion and retracted from the surgical field. The floor of the tendon sheath was incised exposing the sustentaculum peña and flexor digitorum longus was retracted inferiorly followed by subperiosteal dissection to expose the subtalar joint. The talonavicular joint was identified and the capsular attachments freed. The talar head was significantly subluxed. The dissection was carried posteriorly and the subtalar joint was identified. The anterior and middle facets of the subtalar joint were identified and the soft tissue attachments were incised opening the joint.  A Dunn elevator was inserted and used to further free the soft tissue attachments to this subtalar joint. Lamina  was inserted into the subtalar joint to distract the joint. Moderate amount of thickened synovial tissue and fluid within the subtalar joint was appreciated. Cartilage was denuded from the anterior, middle, and posterior facets down to subchondral bone using a combination of curettes and rongeurs for joint preparation.  Attention was directed to the  talonavicular joint, 2 wires were placed dorsally into the talus and the navicular and a pin distractor was used to distract the joint.  Moderate amount of thickened synovial tissue and fluid was appreciated.  The joint was then prepped using a combination of curette, rongeur and kurtis down to subchondral bone.  Both joints were thoroughly flushed with normal saline and any loose cartilage was excised.  A 2.0 mm drill bit was then used to fenestrate both the talonavicular joint and the subtalar joint. With the STJ reduced into a neutral position and the TNJ realigned and temporarily fixated,  a large positioning pin was applied within the posterior-inferior aspect of the calcaneus and driven across the subtalar joint through the posterior facet. The pin was noted to be in an  acceptable position confirmed with intraop fluoroscopy, reducing the STJ adequately.  A small stab incision at the posterior inferior calcaneus was made with a #15 blade.  A large fully threaded cannulated screw was placed percutaneously along this pin, proper position was visualized under fluroscopy and a 6.5 headless cannulated screw was delivered from inferior to superior to fixate the STJ fusion. Another 6.5 headless cannulated screw was placed parallel to the first in the same manner. Next, attention was redirected to the talonavicular joint. Temporary fixation was removed and the talonavicular joint was permanently fixated with an Arthrex 22 mm 4 hole nitinol compression plate with 3.0 locking screws based on 's technique. The fixation at the talonavicular joint was augmented with an Arthrex 25x20 staple based on 's instructions. Adequate compression/apposition of bone at the TNJ and STJ as well as proper placement of hardware was confirmed under intraop fluoroscopy.      Next, deltoid ligament and deep fascia was then repaired and the posterior tibial tendon was reattached to the navicular tuberosity with 2-0 Vicryl. Deep subcutaneous tissue layers were closed with 3-0, 4-0 Vicryl and skin was reapproximated with staples.      Attention was directed to the left foot in which a #15 blade was used to make a longitudinal skin incision over the 1st MTPJ medial to the extensor hallucis longus tendon extending to the midshaft of the 1st metatarsal. The incision was deepened down to the level of subcutaneous tissue. Care was taken to avoid damage to any neurovascular and tendon structures, which were reflected or cauterized as necessary. The incision was deepened down to the level of capsule and periosteum, which was incised longitudinally. Subperiosteal dissection was performed to expose the head of the 1st metatarsal and base of the proximal phalanx and a Mcglamry elevator was used  to free all soft tissue attachments on the plantar and medial/lateral aspect of the 1st metatarsal head. Moderate erosion of the articular cartilage of the 1st MTP joint was appreciated.  A guidewire was driven through the central aspect of the 1st metatarsal head for placement of the reamer. The reamer was used to denude all the cartilage from the surface of the head of the metatarsal down to subchondral bone. The guidewire was then removed and placed through the central aspect of the base of the proximal phalanx and a smaller reamer was used to denude the cartilage from the base of the proximal phalanx. Once adequate cartilage was removed, all surrounding periarticular spurring of bone and soft tissue around the 1st MTP joint was excised using a rongeur. The .062 k-wire was used to fenestrate the base of the proximal phalanx and 1st metatarsal head. The surgical site was copiously irrigated. Next, a 0.062  kwire was driven through the medial-plantar aspect of the proximal phalanx and the hallux was positioned into the proper position of slight (5 degrees) of valgus and 10 degrees of dorsiflexion. Once this was obtained, the Kwire was advanced through the arthrodesis site for temporary fixation. Proper position and length of Kwire was confirmed with intraop fluoroscopy. A 3.0mm lag screw was placed across the fusion site and proper apposition was obtained and confirmed with fluroscopy. Next, an Arthrex dorsal locking 1st MTP plate was then applied and temporarily secured. Permanent fixation was then completed by initially securing the locking screws into the distal phalanx.  Compression across the arthrodesis site was then completed through the plate and then a 3.0 cortical screw was placed in the proximal slot and drilled eccentrically to secure the plate against the 1st metatarsal further compressing the arthrodesis site.  Two more locking screws utilized to secure the construct according to the 's  recommendations.  Final fluoroscopic views were then taken ensuring that there was adequate reduction of the deformity as well as good compression across the arthrodesis site.     Deep capsular and subcutaneous tissue layers were reapproximated with 3-0, 4-0 vicryl. Skin was reapproximated using staples.      The patient was transferred to the recovery room with vital signs stable. Following a period of post op monitoring, the patient was discharged home with the following post op instructions:   1. Keep dressing dry and intact until Podiatry follow up.   2.Weight bearing status: nonweightbearing left foot  3. All necessary prescriptions ordered and medical management will continue.   4. Contact Podiatry for all post op follow up care and if any problems arise.         Description of the Findings of the Procedure: moderate amount of synovial tissue and fluid within the STJ and TNJ. Hypertrophied PT tendon at attachment. Moderate erosion of the articular cartilage 1 MTP left foot.    Complications: No    Estimated Blood Loss (EBL): 75 mL           Implants:   Implant Name Type Inv. Item Serial No.  Lot No. LRB No. Used Action   GUIDEWIRE TROCAR 0.094 X 8 - [id number]  GUIDEWIRE TROCAR 0.094 X 8  AR[employer]X  Left 2 Implanted and Explanted   Guide Wire    [employer]  Left 3 Implanted and Explanted   DynaNite Compression Plate 4 Hole Square, 22mm    ARTH[employer]X [id number] Left 1 Implanted   4.3/6.5mm Compression PT Screws      Left 1 Implanted   4.3/6.5MM Compression PT Screw    [employer]X  Left 1 Implanted   WIRE K SMALL BALL BB-MENA - [id number]  WIRE K SMALL BALL BB-MENA  [employer]  Left 2 Implanted and Explanted   DynaNite SuperMX Staple with Insturmenttation, 25Wx 20L    AR[employer]X [id number] Left 1 Implanted   3.0MM Set  AR Screw    AR[employer]X  Left 2 Implanted   3.0 mm LALIT SCREW, TITANIUM     AR[employer]X  Left 1 Implanted   3.0 Lalit screw, Titanium    AR[employer]X  Left 2 Implanted   MaxForce MTP Compression Plate 3.0mm Disposables     ARTHREX  Left 2 Implanted and Explanted   GUIDEWIRE TROCAR TIP.062 - [id number]  GUIDEWIRE TROCAR TIP.062  [employer]X  Left 1 Implanted and Explanted   Metatarsal Reamers    [employer]X  Left 1 Implanted   Metatarsal Reamer    AR[employer]X  Left 1 Implanted   Guidewire w/ Doublr Trocar Tip, .045 in       Left 1 Implanted and Explanted   3.0 MM LALIT Screw, Titanium    AR[employer]X  Left 1 Implanted   3.0mm LALIT Screw, Titanium    [employer]X  Left 1 Implanted   3.0mm LALIT Screw, titanium    AR[employer]X  Left 1 Implanted   MaxForce MTP Compression Plate    AR[employer]X  Left 1 Implanted   3 mm Flat Head Screw, Cortical    ARTH[employer]X  Left 1 Implanted   3.5 Mini Compression FT Screw    [employer]X  Left 1 Implanted   GUIDEWIRE DE TROCAR .045IN - [id number]  GUIDEWIRE DE TROCAR .045IN  [employer]X  Left 1 Implanted and Explanted       Specimens:   Specimen (12h ago, onward)    None                  Condition: Good    Disposition: PACU - hemodynamically stable.    Attestation: I was present and scrubbed for the entire procedure.    I have personally taken the history and examined this patient and agree with the resident's note as stated as above.   Uriah Veras DPM, FACFAS

## 2020-12-03 NOTE — ANESTHESIA POSTPROCEDURE EVALUATION
Anesthesia Post Evaluation    Patient: Brittny Urias    Procedure(s) Performed: Procedure(s) (LRB):  FUSION, FOOT (Left)  RESECTION, MUSCLE, GASTROCNEMIUS (Left)    Final Anesthesia Type: general    Patient location during evaluation: PACU  Patient participation: Yes- Able to Participate  Level of consciousness: awake and alert, oriented and awake  Post-procedure vital signs: reviewed and stable  Pain management: adequate  Airway patency: patent    PONV status at discharge: No PONV  Anesthetic complications: no      Cardiovascular status: blood pressure returned to baseline  Respiratory status: unassisted and room air  Hydration status: euvolemic  Follow-up not needed.          Vitals Value Taken Time   /78 12/02/20 1415   Temp 36.7 °C (98 °F) 12/02/20 1415   Pulse 81 12/02/20 1415   Resp 18 12/02/20 1415   SpO2 96 % 12/02/20 1415         No case tracking events are documented in the log.      Pain/Deep Score: Pain Rating Prior to Med Admin: 5 (12/2/2020  1:34 PM)  Pain Rating Post Med Admin: 0 (12/2/2020  6:54 AM)  Deep Score: 10 (12/2/2020  2:15 PM)

## 2020-12-09 ENCOUNTER — PATIENT MESSAGE (OUTPATIENT)
Dept: FAMILY MEDICINE | Facility: CLINIC | Age: 50
End: 2020-12-09

## 2020-12-10 ENCOUNTER — OFFICE VISIT (OUTPATIENT)
Dept: PODIATRY | Facility: CLINIC | Age: 50
End: 2020-12-10
Payer: COMMERCIAL

## 2020-12-10 VITALS — WEIGHT: 229.94 LBS | HEIGHT: 62 IN | BODY MASS INDEX: 42.31 KG/M2

## 2020-12-10 DIAGNOSIS — Z98.890 POSTOPERATIVE STATE: Primary | ICD-10-CM

## 2020-12-10 PROCEDURE — 3008F BODY MASS INDEX DOCD: CPT | Mod: CPTII,S$GLB,, | Performed by: PODIATRIST

## 2020-12-10 PROCEDURE — 1126F AMNT PAIN NOTED NONE PRSNT: CPT | Mod: S$GLB,,, | Performed by: PODIATRIST

## 2020-12-10 PROCEDURE — 1126F PR PAIN SEVERITY QUANTIFIED, NO PAIN PRESENT: ICD-10-PCS | Mod: S$GLB,,, | Performed by: PODIATRIST

## 2020-12-10 PROCEDURE — 99024 PR POST-OP FOLLOW-UP VISIT: ICD-10-PCS | Mod: S$GLB,,, | Performed by: PODIATRIST

## 2020-12-10 PROCEDURE — 3008F PR BODY MASS INDEX (BMI) DOCUMENTED: ICD-10-PCS | Mod: CPTII,S$GLB,, | Performed by: PODIATRIST

## 2020-12-10 PROCEDURE — 99024 POSTOP FOLLOW-UP VISIT: CPT | Mod: S$GLB,,, | Performed by: PODIATRIST

## 2020-12-10 PROCEDURE — 99999 PR PBB SHADOW E&M-EST. PATIENT-LVL III: CPT | Mod: PBBFAC,,, | Performed by: PODIATRIST

## 2020-12-10 PROCEDURE — 99999 PR PBB SHADOW E&M-EST. PATIENT-LVL III: ICD-10-PCS | Mod: PBBFAC,,, | Performed by: PODIATRIST

## 2020-12-10 NOTE — PATIENT INSTRUCTIONS
You may remove the orthopedic boot at rest once or twice a day to perform light range of motion exercises consisting of moving the foot up and down, not side to side.  Please stay off the foot and wear the boot at rest.  Keep the foot elevated when at rest.

## 2020-12-10 NOTE — PROGRESS NOTES
"Subjective:      Patient ID: Brittny Urias is a 50 y.o. female.    Chief Complaint: Post-op Evaluation (1 wk)    Patient presents with left bunion and ankle pain. She reports over a year history of left bunion pain. She had seen Dr. Gupta for surgical evaluation but already had several procedures arranged for unrelated pathologies. She returns today for re-evaluation of the bunion. For the bunion, she has had improvement in the bunion pain after she modified her shoe gear; however, over the last 2 months, she has had worsening ankle and heel pain. She denies any traumatic event but does admit to walking 3-4 miles everyday.     10/01/2020:  Follow-up from wearing it left ankle brace and MRI to left ankle.  She relates that she has been feeling better overall with wearing the brace and taking the anti-inflammatory.  She does note some occasional pain or pressure on top of the foot.    10/22/2020:  Presents today accompanied by her daughter for surgical consultation in order to proceed with the previously discussed surgery for the left foot.  No new complaints.    12/10/2020: Patient presents to clinic 1 week s/p left foot medial double arthrodesis with Lapiplasty, and open gastrocnemius resection. DOS:12/2/2020  Patient denies any pedal or calf pain. She has been complaint with nonweightbearing in tall orthopedic boot with knee scooter and crutches. Dressings to left foot clean dry intact. She is only taking celebrex and Eliquis at this point. No other complaints.       Vitals:    12/10/20 0927   Weight: 104.3 kg (229 lb 15 oz)   Height: 5' 2" (1.575 m)   PainSc: 0-No pain      Past Medical History:   Diagnosis Date    Breast cancer, right breast 11/12/2018    Cancer     Hypertension     Psoriasis     on biologic for 1 year, has been controlling it well       Past Surgical History:   Procedure Laterality Date    COLONOSCOPY N/A 10/10/2019    Procedure: COLONOSCOPY;  Surgeon: Matt Marin MD;  " Location: [facility] ENDO;  Service: Endoscopy;  Laterality: N/A;    DILATION AND CURETTAGE OF UTERUS      FOOT ARTHRODESIS Left 12/2/2020    Procedure: FUSION, FOOT;  Surgeon: Uriah Veras DPM;  Location: [facility] OR;  Service: Podiatry;  Laterality: Left;  mini c-arm, Arthrex screws and staples, 1 MTP arthrodesis plates  Libby notified 11/23, EF  Libby w/ Arthrex confirmed 12/1/2020 [place]    HYSTERECTOMY      partial hyst for prolapse    MASTECTOMY Bilateral 11/14/2018    Procedure: MASTECTOMY;  Surgeon: Marga Cardenas MD;  Location: [facility];  Service: Plastics;  Laterality: Bilateral;    MASTECTOMY WITH SENTINEL NODE BIOPSY AND AXILLARY LYMPH NODE DISSECTION Right 11/14/2018    Procedure: SENTINEL NODE BIOPSY AND AXILLARY LYMPHADENECTOMY;  Surgeon: Marga Cardenas MD;  Location: [facility];  Service: Plastics;  Laterality: Right;    RECONSTRUCTION OF BREAST WITH DEEP INFERIOR EPIGASTRIC ARTERY  (MEGAN) FREE FLAP Bilateral 11/14/2018    Procedure: RECONSTRUCTION, BREAST, USING MEGAN FREE FLAP;  Surgeon: Tye Chapman MD;  Location: [facility];  Service: Plastics;  Laterality: Bilateral;    RESECTION OF GASTROCNEMIUS MUSCLE Left 12/2/2020    Procedure: RESECTION, MUSCLE, GASTROCNEMIUS;  Surgeon: Uriah Veras DPM;  Location: [facility];  Service: Podiatry;  Laterality: Left;  video  Kj w/ video confirmed 12/1/2020       Family History   Problem Relation Age of Onset    Diabetes Mother     Hypertension Mother     Hypertension Brother     Cancer Paternal Uncle         leukemia    Breast cancer Neg Hx     Ovarian cancer Neg Hx     Colon cancer Neg Hx        Social History     Socioeconomic History    Marital status:      Spouse name: Not on file    Number of children: Not on file    Years of education: Not on file    Highest education level: Not on file   Occupational History    Not on file   Social Needs    Financial resource strain: Not hard at all    Food insecurity     Worry:  Never true     Inability: Never true    Transportation needs     Medical: No     Non-medical: No   Tobacco Use    Smoking status: Never Smoker    Smokeless tobacco: Never Used   Substance and Sexual Activity    Alcohol use: Yes     Alcohol/week: 3.0 standard drinks     Types: 3 Glasses of wine per week     Frequency: 2-4 times a month     Drinks per session: 1 or 2     Binge frequency: Less than monthly     Comment: social    Drug use: No    Sexual activity: Yes     Partners: Male     Birth control/protection: Surgical   Lifestyle    Physical activity     Days per week: 5 days     Minutes per session: 30 min    Stress: Not at all   Relationships    Social connections     Talks on phone: More than three times a week     Gets together: Once a week     Attends Buddhist service: Not on file     Active member of club or organization: Yes     Attends meetings of clubs or organizations: More than 4 times per year     Relationship status:    Other Topics Concern    Not on file   Social History Narrative    Not on file       Current Outpatient Medications   Medication Sig Dispense Refill    apixaban (ELIQUIS) 2.5 mg Tab Take 1 tablet (2.5 mg total) by mouth 2 (two) times daily. 60 tablet 0    betamethasone dipropionate (DIPROLENE) 0.05 % cream APPLY TWICE DAILY TO ALL AFFECTED AREAS.  2    celecoxib (CELEBREX) 200 MG capsule Take 1 capsule (200 mg total) by mouth 2 (two) times daily. 30 capsule 0    cholecalciferol, vitamin D3, 1,250 mcg (50,000 unit) Tab Take 1 tablet by mouth every 7 days. 12 tablet 1    clobetasol (CLOBEX) 0.05 % shampoo USE TOPICALLY 3 TIMES WEEKLY  3    clobetasoL (TEMOVATE) 0.05 % external solution       losartan-hydrochlorothiazide 50-12.5 mg (HYZAAR) 50-12.5 mg per tablet Take 1 tablet by mouth once daily. 90 tablet 3    metFORMIN (GLUCOPHAGE XR) 500 MG XR 24hr tablet Take 1 tablet (500 mg total) by mouth daily with breakfast. 30 tablet 11    SKYRIZI 150mg/1.66mL(75  mg/0.83 mL x2) subcutaneous injection       venlafaxine (EFFEXOR XR) 75 MG 24 hr capsule Take 1 capsule (75 mg total) by mouth once daily. 30 capsule 2    oxyCODONE-acetaminophen (PERCOCET)  mg per tablet Take 1 tablet by mouth every 4 (four) hours as needed. (Patient not taking: Reported on 12/10/2020) 30 tablet 0     Current Facility-Administered Medications   Medication Dose Route Frequency Provider Last Rate Last Dose    sodium chloride 0.9% flush 10 mL  10 mL Intravenous PRN Uriah Veras DPM         Facility-Administered Medications Ordered in Other Visits   Medication Dose Route Frequency Provider Last Rate Last Dose    EPINEPHrine 1,000 mcg in lactated Ringers 1,000 mL irrigation   Irrigation On Call Procedure Tye Chapman MD        EPINEPHrine 1,000 mcg in lactated Ringers 1,000 mL irrigation   Irrigation On Call Procedure Tye Chapman MD           Review of patient's allergies indicates:  No Known Allergies      Review of Systems   Constitution: Negative for chills, decreased appetite, fever and night sweats.   HENT: Negative for congestion and hearing loss.    Eyes: Negative for vision loss in left eye and vision loss in right eye.   Cardiovascular: Negative for chest pain, claudication, cyanosis, irregular heartbeat and leg swelling.   Respiratory: Negative for cough and shortness of breath.    Skin: Negative for color change, dry skin, itching, nail changes, poor wound healing, rash, suspicious lesions and unusual hair distribution.   Musculoskeletal: Positive for joint pain and joint swelling. Negative for arthritis, back pain, falls, gout and muscle weakness.        Left foot pain   Gastrointestinal: Negative for nausea and vomiting.   Neurological: Negative for dizziness, headaches, loss of balance, numbness and weakness.   Psychiatric/Behavioral: Negative for altered mental status. The patient is not nervous/anxious.            Objective:      Physical  Exam  Constitutional:       General: She is not in acute distress.     Appearance: She is not ill-appearing.   Cardiovascular:      Pulses:           Popliteal pulses are 2+ on the right side and 2+ on the left side.        Dorsalis pedis pulses are 2+ on the right side and 2+ on the left side.      Comments: CFT brisk < 3s  Localized postsurgical edema surrounding incision sites along the medial border of the left foot.     Musculoskeletal:      Comments: No pain on palpation to surgical sites left foot.  Rectus appearance of the left foot.  No available subtalar joint range of motion.  Minimal motion noted to the midtarsal joint left foot.    Skin:     Comments: Incision sites to the left medial leg, left medial hindfoot/midfoot/forefoot are clean, dry, intact, well-approximated with staples with no drainage, no erythema. No signs of wound dehiscence. Post operative edema localized to incision sites.  There is a mild ecchymosis localized to the left medial leg surrorunding the incision site.  No palpable fluctuance or crepitance.  No signs of hematoma formation.      Neurological:      Mental Status: She is alert.      Comments: Light touch intact                 Assessment:       Encounter Diagnosis   Name Primary?    Postoperative state Yes         Plan:       Brittny was seen today for post-op evaluation.    Diagnoses and all orders for this visit:    Postoperative state      I counseled the patient on her conditions, their implications and medical management.    Modifed Dickinson compression dressing to left lower extremity removed.  Left lower extremity cleansed with Hibiclens scrub.  Applied Mepilex transfer to incision sites followed by Tubigrip F x1.  Reapplied tall orthopedic boot to left foot     Continue elevation at rest.     Nonweightbearing left foot. Okay to remove tall boot once or twice a day to do passive dorsiflexion/plantarflexion range of motion exercises. Avoid inversion/eversion motion.      Instructed patient to start tapering off Celebrex and take prn.      RTC 2 weeks for staple removal or prn sooner     Seen and assisted by resident Thu Johnson PGY2    I have personally taken the history and examined this patient and agree with the resident's note as stated as above.   Uriah Veras DPM, FACFAS

## 2020-12-24 ENCOUNTER — OFFICE VISIT (OUTPATIENT)
Dept: PODIATRY | Facility: CLINIC | Age: 50
End: 2020-12-24
Payer: COMMERCIAL

## 2020-12-24 VITALS
HEART RATE: 74 BPM | HEIGHT: 62 IN | BODY MASS INDEX: 42.31 KG/M2 | DIASTOLIC BLOOD PRESSURE: 91 MMHG | SYSTOLIC BLOOD PRESSURE: 141 MMHG | WEIGHT: 229.94 LBS

## 2020-12-24 DIAGNOSIS — Z98.890 POSTOPERATIVE STATE: Primary | ICD-10-CM

## 2020-12-24 PROCEDURE — 1126F AMNT PAIN NOTED NONE PRSNT: CPT | Mod: S$GLB,,, | Performed by: PODIATRIST

## 2020-12-24 PROCEDURE — 99024 PR POST-OP FOLLOW-UP VISIT: ICD-10-PCS | Mod: S$GLB,,, | Performed by: PODIATRIST

## 2020-12-24 PROCEDURE — 3008F PR BODY MASS INDEX (BMI) DOCUMENTED: ICD-10-PCS | Mod: CPTII,S$GLB,, | Performed by: PODIATRIST

## 2020-12-24 PROCEDURE — 99024 POSTOP FOLLOW-UP VISIT: CPT | Mod: S$GLB,,, | Performed by: PODIATRIST

## 2020-12-24 PROCEDURE — 1126F PR PAIN SEVERITY QUANTIFIED, NO PAIN PRESENT: ICD-10-PCS | Mod: S$GLB,,, | Performed by: PODIATRIST

## 2020-12-24 PROCEDURE — 99999 PR PBB SHADOW E&M-EST. PATIENT-LVL III: ICD-10-PCS | Mod: PBBFAC,,, | Performed by: PODIATRIST

## 2020-12-24 PROCEDURE — 99999 PR PBB SHADOW E&M-EST. PATIENT-LVL III: CPT | Mod: PBBFAC,,, | Performed by: PODIATRIST

## 2020-12-24 PROCEDURE — 3008F BODY MASS INDEX DOCD: CPT | Mod: CPTII,S$GLB,, | Performed by: PODIATRIST

## 2020-12-24 NOTE — PROGRESS NOTES
"Subjective:      Patient ID: Brittny Urias is a 50 y.o. female.    Chief Complaint: Post-op Evaluation (3 wk)    Patient presents with left bunion and ankle pain. She reports over a year history of left bunion pain. She had seen Dr. Gupta for surgical evaluation but already had several procedures arranged for unrelated pathologies. She returns today for re-evaluation of the bunion. For the bunion, she has had improvement in the bunion pain after she modified her shoe gear; however, over the last 2 months, she has had worsening ankle and heel pain. She denies any traumatic event but does admit to walking 3-4 miles everyday.     10/01/2020:  Follow-up from wearing it left ankle brace and MRI to left ankle.  She relates that she has been feeling better overall with wearing the brace and taking the anti-inflammatory.  She does note some occasional pain or pressure on top of the foot.    10/22/2020:  Presents today accompanied by her daughter for surgical consultation in order to proceed with the previously discussed surgery for the left foot.  No new complaints.    12/10/2020: Patient presents to clinic 1 week s/p left foot medial double arthrodesis with Lapiplasty, and open gastrocnemius resection. DOS:12/2/2020  Patient denies any pedal or calf pain. She has been complaint with nonweightbearing in tall orthopedic boot with knee scooter and crutches. Dressings to left foot clean dry intact. She is only taking celebrex and Eliquis at this point. No other complaints.     12/24/2020:  3 weeks postop.  Relates no pain.  She has been nonweightbearing with orthopedic boot in using a rolling knee walker.  No new concerns.  States she would like to return to work since she is a  and can sit down after work and that she can also for from home.      Vitals:    12/24/20 0954   BP: (!) 141/91   Pulse: 74   Weight: 104.3 kg (229 lb 15 oz)   Height: 5' 2" (1.575 m)   PainSc: 0-No pain      Past Medical History: "   Diagnosis Date    Breast cancer, right breast 11/12/2018    Cancer     Hypertension     Psoriasis     on biologic for 1 year, has been controlling it well       Past Surgical History:   Procedure Laterality Date    COLONOSCOPY N/A 10/10/2019    Procedure: COLONOSCOPY;  Surgeon: Matt Marin MD;  Location: [facility];  Service: Endoscopy;  Laterality: N/A;    DILATION AND CURETTAGE OF UTERUS      FOOT ARTHRODESIS Left 12/2/2020    Procedure: FUSION, FOOT;  Surgeon: Uriah Veras DPM;  Location: [facility] OR;  Service: Podiatry;  Laterality: Left;  mini c-arm, Arthrex screws and staples, 1 MTP arthrodesis plates  Libby notified 11/23, EF  Libby w/ Arthrex confirmed 12/1/2020 [place]    HYSTERECTOMY      partial hyst for prolapse    MASTECTOMY Bilateral 11/14/2018    Procedure: MASTECTOMY;  Surgeon: Marga Cardenas MD;  Location: [facility];  Service: Plastics;  Laterality: Bilateral;    MASTECTOMY WITH SENTINEL NODE BIOPSY AND AXILLARY LYMPH NODE DISSECTION Right 11/14/2018    Procedure: SENTINEL NODE BIOPSY AND AXILLARY LYMPHADENECTOMY;  Surgeon: Marga Cardenas MD;  Location: [facility];  Service: Plastics;  Laterality: Right;    RECONSTRUCTION OF BREAST WITH DEEP INFERIOR EPIGASTRIC ARTERY  (MEGAN) FREE FLAP Bilateral 11/14/2018    Procedure: RECONSTRUCTION, BREAST, USING MEGAN FREE FLAP;  Surgeon: Tye Chapman MD;  Location: [facility];  Service: Plastics;  Laterality: Bilateral;    RESECTION OF GASTROCNEMIUS MUSCLE Left 12/2/2020    Procedure: RESECTION, MUSCLE, GASTROCNEMIUS;  Surgeon: Uriah Veras DPM;  Location: [facility];  Service: Podiatry;  Laterality: Left;  video  Kj w/ video confirmed 12/1/2020       Family History   Problem Relation Age of Onset    Diabetes Mother     Hypertension Mother     Hypertension Brother     Cancer Paternal Uncle         leukemia    Breast cancer Neg Hx     Ovarian cancer Neg Hx     Colon cancer Neg Hx        Social History     Socioeconomic  History    Marital status:      Spouse name: Not on file    Number of children: Not on file    Years of education: Not on file    Highest education level: Not on file   Occupational History    Not on file   Social Needs    Financial resource strain: Not hard at all    Food insecurity     Worry: Never true     Inability: Never true    Transportation needs     Medical: No     Non-medical: No   Tobacco Use    Smoking status: Never Smoker    Smokeless tobacco: Never Used   Substance and Sexual Activity    Alcohol use: Yes     Alcohol/week: 3.0 standard drinks     Types: 3 Glasses of wine per week     Frequency: 2-4 times a month     Drinks per session: 1 or 2     Binge frequency: Less than monthly     Comment: social    Drug use: No    Sexual activity: Yes     Partners: Male     Birth control/protection: Surgical   Lifestyle    Physical activity     Days per week: 5 days     Minutes per session: 30 min    Stress: Not at all   Relationships    Social connections     Talks on phone: More than three times a week     Gets together: Once a week     Attends Mosque service: Not on file     Active member of club or organization: Yes     Attends meetings of clubs or organizations: More than 4 times per year     Relationship status:    Other Topics Concern    Not on file   Social History Narrative    Not on file       Current Outpatient Medications   Medication Sig Dispense Refill    apixaban (ELIQUIS) 2.5 mg Tab Take 1 tablet (2.5 mg total) by mouth 2 (two) times daily. 60 tablet 0    betamethasone dipropionate (DIPROLENE) 0.05 % cream APPLY TWICE DAILY TO ALL AFFECTED AREAS.  2    celecoxib (CELEBREX) 200 MG capsule Take 1 capsule (200 mg total) by mouth 2 (two) times daily. 30 capsule 0    cholecalciferol, vitamin D3, 1,250 mcg (50,000 unit) Tab Take 1 tablet by mouth every 7 days. 12 tablet 1    clobetasol (CLOBEX) 0.05 % shampoo USE TOPICALLY 3 TIMES WEEKLY  3    clobetasoL (TEMOVATE)  0.05 % external solution       losartan-hydrochlorothiazide 50-12.5 mg (HYZAAR) 50-12.5 mg per tablet Take 1 tablet by mouth once daily. 90 tablet 3    metFORMIN (GLUCOPHAGE XR) 500 MG XR 24hr tablet Take 1 tablet (500 mg total) by mouth daily with breakfast. 30 tablet 11    oxyCODONE-acetaminophen (PERCOCET)  mg per tablet Take 1 tablet by mouth every 4 (four) hours as needed. 30 tablet 0    SKYRIZI 150mg/1.66mL(75 mg/0.83 mL x2) subcutaneous injection       venlafaxine (EFFEXOR XR) 75 MG 24 hr capsule Take 1 capsule (75 mg total) by mouth once daily. 30 capsule 2     Current Facility-Administered Medications   Medication Dose Route Frequency Provider Last Rate Last Dose    sodium chloride 0.9% flush 10 mL  10 mL Intravenous PRN Uriah Veras DPM         Facility-Administered Medications Ordered in Other Visits   Medication Dose Route Frequency Provider Last Rate Last Dose    EPINEPHrine 1,000 mcg in lactated Ringers 1,000 mL irrigation   Irrigation On Call Procedure Tye Chapman MD        EPINEPHrine 1,000 mcg in lactated Ringers 1,000 mL irrigation   Irrigation On Call Procedure Tye Chapman MD           Review of patient's allergies indicates:  No Known Allergies      Review of Systems   Constitution: Negative for chills, decreased appetite, fever and night sweats.   HENT: Negative for congestion and hearing loss.    Eyes: Negative for vision loss in left eye and vision loss in right eye.   Cardiovascular: Negative for chest pain, claudication, cyanosis, irregular heartbeat and leg swelling.   Respiratory: Negative for cough and shortness of breath.    Skin: Negative for color change, dry skin, itching, nail changes, poor wound healing, rash, suspicious lesions and unusual hair distribution.   Musculoskeletal: Positive for joint pain and joint swelling. Negative for arthritis, back pain, falls, gout and muscle weakness.        Left foot pain   Gastrointestinal: Negative for nausea  and vomiting.   Neurological: Negative for dizziness, headaches, loss of balance, numbness and weakness.   Psychiatric/Behavioral: Negative for altered mental status. The patient is not nervous/anxious.            Objective:      Physical Exam  Constitutional:       General: She is not in acute distress.     Appearance: She is not ill-appearing.   Cardiovascular:      Pulses:           Popliteal pulses are 2+ on the right side and 2+ on the left side.        Dorsalis pedis pulses are 2+ on the right side and 2+ on the left side.      Comments: CFT brisk < 3s  Localized postsurgical edema surrounding incision sites along the medial border of the left foot.     Musculoskeletal:      Comments: No pain on palpation to surgical sites left foot.  Rectus appearance of the left foot.  No available subtalar joint range of motion.  No motion 1 MTP left foot.  Minimal motion noted to the midtarsal joint left foot.  Moderate reduction in edema to left foot.   Skin:     Comments: Incision sites to the left medial leg, left medial hindfoot/midfoot/forefoot are clean, dry, intact, well-approximated with staples with no drainage, no erythema. No signs of wound dehiscence.    Neurological:      Mental Status: She is alert.      Comments: Light touch intact                 Assessment:       Encounter Diagnosis   Name Primary?    Postoperative state Yes         Plan:       Brittny was seen today for post-op evaluation.    Diagnoses and all orders for this visit:    Postoperative state  -     X-Ray Foot Complete Left; Future      I counseled the patient on her conditions, their implications and medical management.    Staples removed.    Continue nonweightbearing to left foot with orthopedic boot and rolling knee walker.  May return to work if accommodated nonweightbearing to left foot as we discussed.  She may drive since is not her driving foot.    Continue range-of-motion exercises focusing on plantar flexion and dorsiflexion  only.    RTC 5 weeks or p.r.n. as discussed    A portion of this note was generated by voice recognition software and may contain spelling and grammar errors.

## 2021-01-12 ENCOUNTER — HOSPITAL ENCOUNTER (OUTPATIENT)
Dept: RADIOLOGY | Facility: HOSPITAL | Age: 51
Discharge: HOME | End: 2021-01-12
Attending: NURSE PRACTITIONER
Payer: COMMERCIAL

## 2021-01-12 DIAGNOSIS — C50.111 MALIGNANT NEOPLASM OF CENTRAL PORTION OF RIGHT BREAST IN FEMALE, ESTROGEN RECEPTOR POSITIVE: ICD-10-CM

## 2021-01-12 DIAGNOSIS — Z17.0 MALIGNANT NEOPLASM OF CENTRAL PORTION OF RIGHT BREAST IN FEMALE, ESTROGEN RECEPTOR POSITIVE: ICD-10-CM

## 2021-01-12 PROCEDURE — 77080 DXA BONE DENSITY AXIAL: CPT | Mod: TC,PO

## 2021-01-19 ENCOUNTER — LAB VISIT (OUTPATIENT)
Dept: LAB | Facility: HOSPITAL | Age: 51
End: 2021-01-19
Attending: OPHTHALMOLOGY
Payer: COMMERCIAL

## 2021-01-19 ENCOUNTER — PATIENT MESSAGE (OUTPATIENT)
Dept: PLASTIC SURGERY | Facility: CLINIC | Age: 51
End: 2021-01-19

## 2021-01-19 DIAGNOSIS — N95.1 MENOPAUSAL SYMPTOMS: ICD-10-CM

## 2021-01-19 LAB — ESTRADIOL SERPL-MCNC: <10 PG/ML

## 2021-01-19 PROCEDURE — 84402 ASSAY OF FREE TESTOSTERONE: CPT | Mod: PO

## 2021-01-19 PROCEDURE — 82670 ASSAY OF TOTAL ESTRADIOL: CPT | Mod: PO

## 2021-01-20 ENCOUNTER — PATIENT MESSAGE (OUTPATIENT)
Dept: PODIATRY | Facility: CLINIC | Age: 51
End: 2021-01-20

## 2021-01-21 ENCOUNTER — PATIENT MESSAGE (OUTPATIENT)
Dept: PLASTIC SURGERY | Facility: CLINIC | Age: 51
End: 2021-01-21

## 2021-01-23 LAB — TESTOST FREE SERPL-MCNC: 0.5 PG/ML

## 2021-01-28 ENCOUNTER — OFFICE VISIT (OUTPATIENT)
Dept: PODIATRY | Facility: CLINIC | Age: 51
End: 2021-01-28
Payer: COMMERCIAL

## 2021-01-28 ENCOUNTER — HOSPITAL ENCOUNTER (OUTPATIENT)
Dept: RADIOLOGY | Facility: HOSPITAL | Age: 51
Discharge: HOME | End: 2021-01-28
Attending: PODIATRIST
Payer: COMMERCIAL

## 2021-01-28 VITALS
BODY MASS INDEX: 42.14 KG/M2 | DIASTOLIC BLOOD PRESSURE: 98 MMHG | WEIGHT: 229 LBS | HEART RATE: 81 BPM | HEIGHT: 62 IN | SYSTOLIC BLOOD PRESSURE: 150 MMHG

## 2021-01-28 DIAGNOSIS — Z98.890 POSTOPERATIVE STATE: Primary | ICD-10-CM

## 2021-01-28 DIAGNOSIS — Z98.890 POSTOPERATIVE STATE: ICD-10-CM

## 2021-01-28 PROCEDURE — 73630 X-RAY EXAM OF FOOT: CPT | Mod: 26,LT,, | Performed by: RADIOLOGY

## 2021-01-28 PROCEDURE — 3008F PR BODY MASS INDEX (BMI) DOCUMENTED: ICD-10-PCS | Mod: CPTII,S$GLB,, | Performed by: PODIATRIST

## 2021-01-28 PROCEDURE — 3008F BODY MASS INDEX DOCD: CPT | Mod: CPTII,S$GLB,, | Performed by: PODIATRIST

## 2021-01-28 PROCEDURE — 99024 POSTOP FOLLOW-UP VISIT: CPT | Mod: S$GLB,,, | Performed by: PODIATRIST

## 2021-01-28 PROCEDURE — 99999 PR PBB SHADOW E&M-EST. PATIENT-LVL IV: CPT | Mod: PBBFAC,,, | Performed by: PODIATRIST

## 2021-01-28 PROCEDURE — 99999 PR PBB SHADOW E&M-EST. PATIENT-LVL IV: ICD-10-PCS | Mod: PBBFAC,,, | Performed by: PODIATRIST

## 2021-01-28 PROCEDURE — 1126F AMNT PAIN NOTED NONE PRSNT: CPT | Mod: S$GLB,,, | Performed by: PODIATRIST

## 2021-01-28 PROCEDURE — 73630 XR FOOT COMPLETE 3 VIEW LEFT: ICD-10-PCS | Mod: 26,LT,, | Performed by: RADIOLOGY

## 2021-01-28 PROCEDURE — 1126F PR PAIN SEVERITY QUANTIFIED, NO PAIN PRESENT: ICD-10-PCS | Mod: S$GLB,,, | Performed by: PODIATRIST

## 2021-01-28 PROCEDURE — 99024 PR POST-OP FOLLOW-UP VISIT: ICD-10-PCS | Mod: S$GLB,,, | Performed by: PODIATRIST

## 2021-01-28 PROCEDURE — 73630 X-RAY EXAM OF FOOT: CPT | Mod: TC,PN,LT

## 2021-02-09 ENCOUNTER — OFFICE VISIT (OUTPATIENT)
Dept: HEMATOLOGY/ONCOLOGY | Facility: CLINIC | Age: 51
End: 2021-02-09
Payer: COMMERCIAL

## 2021-02-09 VITALS
BODY MASS INDEX: 41.88 KG/M2 | HEART RATE: 78 BPM | RESPIRATION RATE: 16 BRPM | HEIGHT: 62 IN | DIASTOLIC BLOOD PRESSURE: 82 MMHG | SYSTOLIC BLOOD PRESSURE: 154 MMHG | OXYGEN SATURATION: 98 % | TEMPERATURE: 97 F

## 2021-02-09 DIAGNOSIS — C50.111 MALIGNANT NEOPLASM OF CENTRAL PORTION OF RIGHT BREAST IN FEMALE, ESTROGEN RECEPTOR POSITIVE: Primary | ICD-10-CM

## 2021-02-09 DIAGNOSIS — Z17.0 MALIGNANT NEOPLASM OF CENTRAL PORTION OF RIGHT BREAST IN FEMALE, ESTROGEN RECEPTOR POSITIVE: Primary | ICD-10-CM

## 2021-02-09 PROCEDURE — 99999 PR PBB SHADOW E&M-EST. PATIENT-LVL IV: ICD-10-PCS | Mod: PBBFAC,,, | Performed by: INTERNAL MEDICINE

## 2021-02-09 PROCEDURE — 3008F BODY MASS INDEX DOCD: CPT | Mod: CPTII,S$GLB,, | Performed by: INTERNAL MEDICINE

## 2021-02-09 PROCEDURE — 99999 PR PBB SHADOW E&M-EST. PATIENT-LVL IV: CPT | Mod: PBBFAC,,, | Performed by: INTERNAL MEDICINE

## 2021-02-09 PROCEDURE — 3077F SYST BP >= 140 MM HG: CPT | Mod: CPTII,S$GLB,, | Performed by: INTERNAL MEDICINE

## 2021-02-09 PROCEDURE — 99213 PR OFFICE/OUTPT VISIT, EST, LEVL III, 20-29 MIN: ICD-10-PCS | Mod: S$GLB,,, | Performed by: INTERNAL MEDICINE

## 2021-02-09 PROCEDURE — 3077F PR MOST RECENT SYSTOLIC BLOOD PRESSURE >= 140 MM HG: ICD-10-PCS | Mod: CPTII,S$GLB,, | Performed by: INTERNAL MEDICINE

## 2021-02-09 PROCEDURE — 1125F AMNT PAIN NOTED PAIN PRSNT: CPT | Mod: S$GLB,,, | Performed by: INTERNAL MEDICINE

## 2021-02-09 PROCEDURE — 3008F PR BODY MASS INDEX (BMI) DOCUMENTED: ICD-10-PCS | Mod: CPTII,S$GLB,, | Performed by: INTERNAL MEDICINE

## 2021-02-09 PROCEDURE — 1125F PR PAIN SEVERITY QUANTIFIED, PAIN PRESENT: ICD-10-PCS | Mod: S$GLB,,, | Performed by: INTERNAL MEDICINE

## 2021-02-09 PROCEDURE — 3079F PR MOST RECENT DIASTOLIC BLOOD PRESSURE 80-89 MM HG: ICD-10-PCS | Mod: CPTII,S$GLB,, | Performed by: INTERNAL MEDICINE

## 2021-02-09 PROCEDURE — 3079F DIAST BP 80-89 MM HG: CPT | Mod: CPTII,S$GLB,, | Performed by: INTERNAL MEDICINE

## 2021-02-09 PROCEDURE — 99213 OFFICE O/P EST LOW 20 MIN: CPT | Mod: S$GLB,,, | Performed by: INTERNAL MEDICINE

## 2021-02-09 RX ORDER — LETROZOLE 2.5 MG/1
TABLET, FILM COATED ORAL
COMMUNITY
Start: 2021-02-04 | End: 2021-05-10

## 2021-02-10 ENCOUNTER — PATIENT MESSAGE (OUTPATIENT)
Dept: HEMATOLOGY/ONCOLOGY | Facility: CLINIC | Age: 51
End: 2021-02-10

## 2021-02-12 ENCOUNTER — PATIENT MESSAGE (OUTPATIENT)
Dept: PODIATRY | Facility: CLINIC | Age: 51
End: 2021-02-12

## 2021-02-12 ENCOUNTER — PROCEDURE VISIT (OUTPATIENT)
Dept: PLASTIC SURGERY | Facility: CLINIC | Age: 51
End: 2021-02-12
Payer: COMMERCIAL

## 2021-02-12 DIAGNOSIS — Z98.890 S/P BREAST RECONSTRUCTION: ICD-10-CM

## 2021-02-12 DIAGNOSIS — Z85.3 PERSONAL HISTORY OF BREAST CANCER: Primary | ICD-10-CM

## 2021-02-12 DIAGNOSIS — Z98.890 POSTOPERATIVE STATE: Primary | ICD-10-CM

## 2021-02-12 PROCEDURE — 11921 PR CORRECT SKIN COLR DEFCT 6.1-20 SQCM: ICD-10-PCS | Mod: S$GLB,,, | Performed by: PHYSICIAN ASSISTANT

## 2021-02-12 PROCEDURE — 11922 PR CORRECT SKIN COLR DEFCT ADDN 20 SQCM: ICD-10-PCS | Mod: S$GLB,,, | Performed by: PHYSICIAN ASSISTANT

## 2021-02-12 PROCEDURE — 11922 CORRECT SKIN COLOR EA 20.0CM: CPT | Mod: S$GLB,,, | Performed by: PHYSICIAN ASSISTANT

## 2021-02-12 PROCEDURE — 11921 CORRECT SKN COLOR 6.1-20.0CM: CPT | Mod: S$GLB,,, | Performed by: PHYSICIAN ASSISTANT

## 2021-02-19 ENCOUNTER — HOSPITAL ENCOUNTER (OUTPATIENT)
Dept: RADIOLOGY | Facility: HOSPITAL | Age: 51
Discharge: HOME | End: 2021-02-19
Attending: PODIATRIST
Payer: COMMERCIAL

## 2021-02-19 ENCOUNTER — OFFICE VISIT (OUTPATIENT)
Dept: PODIATRY | Facility: CLINIC | Age: 51
End: 2021-02-19
Payer: COMMERCIAL

## 2021-02-19 VITALS
DIASTOLIC BLOOD PRESSURE: 90 MMHG | BODY MASS INDEX: 42.15 KG/M2 | SYSTOLIC BLOOD PRESSURE: 150 MMHG | HEIGHT: 62 IN | WEIGHT: 229.06 LBS | HEART RATE: 76 BPM

## 2021-02-19 DIAGNOSIS — Z98.890 POSTOPERATIVE STATE: ICD-10-CM

## 2021-02-19 DIAGNOSIS — M62.81 MUSCLE WEAKNESS OF LOWER EXTREMITY: ICD-10-CM

## 2021-02-19 DIAGNOSIS — Z98.890 POSTOPERATIVE STATE: Primary | ICD-10-CM

## 2021-02-19 PROCEDURE — 73610 X-RAY EXAM OF ANKLE: CPT | Mod: 26,LT,, | Performed by: RADIOLOGY

## 2021-02-19 PROCEDURE — 99999 PR PBB SHADOW E&M-EST. PATIENT-LVL V: CPT | Mod: PBBFAC,,, | Performed by: PODIATRIST

## 2021-02-19 PROCEDURE — 73630 X-RAY EXAM OF FOOT: CPT | Mod: 26,LT,, | Performed by: RADIOLOGY

## 2021-02-19 PROCEDURE — 99024 POSTOP FOLLOW-UP VISIT: CPT | Mod: S$GLB,,, | Performed by: PODIATRIST

## 2021-02-19 PROCEDURE — 73630 XR FOOT COMPLETE 3 VIEW LEFT: ICD-10-PCS | Mod: 26,LT,, | Performed by: RADIOLOGY

## 2021-02-19 PROCEDURE — 99024 PR POST-OP FOLLOW-UP VISIT: ICD-10-PCS | Mod: S$GLB,,, | Performed by: PODIATRIST

## 2021-02-19 PROCEDURE — 1126F PR PAIN SEVERITY QUANTIFIED, NO PAIN PRESENT: ICD-10-PCS | Mod: S$GLB,,, | Performed by: PODIATRIST

## 2021-02-19 PROCEDURE — 99999 PR PBB SHADOW E&M-EST. PATIENT-LVL V: ICD-10-PCS | Mod: PBBFAC,,, | Performed by: PODIATRIST

## 2021-02-19 PROCEDURE — 73610 XR ANKLE COMPLETE 3 VIEW LEFT: ICD-10-PCS | Mod: 26,LT,, | Performed by: RADIOLOGY

## 2021-02-19 PROCEDURE — 73610 X-RAY EXAM OF ANKLE: CPT | Mod: TC,PN,LT

## 2021-02-19 PROCEDURE — 3008F BODY MASS INDEX DOCD: CPT | Mod: CPTII,S$GLB,, | Performed by: PODIATRIST

## 2021-02-19 PROCEDURE — 3008F PR BODY MASS INDEX (BMI) DOCUMENTED: ICD-10-PCS | Mod: CPTII,S$GLB,, | Performed by: PODIATRIST

## 2021-02-19 PROCEDURE — 1126F AMNT PAIN NOTED NONE PRSNT: CPT | Mod: S$GLB,,, | Performed by: PODIATRIST

## 2021-02-19 PROCEDURE — 73630 X-RAY EXAM OF FOOT: CPT | Mod: TC,PN,LT

## 2021-02-23 ENCOUNTER — CLINICAL SUPPORT (OUTPATIENT)
Dept: REHABILITATION | Facility: HOSPITAL | Age: 51
End: 2021-02-23
Attending: PODIATRIST
Payer: COMMERCIAL

## 2021-02-23 DIAGNOSIS — R29.898 WEAKNESS OF LEFT LOWER EXTREMITY: ICD-10-CM

## 2021-02-23 DIAGNOSIS — Z98.890 POSTOPERATIVE STATE: ICD-10-CM

## 2021-02-23 DIAGNOSIS — M62.81 MUSCLE WEAKNESS OF LOWER EXTREMITY: ICD-10-CM

## 2021-02-23 DIAGNOSIS — M25.672 DECREASED RANGE OF MOTION OF LEFT ANKLE: ICD-10-CM

## 2021-02-23 DIAGNOSIS — R26.89 BALANCE PROBLEM: ICD-10-CM

## 2021-02-23 DIAGNOSIS — R26.89 ANTALGIC GAIT: ICD-10-CM

## 2021-02-23 PROCEDURE — 97162 PT EVAL MOD COMPLEX 30 MIN: CPT | Mod: PO

## 2021-03-03 ENCOUNTER — CLINICAL SUPPORT (OUTPATIENT)
Dept: REHABILITATION | Facility: HOSPITAL | Age: 51
End: 2021-03-03
Attending: PODIATRIST
Payer: COMMERCIAL

## 2021-03-03 DIAGNOSIS — R29.898 WEAKNESS OF LEFT LOWER EXTREMITY: ICD-10-CM

## 2021-03-03 DIAGNOSIS — R26.89 ANTALGIC GAIT: ICD-10-CM

## 2021-03-03 DIAGNOSIS — M79.672 PAIN IN LEFT FOOT: ICD-10-CM

## 2021-03-03 DIAGNOSIS — R26.89 BALANCE PROBLEM: ICD-10-CM

## 2021-03-03 DIAGNOSIS — M25.672 DECREASED RANGE OF MOTION OF LEFT ANKLE: ICD-10-CM

## 2021-03-03 PROCEDURE — 97110 THERAPEUTIC EXERCISES: CPT | Mod: PO

## 2021-03-05 ENCOUNTER — CLINICAL SUPPORT (OUTPATIENT)
Dept: REHABILITATION | Facility: HOSPITAL | Age: 51
End: 2021-03-05
Attending: PODIATRIST
Payer: COMMERCIAL

## 2021-03-05 DIAGNOSIS — R29.898 WEAKNESS OF LEFT LOWER EXTREMITY: ICD-10-CM

## 2021-03-05 DIAGNOSIS — R26.89 ANTALGIC GAIT: ICD-10-CM

## 2021-03-05 DIAGNOSIS — M25.672 DECREASED RANGE OF MOTION OF LEFT ANKLE: Primary | ICD-10-CM

## 2021-03-05 DIAGNOSIS — R26.89 BALANCE PROBLEM: ICD-10-CM

## 2021-03-05 DIAGNOSIS — M79.672 PAIN IN LEFT FOOT: ICD-10-CM

## 2021-03-05 PROCEDURE — 97110 THERAPEUTIC EXERCISES: CPT | Mod: PO,CQ

## 2021-03-08 ENCOUNTER — CLINICAL SUPPORT (OUTPATIENT)
Dept: REHABILITATION | Facility: HOSPITAL | Age: 51
End: 2021-03-08
Attending: PODIATRIST
Payer: COMMERCIAL

## 2021-03-08 DIAGNOSIS — R29.898 WEAKNESS OF LEFT LOWER EXTREMITY: ICD-10-CM

## 2021-03-08 DIAGNOSIS — M79.672 PAIN IN LEFT FOOT: ICD-10-CM

## 2021-03-08 DIAGNOSIS — R26.89 ANTALGIC GAIT: ICD-10-CM

## 2021-03-08 DIAGNOSIS — M25.672 DECREASED RANGE OF MOTION OF LEFT ANKLE: ICD-10-CM

## 2021-03-08 DIAGNOSIS — R26.89 BALANCE PROBLEM: ICD-10-CM

## 2021-03-08 PROCEDURE — 97110 THERAPEUTIC EXERCISES: CPT | Mod: PO

## 2021-03-12 ENCOUNTER — CLINICAL SUPPORT (OUTPATIENT)
Dept: REHABILITATION | Facility: HOSPITAL | Age: 51
End: 2021-03-12
Attending: PODIATRIST
Payer: COMMERCIAL

## 2021-03-12 DIAGNOSIS — M79.672 PAIN IN LEFT FOOT: ICD-10-CM

## 2021-03-12 DIAGNOSIS — R26.89 BALANCE PROBLEM: ICD-10-CM

## 2021-03-12 DIAGNOSIS — M25.672 DECREASED RANGE OF MOTION OF LEFT ANKLE: ICD-10-CM

## 2021-03-12 DIAGNOSIS — R26.89 ANTALGIC GAIT: ICD-10-CM

## 2021-03-12 DIAGNOSIS — R29.898 WEAKNESS OF LEFT LOWER EXTREMITY: ICD-10-CM

## 2021-03-12 PROCEDURE — 97110 THERAPEUTIC EXERCISES: CPT | Mod: PO,CQ

## 2021-03-15 ENCOUNTER — CLINICAL SUPPORT (OUTPATIENT)
Dept: REHABILITATION | Facility: HOSPITAL | Age: 51
End: 2021-03-15
Attending: PODIATRIST
Payer: COMMERCIAL

## 2021-03-15 DIAGNOSIS — M25.672 DECREASED RANGE OF MOTION OF LEFT ANKLE: ICD-10-CM

## 2021-03-15 DIAGNOSIS — R29.898 WEAKNESS OF LEFT LOWER EXTREMITY: ICD-10-CM

## 2021-03-15 DIAGNOSIS — R26.89 BALANCE PROBLEM: ICD-10-CM

## 2021-03-15 DIAGNOSIS — R26.89 ANTALGIC GAIT: ICD-10-CM

## 2021-03-15 DIAGNOSIS — M79.672 PAIN IN LEFT FOOT: ICD-10-CM

## 2021-03-15 PROCEDURE — 97110 THERAPEUTIC EXERCISES: CPT | Mod: PO

## 2021-03-17 ENCOUNTER — CLINICAL SUPPORT (OUTPATIENT)
Dept: REHABILITATION | Facility: HOSPITAL | Age: 51
End: 2021-03-17
Attending: PODIATRIST
Payer: COMMERCIAL

## 2021-03-17 DIAGNOSIS — M25.672 DECREASED RANGE OF MOTION OF LEFT ANKLE: ICD-10-CM

## 2021-03-17 DIAGNOSIS — R26.89 ANTALGIC GAIT: ICD-10-CM

## 2021-03-17 DIAGNOSIS — M79.672 PAIN IN LEFT FOOT: ICD-10-CM

## 2021-03-17 DIAGNOSIS — R26.89 BALANCE PROBLEM: ICD-10-CM

## 2021-03-17 DIAGNOSIS — R29.898 WEAKNESS OF LEFT LOWER EXTREMITY: ICD-10-CM

## 2021-03-17 PROCEDURE — 97110 THERAPEUTIC EXERCISES: CPT | Mod: PO

## 2021-03-24 ENCOUNTER — CLINICAL SUPPORT (OUTPATIENT)
Dept: REHABILITATION | Facility: HOSPITAL | Age: 51
End: 2021-03-24
Attending: PODIATRIST
Payer: COMMERCIAL

## 2021-03-24 ENCOUNTER — DOCUMENTATION ONLY (OUTPATIENT)
Dept: REHABILITATION | Facility: HOSPITAL | Age: 51
End: 2021-03-24

## 2021-03-24 DIAGNOSIS — R26.89 ANTALGIC GAIT: ICD-10-CM

## 2021-03-24 DIAGNOSIS — M79.672 PAIN IN LEFT FOOT: ICD-10-CM

## 2021-03-24 DIAGNOSIS — R29.898 WEAKNESS OF LEFT LOWER EXTREMITY: ICD-10-CM

## 2021-03-24 DIAGNOSIS — R26.89 BALANCE PROBLEM: ICD-10-CM

## 2021-03-24 DIAGNOSIS — M25.672 DECREASED RANGE OF MOTION OF LEFT ANKLE: ICD-10-CM

## 2021-03-24 PROCEDURE — 97110 THERAPEUTIC EXERCISES: CPT | Mod: PO

## 2021-03-25 ENCOUNTER — CLINICAL SUPPORT (OUTPATIENT)
Dept: REHABILITATION | Facility: HOSPITAL | Age: 51
End: 2021-03-25
Attending: PODIATRIST
Payer: COMMERCIAL

## 2021-03-25 DIAGNOSIS — M79.672 PAIN IN LEFT FOOT: ICD-10-CM

## 2021-03-25 DIAGNOSIS — M25.672 DECREASED RANGE OF MOTION OF LEFT ANKLE: ICD-10-CM

## 2021-03-25 DIAGNOSIS — R26.89 BALANCE PROBLEM: ICD-10-CM

## 2021-03-25 DIAGNOSIS — R26.89 ANTALGIC GAIT: ICD-10-CM

## 2021-03-25 DIAGNOSIS — R29.898 WEAKNESS OF LEFT LOWER EXTREMITY: ICD-10-CM

## 2021-03-25 PROCEDURE — 97110 THERAPEUTIC EXERCISES: CPT | Mod: PO

## 2021-03-29 ENCOUNTER — CLINICAL SUPPORT (OUTPATIENT)
Dept: REHABILITATION | Facility: HOSPITAL | Age: 51
End: 2021-03-29
Attending: PODIATRIST
Payer: COMMERCIAL

## 2021-03-29 DIAGNOSIS — M25.672 DECREASED RANGE OF MOTION OF LEFT ANKLE: ICD-10-CM

## 2021-03-29 DIAGNOSIS — R29.898 WEAKNESS OF LEFT LOWER EXTREMITY: ICD-10-CM

## 2021-03-29 DIAGNOSIS — R26.89 BALANCE PROBLEM: ICD-10-CM

## 2021-03-29 DIAGNOSIS — R26.89 ANTALGIC GAIT: ICD-10-CM

## 2021-03-29 DIAGNOSIS — M79.672 PAIN IN LEFT FOOT: ICD-10-CM

## 2021-03-29 PROCEDURE — 97110 THERAPEUTIC EXERCISES: CPT | Mod: PO

## 2021-03-31 ENCOUNTER — CLINICAL SUPPORT (OUTPATIENT)
Dept: REHABILITATION | Facility: HOSPITAL | Age: 51
End: 2021-03-31
Attending: PODIATRIST
Payer: COMMERCIAL

## 2021-03-31 DIAGNOSIS — R26.89 BALANCE PROBLEM: ICD-10-CM

## 2021-03-31 DIAGNOSIS — R29.898 WEAKNESS OF LEFT LOWER EXTREMITY: ICD-10-CM

## 2021-03-31 DIAGNOSIS — M25.672 DECREASED RANGE OF MOTION OF LEFT ANKLE: ICD-10-CM

## 2021-03-31 DIAGNOSIS — R26.89 ANTALGIC GAIT: ICD-10-CM

## 2021-03-31 DIAGNOSIS — M79.672 PAIN IN LEFT FOOT: ICD-10-CM

## 2021-03-31 PROCEDURE — 97110 THERAPEUTIC EXERCISES: CPT | Mod: PO

## 2021-04-09 ENCOUNTER — CLINICAL SUPPORT (OUTPATIENT)
Dept: REHABILITATION | Facility: HOSPITAL | Age: 51
End: 2021-04-09
Attending: PODIATRIST
Payer: COMMERCIAL

## 2021-04-09 DIAGNOSIS — R29.898 WEAKNESS OF LEFT LOWER EXTREMITY: ICD-10-CM

## 2021-04-09 DIAGNOSIS — M25.672 DECREASED RANGE OF MOTION OF LEFT ANKLE: ICD-10-CM

## 2021-04-09 DIAGNOSIS — R26.89 ANTALGIC GAIT: ICD-10-CM

## 2021-04-09 DIAGNOSIS — R26.89 BALANCE PROBLEM: ICD-10-CM

## 2021-04-09 DIAGNOSIS — M79.672 PAIN IN LEFT FOOT: ICD-10-CM

## 2021-04-09 PROCEDURE — 97110 THERAPEUTIC EXERCISES: CPT | Mod: PO

## 2021-04-12 ENCOUNTER — CLINICAL SUPPORT (OUTPATIENT)
Dept: REHABILITATION | Facility: HOSPITAL | Age: 51
End: 2021-04-12
Attending: PODIATRIST
Payer: COMMERCIAL

## 2021-04-12 DIAGNOSIS — R26.89 BALANCE PROBLEM: ICD-10-CM

## 2021-04-12 DIAGNOSIS — R26.89 ANTALGIC GAIT: ICD-10-CM

## 2021-04-12 DIAGNOSIS — M79.672 PAIN IN LEFT FOOT: ICD-10-CM

## 2021-04-12 DIAGNOSIS — M25.672 DECREASED RANGE OF MOTION OF LEFT ANKLE: ICD-10-CM

## 2021-04-12 DIAGNOSIS — R29.898 WEAKNESS OF LEFT LOWER EXTREMITY: ICD-10-CM

## 2021-04-12 PROCEDURE — 97110 THERAPEUTIC EXERCISES: CPT | Mod: PO

## 2021-04-14 ENCOUNTER — OFFICE VISIT (OUTPATIENT)
Dept: PLASTIC SURGERY | Facility: CLINIC | Age: 51
End: 2021-04-14
Payer: COMMERCIAL

## 2021-04-14 VITALS
HEIGHT: 62 IN | WEIGHT: 229.06 LBS | BODY MASS INDEX: 42.15 KG/M2 | DIASTOLIC BLOOD PRESSURE: 97 MMHG | SYSTOLIC BLOOD PRESSURE: 186 MMHG | HEART RATE: 65 BPM

## 2021-04-14 DIAGNOSIS — Z09 SURGERY FOLLOW-UP EXAMINATION: Primary | ICD-10-CM

## 2021-04-14 PROCEDURE — 3008F BODY MASS INDEX DOCD: CPT | Mod: CPTII,S$GLB,, | Performed by: PHYSICIAN ASSISTANT

## 2021-04-14 PROCEDURE — 1126F AMNT PAIN NOTED NONE PRSNT: CPT | Mod: S$GLB,,, | Performed by: PHYSICIAN ASSISTANT

## 2021-04-14 PROCEDURE — 1126F PR PAIN SEVERITY QUANTIFIED, NO PAIN PRESENT: ICD-10-PCS | Mod: S$GLB,,, | Performed by: PHYSICIAN ASSISTANT

## 2021-04-14 PROCEDURE — 99999 PR PBB SHADOW E&M-EST. PATIENT-LVL III: ICD-10-PCS | Mod: PBBFAC,,, | Performed by: PHYSICIAN ASSISTANT

## 2021-04-14 PROCEDURE — 99024 PR POST-OP FOLLOW-UP VISIT: ICD-10-PCS | Mod: S$GLB,,, | Performed by: PHYSICIAN ASSISTANT

## 2021-04-14 PROCEDURE — 99999 PR PBB SHADOW E&M-EST. PATIENT-LVL III: CPT | Mod: PBBFAC,,, | Performed by: PHYSICIAN ASSISTANT

## 2021-04-14 PROCEDURE — 99024 POSTOP FOLLOW-UP VISIT: CPT | Mod: S$GLB,,, | Performed by: PHYSICIAN ASSISTANT

## 2021-04-14 PROCEDURE — 3008F PR BODY MASS INDEX (BMI) DOCUMENTED: ICD-10-PCS | Mod: CPTII,S$GLB,, | Performed by: PHYSICIAN ASSISTANT

## 2021-04-19 ENCOUNTER — CLINICAL SUPPORT (OUTPATIENT)
Dept: REHABILITATION | Facility: HOSPITAL | Age: 51
End: 2021-04-19
Attending: PODIATRIST
Payer: COMMERCIAL

## 2021-04-19 DIAGNOSIS — R26.89 ANTALGIC GAIT: ICD-10-CM

## 2021-04-19 DIAGNOSIS — R26.89 BALANCE PROBLEM: ICD-10-CM

## 2021-04-19 DIAGNOSIS — M25.672 DECREASED RANGE OF MOTION OF LEFT ANKLE: ICD-10-CM

## 2021-04-19 DIAGNOSIS — R29.898 WEAKNESS OF LEFT LOWER EXTREMITY: ICD-10-CM

## 2021-04-19 DIAGNOSIS — M79.672 PAIN IN LEFT FOOT: ICD-10-CM

## 2021-04-19 PROCEDURE — 97110 THERAPEUTIC EXERCISES: CPT | Mod: PO

## 2021-04-22 ENCOUNTER — OFFICE VISIT (OUTPATIENT)
Dept: PODIATRY | Facility: CLINIC | Age: 51
End: 2021-04-22
Payer: COMMERCIAL

## 2021-04-22 ENCOUNTER — HOSPITAL ENCOUNTER (OUTPATIENT)
Dept: RADIOLOGY | Facility: HOSPITAL | Age: 51
Discharge: HOME | End: 2021-04-22
Attending: PODIATRIST
Payer: COMMERCIAL

## 2021-04-22 VITALS
HEIGHT: 62 IN | BODY MASS INDEX: 42.14 KG/M2 | DIASTOLIC BLOOD PRESSURE: 85 MMHG | HEART RATE: 69 BPM | WEIGHT: 229 LBS | SYSTOLIC BLOOD PRESSURE: 130 MMHG

## 2021-04-22 DIAGNOSIS — Z98.890 POSTOPERATIVE STATE: ICD-10-CM

## 2021-04-22 DIAGNOSIS — M62.81 MUSCLE WEAKNESS OF LOWER EXTREMITY: Primary | ICD-10-CM

## 2021-04-22 DIAGNOSIS — R60.0 EDEMA OF LEFT LOWER EXTREMITY: ICD-10-CM

## 2021-04-22 DIAGNOSIS — M62.81 MUSCLE WEAKNESS OF LOWER EXTREMITY: ICD-10-CM

## 2021-04-22 DIAGNOSIS — Z98.890 HISTORY OF FOOT SURGERY: ICD-10-CM

## 2021-04-22 PROCEDURE — 3008F BODY MASS INDEX DOCD: CPT | Mod: CPTII,S$GLB,, | Performed by: PODIATRIST

## 2021-04-22 PROCEDURE — 99999 PR PBB SHADOW E&M-EST. PATIENT-LVL III: CPT | Mod: PBBFAC,,, | Performed by: PODIATRIST

## 2021-04-22 PROCEDURE — 99213 OFFICE O/P EST LOW 20 MIN: CPT | Mod: S$GLB,,, | Performed by: PODIATRIST

## 2021-04-22 PROCEDURE — 73630 X-RAY EXAM OF FOOT: CPT | Mod: 26,LT,, | Performed by: RADIOLOGY

## 2021-04-22 PROCEDURE — 3008F PR BODY MASS INDEX (BMI) DOCUMENTED: ICD-10-PCS | Mod: CPTII,S$GLB,, | Performed by: PODIATRIST

## 2021-04-22 PROCEDURE — 99999 PR PBB SHADOW E&M-EST. PATIENT-LVL III: ICD-10-PCS | Mod: PBBFAC,,, | Performed by: PODIATRIST

## 2021-04-22 PROCEDURE — 73630 XR FOOT COMPLETE 3 VIEW LEFT: ICD-10-PCS | Mod: 26,LT,, | Performed by: RADIOLOGY

## 2021-04-22 PROCEDURE — 1125F PR PAIN SEVERITY QUANTIFIED, PAIN PRESENT: ICD-10-PCS | Mod: S$GLB,,, | Performed by: PODIATRIST

## 2021-04-22 PROCEDURE — 99213 PR OFFICE/OUTPT VISIT, EST, LEVL III, 20-29 MIN: ICD-10-PCS | Mod: S$GLB,,, | Performed by: PODIATRIST

## 2021-04-22 PROCEDURE — 1125F AMNT PAIN NOTED PAIN PRSNT: CPT | Mod: S$GLB,,, | Performed by: PODIATRIST

## 2021-04-22 PROCEDURE — 73630 X-RAY EXAM OF FOOT: CPT | Mod: TC,PN,LT

## 2021-04-23 ENCOUNTER — CLINICAL SUPPORT (OUTPATIENT)
Dept: REHABILITATION | Facility: HOSPITAL | Age: 51
End: 2021-04-23
Attending: PODIATRIST
Payer: COMMERCIAL

## 2021-04-23 DIAGNOSIS — R26.89 BALANCE PROBLEM: ICD-10-CM

## 2021-04-23 DIAGNOSIS — R26.89 ANTALGIC GAIT: ICD-10-CM

## 2021-04-23 DIAGNOSIS — R29.898 WEAKNESS OF LEFT LOWER EXTREMITY: ICD-10-CM

## 2021-04-23 DIAGNOSIS — M25.672 DECREASED RANGE OF MOTION OF LEFT ANKLE: ICD-10-CM

## 2021-04-23 DIAGNOSIS — M79.672 PAIN IN LEFT FOOT: ICD-10-CM

## 2021-04-23 PROCEDURE — 97110 THERAPEUTIC EXERCISES: CPT | Mod: PO

## 2021-04-23 PROCEDURE — 97164 PT RE-EVAL EST PLAN CARE: CPT | Mod: PO

## 2021-06-29 ENCOUNTER — OFFICE VISIT (OUTPATIENT)
Dept: HEMATOLOGY/ONCOLOGY | Facility: CLINIC | Age: 51
End: 2021-06-29
Payer: COMMERCIAL

## 2021-06-29 VITALS
WEIGHT: 238.13 LBS | DIASTOLIC BLOOD PRESSURE: 91 MMHG | BODY MASS INDEX: 43.82 KG/M2 | RESPIRATION RATE: 18 BRPM | SYSTOLIC BLOOD PRESSURE: 141 MMHG | OXYGEN SATURATION: 96 % | HEIGHT: 62 IN | HEART RATE: 95 BPM

## 2021-06-29 DIAGNOSIS — C50.111 MALIGNANT NEOPLASM OF CENTRAL PORTION OF RIGHT BREAST IN FEMALE, ESTROGEN RECEPTOR POSITIVE: Primary | ICD-10-CM

## 2021-06-29 DIAGNOSIS — Z17.0 MALIGNANT NEOPLASM OF CENTRAL PORTION OF RIGHT BREAST IN FEMALE, ESTROGEN RECEPTOR POSITIVE: Primary | ICD-10-CM

## 2021-06-29 PROCEDURE — 99213 OFFICE O/P EST LOW 20 MIN: CPT | Mod: S$GLB,,, | Performed by: INTERNAL MEDICINE

## 2021-06-29 PROCEDURE — 1126F PR PAIN SEVERITY QUANTIFIED, NO PAIN PRESENT: ICD-10-PCS | Mod: S$GLB,,, | Performed by: INTERNAL MEDICINE

## 2021-06-29 PROCEDURE — 99999 PR PBB SHADOW E&M-EST. PATIENT-LVL IV: CPT | Mod: PBBFAC,,, | Performed by: INTERNAL MEDICINE

## 2021-06-29 PROCEDURE — 3008F PR BODY MASS INDEX (BMI) DOCUMENTED: ICD-10-PCS | Mod: CPTII,S$GLB,, | Performed by: INTERNAL MEDICINE

## 2021-06-29 PROCEDURE — 1126F AMNT PAIN NOTED NONE PRSNT: CPT | Mod: S$GLB,,, | Performed by: INTERNAL MEDICINE

## 2021-06-29 PROCEDURE — 3008F BODY MASS INDEX DOCD: CPT | Mod: CPTII,S$GLB,, | Performed by: INTERNAL MEDICINE

## 2021-06-29 PROCEDURE — 99213 PR OFFICE/OUTPT VISIT, EST, LEVL III, 20-29 MIN: ICD-10-PCS | Mod: S$GLB,,, | Performed by: INTERNAL MEDICINE

## 2021-06-29 PROCEDURE — 99999 PR PBB SHADOW E&M-EST. PATIENT-LVL IV: ICD-10-PCS | Mod: PBBFAC,,, | Performed by: INTERNAL MEDICINE

## 2021-07-13 ENCOUNTER — OFFICE VISIT (OUTPATIENT)
Dept: FAMILY MEDICINE | Facility: CLINIC | Age: 51
End: 2021-07-13
Payer: COMMERCIAL

## 2021-07-13 ENCOUNTER — LAB VISIT (OUTPATIENT)
Dept: LAB | Facility: HOSPITAL | Age: 51
End: 2021-07-13
Attending: FAMILY MEDICINE
Payer: COMMERCIAL

## 2021-07-13 VITALS
SYSTOLIC BLOOD PRESSURE: 154 MMHG | DIASTOLIC BLOOD PRESSURE: 84 MMHG | HEART RATE: 67 BPM | WEIGHT: 241.75 LBS | HEIGHT: 62 IN | BODY MASS INDEX: 44.49 KG/M2 | TEMPERATURE: 98 F | OXYGEN SATURATION: 99 %

## 2021-07-13 DIAGNOSIS — Z00.00 WELLNESS EXAMINATION: Primary | ICD-10-CM

## 2021-07-13 DIAGNOSIS — I10 ESSENTIAL HYPERTENSION: ICD-10-CM

## 2021-07-13 DIAGNOSIS — R06.83 SNORING: ICD-10-CM

## 2021-07-13 DIAGNOSIS — Z11.59 NEED FOR HEPATITIS C SCREENING TEST: ICD-10-CM

## 2021-07-13 DIAGNOSIS — E66.01 CLASS 3 SEVERE OBESITY DUE TO EXCESS CALORIES WITH BODY MASS INDEX (BMI) OF 40.0 TO 44.9 IN ADULT, UNSPECIFIED WHETHER SERIOUS COMORBIDITY PRESENT: ICD-10-CM

## 2021-07-13 LAB
ALBUMIN SERPL BCP-MCNC: 3.9 G/DL (ref 3.5–5.2)
ALP SERPL-CCNC: 82 U/L (ref 55–135)
ALT SERPL W/O P-5'-P-CCNC: 25 U/L (ref 10–44)
ANION GAP SERPL CALC-SCNC: 10 MMOL/L (ref 8–16)
AST SERPL-CCNC: 26 U/L (ref 10–40)
BILIRUB SERPL-MCNC: 0.4 MG/DL (ref 0.1–1)
BUN SERPL-MCNC: 16 MG/DL (ref 6–20)
CALCIUM SERPL-MCNC: 9.9 MG/DL (ref 8.7–10.5)
CHLORIDE SERPL-SCNC: 105 MMOL/L (ref 95–110)
CHOLEST SERPL-MCNC: 304 MG/DL (ref 120–199)
CHOLEST/HDLC SERPL: 3.8 {RATIO} (ref 2–5)
CO2 SERPL-SCNC: 26 MMOL/L (ref 23–29)
CREAT SERPL-MCNC: 0.7 MG/DL (ref 0.5–1.4)
EST. GFR  (AFRICAN AMERICAN): >60 ML/MIN/1.73 M^2
EST. GFR  (NON AFRICAN AMERICAN): >60 ML/MIN/1.73 M^2
ESTIMATED AVG GLUCOSE: 131 MG/DL (ref 68–131)
GLUCOSE SERPL-MCNC: 111 MG/DL (ref 70–110)
HBA1C MFR BLD: 6.2 % (ref 4–5.6)
HDLC SERPL-MCNC: 79 MG/DL (ref 40–75)
HDLC SERPL: 26 % (ref 20–50)
LDLC SERPL CALC-MCNC: 196 MG/DL (ref 63–159)
NONHDLC SERPL-MCNC: 225 MG/DL
POTASSIUM SERPL-SCNC: 4.3 MMOL/L (ref 3.5–5.1)
PROT SERPL-MCNC: 7.6 G/DL (ref 6–8.4)
SODIUM SERPL-SCNC: 141 MMOL/L (ref 136–145)
TRIGL SERPL-MCNC: 145 MG/DL (ref 30–150)
TSH SERPL DL<=0.005 MIU/L-ACNC: 1.33 UIU/ML (ref 0.4–4)

## 2021-07-13 PROCEDURE — 83036 HEMOGLOBIN GLYCOSYLATED A1C: CPT | Performed by: FAMILY MEDICINE

## 2021-07-13 PROCEDURE — 1126F PR PAIN SEVERITY QUANTIFIED, NO PAIN PRESENT: ICD-10-PCS | Mod: S$GLB,,, | Performed by: FAMILY MEDICINE

## 2021-07-13 PROCEDURE — 86803 HEPATITIS C AB TEST: CPT | Performed by: FAMILY MEDICINE

## 2021-07-13 PROCEDURE — 1126F AMNT PAIN NOTED NONE PRSNT: CPT | Mod: S$GLB,,, | Performed by: FAMILY MEDICINE

## 2021-07-13 PROCEDURE — 85025 COMPLETE CBC W/AUTO DIFF WBC: CPT | Performed by: FAMILY MEDICINE

## 2021-07-13 PROCEDURE — 99214 OFFICE O/P EST MOD 30 MIN: CPT | Mod: S$GLB,,, | Performed by: FAMILY MEDICINE

## 2021-07-13 PROCEDURE — 84443 ASSAY THYROID STIM HORMONE: CPT | Performed by: FAMILY MEDICINE

## 2021-07-13 PROCEDURE — 3008F PR BODY MASS INDEX (BMI) DOCUMENTED: ICD-10-PCS | Mod: CPTII,S$GLB,, | Performed by: FAMILY MEDICINE

## 2021-07-13 PROCEDURE — 99999 PR PBB SHADOW E&M-EST. PATIENT-LVL V: ICD-10-PCS | Mod: PBBFAC,,, | Performed by: FAMILY MEDICINE

## 2021-07-13 PROCEDURE — 99214 PR OFFICE/OUTPT VISIT, EST, LEVL IV, 30-39 MIN: ICD-10-PCS | Mod: S$GLB,,, | Performed by: FAMILY MEDICINE

## 2021-07-13 PROCEDURE — 3008F BODY MASS INDEX DOCD: CPT | Mod: CPTII,S$GLB,, | Performed by: FAMILY MEDICINE

## 2021-07-13 PROCEDURE — 3077F PR MOST RECENT SYSTOLIC BLOOD PRESSURE >= 140 MM HG: ICD-10-PCS | Mod: CPTII,S$GLB,, | Performed by: FAMILY MEDICINE

## 2021-07-13 PROCEDURE — 36415 COLL VENOUS BLD VENIPUNCTURE: CPT | Mod: PO | Performed by: FAMILY MEDICINE

## 2021-07-13 PROCEDURE — 3077F SYST BP >= 140 MM HG: CPT | Mod: CPTII,S$GLB,, | Performed by: FAMILY MEDICINE

## 2021-07-13 PROCEDURE — 3079F DIAST BP 80-89 MM HG: CPT | Mod: CPTII,S$GLB,, | Performed by: FAMILY MEDICINE

## 2021-07-13 PROCEDURE — 80061 LIPID PANEL: CPT | Performed by: FAMILY MEDICINE

## 2021-07-13 PROCEDURE — 99999 PR PBB SHADOW E&M-EST. PATIENT-LVL V: CPT | Mod: PBBFAC,,, | Performed by: FAMILY MEDICINE

## 2021-07-13 PROCEDURE — 80053 COMPREHEN METABOLIC PANEL: CPT | Performed by: FAMILY MEDICINE

## 2021-07-13 PROCEDURE — 3079F PR MOST RECENT DIASTOLIC BLOOD PRESSURE 80-89 MM HG: ICD-10-PCS | Mod: CPTII,S$GLB,, | Performed by: FAMILY MEDICINE

## 2021-07-13 RX ORDER — LOSARTAN POTASSIUM AND HYDROCHLOROTHIAZIDE 25; 100 MG/1; MG/1
1 TABLET ORAL DAILY
Qty: 90 TABLET | Refills: 3 | Status: SHIPPED | OUTPATIENT
Start: 2021-07-13 | End: 2022-07-04

## 2021-07-14 LAB
BASOPHILS # BLD AUTO: 0.05 K/UL (ref 0–0.2)
BASOPHILS NFR BLD: 1.2 % (ref 0–1.9)
DIFFERENTIAL METHOD: ABNORMAL
EOSINOPHIL # BLD AUTO: 0.1 K/UL (ref 0–0.5)
EOSINOPHIL NFR BLD: 2.2 % (ref 0–8)
ERYTHROCYTE [DISTWIDTH] IN BLOOD BY AUTOMATED COUNT: 20.1 % (ref 11.5–14.5)
HCT VFR BLD AUTO: 42.2 % (ref 37–48.5)
HGB BLD-MCNC: 12.7 G/DL (ref 12–16)
IMM GRANULOCYTES # BLD AUTO: 0.01 K/UL (ref 0–0.04)
IMM GRANULOCYTES NFR BLD AUTO: 0.2 % (ref 0–0.5)
LYMPHOCYTES # BLD AUTO: 1.2 K/UL (ref 1–4.8)
LYMPHOCYTES NFR BLD: 29.1 % (ref 18–48)
MCH RBC QN AUTO: 22.3 PG (ref 27–31)
MCHC RBC AUTO-ENTMCNC: 30.1 G/DL (ref 32–36)
MCV RBC AUTO: 74 FL (ref 82–98)
MONOCYTES # BLD AUTO: 0.4 K/UL (ref 0.3–1)
MONOCYTES NFR BLD: 9.1 % (ref 4–15)
NEUTROPHILS # BLD AUTO: 2.4 K/UL (ref 1.8–7.7)
NEUTROPHILS NFR BLD: 58.2 % (ref 38–73)
NRBC BLD-RTO: 0 /100 WBC
PLATELET # BLD AUTO: 330 K/UL (ref 150–450)
PMV BLD AUTO: 11.9 FL (ref 9.2–12.9)
RBC # BLD AUTO: 5.7 M/UL (ref 4–5.4)
WBC # BLD AUTO: 4.16 K/UL (ref 3.9–12.7)

## 2021-07-15 ENCOUNTER — TELEPHONE (OUTPATIENT)
Dept: OBSTETRICS AND GYNECOLOGY | Facility: CLINIC | Age: 51
End: 2021-07-15

## 2021-07-15 ENCOUNTER — PATIENT MESSAGE (OUTPATIENT)
Dept: OBSTETRICS AND GYNECOLOGY | Facility: CLINIC | Age: 51
End: 2021-07-15

## 2021-07-15 LAB — HCV AB SERPL QL IA: NEGATIVE

## 2021-08-16 ENCOUNTER — OFFICE VISIT (OUTPATIENT)
Dept: FAMILY MEDICINE | Facility: CLINIC | Age: 51
End: 2021-08-16
Payer: COMMERCIAL

## 2021-08-16 VITALS
BODY MASS INDEX: 44.44 KG/M2 | OXYGEN SATURATION: 98 % | HEART RATE: 69 BPM | DIASTOLIC BLOOD PRESSURE: 92 MMHG | TEMPERATURE: 99 F | HEIGHT: 62 IN | SYSTOLIC BLOOD PRESSURE: 150 MMHG | WEIGHT: 241.5 LBS

## 2021-08-16 DIAGNOSIS — Z09 FOLLOW UP: Primary | ICD-10-CM

## 2021-08-16 DIAGNOSIS — I10 ESSENTIAL HYPERTENSION: ICD-10-CM

## 2021-08-16 DIAGNOSIS — R73.03 PRE-DIABETES: ICD-10-CM

## 2021-08-16 DIAGNOSIS — E66.01 CLASS 3 SEVERE OBESITY DUE TO EXCESS CALORIES WITH BODY MASS INDEX (BMI) OF 40.0 TO 44.9 IN ADULT, UNSPECIFIED WHETHER SERIOUS COMORBIDITY PRESENT: ICD-10-CM

## 2021-08-16 PROCEDURE — 3044F PR MOST RECENT HEMOGLOBIN A1C LEVEL <7.0%: ICD-10-PCS | Mod: CPTII,S$GLB,, | Performed by: FAMILY MEDICINE

## 2021-08-16 PROCEDURE — 1126F AMNT PAIN NOTED NONE PRSNT: CPT | Mod: CPTII,S$GLB,, | Performed by: FAMILY MEDICINE

## 2021-08-16 PROCEDURE — 3008F PR BODY MASS INDEX (BMI) DOCUMENTED: ICD-10-PCS | Mod: CPTII,S$GLB,, | Performed by: FAMILY MEDICINE

## 2021-08-16 PROCEDURE — 3077F PR MOST RECENT SYSTOLIC BLOOD PRESSURE >= 140 MM HG: ICD-10-PCS | Mod: CPTII,S$GLB,, | Performed by: FAMILY MEDICINE

## 2021-08-16 PROCEDURE — 3077F SYST BP >= 140 MM HG: CPT | Mod: CPTII,S$GLB,, | Performed by: FAMILY MEDICINE

## 2021-08-16 PROCEDURE — 3008F BODY MASS INDEX DOCD: CPT | Mod: CPTII,S$GLB,, | Performed by: FAMILY MEDICINE

## 2021-08-16 PROCEDURE — 1159F MED LIST DOCD IN RCRD: CPT | Mod: CPTII,S$GLB,, | Performed by: FAMILY MEDICINE

## 2021-08-16 PROCEDURE — 99999 PR PBB SHADOW E&M-EST. PATIENT-LVL IV: ICD-10-PCS | Mod: PBBFAC,,, | Performed by: FAMILY MEDICINE

## 2021-08-16 PROCEDURE — 99213 PR OFFICE/OUTPT VISIT, EST, LEVL III, 20-29 MIN: ICD-10-PCS | Mod: S$GLB,,, | Performed by: FAMILY MEDICINE

## 2021-08-16 PROCEDURE — 3080F DIAST BP >= 90 MM HG: CPT | Mod: CPTII,S$GLB,, | Performed by: FAMILY MEDICINE

## 2021-08-16 PROCEDURE — 1160F PR REVIEW ALL MEDS BY PRESCRIBER/CLIN PHARMACIST DOCUMENTED: ICD-10-PCS | Mod: CPTII,S$GLB,, | Performed by: FAMILY MEDICINE

## 2021-08-16 PROCEDURE — 99213 OFFICE O/P EST LOW 20 MIN: CPT | Mod: S$GLB,,, | Performed by: FAMILY MEDICINE

## 2021-08-16 PROCEDURE — 3080F PR MOST RECENT DIASTOLIC BLOOD PRESSURE >= 90 MM HG: ICD-10-PCS | Mod: CPTII,S$GLB,, | Performed by: FAMILY MEDICINE

## 2021-08-16 PROCEDURE — 1160F RVW MEDS BY RX/DR IN RCRD: CPT | Mod: CPTII,S$GLB,, | Performed by: FAMILY MEDICINE

## 2021-08-16 PROCEDURE — 99999 PR PBB SHADOW E&M-EST. PATIENT-LVL IV: CPT | Mod: PBBFAC,,, | Performed by: FAMILY MEDICINE

## 2021-08-16 PROCEDURE — 1159F PR MEDICATION LIST DOCUMENTED IN MEDICAL RECORD: ICD-10-PCS | Mod: CPTII,S$GLB,, | Performed by: FAMILY MEDICINE

## 2021-08-16 PROCEDURE — 3044F HG A1C LEVEL LT 7.0%: CPT | Mod: CPTII,S$GLB,, | Performed by: FAMILY MEDICINE

## 2021-08-16 PROCEDURE — 1126F PR PAIN SEVERITY QUANTIFIED, NO PAIN PRESENT: ICD-10-PCS | Mod: CPTII,S$GLB,, | Performed by: FAMILY MEDICINE

## 2021-08-16 RX ORDER — AMLODIPINE BESYLATE 10 MG/1
10 TABLET ORAL DAILY
Qty: 30 TABLET | Refills: 11 | Status: SHIPPED | OUTPATIENT
Start: 2021-08-16 | End: 2022-08-01

## 2021-08-18 ENCOUNTER — PATIENT MESSAGE (OUTPATIENT)
Dept: PULMONOLOGY | Facility: CLINIC | Age: 51
End: 2021-08-18

## 2021-09-08 ENCOUNTER — PATIENT MESSAGE (OUTPATIENT)
Dept: PULMONOLOGY | Facility: CLINIC | Age: 51
End: 2021-09-08

## 2021-09-21 ENCOUNTER — OFFICE VISIT (OUTPATIENT)
Dept: PULMONOLOGY | Facility: CLINIC | Age: 51
End: 2021-09-21
Payer: COMMERCIAL

## 2021-09-21 DIAGNOSIS — R06.83 SNORING: ICD-10-CM

## 2021-09-21 DIAGNOSIS — G47.33 OSA (OBSTRUCTIVE SLEEP APNEA): Primary | ICD-10-CM

## 2021-09-21 DIAGNOSIS — I10 ESSENTIAL HYPERTENSION: ICD-10-CM

## 2021-09-21 PROCEDURE — 3044F PR MOST RECENT HEMOGLOBIN A1C LEVEL <7.0%: ICD-10-PCS | Mod: CPTII,95,, | Performed by: INTERNAL MEDICINE

## 2021-09-21 PROCEDURE — 99203 OFFICE O/P NEW LOW 30 MIN: CPT | Mod: 95,,, | Performed by: INTERNAL MEDICINE

## 2021-09-21 PROCEDURE — 99203 PR OFFICE/OUTPT VISIT, NEW, LEVL III, 30-44 MIN: ICD-10-PCS | Mod: 95,,, | Performed by: INTERNAL MEDICINE

## 2021-09-21 PROCEDURE — 3044F HG A1C LEVEL LT 7.0%: CPT | Mod: CPTII,95,, | Performed by: INTERNAL MEDICINE

## 2021-09-30 ENCOUNTER — TELEPHONE (OUTPATIENT)
Dept: SLEEP MEDICINE | Facility: OTHER | Age: 51
End: 2021-09-30

## 2021-10-08 ENCOUNTER — TELEPHONE (OUTPATIENT)
Dept: FAMILY MEDICINE | Facility: CLINIC | Age: 51
End: 2021-10-08

## 2021-10-08 ENCOUNTER — TELEPHONE (OUTPATIENT)
Dept: SLEEP MEDICINE | Facility: OTHER | Age: 51
End: 2021-10-08

## 2021-10-18 ENCOUNTER — TELEPHONE (OUTPATIENT)
Dept: SLEEP MEDICINE | Facility: OTHER | Age: 51
End: 2021-10-18

## 2021-10-19 ENCOUNTER — HOSPITAL ENCOUNTER (OUTPATIENT)
Dept: SLEEP MEDICINE | Facility: OTHER | Age: 51
Discharge: HOME | End: 2021-10-19
Attending: INTERNAL MEDICINE
Payer: COMMERCIAL

## 2021-10-19 DIAGNOSIS — G47.33 OSA (OBSTRUCTIVE SLEEP APNEA): ICD-10-CM

## 2021-10-19 PROCEDURE — 95800 SLP STDY UNATTENDED: CPT | Mod: 26,,, | Performed by: INTERNAL MEDICINE

## 2021-10-19 PROCEDURE — 95800 PR SLEEP STUDY, UNATTENDED, RECORD HEART RATE/O2 SAT/RESP ANAL/SLEEP TIME: ICD-10-PCS | Mod: 26,,, | Performed by: INTERNAL MEDICINE

## 2021-10-19 PROCEDURE — 95800 SLP STDY UNATTENDED: CPT

## 2021-10-22 ENCOUNTER — TELEPHONE (OUTPATIENT)
Dept: PULMONOLOGY | Facility: CLINIC | Age: 51
End: 2021-10-22

## 2021-10-25 ENCOUNTER — TELEPHONE (OUTPATIENT)
Dept: PULMONOLOGY | Facility: CLINIC | Age: 51
End: 2021-10-25
Payer: COMMERCIAL

## 2021-10-25 ENCOUNTER — TELEPHONE (OUTPATIENT)
Dept: HEMATOLOGY/ONCOLOGY | Facility: CLINIC | Age: 51
End: 2021-10-25
Payer: COMMERCIAL

## 2021-10-28 ENCOUNTER — PATIENT OUTREACH (OUTPATIENT)
Dept: ADMINISTRATIVE | Facility: OTHER | Age: 51
End: 2021-10-28
Payer: COMMERCIAL

## 2021-10-28 ENCOUNTER — OFFICE VISIT (OUTPATIENT)
Dept: PULMONOLOGY | Facility: CLINIC | Age: 51
End: 2021-10-28
Payer: COMMERCIAL

## 2021-10-28 DIAGNOSIS — G47.33 OSA (OBSTRUCTIVE SLEEP APNEA): Primary | ICD-10-CM

## 2021-10-28 PROCEDURE — 3044F HG A1C LEVEL LT 7.0%: CPT | Mod: CPTII,95,, | Performed by: INTERNAL MEDICINE

## 2021-10-28 PROCEDURE — 99214 OFFICE O/P EST MOD 30 MIN: CPT | Mod: 95,,, | Performed by: INTERNAL MEDICINE

## 2021-10-28 PROCEDURE — 3044F PR MOST RECENT HEMOGLOBIN A1C LEVEL <7.0%: ICD-10-PCS | Mod: CPTII,95,, | Performed by: INTERNAL MEDICINE

## 2021-10-28 PROCEDURE — 99214 PR OFFICE/OUTPT VISIT, EST, LEVL IV, 30-39 MIN: ICD-10-PCS | Mod: 95,,, | Performed by: INTERNAL MEDICINE

## 2021-10-30 ENCOUNTER — OFFICE VISIT (OUTPATIENT)
Dept: URGENT CARE | Facility: CLINIC | Age: 51
End: 2021-10-30
Payer: COMMERCIAL

## 2021-10-30 VITALS
BODY MASS INDEX: 43.24 KG/M2 | HEIGHT: 62 IN | WEIGHT: 235 LBS | HEART RATE: 78 BPM | DIASTOLIC BLOOD PRESSURE: 85 MMHG | OXYGEN SATURATION: 98 % | TEMPERATURE: 99 F | SYSTOLIC BLOOD PRESSURE: 127 MMHG | RESPIRATION RATE: 18 BRPM

## 2021-10-30 DIAGNOSIS — M54.9 BACK PAIN, UNSPECIFIED BACK LOCATION, UNSPECIFIED BACK PAIN LATERALITY, UNSPECIFIED CHRONICITY: Primary | ICD-10-CM

## 2021-10-30 LAB
BILIRUB UR QL STRIP: NEGATIVE
GLUCOSE UR QL STRIP: NEGATIVE
KETONES UR QL STRIP: NEGATIVE
LEUKOCYTE ESTERASE UR QL STRIP: NEGATIVE
PH, POC UA: 8
POC BLOOD, URINE: NEGATIVE
POC NITRATES, URINE: NEGATIVE
PROT UR QL STRIP: NEGATIVE
SP GR UR STRIP: 1.01 (ref 1–1.03)
UROBILINOGEN UR STRIP-ACNC: NORMAL (ref 0.1–1.1)

## 2021-10-30 PROCEDURE — 99213 PR OFFICE/OUTPT VISIT, EST, LEVL III, 20-29 MIN: ICD-10-PCS | Mod: 25,S$GLB,, | Performed by: NURSE PRACTITIONER

## 2021-10-30 PROCEDURE — 1159F PR MEDICATION LIST DOCUMENTED IN MEDICAL RECORD: ICD-10-PCS | Mod: CPTII,S$GLB,, | Performed by: NURSE PRACTITIONER

## 2021-10-30 PROCEDURE — 1160F RVW MEDS BY RX/DR IN RCRD: CPT | Mod: CPTII,S$GLB,, | Performed by: NURSE PRACTITIONER

## 2021-10-30 PROCEDURE — 3074F SYST BP LT 130 MM HG: CPT | Mod: CPTII,S$GLB,, | Performed by: NURSE PRACTITIONER

## 2021-10-30 PROCEDURE — 3008F PR BODY MASS INDEX (BMI) DOCUMENTED: ICD-10-PCS | Mod: CPTII,S$GLB,, | Performed by: NURSE PRACTITIONER

## 2021-10-30 PROCEDURE — 99213 OFFICE O/P EST LOW 20 MIN: CPT | Mod: 25,S$GLB,, | Performed by: NURSE PRACTITIONER

## 2021-10-30 PROCEDURE — 3074F PR MOST RECENT SYSTOLIC BLOOD PRESSURE < 130 MM HG: ICD-10-PCS | Mod: CPTII,S$GLB,, | Performed by: NURSE PRACTITIONER

## 2021-10-30 PROCEDURE — 3008F BODY MASS INDEX DOCD: CPT | Mod: CPTII,S$GLB,, | Performed by: NURSE PRACTITIONER

## 2021-10-30 PROCEDURE — 3044F PR MOST RECENT HEMOGLOBIN A1C LEVEL <7.0%: ICD-10-PCS | Mod: CPTII,S$GLB,, | Performed by: NURSE PRACTITIONER

## 2021-10-30 PROCEDURE — 81003 URINALYSIS AUTO W/O SCOPE: CPT | Mod: QW,S$GLB,, | Performed by: NURSE PRACTITIONER

## 2021-10-30 PROCEDURE — 1159F MED LIST DOCD IN RCRD: CPT | Mod: CPTII,S$GLB,, | Performed by: NURSE PRACTITIONER

## 2021-10-30 PROCEDURE — 1160F PR REVIEW ALL MEDS BY PRESCRIBER/CLIN PHARMACIST DOCUMENTED: ICD-10-PCS | Mod: CPTII,S$GLB,, | Performed by: NURSE PRACTITIONER

## 2021-10-30 PROCEDURE — 72100 X-RAY EXAM L-S SPINE 2/3 VWS: CPT | Mod: FY,S$GLB,, | Performed by: RADIOLOGY

## 2021-10-30 PROCEDURE — 72100 XR LUMBAR SPINE 2 OR 3 VIEWS: ICD-10-PCS | Mod: FY,S$GLB,, | Performed by: RADIOLOGY

## 2021-10-30 PROCEDURE — 3079F DIAST BP 80-89 MM HG: CPT | Mod: CPTII,S$GLB,, | Performed by: NURSE PRACTITIONER

## 2021-10-30 PROCEDURE — 3079F PR MOST RECENT DIASTOLIC BLOOD PRESSURE 80-89 MM HG: ICD-10-PCS | Mod: CPTII,S$GLB,, | Performed by: NURSE PRACTITIONER

## 2021-10-30 PROCEDURE — 3044F HG A1C LEVEL LT 7.0%: CPT | Mod: CPTII,S$GLB,, | Performed by: NURSE PRACTITIONER

## 2021-10-30 PROCEDURE — 81003 POCT URINALYSIS, DIPSTICK, AUTOMATED, W/O SCOPE: ICD-10-PCS | Mod: QW,S$GLB,, | Performed by: NURSE PRACTITIONER

## 2021-10-30 RX ORDER — METHOCARBAMOL 500 MG/1
500 TABLET, FILM COATED ORAL 2 TIMES DAILY PRN
Qty: 20 TABLET | Refills: 0 | Status: SHIPPED | OUTPATIENT
Start: 2021-10-30 | End: 2022-11-28

## 2021-11-01 ENCOUNTER — OFFICE VISIT (OUTPATIENT)
Dept: PODIATRY | Facility: CLINIC | Age: 51
End: 2021-11-01
Payer: COMMERCIAL

## 2021-11-01 ENCOUNTER — HOSPITAL ENCOUNTER (OUTPATIENT)
Dept: RADIOLOGY | Facility: HOSPITAL | Age: 51
Discharge: HOME | End: 2021-11-01
Attending: PODIATRIST
Payer: COMMERCIAL

## 2021-11-01 VITALS
HEIGHT: 62 IN | DIASTOLIC BLOOD PRESSURE: 93 MMHG | HEART RATE: 79 BPM | BODY MASS INDEX: 43.24 KG/M2 | WEIGHT: 235 LBS | SYSTOLIC BLOOD PRESSURE: 150 MMHG

## 2021-11-01 DIAGNOSIS — Z98.890 HISTORY OF FOOT SURGERY: ICD-10-CM

## 2021-11-01 DIAGNOSIS — R60.0 EDEMA OF LEFT FOOT: Primary | ICD-10-CM

## 2021-11-01 DIAGNOSIS — R60.0 EDEMA OF LEFT LOWER EXTREMITY: ICD-10-CM

## 2021-11-01 PROCEDURE — 73630 XR FOOT COMPLETE 3 VIEW LEFT: ICD-10-PCS | Mod: 26,LT,, | Performed by: RADIOLOGY

## 2021-11-01 PROCEDURE — 3077F PR MOST RECENT SYSTOLIC BLOOD PRESSURE >= 140 MM HG: ICD-10-PCS | Mod: CPTII,S$GLB,, | Performed by: PODIATRIST

## 2021-11-01 PROCEDURE — 3080F DIAST BP >= 90 MM HG: CPT | Mod: CPTII,S$GLB,, | Performed by: PODIATRIST

## 2021-11-01 PROCEDURE — 99999 PR PBB SHADOW E&M-EST. PATIENT-LVL IV: ICD-10-PCS | Mod: PBBFAC,,, | Performed by: PODIATRIST

## 2021-11-01 PROCEDURE — 3008F BODY MASS INDEX DOCD: CPT | Mod: CPTII,S$GLB,, | Performed by: PODIATRIST

## 2021-11-01 PROCEDURE — 1160F RVW MEDS BY RX/DR IN RCRD: CPT | Mod: CPTII,S$GLB,, | Performed by: PODIATRIST

## 2021-11-01 PROCEDURE — 1159F MED LIST DOCD IN RCRD: CPT | Mod: CPTII,S$GLB,, | Performed by: PODIATRIST

## 2021-11-01 PROCEDURE — 1160F PR REVIEW ALL MEDS BY PRESCRIBER/CLIN PHARMACIST DOCUMENTED: ICD-10-PCS | Mod: CPTII,S$GLB,, | Performed by: PODIATRIST

## 2021-11-01 PROCEDURE — 99213 PR OFFICE/OUTPT VISIT, EST, LEVL III, 20-29 MIN: ICD-10-PCS | Mod: S$GLB,,, | Performed by: PODIATRIST

## 2021-11-01 PROCEDURE — 1159F PR MEDICATION LIST DOCUMENTED IN MEDICAL RECORD: ICD-10-PCS | Mod: CPTII,S$GLB,, | Performed by: PODIATRIST

## 2021-11-01 PROCEDURE — 3044F PR MOST RECENT HEMOGLOBIN A1C LEVEL <7.0%: ICD-10-PCS | Mod: CPTII,S$GLB,, | Performed by: PODIATRIST

## 2021-11-01 PROCEDURE — 73630 X-RAY EXAM OF FOOT: CPT | Mod: TC,FY,LT

## 2021-11-01 PROCEDURE — 3008F PR BODY MASS INDEX (BMI) DOCUMENTED: ICD-10-PCS | Mod: CPTII,S$GLB,, | Performed by: PODIATRIST

## 2021-11-01 PROCEDURE — 99999 PR PBB SHADOW E&M-EST. PATIENT-LVL IV: CPT | Mod: PBBFAC,,, | Performed by: PODIATRIST

## 2021-11-01 PROCEDURE — 99213 OFFICE O/P EST LOW 20 MIN: CPT | Mod: S$GLB,,, | Performed by: PODIATRIST

## 2021-11-01 PROCEDURE — 3044F HG A1C LEVEL LT 7.0%: CPT | Mod: CPTII,S$GLB,, | Performed by: PODIATRIST

## 2021-11-01 PROCEDURE — 3080F PR MOST RECENT DIASTOLIC BLOOD PRESSURE >= 90 MM HG: ICD-10-PCS | Mod: CPTII,S$GLB,, | Performed by: PODIATRIST

## 2021-11-01 PROCEDURE — 3077F SYST BP >= 140 MM HG: CPT | Mod: CPTII,S$GLB,, | Performed by: PODIATRIST

## 2021-11-01 PROCEDURE — 73630 X-RAY EXAM OF FOOT: CPT | Mod: 26,LT,, | Performed by: RADIOLOGY

## 2021-11-05 ENCOUNTER — OFFICE VISIT (OUTPATIENT)
Dept: NEUROSURGERY | Facility: CLINIC | Age: 51
End: 2021-11-05
Payer: COMMERCIAL

## 2021-11-05 ENCOUNTER — HOSPITAL ENCOUNTER (OUTPATIENT)
Dept: RADIOLOGY | Facility: HOSPITAL | Age: 51
Discharge: HOME | End: 2021-11-05
Attending: NURSE PRACTITIONER
Payer: COMMERCIAL

## 2021-11-05 VITALS — DIASTOLIC BLOOD PRESSURE: 90 MMHG | HEART RATE: 76 BPM | SYSTOLIC BLOOD PRESSURE: 146 MMHG

## 2021-11-05 DIAGNOSIS — M54.9 DORSALGIA, UNSPECIFIED: ICD-10-CM

## 2021-11-05 DIAGNOSIS — M54.9 DORSALGIA, UNSPECIFIED: Primary | ICD-10-CM

## 2021-11-05 PROCEDURE — 99204 PR OFFICE/OUTPT VISIT, NEW, LEVL IV, 45-59 MIN: ICD-10-PCS | Mod: S$GLB,,, | Performed by: NURSE PRACTITIONER

## 2021-11-05 PROCEDURE — 72110 X-RAY EXAM L-2 SPINE 4/>VWS: CPT | Mod: 26,,, | Performed by: RADIOLOGY

## 2021-11-05 PROCEDURE — 72110 X-RAY EXAM L-2 SPINE 4/>VWS: CPT | Mod: TC

## 2021-11-05 PROCEDURE — 3080F PR MOST RECENT DIASTOLIC BLOOD PRESSURE >= 90 MM HG: ICD-10-PCS | Mod: CPTII,S$GLB,, | Performed by: NURSE PRACTITIONER

## 2021-11-05 PROCEDURE — 3077F PR MOST RECENT SYSTOLIC BLOOD PRESSURE >= 140 MM HG: ICD-10-PCS | Mod: CPTII,S$GLB,, | Performed by: NURSE PRACTITIONER

## 2021-11-05 PROCEDURE — 99204 OFFICE O/P NEW MOD 45 MIN: CPT | Mod: S$GLB,,, | Performed by: NURSE PRACTITIONER

## 2021-11-05 PROCEDURE — 1159F PR MEDICATION LIST DOCUMENTED IN MEDICAL RECORD: ICD-10-PCS | Mod: CPTII,S$GLB,, | Performed by: NURSE PRACTITIONER

## 2021-11-05 PROCEDURE — 1159F MED LIST DOCD IN RCRD: CPT | Mod: CPTII,S$GLB,, | Performed by: NURSE PRACTITIONER

## 2021-11-05 PROCEDURE — 72110 XR LUMBAR SPINE 5 VIEW WITH FLEX AND EXT: ICD-10-PCS | Mod: 26,,, | Performed by: RADIOLOGY

## 2021-11-05 PROCEDURE — 3077F SYST BP >= 140 MM HG: CPT | Mod: CPTII,S$GLB,, | Performed by: NURSE PRACTITIONER

## 2021-11-05 PROCEDURE — 1160F PR REVIEW ALL MEDS BY PRESCRIBER/CLIN PHARMACIST DOCUMENTED: ICD-10-PCS | Mod: CPTII,S$GLB,, | Performed by: NURSE PRACTITIONER

## 2021-11-05 PROCEDURE — 3080F DIAST BP >= 90 MM HG: CPT | Mod: CPTII,S$GLB,, | Performed by: NURSE PRACTITIONER

## 2021-11-05 PROCEDURE — 1160F RVW MEDS BY RX/DR IN RCRD: CPT | Mod: CPTII,S$GLB,, | Performed by: NURSE PRACTITIONER

## 2021-11-05 PROCEDURE — 99999 PR PBB SHADOW E&M-EST. PATIENT-LVL III: CPT | Mod: PBBFAC,,, | Performed by: NURSE PRACTITIONER

## 2021-11-05 PROCEDURE — 99999 PR PBB SHADOW E&M-EST. PATIENT-LVL III: ICD-10-PCS | Mod: PBBFAC,,, | Performed by: NURSE PRACTITIONER

## 2021-11-05 PROCEDURE — 3044F HG A1C LEVEL LT 7.0%: CPT | Mod: CPTII,S$GLB,, | Performed by: NURSE PRACTITIONER

## 2021-11-05 PROCEDURE — 3044F PR MOST RECENT HEMOGLOBIN A1C LEVEL <7.0%: ICD-10-PCS | Mod: CPTII,S$GLB,, | Performed by: NURSE PRACTITIONER

## 2021-11-23 ENCOUNTER — OFFICE VISIT (OUTPATIENT)
Dept: HEMATOLOGY/ONCOLOGY | Facility: CLINIC | Age: 51
End: 2021-11-23
Payer: COMMERCIAL

## 2021-11-23 VITALS
DIASTOLIC BLOOD PRESSURE: 89 MMHG | BODY MASS INDEX: 44.79 KG/M2 | SYSTOLIC BLOOD PRESSURE: 147 MMHG | HEIGHT: 62 IN | TEMPERATURE: 99 F | WEIGHT: 243.38 LBS | OXYGEN SATURATION: 98 % | RESPIRATION RATE: 20 BRPM | HEART RATE: 83 BPM

## 2021-11-23 DIAGNOSIS — C50.111 MALIGNANT NEOPLASM OF CENTRAL PORTION OF RIGHT BREAST IN FEMALE, ESTROGEN RECEPTOR POSITIVE: Primary | ICD-10-CM

## 2021-11-23 DIAGNOSIS — Z17.0 MALIGNANT NEOPLASM OF CENTRAL PORTION OF RIGHT BREAST IN FEMALE, ESTROGEN RECEPTOR POSITIVE: Primary | ICD-10-CM

## 2021-11-23 PROCEDURE — 99999 PR PBB SHADOW E&M-EST. PATIENT-LVL IV: CPT | Mod: PBBFAC,,, | Performed by: NURSE PRACTITIONER

## 2021-11-23 PROCEDURE — 99999 PR PBB SHADOW E&M-EST. PATIENT-LVL IV: ICD-10-PCS | Mod: PBBFAC,,, | Performed by: NURSE PRACTITIONER

## 2021-11-23 PROCEDURE — 99214 PR OFFICE/OUTPT VISIT, EST, LEVL IV, 30-39 MIN: ICD-10-PCS | Mod: S$GLB,,, | Performed by: NURSE PRACTITIONER

## 2021-11-23 PROCEDURE — 99214 OFFICE O/P EST MOD 30 MIN: CPT | Mod: S$GLB,,, | Performed by: NURSE PRACTITIONER

## 2021-12-13 ENCOUNTER — TELEPHONE (OUTPATIENT)
Dept: FAMILY MEDICINE | Facility: CLINIC | Age: 51
End: 2021-12-13
Payer: COMMERCIAL

## 2021-12-15 ENCOUNTER — PATIENT MESSAGE (OUTPATIENT)
Dept: ADMINISTRATIVE | Facility: HOSPITAL | Age: 51
End: 2021-12-15
Payer: COMMERCIAL

## 2022-04-27 NOTE — PROGRESS NOTES
Subjective:       Patient ID: Brittny Urias is a 51 y.o. female.    Chief Complaint: Malignant neoplasm of central portion of right breast in fe    HPI 51-year-old postmenopausal female who returns for follow-up of a diagnosis of right breast cancer, T1cN1a, ER positive, HER2 negative.  She is on letrozole hormonal therapy.    Blood pressure is elevated, she sees her PCP today at noon, this will be addressed.  She is still not back in her house from [place], but she was able to buy a new house.   Hot flashes have been getting worse.     Appetite is good.  She has been exercising (walking 4-5 times a week) and is feeling well.       Per Dr. Woods's previous note: Breast History: Screening mammogram on August 3, 2018 showed architectural distortion in the retroareolar right breast.  Follow-up diagnostic mammogram on August 18th showed architectural distortion and nipple retraction on the right.  Ultrasound showed no definite abnormality.    August 21, 2018 a biopsy was performed which showed infiltrating lobular carcinoma (histologic grade 3, nuclear grade 1, mitotic index 1).  ER was 90% positive, OH was 20% positive and HER2 was 0.  Ki-67 was less than 1%.    MRI of the breast on September 4, 2018 showed a 3.2 x 1.8 x 1.4 cm mass with spiculated margins in the subareolar region of the right breast.  The left breast was unremarkable.    On November 14, 2018 bilateral mastectomies were performed.  The right breast showed a 2 cm intermediate grade carcinoma (3+ 2+ 1) 2 of 3 sentinel lymph nodes were positive with some extracapsular extension.  Completion axillary lymph node dissection showed 0 of 12 additional nodes were involved.  Margins were negative.  The left breast showed no abnormalities.     Mammaprint was low risk at + 0.362    She completed radiation on February 26, 2019.    Letrozole was started in March 2019.    Review of Systems   Constitutional: Negative.  Negative for activity change, appetite change,  chills, diaphoresis, fatigue, fever and unexpected weight change.   HENT: Negative.  Negative for nosebleeds.    Eyes: Negative for visual disturbance.   Respiratory: Negative.  Negative for cough and shortness of breath.    Cardiovascular: Negative.  Negative for chest pain, palpitations and leg swelling.   Gastrointestinal: Negative.  Negative for abdominal distention, abdominal pain, blood in stool, constipation, diarrhea, nausea and vomiting.   Endocrine: Positive for heat intolerance (hot flashes).   Genitourinary: Negative.  Negative for frequency, hematuria and vaginal bleeding.   Musculoskeletal: Negative for arthralgias, back pain and myalgias.   Skin: Negative for pallor and rash.   Allergic/Immunologic: Negative for immunocompromised state.   Neurological: Negative for dizziness, weakness, light-headedness, numbness and headaches.   Hematological: Negative for adenopathy. Does not bruise/bleed easily.   Psychiatric/Behavioral: Negative.  Negative for confusion. The patient is not nervous/anxious.         Emotional due to outside stressors       Objective:      Physical Exam  Vitals and nursing note reviewed.   Constitutional:       General: She is not in acute distress.     Appearance: Normal appearance. She is well-developed.   HENT:      Head: Normocephalic.   Eyes:      Pupils: Pupils are equal, round, and reactive to light.   Cardiovascular:      Rate and Rhythm: Normal rate and regular rhythm.      Heart sounds: Normal heart sounds.   Pulmonary:      Effort: Pulmonary effort is normal. No respiratory distress.      Breath sounds: Normal breath sounds. No wheezing.   Chest:       Abdominal:      General: Bowel sounds are normal. There is no distension.      Palpations: Abdomen is soft. There is no mass.      Tenderness: There is no abdominal tenderness.   Musculoskeletal:      Cervical back: Normal range of motion.   Skin:     General: Skin is warm and dry.      Findings: No rash.   Neurological:       Mental Status: She is alert and oriented to person, place, and time.   Psychiatric:         Behavior: Behavior normal.         Thought Content: Thought content normal.         Assessment:       1. Malignant neoplasm of central portion of right breast in female, estrogen receptor positive        Plan:       1. She will continue letrozole and return to clinic in 6 months time  - DXA normal BMD in Jan 2021, repeat in Jan 2023.     Return to clinic in 6 months with MD appointment.     Patient is in agreement with the proposed treatment plan. All questions were answered to the patient's satisfaction. Patient knows to call clinic for any new or worsening symptoms and if anything is needed before the next clinic visit.          Lubna Jamison, RIGOBERTOP-C  Hematology & Medical Oncology   [facility] [place] [address]  ph. [phone]  Fax. [phone]    Collaborating physician, Dr. Woods.    Approximately 15 minutes were spent face-to-face with the patient.  Approximately 25 minutes in total were spent on this encounter, which includes face-to-face time and non-face-to-face time preparing to see the patient (e.g., review of tests), obtaining and/or reviewing separately obtained history, documenting clinical information in the electronic or other health record, independently interpreting results (not separately reported) and communicating results to the patient/family/caregiver, or care coordination (not separately reported).     Route Chart for Scheduling    Med Onc Chart Routing      Follow up with physician 6 months.   Follow up with GARY    Labs    Imaging    Pharmacy appointment    Other referrals                 .

## 2022-05-10 ENCOUNTER — PATIENT MESSAGE (OUTPATIENT)
Dept: BEHAVIORAL HEALTH | Facility: CLINIC | Age: 52
End: 2022-05-10
Payer: COMMERCIAL

## 2022-05-10 ENCOUNTER — OFFICE VISIT (OUTPATIENT)
Dept: HEMATOLOGY/ONCOLOGY | Facility: CLINIC | Age: 52
End: 2022-05-10
Payer: COMMERCIAL

## 2022-05-10 ENCOUNTER — TELEPHONE (OUTPATIENT)
Dept: BEHAVIORAL HEALTH | Facility: CLINIC | Age: 52
End: 2022-05-10
Payer: COMMERCIAL

## 2022-05-10 ENCOUNTER — OFFICE VISIT (OUTPATIENT)
Dept: FAMILY MEDICINE | Facility: CLINIC | Age: 52
End: 2022-05-10
Payer: COMMERCIAL

## 2022-05-10 VITALS
HEIGHT: 62 IN | RESPIRATION RATE: 18 BRPM | SYSTOLIC BLOOD PRESSURE: 185 MMHG | BODY MASS INDEX: 46.29 KG/M2 | OXYGEN SATURATION: 96 % | WEIGHT: 251.56 LBS | HEART RATE: 67 BPM | DIASTOLIC BLOOD PRESSURE: 96 MMHG

## 2022-05-10 VITALS
OXYGEN SATURATION: 98 % | BODY MASS INDEX: 46.59 KG/M2 | DIASTOLIC BLOOD PRESSURE: 80 MMHG | SYSTOLIC BLOOD PRESSURE: 136 MMHG | HEIGHT: 62 IN | TEMPERATURE: 98 F | WEIGHT: 253.19 LBS | RESPIRATION RATE: 16 BRPM | HEART RATE: 73 BPM

## 2022-05-10 DIAGNOSIS — E66.01 CLASS 3 SEVERE OBESITY DUE TO EXCESS CALORIES WITH SERIOUS COMORBIDITY AND BODY MASS INDEX (BMI) OF 45.0 TO 49.9 IN ADULT: Primary | ICD-10-CM

## 2022-05-10 DIAGNOSIS — R73.03 PRE-DIABETES: ICD-10-CM

## 2022-05-10 DIAGNOSIS — I10 ESSENTIAL HYPERTENSION: ICD-10-CM

## 2022-05-10 DIAGNOSIS — Z17.0 MALIGNANT NEOPLASM OF CENTRAL PORTION OF RIGHT BREAST IN FEMALE, ESTROGEN RECEPTOR POSITIVE: Primary | ICD-10-CM

## 2022-05-10 DIAGNOSIS — N95.1 MENOPAUSAL SYMPTOMS: ICD-10-CM

## 2022-05-10 DIAGNOSIS — C50.111 MALIGNANT NEOPLASM OF CENTRAL PORTION OF RIGHT BREAST IN FEMALE, ESTROGEN RECEPTOR POSITIVE: Primary | ICD-10-CM

## 2022-05-10 DIAGNOSIS — F43.21 ADJUSTMENT DISORDER WITH DEPRESSED MOOD: ICD-10-CM

## 2022-05-10 DIAGNOSIS — C50.111 MALIGNANT NEOPLASM OF CENTRAL PORTION OF RIGHT FEMALE BREAST: ICD-10-CM

## 2022-05-10 DIAGNOSIS — E55.9 VITAMIN D DEFICIENCY: Primary | ICD-10-CM

## 2022-05-10 PROCEDURE — 3008F PR BODY MASS INDEX (BMI) DOCUMENTED: ICD-10-PCS | Mod: CPTII,S$GLB,, | Performed by: NURSE PRACTITIONER

## 2022-05-10 PROCEDURE — 1160F PR REVIEW ALL MEDS BY PRESCRIBER/CLIN PHARMACIST DOCUMENTED: ICD-10-PCS | Mod: CPTII,S$GLB,, | Performed by: NURSE PRACTITIONER

## 2022-05-10 PROCEDURE — 99214 OFFICE O/P EST MOD 30 MIN: CPT | Mod: S$GLB,,, | Performed by: FAMILY MEDICINE

## 2022-05-10 PROCEDURE — 99999 PR PBB SHADOW E&M-EST. PATIENT-LVL V: CPT | Mod: PBBFAC,,, | Performed by: FAMILY MEDICINE

## 2022-05-10 PROCEDURE — 3075F SYST BP GE 130 - 139MM HG: CPT | Mod: CPTII,S$GLB,, | Performed by: FAMILY MEDICINE

## 2022-05-10 PROCEDURE — 3008F BODY MASS INDEX DOCD: CPT | Mod: CPTII,S$GLB,, | Performed by: FAMILY MEDICINE

## 2022-05-10 PROCEDURE — 3080F DIAST BP >= 90 MM HG: CPT | Mod: CPTII,S$GLB,, | Performed by: NURSE PRACTITIONER

## 2022-05-10 PROCEDURE — 3008F BODY MASS INDEX DOCD: CPT | Mod: CPTII,S$GLB,, | Performed by: NURSE PRACTITIONER

## 2022-05-10 PROCEDURE — 3079F PR MOST RECENT DIASTOLIC BLOOD PRESSURE 80-89 MM HG: ICD-10-PCS | Mod: CPTII,S$GLB,, | Performed by: FAMILY MEDICINE

## 2022-05-10 PROCEDURE — 3077F SYST BP >= 140 MM HG: CPT | Mod: CPTII,S$GLB,, | Performed by: NURSE PRACTITIONER

## 2022-05-10 PROCEDURE — 3008F PR BODY MASS INDEX (BMI) DOCUMENTED: ICD-10-PCS | Mod: CPTII,S$GLB,, | Performed by: FAMILY MEDICINE

## 2022-05-10 PROCEDURE — 99214 PR OFFICE/OUTPT VISIT, EST, LEVL IV, 30-39 MIN: ICD-10-PCS | Mod: S$GLB,,, | Performed by: NURSE PRACTITIONER

## 2022-05-10 PROCEDURE — 99214 OFFICE O/P EST MOD 30 MIN: CPT | Mod: S$GLB,,, | Performed by: NURSE PRACTITIONER

## 2022-05-10 PROCEDURE — 1160F RVW MEDS BY RX/DR IN RCRD: CPT | Mod: CPTII,S$GLB,, | Performed by: NURSE PRACTITIONER

## 2022-05-10 PROCEDURE — 99214 PR OFFICE/OUTPT VISIT, EST, LEVL IV, 30-39 MIN: ICD-10-PCS | Mod: S$GLB,,, | Performed by: FAMILY MEDICINE

## 2022-05-10 PROCEDURE — 3075F PR MOST RECENT SYSTOLIC BLOOD PRESS GE 130-139MM HG: ICD-10-PCS | Mod: CPTII,S$GLB,, | Performed by: FAMILY MEDICINE

## 2022-05-10 PROCEDURE — 1159F PR MEDICATION LIST DOCUMENTED IN MEDICAL RECORD: ICD-10-PCS | Mod: CPTII,S$GLB,, | Performed by: NURSE PRACTITIONER

## 2022-05-10 PROCEDURE — 1159F MED LIST DOCD IN RCRD: CPT | Mod: CPTII,S$GLB,, | Performed by: FAMILY MEDICINE

## 2022-05-10 PROCEDURE — 1160F RVW MEDS BY RX/DR IN RCRD: CPT | Mod: CPTII,S$GLB,, | Performed by: FAMILY MEDICINE

## 2022-05-10 PROCEDURE — 99999 PR PBB SHADOW E&M-EST. PATIENT-LVL IV: CPT | Mod: PBBFAC,,, | Performed by: NURSE PRACTITIONER

## 2022-05-10 PROCEDURE — 1159F MED LIST DOCD IN RCRD: CPT | Mod: CPTII,S$GLB,, | Performed by: NURSE PRACTITIONER

## 2022-05-10 PROCEDURE — 99999 PR PBB SHADOW E&M-EST. PATIENT-LVL IV: ICD-10-PCS | Mod: PBBFAC,,, | Performed by: NURSE PRACTITIONER

## 2022-05-10 PROCEDURE — 99999 PR PBB SHADOW E&M-EST. PATIENT-LVL V: ICD-10-PCS | Mod: PBBFAC,,, | Performed by: FAMILY MEDICINE

## 2022-05-10 PROCEDURE — 3080F PR MOST RECENT DIASTOLIC BLOOD PRESSURE >= 90 MM HG: ICD-10-PCS | Mod: CPTII,S$GLB,, | Performed by: NURSE PRACTITIONER

## 2022-05-10 PROCEDURE — 3079F DIAST BP 80-89 MM HG: CPT | Mod: CPTII,S$GLB,, | Performed by: FAMILY MEDICINE

## 2022-05-10 PROCEDURE — 3077F PR MOST RECENT SYSTOLIC BLOOD PRESSURE >= 140 MM HG: ICD-10-PCS | Mod: CPTII,S$GLB,, | Performed by: NURSE PRACTITIONER

## 2022-05-10 PROCEDURE — 1159F PR MEDICATION LIST DOCUMENTED IN MEDICAL RECORD: ICD-10-PCS | Mod: CPTII,S$GLB,, | Performed by: FAMILY MEDICINE

## 2022-05-10 PROCEDURE — 1160F PR REVIEW ALL MEDS BY PRESCRIBER/CLIN PHARMACIST DOCUMENTED: ICD-10-PCS | Mod: CPTII,S$GLB,, | Performed by: FAMILY MEDICINE

## 2022-05-10 RX ORDER — MULTIVITAMIN
1 TABLET ORAL DAILY
COMMUNITY

## 2022-05-10 RX ORDER — VENLAFAXINE HYDROCHLORIDE 150 MG/1
150 CAPSULE, EXTENDED RELEASE ORAL DAILY
Qty: 90 CAPSULE | Refills: 3 | Status: SHIPPED | OUTPATIENT
Start: 2022-05-10 | End: 2023-05-03

## 2022-05-10 RX ORDER — LETROZOLE 2.5 MG/1
2.5 TABLET, FILM COATED ORAL DAILY
Qty: 90 TABLET | Refills: 3 | Status: SHIPPED | OUTPATIENT
Start: 2022-05-10 | End: 2023-05-03

## 2022-05-10 RX ORDER — ASPIRIN 325 MG
50000 TABLET, DELAYED RELEASE (ENTERIC COATED) ORAL
Qty: 12 CAPSULE | Refills: 1 | Status: SHIPPED | OUTPATIENT
Start: 2022-05-10 | End: 2022-11-06 | Stop reason: SDUPTHER
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
## 2022-05-11 ENCOUNTER — TELEPHONE (OUTPATIENT)
Dept: BEHAVIORAL HEALTH | Facility: CLINIC | Age: 52
End: 2022-05-11
Payer: COMMERCIAL

## 2022-05-11 ENCOUNTER — LAB VISIT (OUTPATIENT)
Dept: LAB | Facility: HOSPITAL | Age: 52
End: 2022-05-11
Attending: FAMILY MEDICINE
Payer: COMMERCIAL

## 2022-05-11 DIAGNOSIS — I10 ESSENTIAL HYPERTENSION: ICD-10-CM

## 2022-05-11 DIAGNOSIS — R73.03 PRE-DIABETES: ICD-10-CM

## 2022-05-11 LAB
ALBUMIN SERPL BCP-MCNC: 4.5 G/DL (ref 3.5–5.2)
ALP SERPL-CCNC: 92 U/L (ref 38–126)
ALT SERPL W/O P-5'-P-CCNC: 26 U/L (ref 10–44)
ANION GAP SERPL CALC-SCNC: 9 MMOL/L (ref 8–16)
AST SERPL-CCNC: 27 U/L (ref 15–46)
BASOPHILS # BLD AUTO: 0.04 K/UL (ref 0–0.2)
BASOPHILS NFR BLD: 0.9 % (ref 0–1.9)
BILIRUB SERPL-MCNC: 0.5 MG/DL (ref 0.1–1)
CALCIUM SERPL-MCNC: 9.6 MG/DL (ref 8.7–10.5)
CHLORIDE SERPL-SCNC: 102 MMOL/L (ref 95–110)
CHOLEST SERPL-MCNC: 294 MG/DL (ref 120–199)
CHOLEST/HDLC SERPL: 3.8 {RATIO} (ref 2–5)
CO2 SERPL-SCNC: 30 MMOL/L (ref 23–29)
CREAT SERPL-MCNC: 0.7 MG/DL (ref 0.5–1.4)
DIFFERENTIAL METHOD: ABNORMAL
EOSINOPHIL # BLD AUTO: 0.1 K/UL (ref 0–0.5)
EOSINOPHIL NFR BLD: 2.6 % (ref 0–8)
ERYTHROCYTE [DISTWIDTH] IN BLOOD BY AUTOMATED COUNT: 17.7 % (ref 11.5–14.5)
EST. GFR  (AFRICAN AMERICAN): >60 ML/MIN/1.73 M^2
EST. GFR  (NON AFRICAN AMERICAN): >60 ML/MIN/1.73 M^2
ESTIMATED AVG GLUCOSE: 140 MG/DL (ref 68–131)
GLUCOSE SERPL-MCNC: 119 MG/DL (ref 70–110)
HBA1C MFR BLD: 6.5 % (ref 4–5.6)
HCT VFR BLD AUTO: 41.1 % (ref 37–48.5)
HDLC SERPL-MCNC: 77 MG/DL (ref 40–75)
HDLC SERPL: 26.2 % (ref 20–50)
HGB BLD-MCNC: 12.8 G/DL (ref 12–16)
IMM GRANULOCYTES # BLD AUTO: 0.01 K/UL (ref 0–0.04)
IMM GRANULOCYTES NFR BLD AUTO: 0.2 % (ref 0–0.5)
LDLC SERPL CALC-MCNC: 188.6 MG/DL (ref 63–159)
LYMPHOCYTES # BLD AUTO: 1.4 K/UL (ref 1–4.8)
LYMPHOCYTES NFR BLD: 32.3 % (ref 18–48)
MCH RBC QN AUTO: 22.5 PG (ref 27–31)
MCHC RBC AUTO-ENTMCNC: 31.1 G/DL (ref 32–36)
MCV RBC AUTO: 72 FL (ref 82–98)
MONOCYTES # BLD AUTO: 0.4 K/UL (ref 0.3–1)
MONOCYTES NFR BLD: 9.3 % (ref 4–15)
NEUTROPHILS # BLD AUTO: 2.4 K/UL (ref 1.8–7.7)
NEUTROPHILS NFR BLD: 54.7 % (ref 38–73)
NONHDLC SERPL-MCNC: 217 MG/DL
NRBC BLD-RTO: 0 /100 WBC
PLATELET # BLD AUTO: 336 K/UL (ref 150–450)
PMV BLD AUTO: 11.6 FL (ref 9.2–12.9)
POTASSIUM SERPL-SCNC: 4.4 MMOL/L (ref 3.5–5.1)
PROT SERPL-MCNC: 8.1 G/DL (ref 6–8.4)
RBC # BLD AUTO: 5.7 M/UL (ref 4–5.4)
SODIUM SERPL-SCNC: 141 MMOL/L (ref 136–145)
TRIGL SERPL-MCNC: 142 MG/DL (ref 30–150)
UUN UR-MCNC: 13 MG/DL (ref 7–17)
WBC # BLD AUTO: 4.31 K/UL (ref 3.9–12.7)

## 2022-05-11 PROCEDURE — 80061 LIPID PANEL: CPT | Performed by: FAMILY MEDICINE

## 2022-05-11 PROCEDURE — 83036 HEMOGLOBIN GLYCOSYLATED A1C: CPT | Performed by: FAMILY MEDICINE

## 2022-05-11 PROCEDURE — 36415 COLL VENOUS BLD VENIPUNCTURE: CPT | Mod: PO | Performed by: FAMILY MEDICINE

## 2022-05-11 PROCEDURE — 85025 COMPLETE CBC W/AUTO DIFF WBC: CPT | Mod: PO | Performed by: FAMILY MEDICINE

## 2022-05-11 PROCEDURE — 80053 COMPREHEN METABOLIC PANEL: CPT | Mod: PO | Performed by: FAMILY MEDICINE

## 2022-05-11 NOTE — PROGRESS NOTES
Behavioral Health Community Health Worker  Initial Assessment  Completed by:  Patricia Faust    Date:  5/11/2022    Patient Enrollment in Behavioral Health Program:  · Patient verbalized understanding of Behavioral Health Integration services to include:  · Patient understands that CHW, LCSW, PharmD and consulting Psychiatrist are members of the care team working collaboratively with his/her primary care provider: Yes  · Patient understands that activation of their [facility] patient portal account is required for accessing the full scope of team services: Yes  · Patient understands that some counseling sessions may occur via video: Yes  · Clinic visits with the psychiatrist may be subject to a co-pay based on your insurance: Yes  · Patient consents to enroll in BHI program: Yes    Assessments     Single Item Health Literacy Scale:  · How often do you need to have someone help you read instructions, pamphlets or other written material from your doctor or pharmacy?: Never    Promis 10:  · Promis 10 Responses  · In general, would you say your health is: Good  · In general, would you say your quality of life is: Very good  · In general, how would you rate your physical health?: Very good  · In general, how would you rate your mental health, including your mood and your ability to think?: Good  · In general, how would you rate your satisfaction with your social activities and relationships?: Fair  · In general, please rate how well you carry out your usual social activities and roles. (This includes activities at home, at work and in your community, and responsibilities as a parent, child, spouse, employee, friend, etc.): Good  · To what extent are you able to carry out your everyday physical activities such as walking, climbing stairs, carrying groceries, or moving a chair? : Completely  · How often have you been bothered by emotional problems such as feeling anxious, depressed or irritable?: Sometimes  · In the past 7  days, how would you rate your fatigue on average?: Moderate  · In the past 7 days, on a scale of 0 to 10 (where 0 is no pain and 10 is the worst pain imaginable) how would you rate your pain on average?: 6  · Global Physical Health: 18  · Global Mental health Score: 12    Depression PHQ:  PHQ9 5/11/2022   Total Score 9         Generalized Anxiety Disorder 7-Item Scale:  GAD7 5/11/2022   1. Feeling nervous, anxious, or on edge? 1   2. Not being able to stop or control worrying? 0   3. Worrying too much about different things? 0   4. Trouble relaxing? 1   5. Being so restless that it is hard to sit still? 0   6. Becoming easily annoyed or irritable? 1   7. Feeling afraid as if something awful might happen? 0   8. If you checked off any problems, how difficult have these problems made it for you to do your work, take care of things at home, or get along with other people? 1   KAIA-7 Score 3       History     Social History     Socioeconomic History    Marital status:    Tobacco Use    Smoking status: Never Smoker    Smokeless tobacco: Never Used   Substance and Sexual Activity    Alcohol use: Yes     Alcohol/week: 3.0 standard drinks     Types: 3 Glasses of wine per week     Comment: social    Drug use: No    Sexual activity: Yes     Partners: Male     Birth control/protection: Surgical     Social Determinants of Health     Financial Resource Strain: Low Risk     Difficulty of Paying Living Expenses: Not hard at all   Food Insecurity: No Food Insecurity    Worried About Running Out of Food in the Last Year: Never true    Ran Out of Food in the Last Year: Never true   Transportation Needs: No Transportation Needs    Lack of Transportation (Medical): No    Lack of Transportation (Non-Medical): No   Physical Activity: Sufficiently Active    Days of Exercise per Week: 4 days    Minutes of Exercise per Session: 40 min   Stress: No Stress Concern Present    Feeling of Stress : Not at all   Social  "Connections: Unknown    Frequency of Communication with Friends and Family: More than three times a week    Frequency of Social Gatherings with Friends and Family: Once a week    Active Member of Clubs or Organizations: Yes    Attends Club or Organization Meetings: More than 4 times per year    Marital Status:    Housing Stability: High Risk    Unable to Pay for Housing in the Last Year: Yes    Number of Places Lived in the Last Year: 3       Call Summary     Patient was referred to the BHI (Non-opioid) program by Primary Care Provider, Dr. Stevens CHW contacted Brittny Urias who reports Adjustment disorder/depressed mood that limits [his/her] activities of daily living (ADLs).   Patient scored "9" on the PHQ9 and "3" on the KAIA 7. Based on these scores patient is eligible for the Behavioral health Integration (Non-opioid) Program. MONIQUEW completed the intake and scheduled an appointment for patient with Carly Fajardo LCSW, on 6/6/22.         "

## 2022-05-13 ENCOUNTER — PATIENT MESSAGE (OUTPATIENT)
Dept: FAMILY MEDICINE | Facility: CLINIC | Age: 52
End: 2022-05-13
Payer: COMMERCIAL

## 2022-06-03 ENCOUNTER — PATIENT MESSAGE (OUTPATIENT)
Dept: BEHAVIORAL HEALTH | Facility: CLINIC | Age: 52
End: 2022-06-03
Payer: COMMERCIAL

## 2022-06-07 ENCOUNTER — PATIENT MESSAGE (OUTPATIENT)
Dept: BEHAVIORAL HEALTH | Facility: CLINIC | Age: 52
End: 2022-06-07
Payer: COMMERCIAL

## 2022-06-30 ENCOUNTER — OFFICE VISIT (OUTPATIENT)
Dept: FAMILY MEDICINE | Facility: CLINIC | Age: 52
End: 2022-06-30
Payer: COMMERCIAL

## 2022-06-30 DIAGNOSIS — B34.9 VIRAL ILLNESS: Primary | ICD-10-CM

## 2022-06-30 PROBLEM — Z01.818 PREOP TESTING: Status: RESOLVED | Noted: 2020-12-02 | Resolved: 2022-06-30

## 2022-06-30 PROCEDURE — 1160F RVW MEDS BY RX/DR IN RCRD: CPT | Mod: CPTII,95,, | Performed by: NURSE PRACTITIONER

## 2022-06-30 PROCEDURE — 1159F PR MEDICATION LIST DOCUMENTED IN MEDICAL RECORD: ICD-10-PCS | Mod: CPTII,95,, | Performed by: NURSE PRACTITIONER

## 2022-06-30 PROCEDURE — 1160F PR REVIEW ALL MEDS BY PRESCRIBER/CLIN PHARMACIST DOCUMENTED: ICD-10-PCS | Mod: CPTII,95,, | Performed by: NURSE PRACTITIONER

## 2022-06-30 PROCEDURE — 3044F PR MOST RECENT HEMOGLOBIN A1C LEVEL <7.0%: ICD-10-PCS | Mod: CPTII,95,, | Performed by: NURSE PRACTITIONER

## 2022-06-30 PROCEDURE — 1159F MED LIST DOCD IN RCRD: CPT | Mod: CPTII,95,, | Performed by: NURSE PRACTITIONER

## 2022-06-30 PROCEDURE — 99214 PR OFFICE/OUTPT VISIT, EST, LEVL IV, 30-39 MIN: ICD-10-PCS | Mod: 95,,, | Performed by: NURSE PRACTITIONER

## 2022-06-30 PROCEDURE — 99214 OFFICE O/P EST MOD 30 MIN: CPT | Mod: 95,,, | Performed by: NURSE PRACTITIONER

## 2022-06-30 PROCEDURE — 3044F HG A1C LEVEL LT 7.0%: CPT | Mod: CPTII,95,, | Performed by: NURSE PRACTITIONER

## 2022-06-30 RX ORDER — TRIAMCINOLONE ACETONIDE 1 MG/G
CREAM TOPICAL 2 TIMES DAILY
COMMUNITY
Start: 2022-04-07

## 2022-06-30 RX ORDER — FLUTICASONE PROPIONATE 0.5 MG/G
CREAM TOPICAL 2 TIMES DAILY
COMMUNITY
Start: 2022-04-07

## 2022-06-30 RX ORDER — RISANKIZUMAB-RZAA 150 MG/ML
INJECTION SUBCUTANEOUS
COMMUNITY
Start: 2022-06-17

## 2022-06-30 RX ORDER — DEXAMETHASONE 2 MG/1
2 TABLET ORAL 2 TIMES DAILY WITH MEALS
Qty: 20 TABLET | Refills: 0 | Status: SHIPPED | OUTPATIENT
Start: 2022-06-30 | End: 2022-07-10

## 2022-06-30 RX ORDER — ONDANSETRON 4 MG/1
4 TABLET, ORALLY DISINTEGRATING ORAL EVERY 6 HOURS PRN
Qty: 30 TABLET | Refills: 1 | Status: SHIPPED | OUTPATIENT
Start: 2022-06-30 | End: 2022-08-23 | Stop reason: SDUPTHER

## 2022-06-30 RX ORDER — ERGOCALCIFEROL 1.25 MG/1
50000 CAPSULE ORAL
COMMUNITY
Start: 2022-05-10 | End: 2022-11-28

## 2022-06-30 NOTE — PROGRESS NOTES
The patient location is: at home in [place]   The chief complaint leading to consultation is: nausea    Visit type: audiovisual    Face to Face time with patient: 15  25 minutes of total time spent on the encounter, which includes face to face time and non-face to face time preparing to see the patient (eg, review of tests), Obtaining and/or reviewing separately obtained history, Documenting clinical information in the electronic or other health record, Independently interpreting results (not separately reported) and communicating results to the patient/family/caregiver, or Care coordination (not separately reported).         Each patient to whom he or she provides medical services by telemedicine is:  (1) informed of the relationship between the physician and patient and the respective role of any other health care provider with respect to management of the patient; and (2) notified that he or she may decline to receive medical services by telemedicine and may withdraw from such care at any time.    Subjective:      Patient ID: Brittny Urias is a 51 y.o. female.  New to me but seen previously in clinic by a fellow provider. Pt presents virtually with acute URI symptoms that began ~1wk ago. Had negative home pregnancy test.     Review of Systems   Constitutional: Positive for appetite change and fatigue. Negative for activity change, chills, diaphoresis, fever and unexpected weight change.   HENT: Positive for nasal congestion, postnasal drip, rhinorrhea and sinus pressure/congestion. Negative for hearing loss, sore throat, trouble swallowing and voice change.    Eyes: Negative for discharge and visual disturbance.   Respiratory: Negative for chest tightness, shortness of breath and wheezing.    Cardiovascular: Negative for chest pain and palpitations.   Gastrointestinal: Positive for diarrhea, nausea and vomiting. Negative for abdominal pain, blood in stool, change in bowel habit, constipation, rectal  pain, reflux and change in bowel habit.   Endocrine: Negative for polydipsia and polyuria.   Genitourinary: Negative.  Negative for difficulty urinating, dysuria and hematuria.   Musculoskeletal: Negative for arthralgias, joint swelling and neck pain.   Neurological: Negative for weakness and headaches.   Psychiatric/Behavioral: Negative for confusion and dysphoric mood.   All other systems reviewed and are negative.        Objective:   There were no vitals filed for this visit.  Physical Exam  Vitals and nursing note reviewed.   Constitutional:       General: She is not in acute distress.     Appearance: Normal appearance. She is well-developed, well-groomed and overweight. She is not ill-appearing.   HENT:      Head: Normocephalic and atraumatic.      Right Ear: External ear normal.      Left Ear: External ear normal.      Nose: Nose normal.      Mouth/Throat:      Lips: Pink.      Mouth: Mucous membranes are moist.   Eyes:      General: Lids are normal. Vision grossly intact. Gaze aligned appropriately. No scleral icterus.        Right eye: No discharge.         Left eye: No discharge.      Conjunctiva/sclera: Conjunctivae normal.   Neck:      Trachea: Phonation normal.   Pulmonary:      Effort: Pulmonary effort is normal. No accessory muscle usage or respiratory distress.   Abdominal:      General: Abdomen is flat. There is no distension.   Musculoskeletal:      Cervical back: Neck supple.   Skin:     Findings: No rash.   Neurological:      General: No focal deficit present.      Mental Status: She is alert and oriented to person, place, and time. Mental status is at baseline.      Motor: No abnormal muscle tone.   Psychiatric:         Attention and Perception: Attention and perception normal.         Mood and Affect: Mood and affect normal.         Speech: Speech normal.         Behavior: Behavior normal. Behavior is cooperative.         Thought Content: Thought content normal.         Cognition and Memory:  Cognition and memory normal.         Judgment: Judgment normal.       Assessment and Plan:     1. Viral illness  Symptomatic therapy suggested: rest, increase fluids, gargle prn for sore throat, apply heat to sinuses prn, use mist of vaporizer prn, OTC acetaminophen, ibuprofen, antihistamine-decongestant of choice, cough suppressant of choice and call prn if symptoms persist or worsen. Call or return to clinic prn if these symptoms worsen or fail to improve as anticipated.  - ondansetron (ZOFRAN-ODT) 4 MG TbDL; Take 1 tablet (4 mg total) by mouth every 6 (six) hours as needed (nausea).  Dispense: 30 tablet; Refill: 1  - dexAMETHasone (DECADRON) 2 MG tablet; Take 1 tablet (2 mg total) by mouth 2 (two) times daily with meals. for 10 days  Dispense: 20 tablet; Refill: 0           NORMA Bedolla, FNP-C  Family/Internal Medicine  [facility]

## 2022-07-03 DIAGNOSIS — I10 ESSENTIAL HYPERTENSION: ICD-10-CM

## 2022-07-03 NOTE — TELEPHONE ENCOUNTER
No new care gaps identified.  [facility] Embedded Care Gaps. Reference number: [id number]. 7/03/2022   7:11:52 AM BENT

## 2022-07-04 RX ORDER — LOSARTAN POTASSIUM AND HYDROCHLOROTHIAZIDE 25; 100 MG/1; MG/1
TABLET ORAL
Qty: 90 TABLET | Refills: 3 | Status: SHIPPED | OUTPATIENT
Start: 2022-07-04 | End: 2023-07-16

## 2022-07-31 DIAGNOSIS — I10 ESSENTIAL HYPERTENSION: ICD-10-CM

## 2022-07-31 NOTE — TELEPHONE ENCOUNTER
No new care gaps identified.  [facility] Embedded Care Gaps. Reference number: [id number]. 7/31/2022   3:04:28 AM BENT

## 2022-08-01 RX ORDER — AMLODIPINE BESYLATE 10 MG/1
TABLET ORAL
Qty: 90 TABLET | Refills: 3 | Status: SHIPPED | OUTPATIENT
Start: 2022-08-01 | End: 2023-08-02

## 2022-08-01 NOTE — TELEPHONE ENCOUNTER
Refill Decision Note   Brittny Urias  is requesting a refill authorization.  Brief Assessment and Rationale for Refill:  Approve     Medication Therapy Plan:       Medication Reconciliation Completed: No   Comments:     No Care Gaps recommended.     Note composed:12:17 PM 08/01/2022

## 2022-08-23 ENCOUNTER — OFFICE VISIT (OUTPATIENT)
Dept: FAMILY MEDICINE | Facility: CLINIC | Age: 52
End: 2022-08-23
Payer: COMMERCIAL

## 2022-08-23 VITALS
SYSTOLIC BLOOD PRESSURE: 140 MMHG | HEIGHT: 62 IN | HEART RATE: 87 BPM | TEMPERATURE: 98 F | DIASTOLIC BLOOD PRESSURE: 82 MMHG | OXYGEN SATURATION: 97 % | BODY MASS INDEX: 43.16 KG/M2 | WEIGHT: 234.56 LBS

## 2022-08-23 DIAGNOSIS — Z23 NEED FOR VACCINATION AGAINST STREPTOCOCCUS PNEUMONIAE: ICD-10-CM

## 2022-08-23 DIAGNOSIS — K21.9 GASTROESOPHAGEAL REFLUX DISEASE, UNSPECIFIED WHETHER ESOPHAGITIS PRESENT: ICD-10-CM

## 2022-08-23 DIAGNOSIS — R11.2 NAUSEA AND VOMITING, INTRACTABILITY OF VOMITING NOT SPECIFIED, UNSPECIFIED VOMITING TYPE: ICD-10-CM

## 2022-08-23 DIAGNOSIS — R19.7 DIARRHEA, UNSPECIFIED TYPE: Primary | ICD-10-CM

## 2022-08-23 PROCEDURE — 1159F PR MEDICATION LIST DOCUMENTED IN MEDICAL RECORD: ICD-10-PCS | Mod: CPTII,S$GLB,, | Performed by: FAMILY MEDICINE

## 2022-08-23 PROCEDURE — 3044F HG A1C LEVEL LT 7.0%: CPT | Mod: CPTII,S$GLB,, | Performed by: FAMILY MEDICINE

## 2022-08-23 PROCEDURE — 3044F PR MOST RECENT HEMOGLOBIN A1C LEVEL <7.0%: ICD-10-PCS | Mod: CPTII,S$GLB,, | Performed by: FAMILY MEDICINE

## 2022-08-23 PROCEDURE — 90677 PCV20 VACCINE IM: CPT | Mod: S$GLB,,, | Performed by: FAMILY MEDICINE

## 2022-08-23 PROCEDURE — 3077F PR MOST RECENT SYSTOLIC BLOOD PRESSURE >= 140 MM HG: ICD-10-PCS | Mod: CPTII,S$GLB,, | Performed by: FAMILY MEDICINE

## 2022-08-23 PROCEDURE — 3077F SYST BP >= 140 MM HG: CPT | Mod: CPTII,S$GLB,, | Performed by: FAMILY MEDICINE

## 2022-08-23 PROCEDURE — 90471 PNEUMOCOCCAL CONJUGATE VACCINE 20-VALENT: ICD-10-PCS | Mod: S$GLB,,, | Performed by: FAMILY MEDICINE

## 2022-08-23 PROCEDURE — 1159F MED LIST DOCD IN RCRD: CPT | Mod: CPTII,S$GLB,, | Performed by: FAMILY MEDICINE

## 2022-08-23 PROCEDURE — 90471 IMMUNIZATION ADMIN: CPT | Mod: S$GLB,,, | Performed by: FAMILY MEDICINE

## 2022-08-23 PROCEDURE — 99214 OFFICE O/P EST MOD 30 MIN: CPT | Mod: 25,S$GLB,, | Performed by: FAMILY MEDICINE

## 2022-08-23 PROCEDURE — 3008F BODY MASS INDEX DOCD: CPT | Mod: CPTII,S$GLB,, | Performed by: FAMILY MEDICINE

## 2022-08-23 PROCEDURE — 90677 PNEUMOCOCCAL CONJUGATE VACCINE 20-VALENT: ICD-10-PCS | Mod: S$GLB,,, | Performed by: FAMILY MEDICINE

## 2022-08-23 PROCEDURE — 1160F RVW MEDS BY RX/DR IN RCRD: CPT | Mod: CPTII,S$GLB,, | Performed by: FAMILY MEDICINE

## 2022-08-23 PROCEDURE — 1160F PR REVIEW ALL MEDS BY PRESCRIBER/CLIN PHARMACIST DOCUMENTED: ICD-10-PCS | Mod: CPTII,S$GLB,, | Performed by: FAMILY MEDICINE

## 2022-08-23 PROCEDURE — 99999 PR PBB SHADOW E&M-EST. PATIENT-LVL V: ICD-10-PCS | Mod: PBBFAC,,, | Performed by: FAMILY MEDICINE

## 2022-08-23 PROCEDURE — 3079F PR MOST RECENT DIASTOLIC BLOOD PRESSURE 80-89 MM HG: ICD-10-PCS | Mod: CPTII,S$GLB,, | Performed by: FAMILY MEDICINE

## 2022-08-23 PROCEDURE — 3008F PR BODY MASS INDEX (BMI) DOCUMENTED: ICD-10-PCS | Mod: CPTII,S$GLB,, | Performed by: FAMILY MEDICINE

## 2022-08-23 PROCEDURE — 99214 PR OFFICE/OUTPT VISIT, EST, LEVL IV, 30-39 MIN: ICD-10-PCS | Mod: 25,S$GLB,, | Performed by: FAMILY MEDICINE

## 2022-08-23 PROCEDURE — 99999 PR PBB SHADOW E&M-EST. PATIENT-LVL V: CPT | Mod: PBBFAC,,, | Performed by: FAMILY MEDICINE

## 2022-08-23 PROCEDURE — 3079F DIAST BP 80-89 MM HG: CPT | Mod: CPTII,S$GLB,, | Performed by: FAMILY MEDICINE

## 2022-08-23 RX ORDER — LOPERAMIDE HYDROCHLORIDE 2 MG/1
2 CAPSULE ORAL 3 TIMES DAILY PRN
Qty: 30 CAPSULE | Refills: 0 | Status: SHIPPED | OUTPATIENT
Start: 2022-08-23 | End: 2022-09-02

## 2022-08-23 RX ORDER — SUCRALFATE 1 G/10ML
1 SUSPENSION ORAL 3 TIMES DAILY PRN
Qty: 414 ML | Refills: 2 | Status: SHIPPED | OUTPATIENT
Start: 2022-08-23 | End: 2022-08-25

## 2022-08-23 RX ORDER — OMEPRAZOLE 40 MG/1
40 CAPSULE, DELAYED RELEASE ORAL DAILY PRN
Qty: 30 CAPSULE | Refills: 2 | Status: SHIPPED | OUTPATIENT
Start: 2022-08-23 | End: 2022-11-23

## 2022-08-23 RX ORDER — ONDANSETRON 4 MG/1
4 TABLET, ORALLY DISINTEGRATING ORAL EVERY 8 HOURS PRN
Qty: 30 TABLET | Refills: 1 | Status: SHIPPED | OUTPATIENT
Start: 2022-08-23

## 2022-08-23 NOTE — PROGRESS NOTES
Assessment & Plan  Diarrhea, unspecified type  -     loperamide (IMODIUM) 2 mg capsule; Take 1 capsule (2 mg total) by mouth 3 (three) times daily as needed for Diarrhea.  Dispense: 30 capsule; Refill: 0  -     Clostridium difficile EIA; Future; Expected date: 08/23/2022  -     Giardia / Cryptosporidum, EIA; Future; Expected date: 08/23/2022  -     Stool culture; Future; Expected date: 08/23/2022  -     Stool Exam-Ova,Cysts,Parasites; Future; Expected date: 08/23/2022  -     WBC, Stool; Future; Expected date: 08/23/2022  -     Lactoferrin, fecal, quantitative; Future; Expected date: 08/23/2022  -     Ambulatory referral/consult to Gastroenterology; Future; Expected date: 08/30/2022    Nausea and vomiting, intractability of vomiting not specified, unspecified vomiting type  -     ondansetron (ZOFRAN-ODT) 4 MG TbDL; Take 1 tablet (4 mg total) by mouth every 8 (eight) hours as needed (nausea).  Dispense: 30 tablet; Refill: 1  -     Ambulatory referral/consult to Gastroenterology; Future; Expected date: 08/30/2022    Gastroesophageal reflux disease, unspecified whether esophagitis present  -     sucralfate (CARAFATE) 100 mg/mL suspension; Take 10 mLs (1 g total) by mouth 3 (three) times daily as needed (upset stomach, acid reflux).  Dispense: 414 mL; Refill: 2  -     omeprazole (PRILOSEC) 40 MG capsule; Take 1 capsule (40 mg total) by mouth daily as needed (nausea). 30 minutes before breakfast or coffee  Dispense: 30 capsule; Refill: 2  -     Ambulatory referral/consult to Gastroenterology; Future; Expected date: 08/30/2022    Need for vaccination against Streptococcus pneumoniae  -     Pneumococcal Conjugate Vaccine (20 Valent) (IM)      Follow-up: Follow up if symptoms worsen or fail to improve.  ______________________________________________________________________    Chief Complaint  Chief Complaint   Patient presents with    Follow-up       HPI  Sulaica Rosamaria Urias is a 51 y.o. female with medical diagnoses as  listed in the medical history and problem list that presents to the office for diarrhea and nausea.     Follow-up  This is a new problem. The current episode started more than 1 month ago. The problem occurs intermittently. The problem has been waxing and waning. Associated symptoms include abdominal pain, a change in bowel habit, nausea and vomiting. Pertinent negatives include no chills or fever. Nothing aggravates the symptoms.       PAST MEDICAL HISTORY:  Past Medical History:   Diagnosis Date    Breast cancer, right breast 11/12/2018    Cancer     Diabetes mellitus, type 2     Hypertension     Psoriasis     on biologic for 1 year, has been controlling it well       PAST SURGICAL HISTORY:  Past Surgical History:   Procedure Laterality Date    COLONOSCOPY N/A 10/10/2019    Procedure: COLONOSCOPY;  Surgeon: Matt Marin MD;  Location: [facility];  Service: Endoscopy;  Laterality: N/A;    DILATION AND CURETTAGE OF UTERUS      FOOT ARTHRODESIS Left 12/2/2020    Procedure: FUSION, FOOT;  Surgeon: Uriah Veras DPM;  Location: [facility] OR;  Service: Podiatry;  Laterality: Left;  mini c-arm, Arthrex screws and staples, 1 MTP arthrodesis plates  Libby notified 11/23, EF  Libby w/ Arthrex confirmed 12/1/2020 [place]    HYSTERECTOMY      partial hyst for prolapse    MASTECTOMY Bilateral 11/14/2018    Procedure: MASTECTOMY;  Surgeon: Marga Cardenas MD;  Location: [facility];  Service: Plastics;  Laterality: Bilateral;    MASTECTOMY WITH SENTINEL NODE BIOPSY AND AXILLARY LYMPH NODE DISSECTION Right 11/14/2018    Procedure: SENTINEL NODE BIOPSY AND AXILLARY LYMPHADENECTOMY;  Surgeon: Marga Cardenas MD;  Location: [facility];  Service: Plastics;  Laterality: Right;    RECONSTRUCTION OF BREAST WITH DEEP INFERIOR EPIGASTRIC ARTERY  (MEGAN) FREE FLAP Bilateral 11/14/2018    Procedure: RECONSTRUCTION, BREAST, USING MEGAN FREE FLAP;  Surgeon: Tye Chapman MD;  Location: [facility];  Service: Plastics;  Laterality:  Bilateral;    RESECTION OF GASTROCNEMIUS MUSCLE Left 12/2/2020    Procedure: RESECTION, MUSCLE, GASTROCNEMIUS;  Surgeon: Uriah Veras DPM;  Location: [facility];  Service: Podiatry;  Laterality: Left;  video  Kj w/ video confirmed 12/1/2020       SOCIAL HISTORY:  Social History     Socioeconomic History    Marital status:    Tobacco Use    Smoking status: Never    Smokeless tobacco: Never   Substance and Sexual Activity    Alcohol use: Yes     Alcohol/week: 3.0 standard drinks     Types: 3 Glasses of wine per week     Comment: social    Drug use: No    Sexual activity: Yes     Partners: Male     Birth control/protection: Surgical     Social Determinants of Health     Financial Resource Strain: Medium Risk    Difficulty of Paying Living Expenses: Somewhat hard   Food Insecurity: No Food Insecurity    Worried About Running Out of Food in the Last Year: Never true    Ran Out of Food in the Last Year: Never true   Transportation Needs: No Transportation Needs    Lack of Transportation (Medical): No    Lack of Transportation (Non-Medical): No   Physical Activity: Insufficiently Active    Days of Exercise per Week: 4 days    Minutes of Exercise per Session: 30 min   Stress: No Stress Concern Present    Feeling of Stress : Not at all   Social Connections: Unknown    Frequency of Communication with Friends and Family: More than three times a week    Frequency of Social Gatherings with Friends and Family: More than three times a week    Active Member of Clubs or Organizations: Yes    Attends Club or Organization Meetings: More than 4 times per year    Marital Status:    Housing Stability: Low Risk     Unable to Pay for Housing in the Last Year: No    Number of Places Lived in the Last Year: 1    Unstable Housing in the Last Year: No       FAMILY HISTORY:  Family History   Problem Relation Age of Onset    Diabetes Mother     Hypertension Mother     No Known Problems Father     Hypertension Brother      Cancer Paternal Uncle         leukemia    Breast cancer Neg Hx     Ovarian cancer Neg Hx     Colon cancer Neg Hx        ALLERGIES AND MEDICATIONS: updated and reviewed.  Review of patient's allergies indicates:  No Known Allergies  Current Outpatient Medications   Medication Sig Dispense Refill    amLODIPine (NORVASC) 10 MG tablet TAKE 1 TABLET BY MOUTH EVERY DAY 90 tablet 3    cholecalciferol, vitamin D3, 1,250 mcg (50,000 unit) capsule Take 1 capsule (50,000 Units total) by mouth every 7 days. 12 capsule 1    clobetasol (CLOBEX) 0.05 % shampoo USE TOPICALLY 3 TIMES WEEKLY  3    clobetasoL (TEMOVATE) 0.05 % external solution       ergocalciferol (ERGOCALCIFEROL) 50,000 unit Cap Take 50,000 Units by mouth every 7 days.      fluticasone propionate (CUTIVATE) 0.05 % cream Apply topically 2 (two) times daily.      letrozole (FEMARA) 2.5 mg Tab Take 1 tablet (2.5 mg total) by mouth once daily. 90 tablet 3    losartan-hydrochlorothiazide 100-25 mg (HYZAAR) 100-25 mg per tablet TAKE 1 TABLET BY MOUTH EVERY DAY 90 tablet 3    multivitamin (THERAGRAN) per tablet Take 1 tablet by mouth once daily.      semaglutide (OZEMPIC) 1 mg/dose (4 mg/3 mL) Inject 1 mg into the skin every 7 days. 3 pen 3    SKYRIZI 150 mg/mL Syrg Inject into the skin.      triamcinolone acetonide 0.1% (KENALOG) 0.1 % cream Apply topically 2 (two) times daily.      venlafaxine (EFFEXOR-XR) 150 MG Cp24 Take 1 capsule (150 mg total) by mouth once daily. 90 capsule 3    venlafaxine (EFFEXOR-XR) 75 MG 24 hr capsule TAKE 1 CAPSULE BY MOUTH EVERY DAY 90 capsule 1    betamethasone dipropionate (DIPROLENE) 0.05 % cream APPLY TWICE DAILY TO ALL AFFECTED AREAS.  2    methocarbamoL (ROBAXIN) 500 MG Tab Take 1 tablet (500 mg total) by mouth 2 (two) times daily as needed (muscle spasm). (Patient not taking: No sig reported) 20 tablet 0    omeprazole (PRILOSEC) 40 MG capsule Take 1 capsule (40 mg total) by mouth daily as needed (nausea). 30 minutes before breakfast or  "coffee 30 capsule 2    ondansetron (ZOFRAN-ODT) 4 MG TbDL Take 1 tablet (4 mg total) by mouth every 8 (eight) hours as needed (nausea). 30 tablet 1    sucralfate (CARAFATE) 1 gram tablet Take 1 tablet (1 g total) by mouth 3 (three) times daily as needed (upset stomach). 30 tablet 2     Current Facility-Administered Medications   Medication Dose Route Frequency Provider Last Rate Last Admin    sodium chloride 0.9% flush 10 mL  10 mL Intravenous PRN Uriah Veras DPM         Facility-Administered Medications Ordered in Other Visits   Medication Dose Route Frequency Provider Last Rate Last Admin    EPINEPHrine 1,000 mcg in lactated Ringers 1,000 mL irrigation   Irrigation On Call Procedure Tye Chapman MD        EPINEPHrine 1,000 mcg in lactated Ringers 1,000 mL irrigation   Irrigation On Call Procedure Tye Chapman MD             ROS  Review of Systems   Constitutional:  Positive for appetite change. Negative for chills, fever and unexpected weight change.   Gastrointestinal:  Positive for abdominal pain, change in bowel habit, diarrhea, nausea and vomiting.         Physical Exam  Vitals:    08/23/22 1509   BP: (!) 140/82   BP Location: Right arm   Patient Position: Sitting   BP Method: Large (Manual)   Pulse: 87   Temp: 98.4 °F (36.9 °C)   TempSrc: Oral   SpO2: 97%   Weight: 106.4 kg (234 lb 9.1 oz)   Height: 5' 2" (1.575 m)    Body mass index is 42.9 kg/m².  Weight: 106.4 kg (234 lb 9.1 oz)   Height: 5' 2" (157.5 cm)   Physical Exam  Constitutional:       General: She is not in acute distress.     Appearance: She is obese.   HENT:      Head: Normocephalic and atraumatic.   Cardiovascular:      Rate and Rhythm: Normal rate and regular rhythm.      Pulses: Normal pulses.      Heart sounds: Normal heart sounds.   Pulmonary:      Effort: Pulmonary effort is normal. No respiratory distress.      Breath sounds: Normal breath sounds.   Abdominal:      General: Bowel sounds are normal. There is no " distension.      Palpations: Abdomen is soft.      Tenderness: There is no abdominal tenderness.   Skin:     General: Skin is warm and dry.      Findings: No rash.   Neurological:      General: No focal deficit present.      Mental Status: She is alert and oriented to person, place, and time.   Psychiatric:         Mood and Affect: Mood normal.         Behavior: Behavior normal.         Thought Content: Thought content normal.

## 2022-08-25 DIAGNOSIS — R19.7 DIARRHEA, UNSPECIFIED TYPE: Primary | ICD-10-CM

## 2022-08-25 DIAGNOSIS — R11.2 NAUSEA AND VOMITING, INTRACTABILITY OF VOMITING NOT SPECIFIED, UNSPECIFIED VOMITING TYPE: ICD-10-CM

## 2022-08-25 RX ORDER — SUCRALFATE 1 G/1
1 TABLET ORAL 3 TIMES DAILY PRN
Qty: 30 TABLET | Refills: 2 | Status: SHIPPED | OUTPATIENT
Start: 2022-08-25

## 2022-08-29 ENCOUNTER — LAB VISIT (OUTPATIENT)
Dept: LAB | Facility: HOSPITAL | Age: 52
End: 2022-08-29
Attending: FAMILY MEDICINE
Payer: COMMERCIAL

## 2022-08-29 DIAGNOSIS — R19.7 DIARRHEA, UNSPECIFIED TYPE: ICD-10-CM

## 2022-08-29 PROCEDURE — 83630 LACTOFERRIN FECAL (QUAL): CPT | Performed by: FAMILY MEDICINE

## 2022-08-29 PROCEDURE — 87177 OVA AND PARASITES SMEARS: CPT | Performed by: FAMILY MEDICINE

## 2022-08-29 PROCEDURE — 87209 SMEAR COMPLEX STAIN: CPT | Performed by: FAMILY MEDICINE

## 2022-08-29 PROCEDURE — 87329 GIARDIA AG IA: CPT | Performed by: FAMILY MEDICINE

## 2022-08-29 PROCEDURE — 89055 LEUKOCYTE ASSESSMENT FECAL: CPT | Performed by: FAMILY MEDICINE

## 2022-08-29 PROCEDURE — 87045 FECES CULTURE AEROBIC BACT: CPT | Performed by: FAMILY MEDICINE

## 2022-08-29 PROCEDURE — 87046 STOOL CULTR AEROBIC BACT EA: CPT | Mod: 59 | Performed by: FAMILY MEDICINE

## 2022-08-29 PROCEDURE — 87427 SHIGA-LIKE TOXIN AG IA: CPT | Performed by: FAMILY MEDICINE

## 2022-08-30 ENCOUNTER — TELEPHONE (OUTPATIENT)
Dept: FAMILY MEDICINE | Facility: CLINIC | Age: 52
End: 2022-08-30
Payer: COMMERCIAL

## 2022-08-30 LAB
CRYPTOSP AG STL QL IA: NEGATIVE
E COLI SXT1 STL QL IA: NEGATIVE
E COLI SXT2 STL QL IA: NEGATIVE
G LAMBLIA AG STL QL IA: NEGATIVE
WBC #/AREA STL HPF: NORMAL /[HPF]

## 2022-08-30 NOTE — TELEPHONE ENCOUNTER
----- Message from Daniel Momin sent at 8/30/2022  4:23 AM CDT -----  Regarding: Lab Client Services  Contact: [phone]  Good Morning,    My name is Daniel Momin I work in the lab. We received a specimen for Clostridium Difficile test. The test was unable to be performed due to the stool sample was formed stool. If you have any questions regarding this, please contact Lab Client Services at [phone].     Thank you

## 2022-08-31 LAB — BACTERIA STL CULT: NORMAL

## 2022-09-01 LAB — LACTOFERRIN STL QL IA: NEGATIVE

## 2022-09-02 LAB — O+P STL MICRO: NORMAL

## 2022-09-06 ENCOUNTER — PATIENT MESSAGE (OUTPATIENT)
Dept: FAMILY MEDICINE | Facility: CLINIC | Age: 52
End: 2022-09-06
Payer: COMMERCIAL

## 2022-09-08 ENCOUNTER — OFFICE VISIT (OUTPATIENT)
Dept: GASTROENTEROLOGY | Facility: CLINIC | Age: 52
End: 2022-09-08
Payer: COMMERCIAL

## 2022-09-08 VITALS
SYSTOLIC BLOOD PRESSURE: 132 MMHG | DIASTOLIC BLOOD PRESSURE: 80 MMHG | WEIGHT: 233.31 LBS | BODY MASS INDEX: 42.67 KG/M2

## 2022-09-08 DIAGNOSIS — R10.13 EPIGASTRIC ABDOMINAL PAIN: Primary | ICD-10-CM

## 2022-09-08 DIAGNOSIS — E66.01 CLASS 3 SEVERE OBESITY DUE TO EXCESS CALORIES WITH BODY MASS INDEX (BMI) OF 40.0 TO 44.9 IN ADULT, UNSPECIFIED WHETHER SERIOUS COMORBIDITY PRESENT: ICD-10-CM

## 2022-09-08 DIAGNOSIS — K21.9 GASTROESOPHAGEAL REFLUX DISEASE, UNSPECIFIED WHETHER ESOPHAGITIS PRESENT: ICD-10-CM

## 2022-09-08 DIAGNOSIS — R19.7 DIARRHEA, UNSPECIFIED TYPE: ICD-10-CM

## 2022-09-08 DIAGNOSIS — R11.2 NAUSEA AND VOMITING, INTRACTABILITY OF VOMITING NOT SPECIFIED, UNSPECIFIED VOMITING TYPE: ICD-10-CM

## 2022-09-08 PROCEDURE — 3079F DIAST BP 80-89 MM HG: CPT | Mod: CPTII,S$GLB,, | Performed by: NURSE PRACTITIONER

## 2022-09-08 PROCEDURE — 3079F PR MOST RECENT DIASTOLIC BLOOD PRESSURE 80-89 MM HG: ICD-10-PCS | Mod: CPTII,S$GLB,, | Performed by: NURSE PRACTITIONER

## 2022-09-08 PROCEDURE — 1160F PR REVIEW ALL MEDS BY PRESCRIBER/CLIN PHARMACIST DOCUMENTED: ICD-10-PCS | Mod: CPTII,S$GLB,, | Performed by: NURSE PRACTITIONER

## 2022-09-08 PROCEDURE — 99203 OFFICE O/P NEW LOW 30 MIN: CPT | Mod: S$GLB,,, | Performed by: NURSE PRACTITIONER

## 2022-09-08 PROCEDURE — 99203 PR OFFICE/OUTPT VISIT, NEW, LEVL III, 30-44 MIN: ICD-10-PCS | Mod: S$GLB,,, | Performed by: NURSE PRACTITIONER

## 2022-09-08 PROCEDURE — 1159F PR MEDICATION LIST DOCUMENTED IN MEDICAL RECORD: ICD-10-PCS | Mod: CPTII,S$GLB,, | Performed by: NURSE PRACTITIONER

## 2022-09-08 PROCEDURE — 3044F PR MOST RECENT HEMOGLOBIN A1C LEVEL <7.0%: ICD-10-PCS | Mod: CPTII,S$GLB,, | Performed by: NURSE PRACTITIONER

## 2022-09-08 PROCEDURE — 3008F BODY MASS INDEX DOCD: CPT | Mod: CPTII,S$GLB,, | Performed by: NURSE PRACTITIONER

## 2022-09-08 PROCEDURE — 99999 PR PBB SHADOW E&M-EST. PATIENT-LVL V: ICD-10-PCS | Mod: PBBFAC,,, | Performed by: NURSE PRACTITIONER

## 2022-09-08 PROCEDURE — 3044F HG A1C LEVEL LT 7.0%: CPT | Mod: CPTII,S$GLB,, | Performed by: NURSE PRACTITIONER

## 2022-09-08 PROCEDURE — 1160F RVW MEDS BY RX/DR IN RCRD: CPT | Mod: CPTII,S$GLB,, | Performed by: NURSE PRACTITIONER

## 2022-09-08 PROCEDURE — 99999 PR PBB SHADOW E&M-EST. PATIENT-LVL V: CPT | Mod: PBBFAC,,, | Performed by: NURSE PRACTITIONER

## 2022-09-08 PROCEDURE — 3075F SYST BP GE 130 - 139MM HG: CPT | Mod: CPTII,S$GLB,, | Performed by: NURSE PRACTITIONER

## 2022-09-08 PROCEDURE — 3075F PR MOST RECENT SYSTOLIC BLOOD PRESS GE 130-139MM HG: ICD-10-PCS | Mod: CPTII,S$GLB,, | Performed by: NURSE PRACTITIONER

## 2022-09-08 PROCEDURE — 3008F PR BODY MASS INDEX (BMI) DOCUMENTED: ICD-10-PCS | Mod: CPTII,S$GLB,, | Performed by: NURSE PRACTITIONER

## 2022-09-08 PROCEDURE — 1159F MED LIST DOCD IN RCRD: CPT | Mod: CPTII,S$GLB,, | Performed by: NURSE PRACTITIONER

## 2022-09-08 RX ORDER — LOPERAMIDE HCL 2 MG
2 TABLET ORAL 4 TIMES DAILY PRN
COMMUNITY

## 2022-09-08 NOTE — PATIENT INSTRUCTIONS
- Omeprazole 40 mg every morning on an empty stomach 30-45 minutes before food or any other medications  - Do not take sucralfate within 60 minutes of any other medication as it could potentially block the absorption of other medication  - Daily probiotic

## 2022-09-08 NOTE — PROGRESS NOTES
Subjective:       Patient ID: Brittny Urias is a 51 y.o. female.    Chief Complaint: Diarrhea, Nausea (Sulfur burps), and Emesis    52 y/o female with hypertension and diabetes presents to clinic with c/o abdominal pain, n/v, and diarrhea x 2 months. States symptoms started with COVID infection in June 2022. Recurrence of aforementioned symptoms at least weekly with duration of 2-3 days. She has tried omeprazole and sucralfate with minimal symptom relief. Stool studies were negative for any infectious cause of diarrhea. Has sulfur burps and early satiety. Started Ozempic 4 months which is associated with multiple GI adverse effects.     Component      Latest Ref Rng & Units 8/29/2022   Giardia Antigen - EIA      Negative Negative   Cryptosporidium Antigen      Negative Negative   Stool Exam-Ova,Cysts,Parasites       FINAL 09/02/2022 1035   Stool WBC      No neutrophils seen No neutrophils seen   Lactoferrin, stool      Negative Negative     Component      Latest Ref Rng & Units 8/29/2022   Shiga Toxin 1 E.coli       Negative   Shiga Toxin 2 E.coli       Negative   Starch Stain, Stool       No Salmonella,Shigella,Vibrio,Campylobacter,Yersinia isolated.       Past Medical History:   Diagnosis Date    Breast cancer, right breast 11/12/2018    Cancer     Diabetes mellitus, type 2     Hypertension     Psoriasis     on biologic for 1 year, has been controlling it well       Past Surgical History:   Procedure Laterality Date    COLONOSCOPY N/A 10/10/2019    Procedure: COLONOSCOPY;  Surgeon: Matt Marin MD;  Location: [facility] ENDO;  Service: Endoscopy;  Laterality: N/A;    DILATION AND CURETTAGE OF UTERUS      FOOT ARTHRODESIS Left 12/2/2020    Procedure: FUSION, FOOT;  Surgeon: Uriah Veras DPM;  Location: [facility] OR;  Service: Podiatry;  Laterality: Left;  mini c-arm, Arthrex screws and staples, 1 MTP arthrodesis plates  Libby notified 11/23, EF  Libby w/ Arthrex confirmed 12/1/2020 [place]    HYSTERECTOMY       partial hyst for prolapse    MASTECTOMY Bilateral 11/14/2018    Procedure: MASTECTOMY;  Surgeon: Marga Cardenas MD;  Location: [facility] OR;  Service: Plastics;  Laterality: Bilateral;    MASTECTOMY WITH SENTINEL NODE BIOPSY AND AXILLARY LYMPH NODE DISSECTION Right 11/14/2018    Procedure: SENTINEL NODE BIOPSY AND AXILLARY LYMPHADENECTOMY;  Surgeon: Marga Cardenas MD;  Location: [facility];  Service: Plastics;  Laterality: Right;    RECONSTRUCTION OF BREAST WITH DEEP INFERIOR EPIGASTRIC ARTERY  (MEGAN) FREE FLAP Bilateral 11/14/2018    Procedure: RECONSTRUCTION, BREAST, USING MEGAN FREE FLAP;  Surgeon: Tye Chapman MD;  Location: [facility];  Service: Plastics;  Laterality: Bilateral;    RESECTION OF GASTROCNEMIUS MUSCLE Left 12/2/2020    Procedure: RESECTION, MUSCLE, GASTROCNEMIUS;  Surgeon: Uriah Veras DPM;  Location: [facility];  Service: Podiatry;  Laterality: Left;  video  Kj w/ video confirmed 12/1/2020       Family History   Problem Relation Age of Onset    Diabetes Mother     Hypertension Mother     No Known Problems Father     Hypertension Brother     Cancer Paternal Uncle         leukemia    Breast cancer Neg Hx     Ovarian cancer Neg Hx     Colon cancer Neg Hx        Social History     Socioeconomic History    Marital status:    Tobacco Use    Smoking status: Never    Smokeless tobacco: Never   Substance and Sexual Activity    Alcohol use: Yes     Alcohol/week: 3.0 standard drinks     Types: 3 Glasses of wine per week     Comment: social    Drug use: No    Sexual activity: Yes     Partners: Male     Birth control/protection: Surgical     Social Determinants of Health     Financial Resource Strain: Medium Risk    Difficulty of Paying Living Expenses: Somewhat hard   Food Insecurity: No Food Insecurity    Worried About Running Out of Food in the Last Year: Never true    Ran Out of Food in the Last Year: Never true   Transportation Needs: No Transportation Needs    Lack of Transportation  (Medical): No    Lack of Transportation (Non-Medical): No   Physical Activity: Insufficiently Active    Days of Exercise per Week: 4 days    Minutes of Exercise per Session: 30 min   Stress: No Stress Concern Present    Feeling of Stress : Not at all   Social Connections: Unknown    Frequency of Communication with Friends and Family: More than three times a week    Frequency of Social Gatherings with Friends and Family: More than three times a week    Active Member of Clubs or Organizations: Yes    Attends Club or Organization Meetings: More than 4 times per year    Marital Status:    Housing Stability: Low Risk     Unable to Pay for Housing in the Last Year: No    Number of Places Lived in the Last Year: 1    Unstable Housing in the Last Year: No       Review of Systems   Constitutional:  Positive for appetite change. Negative for unexpected weight change.   HENT:  Negative for trouble swallowing.    Respiratory:  Negative for shortness of breath.    Cardiovascular:  Negative for chest pain.   Gastrointestinal:  Positive for abdominal pain, diarrhea, nausea and vomiting. Negative for blood in stool.   Neurological:  Negative for dizziness and weakness.   Hematological:  Negative for adenopathy. Does not bruise/bleed easily.   Psychiatric/Behavioral:  Negative for dysphoric mood.        Objective:     Vitals:    09/08/22 1513   BP: 132/80   BP Location: Right arm   Patient Position: Sitting   BP Method: Large (Manual)   Weight: 105.8 kg (233 lb 4.8 oz)          Physical Exam  Constitutional:       General: She is not in acute distress.     Appearance: Normal appearance. She is not ill-appearing.   HENT:      Head: Normocephalic.   Eyes:      Conjunctiva/sclera: Conjunctivae normal.      Pupils: Pupils are equal, round, and reactive to light.   Pulmonary:      Effort: Pulmonary effort is normal. No respiratory distress.   Musculoskeletal:         General: Normal range of motion.      Cervical back: Normal  range of motion.   Skin:     General: Skin is warm and dry.   Neurological:      Mental Status: She is alert and oriented to person, place, and time.   Psychiatric:         Mood and Affect: Mood normal.         Behavior: Behavior normal.             Assessment:         ICD-10-CM ICD-9-CM   1. Epigastric abdominal pain  R10.13 789.06   2. Nausea and vomiting, intractability of vomiting not specified, unspecified vomiting type  R11.2 787.01   3. Diarrhea, unspecified type  R19.7 787.91   4. Gastroesophageal reflux disease, unspecified whether esophagitis present  K21.9 530.81   5. Class 3 severe obesity due to excess calories with body mass index (BMI) of 40.0 to 44.9 in adult, unspecified whether serious comorbidity present  E66.01 278.01    Z68.41 V85.41       Plan:       Epigastric abdominal pain  -     H. pylori Antibody, IgG; Future; Expected date: 09/08/2022    Nausea and vomiting, intractability of vomiting not specified, unspecified vomiting type  -     Ambulatory referral/consult to Gastroenterology  -     H. pylori Antibody, IgG; Future; Expected date: 09/08/2022    Diarrhea, unspecified type  -     Ambulatory referral/consult to Gastroenterology    Gastroesophageal reflux disease, unspecified whether esophagitis present  -     Ambulatory referral/consult to Gastroenterology    Class 3 severe obesity due to excess calories with body mass index (BMI) of 40.0 to 44.9 in adult, unspecified whether serious comorbidity present    - Continue current medication. Start daily probiotic. H. Pylori antibody test pending.  Follow up if symptoms worsen or fail to improve.     Patient's Medications   New Prescriptions    No medications on file   Previous Medications    AMLODIPINE (NORVASC) 10 MG TABLET    TAKE 1 TABLET BY MOUTH EVERY DAY    BETAMETHASONE DIPROPIONATE (DIPROLENE) 0.05 % CREAM    APPLY TWICE DAILY TO ALL AFFECTED AREAS.    CHOLECALCIFEROL, VITAMIN D3, 1,250 MCG (50,000 UNIT) CAPSULE    Take 1 capsule  (50,000 Units total) by mouth every 7 days.    CLOBETASOL (CLOBEX) 0.05 % SHAMPOO    USE TOPICALLY 3 TIMES WEEKLY    CLOBETASOL (TEMOVATE) 0.05 % EXTERNAL SOLUTION        ERGOCALCIFEROL (ERGOCALCIFEROL) 50,000 UNIT CAP    Take 50,000 Units by mouth every 7 days.    FLUTICASONE PROPIONATE (CUTIVATE) 0.05 % CREAM    Apply topically 2 (two) times daily.    LETROZOLE (FEMARA) 2.5 MG TAB    Take 1 tablet (2.5 mg total) by mouth once daily.    LOPERAMIDE (IMODIUM A-D) 2 MG TAB    Take 2 mg by mouth 4 (four) times daily as needed.    LOSARTAN-HYDROCHLOROTHIAZIDE 100-25 MG (HYZAAR) 100-25 MG PER TABLET    TAKE 1 TABLET BY MOUTH EVERY DAY    METHOCARBAMOL (ROBAXIN) 500 MG TAB    Take 1 tablet (500 mg total) by mouth 2 (two) times daily as needed (muscle spasm).    MULTIVITAMIN (THERAGRAN) PER TABLET    Take 1 tablet by mouth once daily.    OMEPRAZOLE (PRILOSEC) 40 MG CAPSULE    Take 1 capsule (40 mg total) by mouth daily as needed (nausea). 30 minutes before breakfast or coffee    ONDANSETRON (ZOFRAN-ODT) 4 MG TBDL    Take 1 tablet (4 mg total) by mouth every 8 (eight) hours as needed (nausea).    SEMAGLUTIDE (OZEMPIC) 1 MG/DOSE (4 MG/3 ML)    Inject 1 mg into the skin every 7 days.    SKYRIZI 150 MG/ML SYRG    Inject into the skin.    SUCRALFATE (CARAFATE) 1 GRAM TABLET    Take 1 tablet (1 g total) by mouth 3 (three) times daily as needed (upset stomach).    TRIAMCINOLONE ACETONIDE 0.1% (KENALOG) 0.1 % CREAM    Apply topically 2 (two) times daily.    VENLAFAXINE (EFFEXOR-XR) 150 MG CP24    Take 1 capsule (150 mg total) by mouth once daily.    VENLAFAXINE (EFFEXOR-XR) 75 MG 24 HR CAPSULE    TAKE 1 CAPSULE BY MOUTH EVERY DAY   Modified Medications    No medications on file   Discontinued Medications    No medications on file

## 2022-09-27 ENCOUNTER — PATIENT OUTREACH (OUTPATIENT)
Dept: ADMINISTRATIVE | Facility: HOSPITAL | Age: 52
End: 2022-09-27
Payer: COMMERCIAL

## 2022-09-27 ENCOUNTER — PATIENT MESSAGE (OUTPATIENT)
Dept: ADMINISTRATIVE | Facility: HOSPITAL | Age: 52
End: 2022-09-27
Payer: COMMERCIAL

## 2022-09-27 NOTE — PROGRESS NOTES
REY Stevens BP Non-Compliant report - Left message for patient to call our office. Updated blood pressure reading needed.

## 2022-09-30 ENCOUNTER — PATIENT MESSAGE (OUTPATIENT)
Dept: BEHAVIORAL HEALTH | Facility: CLINIC | Age: 52
End: 2022-09-30
Payer: COMMERCIAL

## 2022-10-26 NOTE — PLAN OF CARE
Problem: Adult Inpatient Plan of Care  Goal: Plan of Care Review  Outcome: Ongoing (interventions implemented as appropriate)  Day 18 of outpatient XRT to the right breast mound. Doing well. Skin intact. Slight erythema. Using Miaderm 4x a day. Mild burning under the axilla. Gel packs given.        18

## 2022-11-18 ENCOUNTER — TELEPHONE (OUTPATIENT)
Dept: HEMATOLOGY/ONCOLOGY | Facility: CLINIC | Age: 52
End: 2022-11-18
Payer: COMMERCIAL

## 2022-11-25 ENCOUNTER — TELEPHONE (OUTPATIENT)
Dept: HEMATOLOGY/ONCOLOGY | Facility: CLINIC | Age: 52
End: 2022-11-25
Payer: COMMERCIAL

## 2022-11-28 ENCOUNTER — OFFICE VISIT (OUTPATIENT)
Dept: PODIATRY | Facility: CLINIC | Age: 52
End: 2022-11-28
Payer: COMMERCIAL

## 2022-11-28 VITALS — HEART RATE: 73 BPM | DIASTOLIC BLOOD PRESSURE: 98 MMHG | SYSTOLIC BLOOD PRESSURE: 140 MMHG

## 2022-11-28 DIAGNOSIS — Z98.890 HISTORY OF FOOT SURGERY: ICD-10-CM

## 2022-11-28 DIAGNOSIS — M19.072 OSTEOARTHRITIS OF LEFT FOOT, UNSPECIFIED OSTEOARTHRITIS TYPE: Primary | ICD-10-CM

## 2022-11-28 DIAGNOSIS — S99.922A TOE INJURY, LEFT, INITIAL ENCOUNTER: ICD-10-CM

## 2022-11-28 PROCEDURE — 3044F HG A1C LEVEL LT 7.0%: CPT | Mod: CPTII,S$GLB,, | Performed by: PODIATRIST

## 2022-11-28 PROCEDURE — 99214 OFFICE O/P EST MOD 30 MIN: CPT | Mod: S$GLB,,, | Performed by: PODIATRIST

## 2022-11-28 PROCEDURE — 3080F PR MOST RECENT DIASTOLIC BLOOD PRESSURE >= 90 MM HG: ICD-10-PCS | Mod: CPTII,S$GLB,, | Performed by: PODIATRIST

## 2022-11-28 PROCEDURE — 99999 PR PBB SHADOW E&M-EST. PATIENT-LVL IV: ICD-10-PCS | Mod: PBBFAC,,, | Performed by: PODIATRIST

## 2022-11-28 PROCEDURE — 3044F PR MOST RECENT HEMOGLOBIN A1C LEVEL <7.0%: ICD-10-PCS | Mod: CPTII,S$GLB,, | Performed by: PODIATRIST

## 2022-11-28 PROCEDURE — 1160F PR REVIEW ALL MEDS BY PRESCRIBER/CLIN PHARMACIST DOCUMENTED: ICD-10-PCS | Mod: CPTII,S$GLB,, | Performed by: PODIATRIST

## 2022-11-28 PROCEDURE — 99214 PR OFFICE/OUTPT VISIT, EST, LEVL IV, 30-39 MIN: ICD-10-PCS | Mod: S$GLB,,, | Performed by: PODIATRIST

## 2022-11-28 PROCEDURE — 1159F PR MEDICATION LIST DOCUMENTED IN MEDICAL RECORD: ICD-10-PCS | Mod: CPTII,S$GLB,, | Performed by: PODIATRIST

## 2022-11-28 PROCEDURE — 3080F DIAST BP >= 90 MM HG: CPT | Mod: CPTII,S$GLB,, | Performed by: PODIATRIST

## 2022-11-28 PROCEDURE — 99999 PR PBB SHADOW E&M-EST. PATIENT-LVL IV: CPT | Mod: PBBFAC,,, | Performed by: PODIATRIST

## 2022-11-28 PROCEDURE — 1159F MED LIST DOCD IN RCRD: CPT | Mod: CPTII,S$GLB,, | Performed by: PODIATRIST

## 2022-11-28 PROCEDURE — 1160F RVW MEDS BY RX/DR IN RCRD: CPT | Mod: CPTII,S$GLB,, | Performed by: PODIATRIST

## 2022-11-28 PROCEDURE — 3077F SYST BP >= 140 MM HG: CPT | Mod: CPTII,S$GLB,, | Performed by: PODIATRIST

## 2022-11-28 PROCEDURE — 3077F PR MOST RECENT SYSTOLIC BLOOD PRESSURE >= 140 MM HG: ICD-10-PCS | Mod: CPTII,S$GLB,, | Performed by: PODIATRIST

## 2022-11-28 NOTE — PROGRESS NOTES
Subjective:      Patient ID: Brittny Urias is a 52 y.o. female.    Chief Complaint: Follow-up    Patient presents with left bunion and ankle pain. She reports over a year history of left bunion pain. She had seen Dr. Gupta for surgical evaluation but already had several procedures arranged for unrelated pathologies. She returns today for re-evaluation of the bunion. For the bunion, she has had improvement in the bunion pain after she modified her shoe gear; however, over the last 2 months, she has had worsening ankle and heel pain. She denies any traumatic event but does admit to walking 3-4 miles everyday.     10/01/2020:  Follow-up from wearing it left ankle brace and MRI to left ankle.  She relates that she has been feeling better overall with wearing the brace and taking the anti-inflammatory.  She does note some occasional pain or pressure on top of the foot.    10/22/2020:  Presents today accompanied by her daughter for surgical consultation in order to proceed with the previously discussed surgery for the left foot.  No new complaints.    12/10/2020: Patient presents to clinic 1 week s/p left foot medial double arthrodesis with Lapiplasty, and open gastrocnemius resection. DOS:12/2/2020  Patient denies any pedal or calf pain. She has been complaint with nonweightbearing in tall orthopedic boot with knee scooter and crutches. Dressings to left foot clean dry intact. She is only taking celebrex and Eliquis at this point. No other complaints.     12/24/2020:  3 weeks postop.  Relates no pain.  She has been nonweightbearing with orthopedic boot in using a rolling knee walker.  No new concerns.  States she would like to return to work since she is a  and can sit down after work and that she can also for from home.    01/28/2021:  8 weeks postop.  Relates no pain to left foot.  No new complaints.  Nonweightbearing for rolling knee walker.    02/19/2021:  11 weeks postop.  Relates no pain to left  foot.  She did have a bout of throbbing pain to left foot when she initially started weight-bearing with the orthopedic boot however that has improved and the swelling has improved as well.  States she is working and that she is using her orthopedic boot.  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  She is also inquiring about returning to the gym.    4/22/2021: 4 months post op. She has completely transitioned out of the orthopedic boot and ankle brace into a regular tennis shoe. Relates to intermittent swelling and throbbing pain in the left foot and ankle with prolonged standing/walking. Rates pain as 3/10, improved with rest. She has been going to PT twice a week and is expected to finish PT at the end of this week. No new complaints.     01/20/2021:  Nearly 11 months postop from left foot medial double arthrodesis with 1st metatarsophalangeal joint arthrodesis.  Relates that she has not experienced any significant pain however she will get some aching to the left foot when she is on her feet for extended periods of time.  She purchased motion control tennis shoes which have her her feet in the beginning.  States that she felt like they are trying to hold her foot too straight.    11/28/2022: Nearly 2 years since patient received 2 medial double arthrodesis with 1st metatarsophalangeal joint arthrodesis left foot.  Relates no significant pain but she gets intermittent sharp in achy pain to the dorsal left midfoot when she attempts to do light dog.  She also injured her left 3rd and 4th toes against a coffee table a couple weeks ago and still has some residual discoloration and pain.      Vitals:    11/28/22 1628   BP: (!) 140/98   Pulse: 73   PainSc:   2   PainLoc: Foot      Past Medical History:   Diagnosis Date    Breast cancer, right breast 11/12/2018    Cancer     Diabetes mellitus, type 2     Hypertension     Psoriasis     on biologic for 1 year, has been  controlling it well       Past Surgical History:   Procedure Laterality Date    COLONOSCOPY N/A 10/10/2019    Procedure: COLONOSCOPY;  Surgeon: Matt Marin MD;  Location: [facility] ENDO;  Service: Endoscopy;  Laterality: N/A;    DILATION AND CURETTAGE OF UTERUS      FOOT ARTHRODESIS Left 12/2/2020    Procedure: FUSION, FOOT;  Surgeon: Uriah Veras DPM;  Location: [facility] OR;  Service: Podiatry;  Laterality: Left;  mini c-arm, Arthrex screws and staples, 1 MTP arthrodesis plates  Libby notified 11/23, EF  Libby w/ Arthrex confirmed 12/1/2020 [place]    HYSTERECTOMY      partial hyst for prolapse    MASTECTOMY Bilateral 11/14/2018    Procedure: MASTECTOMY;  Surgeon: Marga Cardenas MD;  Location: [facility];  Service: Plastics;  Laterality: Bilateral;    MASTECTOMY WITH SENTINEL NODE BIOPSY AND AXILLARY LYMPH NODE DISSECTION Right 11/14/2018    Procedure: SENTINEL NODE BIOPSY AND AXILLARY LYMPHADENECTOMY;  Surgeon: Marga Cardenas MD;  Location: [facility];  Service: Plastics;  Laterality: Right;    RECONSTRUCTION OF BREAST WITH DEEP INFERIOR EPIGASTRIC ARTERY  (MEGAN) FREE FLAP Bilateral 11/14/2018    Procedure: RECONSTRUCTION, BREAST, USING MEGAN FREE FLAP;  Surgeon: Tye Chapman MD;  Location: [facility];  Service: Plastics;  Laterality: Bilateral;    RESECTION OF GASTROCNEMIUS MUSCLE Left 12/2/2020    Procedure: RESECTION, MUSCLE, GASTROCNEMIUS;  Surgeon: Uriah Veras DPM;  Location: [facility];  Service: Podiatry;  Laterality: Left;  video  Kj w/ video confirmed 12/1/2020       Family History   Problem Relation Age of Onset    Diabetes Mother     Hypertension Mother     No Known Problems Father     Hypertension Brother     Cancer Paternal Uncle         leukemia    Breast cancer Neg Hx     Ovarian cancer Neg Hx     Colon cancer Neg Hx        Social History     Socioeconomic History    Marital status:    Tobacco Use    Smoking status: Never    Smokeless tobacco: Never   Substance and Sexual  Activity    Alcohol use: Yes     Alcohol/week: 3.0 standard drinks     Types: 3 Glasses of wine per week     Comment: social    Drug use: No    Sexual activity: Yes     Partners: Male     Birth control/protection: Surgical     Social Determinants of Health     Financial Resource Strain: Medium Risk    Difficulty of Paying Living Expenses: Somewhat hard   Food Insecurity: No Food Insecurity    Worried About Running Out of Food in the Last Year: Never true    Ran Out of Food in the Last Year: Never true   Transportation Needs: No Transportation Needs    Lack of Transportation (Medical): No    Lack of Transportation (Non-Medical): No   Physical Activity: Insufficiently Active    Days of Exercise per Week: 4 days    Minutes of Exercise per Session: 30 min   Stress: No Stress Concern Present    Feeling of Stress : Not at all   Social Connections: Unknown    Frequency of Communication with Friends and Family: More than three times a week    Frequency of Social Gatherings with Friends and Family: More than three times a week    Active Member of Clubs or Organizations: Yes    Attends Club or Organization Meetings: More than 4 times per year    Marital Status:    Housing Stability: Low Risk     Unable to Pay for Housing in the Last Year: No    Number of Places Lived in the Last Year: 1    Unstable Housing in the Last Year: No       Current Outpatient Medications   Medication Sig Dispense Refill    amLODIPine (NORVASC) 10 MG tablet TAKE 1 TABLET BY MOUTH EVERY DAY 90 tablet 3    betamethasone dipropionate (DIPROLENE) 0.05 % cream APPLY TWICE DAILY TO ALL AFFECTED AREAS.  2    cholecalciferol, vitamin D3, 1,250 mcg (50,000 unit) capsule Take 1 capsule (50,000 Units total) by mouth every 7 days. 12 capsule 1    clobetasol (CLOBEX) 0.05 % shampoo USE TOPICALLY 3 TIMES WEEKLY  3    clobetasoL (TEMOVATE) 0.05 % external solution       fluticasone propionate (CUTIVATE) 0.05 % cream Apply topically 2 (two) times daily.       letrozole (FEMARA) 2.5 mg Tab Take 1 tablet (2.5 mg total) by mouth once daily. 90 tablet 3    loperamide (IMODIUM A-D) 2 mg Tab Take 2 mg by mouth 4 (four) times daily as needed.      losartan-hydrochlorothiazide 100-25 mg (HYZAAR) 100-25 mg per tablet TAKE 1 TABLET BY MOUTH EVERY DAY 90 tablet 3    multivitamin (THERAGRAN) per tablet Take 1 tablet by mouth once daily.      omeprazole (PRILOSEC) 40 MG capsule TAKE 1 CAPSULE BY MOUTH 30MIN BEFORE BREAKFAST OR COFFEE 90 capsule 2    ondansetron (ZOFRAN-ODT) 4 MG TbDL Take 1 tablet (4 mg total) by mouth every 8 (eight) hours as needed (nausea). 30 tablet 1    semaglutide (OZEMPIC) 1 mg/dose (4 mg/3 mL) Inject 1 mg into the skin every 7 days. 3 pen 3    SKYRIZI 150 mg/mL Syrg Inject into the skin.      sucralfate (CARAFATE) 1 gram tablet Take 1 tablet (1 g total) by mouth 3 (three) times daily as needed (upset stomach). 30 tablet 2    triamcinolone acetonide 0.1% (KENALOG) 0.1 % cream Apply topically 2 (two) times daily.      venlafaxine (EFFEXOR-XR) 150 MG Cp24 Take 1 capsule (150 mg total) by mouth once daily. 90 capsule 3     Current Facility-Administered Medications   Medication Dose Route Frequency Provider Last Rate Last Admin    sodium chloride 0.9% flush 10 mL  10 mL Intravenous PRN Uriah Veras DPM         Facility-Administered Medications Ordered in Other Visits   Medication Dose Route Frequency Provider Last Rate Last Admin    EPINEPHrine 1,000 mcg in lactated Ringers 1,000 mL irrigation   Irrigation On Call Procedure Tye Chapman MD        EPINEPHrine 1,000 mcg in lactated Ringers 1,000 mL irrigation   Irrigation On Call Procedure Tye Chapman MD           Review of patient's allergies indicates:  No Known Allergies      Review of Systems   Constitutional: Negative for chills, decreased appetite, fever and night sweats.   HENT:  Negative for congestion and hearing loss.    Eyes:  Negative for vision loss in left eye and vision loss in right  eye.   Cardiovascular:  Positive for leg swelling. Negative for chest pain, claudication, cyanosis and irregular heartbeat.   Respiratory:  Negative for cough and shortness of breath.    Skin:  Negative for color change, dry skin, itching, nail changes, poor wound healing, rash, suspicious lesions and unusual hair distribution.   Musculoskeletal:  Negative for arthritis, back pain, falls, gout, joint pain, joint swelling and muscle weakness.        Left foot pain   Gastrointestinal:  Negative for nausea and vomiting.   Neurological:  Negative for dizziness, headaches, loss of balance, numbness and weakness.   Psychiatric/Behavioral:  Negative for altered mental status. The patient is not nervous/anxious.          Objective:      Physical Exam  Constitutional:       General: She is not in acute distress.     Appearance: She is not ill-appearing.   Cardiovascular:      Pulses:           Popliteal pulses are 2+ on the right side and 2+ on the left side.        Dorsalis pedis pulses are 2+ on the right side and 2+ on the left side.      Comments: CFT brisk < 3s. Diffuse edema to the left foot with no discoloration.   Musculoskeletal:      Comments: Negative anterior drawer sign left ankle.  No pain with stress eversion or inversion left ankle.  No pain on palpation along the previous surgical scar medial left hindfoot.  Limited midtarsal joint range of motion left foot.  No available 1 MTP range of motion left foot.  Clinical rectus appearance to the left foot.    Mild pain on palpation overlying small palpable bony prominence near the dorsal lateral 1st metatarsal cuneiform joint/2nd metatarsal cuneiform joint region.  Negative Tinel sign or provocation sign to the dorsal left midfoot overlying the deep peroneal nerve.    Mild localized pain on palpation and edema to left 3rd and 4th toes.  Reducible flexion adductovarus contracture of the 3rd and 4th toe left foot.   Skin:     General: Skin is warm.      Capillary  Refill: Capillary refill takes less than 2 seconds.      Findings: No ecchymosis or erythema.      Nails: There is no clubbing.      Comments: Normal appearing scars left foot.   Neurological:      Mental Status: She is alert.      Comments: Light touch intact. Negative provocation sign to the deep peroneal, tibial, superficial peroneal, sural nerve left lower extremity               Assessment:       Encounter Diagnoses   Name Primary?    Osteoarthritis of left foot, unspecified osteoarthritis type Yes    History of foot surgery     Toe injury, left, initial encounter          Plan:       Brittny was seen today for follow-up.    Diagnoses and all orders for this visit:    Osteoarthritis of left foot, unspecified osteoarthritis type  -     X-Ray Foot Complete Left; Future  -     X-Ray Ankle Complete Left; Future    History of foot surgery  -     X-Ray Foot Complete Left; Future  -     X-Ray Ankle Complete Left; Future    Toe injury, left, initial encounter  -     X-Ray Foot Complete Left; Future  -     X-Ray Ankle Complete Left; Future    I counseled the patient on her conditions, their implications and medical management.    Reviewed previous weight-bearing left foot x-ray completed a year ago noting fully consolidated subtalar joint talonavicular joint arthrodesis without any signs of hardware failure.  The 1st metatarsophalangeal joint was also fully consolidated with intact hardware.  There was some mild arthrosis with irregular joint space narrowing of the navicular cuneiform joint and appear to be some changes to the 2nd metatarsal cuneiform joint region.  Have ordered follow-up weight-bearing left foot and ankle x-ray to further assess.  Will also inspect the left 3rd and 4th toes ensure that there are not fractured.      Continue activity as tolerated.  We discussed wearing appropriate shoe gear with adequate support and monitoring the left midfoot pain.      RTC p.r.n. as discussed.    A portion of this  note was generated by voice recognition software and may contain spelling and grammar errors.

## 2022-11-29 ENCOUNTER — PATIENT MESSAGE (OUTPATIENT)
Dept: PODIATRY | Facility: CLINIC | Age: 52
End: 2022-11-29
Payer: COMMERCIAL

## 2022-11-29 ENCOUNTER — HOSPITAL ENCOUNTER (OUTPATIENT)
Dept: RADIOLOGY | Facility: HOSPITAL | Age: 52
Discharge: HOME | End: 2022-11-29
Attending: PODIATRIST
Payer: COMMERCIAL

## 2022-11-29 DIAGNOSIS — M19.072 OSTEOARTHRITIS OF LEFT FOOT, UNSPECIFIED OSTEOARTHRITIS TYPE: ICD-10-CM

## 2022-11-29 DIAGNOSIS — Z98.890 HISTORY OF FOOT SURGERY: ICD-10-CM

## 2022-11-29 DIAGNOSIS — S99.922A TOE INJURY, LEFT, INITIAL ENCOUNTER: ICD-10-CM

## 2022-11-29 PROCEDURE — 73610 X-RAY EXAM OF ANKLE: CPT | Mod: TC,FY,PO,LT

## 2022-11-29 PROCEDURE — 73630 X-RAY EXAM OF FOOT: CPT | Mod: TC,FY,PO,LT

## 2022-12-16 ENCOUNTER — PATIENT MESSAGE (OUTPATIENT)
Dept: ADMINISTRATIVE | Facility: HOSPITAL | Age: 52
End: 2022-12-16
Payer: COMMERCIAL

## 2022-12-16 ENCOUNTER — PATIENT OUTREACH (OUTPATIENT)
Dept: ADMINISTRATIVE | Facility: HOSPITAL | Age: 52
End: 2022-12-16
Payer: COMMERCIAL

## 2022-12-16 NOTE — PROGRESS NOTES
Patient reports that she does have a blood pressure cuff at home, she will call back with a reading.

## 2022-12-16 NOTE — PROGRESS NOTES
[facility] 1 BP Non-Compliant 12.12.22 Dr. Stevens - Left message for patient to call our office. Updated blood pressure reading is needed.

## 2022-12-22 ENCOUNTER — OFFICE VISIT (OUTPATIENT)
Dept: FAMILY MEDICINE | Facility: CLINIC | Age: 52
End: 2022-12-22
Payer: COMMERCIAL

## 2022-12-22 ENCOUNTER — LAB VISIT (OUTPATIENT)
Dept: LAB | Facility: HOSPITAL | Age: 52
End: 2022-12-22
Attending: FAMILY MEDICINE
Payer: COMMERCIAL

## 2022-12-22 VITALS
TEMPERATURE: 98 F | BODY MASS INDEX: 43.19 KG/M2 | HEIGHT: 62 IN | OXYGEN SATURATION: 96 % | DIASTOLIC BLOOD PRESSURE: 76 MMHG | WEIGHT: 234.69 LBS | SYSTOLIC BLOOD PRESSURE: 128 MMHG | HEART RATE: 78 BPM

## 2022-12-22 DIAGNOSIS — E78.5 DYSLIPIDEMIA: ICD-10-CM

## 2022-12-22 DIAGNOSIS — I10 ESSENTIAL HYPERTENSION: ICD-10-CM

## 2022-12-22 DIAGNOSIS — Z00.00 ANNUAL PHYSICAL EXAM: Primary | ICD-10-CM

## 2022-12-22 DIAGNOSIS — R73.03 PRE-DIABETES: ICD-10-CM

## 2022-12-22 DIAGNOSIS — Z23 NEEDS FLU SHOT: ICD-10-CM

## 2022-12-22 DIAGNOSIS — E55.9 VITAMIN D DEFICIENCY: ICD-10-CM

## 2022-12-22 DIAGNOSIS — E66.01 CLASS 3 SEVERE OBESITY DUE TO EXCESS CALORIES WITH SERIOUS COMORBIDITY AND BODY MASS INDEX (BMI) OF 40.0 TO 44.9 IN ADULT: ICD-10-CM

## 2022-12-22 DIAGNOSIS — Z00.00 ANNUAL PHYSICAL EXAM: ICD-10-CM

## 2022-12-22 LAB
25(OH)D3+25(OH)D2 SERPL-MCNC: 51 NG/ML (ref 30–96)
ALBUMIN SERPL BCP-MCNC: 3.9 G/DL (ref 3.5–5.2)
ALP SERPL-CCNC: 80 U/L (ref 55–135)
ALT SERPL W/O P-5'-P-CCNC: 23 U/L (ref 10–44)
ANION GAP SERPL CALC-SCNC: 7 MMOL/L (ref 8–16)
AST SERPL-CCNC: 21 U/L (ref 10–40)
BASOPHILS # BLD AUTO: 0.05 K/UL (ref 0–0.2)
BASOPHILS NFR BLD: 1 % (ref 0–1.9)
BILIRUB SERPL-MCNC: 0.2 MG/DL (ref 0.1–1)
BUN SERPL-MCNC: 12 MG/DL (ref 6–20)
CALCIUM SERPL-MCNC: 10.1 MG/DL (ref 8.7–10.5)
CHLORIDE SERPL-SCNC: 106 MMOL/L (ref 95–110)
CHOLEST SERPL-MCNC: 264 MG/DL (ref 120–199)
CHOLEST/HDLC SERPL: 3.3 {RATIO} (ref 2–5)
CO2 SERPL-SCNC: 27 MMOL/L (ref 23–29)
CREAT SERPL-MCNC: 0.8 MG/DL (ref 0.5–1.4)
DIFFERENTIAL METHOD: ABNORMAL
EOSINOPHIL # BLD AUTO: 0.2 K/UL (ref 0–0.5)
EOSINOPHIL NFR BLD: 3.6 % (ref 0–8)
ERYTHROCYTE [DISTWIDTH] IN BLOOD BY AUTOMATED COUNT: 19 % (ref 11.5–14.5)
EST. GFR  (NO RACE VARIABLE): >60 ML/MIN/1.73 M^2
ESTIMATED AVG GLUCOSE: 123 MG/DL (ref 68–131)
GLUCOSE SERPL-MCNC: 95 MG/DL (ref 70–110)
HBA1C MFR BLD: 5.9 % (ref 4–5.6)
HCT VFR BLD AUTO: 44.6 % (ref 37–48.5)
HDLC SERPL-MCNC: 80 MG/DL (ref 40–75)
HDLC SERPL: 30.3 % (ref 20–50)
HGB BLD-MCNC: 13.3 G/DL (ref 12–16)
IMM GRANULOCYTES # BLD AUTO: 0.01 K/UL (ref 0–0.04)
IMM GRANULOCYTES NFR BLD AUTO: 0.2 % (ref 0–0.5)
LDLC SERPL CALC-MCNC: 167.6 MG/DL (ref 63–159)
LYMPHOCYTES # BLD AUTO: 1.9 K/UL (ref 1–4.8)
LYMPHOCYTES NFR BLD: 37.4 % (ref 18–48)
MCH RBC QN AUTO: 22.3 PG (ref 27–31)
MCHC RBC AUTO-ENTMCNC: 29.8 G/DL (ref 32–36)
MCV RBC AUTO: 75 FL (ref 82–98)
MONOCYTES # BLD AUTO: 0.5 K/UL (ref 0.3–1)
MONOCYTES NFR BLD: 9.4 % (ref 4–15)
NEUTROPHILS # BLD AUTO: 2.4 K/UL (ref 1.8–7.7)
NEUTROPHILS NFR BLD: 48.4 % (ref 38–73)
NONHDLC SERPL-MCNC: 184 MG/DL
NRBC BLD-RTO: 0 /100 WBC
PLATELET # BLD AUTO: 346 K/UL (ref 150–450)
PMV BLD AUTO: 11.3 FL (ref 9.2–12.9)
POTASSIUM SERPL-SCNC: 4.5 MMOL/L (ref 3.5–5.1)
PROT SERPL-MCNC: 7.5 G/DL (ref 6–8.4)
RBC # BLD AUTO: 5.97 M/UL (ref 4–5.4)
SODIUM SERPL-SCNC: 140 MMOL/L (ref 136–145)
TRIGL SERPL-MCNC: 82 MG/DL (ref 30–150)
WBC # BLD AUTO: 5 K/UL (ref 3.9–12.7)

## 2022-12-22 PROCEDURE — 99214 PR OFFICE/OUTPT VISIT, EST, LEVL IV, 30-39 MIN: ICD-10-PCS | Mod: 25,S$GLB,, | Performed by: FAMILY MEDICINE

## 2022-12-22 PROCEDURE — 80061 LIPID PANEL: CPT | Performed by: FAMILY MEDICINE

## 2022-12-22 PROCEDURE — 83036 HEMOGLOBIN GLYCOSYLATED A1C: CPT | Performed by: FAMILY MEDICINE

## 2022-12-22 PROCEDURE — 90471 FLU VACCINE (QUAD) GREATER THAN OR EQUAL TO 3YO PRESERVATIVE FREE IM: ICD-10-PCS | Mod: S$GLB,,, | Performed by: FAMILY MEDICINE

## 2022-12-22 PROCEDURE — 99999 PR PBB SHADOW E&M-EST. PATIENT-LVL IV: ICD-10-PCS | Mod: PBBFAC,,, | Performed by: FAMILY MEDICINE

## 2022-12-22 PROCEDURE — 1160F PR REVIEW ALL MEDS BY PRESCRIBER/CLIN PHARMACIST DOCUMENTED: ICD-10-PCS | Mod: CPTII,S$GLB,, | Performed by: FAMILY MEDICINE

## 2022-12-22 PROCEDURE — 3078F DIAST BP <80 MM HG: CPT | Mod: CPTII,S$GLB,, | Performed by: FAMILY MEDICINE

## 2022-12-22 PROCEDURE — 90686 IIV4 VACC NO PRSV 0.5 ML IM: CPT | Mod: S$GLB,,, | Performed by: FAMILY MEDICINE

## 2022-12-22 PROCEDURE — 3078F PR MOST RECENT DIASTOLIC BLOOD PRESSURE < 80 MM HG: ICD-10-PCS | Mod: CPTII,S$GLB,, | Performed by: FAMILY MEDICINE

## 2022-12-22 PROCEDURE — 90471 IMMUNIZATION ADMIN: CPT | Mod: S$GLB,,, | Performed by: FAMILY MEDICINE

## 2022-12-22 PROCEDURE — 99999 PR PBB SHADOW E&M-EST. PATIENT-LVL IV: CPT | Mod: PBBFAC,,, | Performed by: FAMILY MEDICINE

## 2022-12-22 PROCEDURE — 99214 OFFICE O/P EST MOD 30 MIN: CPT | Mod: 25,S$GLB,, | Performed by: FAMILY MEDICINE

## 2022-12-22 PROCEDURE — 3044F HG A1C LEVEL LT 7.0%: CPT | Mod: CPTII,S$GLB,, | Performed by: FAMILY MEDICINE

## 2022-12-22 PROCEDURE — 3044F PR MOST RECENT HEMOGLOBIN A1C LEVEL <7.0%: ICD-10-PCS | Mod: CPTII,S$GLB,, | Performed by: FAMILY MEDICINE

## 2022-12-22 PROCEDURE — 3074F SYST BP LT 130 MM HG: CPT | Mod: CPTII,S$GLB,, | Performed by: FAMILY MEDICINE

## 2022-12-22 PROCEDURE — 90686 FLU VACCINE (QUAD) GREATER THAN OR EQUAL TO 3YO PRESERVATIVE FREE IM: ICD-10-PCS | Mod: S$GLB,,, | Performed by: FAMILY MEDICINE

## 2022-12-22 PROCEDURE — 1159F MED LIST DOCD IN RCRD: CPT | Mod: CPTII,S$GLB,, | Performed by: FAMILY MEDICINE

## 2022-12-22 PROCEDURE — 3074F PR MOST RECENT SYSTOLIC BLOOD PRESSURE < 130 MM HG: ICD-10-PCS | Mod: CPTII,S$GLB,, | Performed by: FAMILY MEDICINE

## 2022-12-22 PROCEDURE — 36415 COLL VENOUS BLD VENIPUNCTURE: CPT | Mod: PO | Performed by: FAMILY MEDICINE

## 2022-12-22 PROCEDURE — 3008F PR BODY MASS INDEX (BMI) DOCUMENTED: ICD-10-PCS | Mod: CPTII,S$GLB,, | Performed by: FAMILY MEDICINE

## 2022-12-22 PROCEDURE — 1160F RVW MEDS BY RX/DR IN RCRD: CPT | Mod: CPTII,S$GLB,, | Performed by: FAMILY MEDICINE

## 2022-12-22 PROCEDURE — 80053 COMPREHEN METABOLIC PANEL: CPT | Performed by: FAMILY MEDICINE

## 2022-12-22 PROCEDURE — 85025 COMPLETE CBC W/AUTO DIFF WBC: CPT | Performed by: FAMILY MEDICINE

## 2022-12-22 PROCEDURE — 1159F PR MEDICATION LIST DOCUMENTED IN MEDICAL RECORD: ICD-10-PCS | Mod: CPTII,S$GLB,, | Performed by: FAMILY MEDICINE

## 2022-12-22 PROCEDURE — 82306 VITAMIN D 25 HYDROXY: CPT | Performed by: FAMILY MEDICINE

## 2022-12-22 PROCEDURE — 3008F BODY MASS INDEX DOCD: CPT | Mod: CPTII,S$GLB,, | Performed by: FAMILY MEDICINE

## 2022-12-22 RX ORDER — ASPIRIN 325 MG
50000 TABLET, DELAYED RELEASE (ENTERIC COATED) ORAL
Qty: 12 CAPSULE | Refills: 3 | Status: SHIPPED | OUTPATIENT
Start: 2022-12-22 | End: 2023-12-08

## 2022-12-22 RX ORDER — SEMAGLUTIDE 2.68 MG/ML
2 INJECTION, SOLUTION SUBCUTANEOUS
Qty: 1 PEN | Refills: 11 | Status: SHIPPED | OUTPATIENT
Start: 2022-12-22 | End: 2023-07-25 | Stop reason: SDUPTHER

## 2022-12-22 NOTE — PROGRESS NOTES
Assessment & Plan  Annual physical exam  -     CBC Auto Differential; Future; Expected date: 12/22/2022  -     Comprehensive Metabolic Panel; Future; Expected date: 12/22/2022  -     Hemoglobin A1C; Future; Expected date: 12/22/2022  -     Lipid Panel; Future; Expected date: 12/22/2022  -     Vitamin D; Future; Expected date: 12/22/2022    Routine fasting labs to be performed today.    Class 3 severe obesity due to excess calories with serious comorbidity and body mass index (BMI) of 40.0 to 44.9 in adult  -     Discontinue: semaglutide (OZEMPIC) 1 mg/dose (4 mg/3 mL); Inject 1 mg into the skin every 7 days.  Dispense: 3 pen; Refill: 3  -     semaglutide (OZEMPIC) 2 mg/dose (8 mg/3 mL) PnIj; Inject 2 mg into the skin every 7 days.  Dispense: 1 pen; Refill: 11    Increase Ozempic to 2 mg.   Reinforced importance of regular exercise and portion control for weight loss.    Vitamin D deficiency  -     cholecalciferol, vitamin D3, 1,250 mcg (50,000 unit) capsule; Take 1 capsule (50,000 Units total) by mouth every 7 days.  Dispense: 12 capsule; Refill: 3  -     Vitamin D; Future; Expected date: 12/22/2022    Med refilled.    Pre-diabetes  -     CBC Auto Differential; Future; Expected date: 12/22/2022  -     Comprehensive Metabolic Panel; Future; Expected date: 12/22/2022  -     Hemoglobin A1C; Future; Expected date: 12/22/2022    See above.    Dyslipidemia  -     Lipid Panel; Future; Expected date: 12/22/2022    See above.    Essential hypertension  -     CBC Auto Differential; Future; Expected date: 12/22/2022  -     Comprehensive Metabolic Panel; Future; Expected date: 12/22/2022  -     Hemoglobin A1C; Future; Expected date: 12/22/2022  -     Lipid Panel; Future; Expected date: 12/22/2022    Controlled. Continue current therapy.     Needs flu shot  -     Influenza - Quadrivalent (PF)      Follow-up: Follow up in about 6 months (around  6/22/2023).  ______________________________________________________________________    Chief Complaint  Chief Complaint   Patient presents with    Annual Exam       HPI  Brittny Urias is a 52 y.o. female with medical diagnoses as listed in the medical history and problem list that presents to the office for her annual exam. She is in her usual state of health today. She has been traveling for work the last 2 weeks and has not been exercising or eating healthy as she was before.     Health Maintenance         Date Due Completion Date    COVID-19 Vaccine (5 - Booster for Pfizer series) 07/19/2022 5/24/2022    Influenza Vaccine (1) 09/01/2022 8/20/2021    Hemoglobin A1c (Prediabetes) 05/11/2023 5/11/2022    Colorectal Cancer Screening 10/10/2024 10/10/2019    Lipid Panel 05/11/2027 5/11/2022    TETANUS VACCINE 07/21/2030 7/21/2020              PAST MEDICAL HISTORY:  Past Medical History:   Diagnosis Date    Breast cancer, right breast 11/12/2018    Cancer     Diabetes mellitus, type 2     Hypertension     Psoriasis     on biologic for 1 year, has been controlling it well       PAST SURGICAL HISTORY:  Past Surgical History:   Procedure Laterality Date    COLONOSCOPY N/A 10/10/2019    Procedure: COLONOSCOPY;  Surgeon: Matt Marin MD;  Location: [facility] ENDO;  Service: Endoscopy;  Laterality: N/A;    DILATION AND CURETTAGE OF UTERUS      FOOT ARTHRODESIS Left 12/2/2020    Procedure: FUSION, FOOT;  Surgeon: Uriah Veras DPM;  Location: [facility] OR;  Service: Podiatry;  Laterality: Left;  mini c-arm, Arthrex screws and staples, 1 MTP arthrodesis plates  Libby notified 11/23, EF  Libby w/ Arthrex confirmed 12/1/2020 [place]    HYSTERECTOMY      partial hyst for prolapse    MASTECTOMY Bilateral 11/14/2018    Procedure: MASTECTOMY;  Surgeon: Marga Cardenas MD;  Location: [facility] OR;  Service: Plastics;  Laterality: Bilateral;    MASTECTOMY WITH SENTINEL NODE BIOPSY AND AXILLARY LYMPH NODE DISSECTION Right 11/14/2018     Procedure: SENTINEL NODE BIOPSY AND AXILLARY LYMPHADENECTOMY;  Surgeon: Marga Cardenas MD;  Location: [facility] OR;  Service: Plastics;  Laterality: Right;    RECONSTRUCTION OF BREAST WITH DEEP INFERIOR EPIGASTRIC ARTERY  (MEGAN) FREE FLAP Bilateral 11/14/2018    Procedure: RECONSTRUCTION, BREAST, USING MEGAN FREE FLAP;  Surgeon: Tye Chapman MD;  Location: [facility] OR;  Service: Plastics;  Laterality: Bilateral;    RESECTION OF GASTROCNEMIUS MUSCLE Left 12/2/2020    Procedure: RESECTION, MUSCLE, GASTROCNEMIUS;  Surgeon: Uriah Veras DPM;  Location: [facility] OR;  Service: Podiatry;  Laterality: Left;  video  Kj w/ video confirmed 12/1/2020       SOCIAL HISTORY:  Social History     Socioeconomic History    Marital status:    Tobacco Use    Smoking status: Never    Smokeless tobacco: Never   Substance and Sexual Activity    Alcohol use: Yes     Alcohol/week: 3.0 standard drinks     Types: 3 Glasses of wine per week     Comment: social    Drug use: No    Sexual activity: Yes     Partners: Male     Birth control/protection: Surgical     Social Determinants of Health     Financial Resource Strain: Medium Risk    Difficulty of Paying Living Expenses: Somewhat hard   Food Insecurity: No Food Insecurity    Worried About Running Out of Food in the Last Year: Never true    Ran Out of Food in the Last Year: Never true   Transportation Needs: No Transportation Needs    Lack of Transportation (Medical): No    Lack of Transportation (Non-Medical): No   Physical Activity: Insufficiently Active    Days of Exercise per Week: 3 days    Minutes of Exercise per Session: 40 min   Stress: No Stress Concern Present    Feeling of Stress : Only a little   Social Connections: Unknown    Frequency of Communication with Friends and Family: More than three times a week    Frequency of Social Gatherings with Friends and Family: Twice a week    Active Member of Clubs or Organizations: Yes    Attends Club or Organization Meetings:  More than 4 times per year    Marital Status:    Housing Stability: High Risk    Unable to Pay for Housing in the Last Year: Yes    Number of Places Lived in the Last Year: 1    Unstable Housing in the Last Year: No       FAMILY HISTORY:  Family History   Problem Relation Age of Onset    Diabetes Mother     Hypertension Mother     No Known Problems Father     Hypertension Brother     Cancer Paternal Uncle         leukemia    Breast cancer Neg Hx     Ovarian cancer Neg Hx     Colon cancer Neg Hx        ALLERGIES AND MEDICATIONS: updated and reviewed.  Review of patient's allergies indicates:  No Known Allergies  Current Outpatient Medications   Medication Sig Dispense Refill    amLODIPine (NORVASC) 10 MG tablet TAKE 1 TABLET BY MOUTH EVERY DAY 90 tablet 3    betamethasone dipropionate (DIPROLENE) 0.05 % cream APPLY TWICE DAILY TO ALL AFFECTED AREAS.  2    clobetasol (CLOBEX) 0.05 % shampoo USE TOPICALLY 3 TIMES WEEKLY  3    clobetasoL (TEMOVATE) 0.05 % external solution       fluticasone propionate (CUTIVATE) 0.05 % cream Apply topically 2 (two) times daily.      letrozole (FEMARA) 2.5 mg Tab Take 1 tablet (2.5 mg total) by mouth once daily. 90 tablet 3    loperamide (IMODIUM A-D) 2 mg Tab Take 2 mg by mouth 4 (four) times daily as needed.      losartan-hydrochlorothiazide 100-25 mg (HYZAAR) 100-25 mg per tablet TAKE 1 TABLET BY MOUTH EVERY DAY 90 tablet 3    multivitamin (THERAGRAN) per tablet Take 1 tablet by mouth once daily.      omeprazole (PRILOSEC) 40 MG capsule TAKE 1 CAPSULE BY MOUTH 30MIN BEFORE BREAKFAST OR COFFEE 90 capsule 2    ondansetron (ZOFRAN-ODT) 4 MG TbDL Take 1 tablet (4 mg total) by mouth every 8 (eight) hours as needed (nausea). 30 tablet 1    SKYRIZI 150 mg/mL Syrg Inject into the skin.      sucralfate (CARAFATE) 1 gram tablet Take 1 tablet (1 g total) by mouth 3 (three) times daily as needed (upset stomach). 30 tablet 2    triamcinolone acetonide 0.1% (KENALOG) 0.1 % cream Apply  topically 2 (two) times daily.      venlafaxine (EFFEXOR-XR) 150 MG Cp24 Take 1 capsule (150 mg total) by mouth once daily. 90 capsule 3    cholecalciferol, vitamin D3, 1,250 mcg (50,000 unit) capsule Take 1 capsule (50,000 Units total) by mouth every 7 days. 12 capsule 3    semaglutide (OZEMPIC) 2 mg/dose (8 mg/3 mL) PnIj Inject 2 mg into the skin every 7 days. 1 pen 11     Current Facility-Administered Medications   Medication Dose Route Frequency Provider Last Rate Last Admin    sodium chloride 0.9% flush 10 mL  10 mL Intravenous PRN Uriah Veras DPM         Facility-Administered Medications Ordered in Other Visits   Medication Dose Route Frequency Provider Last Rate Last Admin    EPINEPHrine 1,000 mcg in lactated Ringers 1,000 mL irrigation   Irrigation On Call Procedure Tye Chapman MD        EPINEPHrine 1,000 mcg in lactated Ringers 1,000 mL irrigation   Irrigation On Call Procedure Tye Chapman MD             ROS  Review of Systems   Constitutional:  Positive for activity change. Negative for appetite change, fever and unexpected weight change.   HENT:  Negative for congestion and sore throat.    Eyes:  Negative for photophobia and visual disturbance.   Respiratory:  Negative for cough and shortness of breath.    Cardiovascular:  Negative for chest pain and leg swelling.   Gastrointestinal:  Negative for abdominal pain, constipation, diarrhea, nausea and vomiting.   Endocrine: Negative for polydipsia, polyphagia and polyuria.   Genitourinary:  Negative for dysuria and urgency.   Musculoskeletal:  Negative for arthralgias and gait problem.   Skin:  Negative for color change.   Neurological:  Negative for dizziness, weakness and headaches.   Psychiatric/Behavioral:  Negative for dysphoric mood and sleep disturbance. The patient is not nervous/anxious.          Physical Exam  Vitals:    12/22/22 1006   BP: 128/76   BP Location: Left arm   Patient Position: Sitting   BP Method: X-Large (Manual)  "  Pulse: 78   Temp: 98.2 °F (36.8 °C)   TempSrc: Oral   SpO2: 96%   Weight: 106.4 kg (234 lb 10.9 oz)   Height: 5' 2" (1.575 m)    Body mass index is 42.92 kg/m².  Weight: 106.4 kg (234 lb 10.9 oz)   Height: 5' 2" (157.5 cm)   Physical Exam  Constitutional:       General: She is not in acute distress.     Appearance: She is obese.   HENT:      Head: Normocephalic and atraumatic.   Neck:      Thyroid: No thyromegaly.      Vascular: No carotid bruit.   Cardiovascular:      Rate and Rhythm: Normal rate and regular rhythm.      Pulses: Normal pulses.      Heart sounds: Normal heart sounds.   Pulmonary:      Effort: Pulmonary effort is normal. No respiratory distress.      Breath sounds: Normal breath sounds.   Musculoskeletal:      Cervical back: Neck supple.      Right lower leg: No edema.      Left lower leg: No edema.   Lymphadenopathy:      Cervical: No cervical adenopathy.   Skin:     General: Skin is warm and dry.      Findings: No rash.   Neurological:      General: No focal deficit present.      Mental Status: She is alert and oriented to person, place, and time.   Psychiatric:         Mood and Affect: Mood normal.         Behavior: Behavior normal.         Thought Content: Thought content normal.           "

## 2022-12-23 ENCOUNTER — TELEPHONE (OUTPATIENT)
Dept: FAMILY MEDICINE | Facility: CLINIC | Age: 52
End: 2022-12-23
Payer: COMMERCIAL

## 2022-12-23 ENCOUNTER — OFFICE VISIT (OUTPATIENT)
Dept: HEMATOLOGY/ONCOLOGY | Facility: CLINIC | Age: 52
End: 2022-12-23
Payer: COMMERCIAL

## 2022-12-23 ENCOUNTER — PATIENT MESSAGE (OUTPATIENT)
Dept: FAMILY MEDICINE | Facility: CLINIC | Age: 52
End: 2022-12-23
Payer: COMMERCIAL

## 2022-12-23 DIAGNOSIS — Z79.811 LONG TERM (CURRENT) USE OF AROMATASE INHIBITORS: ICD-10-CM

## 2022-12-23 DIAGNOSIS — E78.5 DYSLIPIDEMIA: Primary | ICD-10-CM

## 2022-12-23 DIAGNOSIS — Z90.12 HISTORY OF MASTECTOMY, LEFT: ICD-10-CM

## 2022-12-23 DIAGNOSIS — Z17.0 MALIGNANT NEOPLASM OF CENTRAL PORTION OF RIGHT BREAST IN FEMALE, ESTROGEN RECEPTOR POSITIVE: Primary | ICD-10-CM

## 2022-12-23 DIAGNOSIS — R73.03 PRE-DIABETES: ICD-10-CM

## 2022-12-23 DIAGNOSIS — C50.111 MALIGNANT NEOPLASM OF CENTRAL PORTION OF RIGHT BREAST IN FEMALE, ESTROGEN RECEPTOR POSITIVE: Primary | ICD-10-CM

## 2022-12-23 DIAGNOSIS — E55.9 VITAMIN D DEFICIENCY: ICD-10-CM

## 2022-12-23 PROCEDURE — 3044F HG A1C LEVEL LT 7.0%: CPT | Mod: CPTII,95,, | Performed by: NURSE PRACTITIONER

## 2022-12-23 PROCEDURE — 1159F PR MEDICATION LIST DOCUMENTED IN MEDICAL RECORD: ICD-10-PCS | Mod: CPTII,95,, | Performed by: NURSE PRACTITIONER

## 2022-12-23 PROCEDURE — 1160F PR REVIEW ALL MEDS BY PRESCRIBER/CLIN PHARMACIST DOCUMENTED: ICD-10-PCS | Mod: CPTII,95,, | Performed by: NURSE PRACTITIONER

## 2022-12-23 PROCEDURE — 3044F PR MOST RECENT HEMOGLOBIN A1C LEVEL <7.0%: ICD-10-PCS | Mod: CPTII,95,, | Performed by: NURSE PRACTITIONER

## 2022-12-23 PROCEDURE — 1160F RVW MEDS BY RX/DR IN RCRD: CPT | Mod: CPTII,95,, | Performed by: NURSE PRACTITIONER

## 2022-12-23 PROCEDURE — 99214 OFFICE O/P EST MOD 30 MIN: CPT | Mod: 95,,, | Performed by: NURSE PRACTITIONER

## 2022-12-23 PROCEDURE — 1159F MED LIST DOCD IN RCRD: CPT | Mod: CPTII,95,, | Performed by: NURSE PRACTITIONER

## 2022-12-23 PROCEDURE — 99214 PR OFFICE/OUTPT VISIT, EST, LEVL IV, 30-39 MIN: ICD-10-PCS | Mod: 95,,, | Performed by: NURSE PRACTITIONER

## 2022-12-23 RX ORDER — PRAVASTATIN SODIUM 10 MG/1
10 TABLET ORAL DAILY
Qty: 30 TABLET | Refills: 11 | Status: SHIPPED | OUTPATIENT
Start: 2022-12-23 | End: 2023-06-22 | Stop reason: SDUPTHER

## 2022-12-23 NOTE — TELEPHONE ENCOUNTER
Patient was informed of result(s) via [id number].  Please schedule fasting labs before f/u in 6 mo.

## 2023-01-27 ENCOUNTER — HOSPITAL ENCOUNTER (OUTPATIENT)
Dept: RADIOLOGY | Facility: HOSPITAL | Age: 53
Discharge: HOME | End: 2023-01-27
Attending: NURSE PRACTITIONER
Payer: COMMERCIAL

## 2023-01-27 DIAGNOSIS — Z79.811 LONG TERM (CURRENT) USE OF AROMATASE INHIBITORS: ICD-10-CM

## 2023-01-27 PROCEDURE — 77080 DXA BONE DENSITY AXIAL: CPT | Mod: TC,PO

## 2023-01-27 PROCEDURE — 77080 DXA BONE DENSITY AXIAL: CPT | Mod: 26,,, | Performed by: RADIOLOGY

## 2023-01-27 PROCEDURE — 77080 DEXA BONE DENSITY SPINE HIP: ICD-10-PCS | Mod: 26,,, | Performed by: RADIOLOGY

## 2023-02-28 DIAGNOSIS — K21.9 GASTROESOPHAGEAL REFLUX DISEASE, UNSPECIFIED WHETHER ESOPHAGITIS PRESENT: ICD-10-CM

## 2023-02-28 RX ORDER — OMEPRAZOLE 40 MG/1
CAPSULE, DELAYED RELEASE ORAL
Qty: 90 CAPSULE | Refills: 2 | OUTPATIENT
Start: 2023-02-28

## 2023-02-28 NOTE — TELEPHONE ENCOUNTER
No new care gaps identified.  [facility] Embedded Care Gaps. Reference number: [id number]. 2/28/2023   7:00:47 AM CST

## 2023-02-28 NOTE — TELEPHONE ENCOUNTER
[facility] Note  Quick DC. Inappropriate Request   Refill request requires further review by MD: NO   Medication Therapy Plan: Pharmacy is requesting new script(s) for the following medications without required information, (sig/ frequency/qty/etc)     [facility] action(s):  Quick Discontinue      Encounter Details:    Pharmacies have been requesting medications for patients without required information, (sig, frequency, qty, etc.). In addition, requests are sent for medication(s) pt. are currently not taking, and medications patients have never taken.    We have spoken to the pharmacies about these request types and advised their teams previously that we are unable to assess these New Script requests and require all details for these requests. This is a known issue and has been reported.       Automatic Epic Generated Protocol Data Below:   Requested Prescriptions   Pending Prescriptions Disp Refills    omeprazole (PRILOSEC) 40 MG capsule 90 capsule 2            Appointments      Date Provider   Last Visit   12/22/2022 Anya Stevens DO   Next Visit   6/22/2023 Anya Stevens DO        Note composed:12:47 PM 02/28/2023

## 2023-03-31 ENCOUNTER — E-VISIT (OUTPATIENT)
Dept: FAMILY MEDICINE | Facility: CLINIC | Age: 53
End: 2023-03-31
Payer: COMMERCIAL

## 2023-03-31 DIAGNOSIS — J06.9 VIRAL UPPER RESPIRATORY TRACT INFECTION: Primary | ICD-10-CM

## 2023-03-31 PROCEDURE — 99421 OL DIG E/M SVC 5-10 MIN: CPT | Mod: 95,,, | Performed by: FAMILY MEDICINE

## 2023-03-31 PROCEDURE — 99421 PR E&M, ONLINE DIGIT, EST, < 7 DAYS, 5-10 MINS: ICD-10-PCS | Mod: 95,,, | Performed by: FAMILY MEDICINE

## 2023-03-31 RX ORDER — PROMETHAZINE HYDROCHLORIDE AND DEXTROMETHORPHAN HYDROBROMIDE 6.25; 15 MG/5ML; MG/5ML
SYRUP ORAL
Qty: 240 ML | Refills: 0 | Status: SHIPPED | OUTPATIENT
Start: 2023-03-31 | End: 2023-07-13

## 2023-03-31 RX ORDER — BENZONATATE 200 MG/1
200 CAPSULE ORAL 3 TIMES DAILY PRN
Qty: 30 CAPSULE | Refills: 1 | Status: SHIPPED | OUTPATIENT
Start: 2023-03-31

## 2023-03-31 NOTE — PROGRESS NOTES
Patient ID: Brittny Urias is a 52 y.o. female.    Chief Complaint: Cough    The patient initiated a request through [employer] on 3/31/2023 for evaluation and management with a chief complaint of Cough     I evaluated the questionnaire submission on 3/31/23.    [facility] Peq Evisit Upper Respitatory/Cough Questionnaire    3/31/2023  5:56 AM CDT - Filed by Patient   Do you agree to participate in an E-Visit? Yes   If you have any of the following symptoms, please present to your local ER or call 911:  I acknowledge   What is the main issue that you would like for your doctor to address today? Cough   Are you able to take your vital signs? No   Are you currently pregnant, could you be pregnant, or are you breast feeding? None of the above   What symptoms do you currently have?  Cough;  Fatigue;  Nasal Congestion;  Runny nose   Have you had a fever? No   When did your symptoms first appear? 3/25/2023   In the last two weeks, have you been in close contact with someone who has COVID-19 or the Flu? No   In the last two weeks, have you worked or volunteered in a healthcare facility or as a ? Healthcare facilities include a hospital, medical or dental clinic, long-term care facility, or nursing home No   Do you live in a long-term care facility, nursing home, group home, or homeless shelter? No   List what you have done or taken to help your symptoms. Allergy medication   How severe are your symptoms? Moderate   Have you taken an at home Covid test? Yes   What were the results? Negative   Have you taken a Flu test? No   Have you been fully vaccinated for COVID? (2 Pfizer, 2 Moderna or 1 [employer] vaccine injections) Yes   Have you received a booster? Yes   Have you recieved a Flu shot? Yes   When did you recieve your Flu shot? 12/20/2022   Do you have transportation to get tested for COVID if it is indicated and ordered for you at an [facility] location? Yes   Provide any information you feel is important  to your history not asked above    Please attach any relevant images or files          Recent Labs Obtained:  No visits with results within 7 Day(s) from this visit.   Latest known visit with results is:   Lab Visit on 12/22/2022   Component Date Value Ref Range Status    WBC 12/22/2022 5.00  3.90 - 12.70 K/uL Final    RBC 12/22/2022 5.97 (H)  4.00 - 5.40 M/uL Final    Hemoglobin 12/22/2022 13.3  12.0 - 16.0 g/dL Final    Hematocrit 12/22/2022 44.6  37.0 - 48.5 % Final    MCV 12/22/2022 75 (L)  82 - 98 fL Final    MCH 12/22/2022 22.3 (L)  27.0 - 31.0 pg Final    MCHC 12/22/2022 29.8 (L)  32.0 - 36.0 g/dL Final    RDW 12/22/2022 19.0 (H)  11.5 - 14.5 % Final    Platelets 12/22/2022 346  150 - 450 K/uL Final    MPV 12/22/2022 11.3  9.2 - 12.9 fL Final    Immature Granulocytes 12/22/2022 0.2  0.0 - 0.5 % Final    Gran # (ANC) 12/22/2022 2.4  1.8 - 7.7 K/uL Final    Immature Grans (Abs) 12/22/2022 0.01  0.00 - 0.04 K/uL Final    Comment: Mild elevation in immature granulocytes is non specific and   can be seen in a variety of conditions including stress response,   acute inflammation, trauma and pregnancy. Correlation with other   laboratory and clinical findings is essential.      Lymph # 12/22/2022 1.9  1.0 - 4.8 K/uL Final    Mono # 12/22/2022 0.5  0.3 - 1.0 K/uL Final    Eos # 12/22/2022 0.2  0.0 - 0.5 K/uL Final    Baso # 12/22/2022 0.05  0.00 - 0.20 K/uL Final    nRBC 12/22/2022 0  0 /100 WBC Final    Gran % 12/22/2022 48.4  38.0 - 73.0 % Final    Lymph % 12/22/2022 37.4  18.0 - 48.0 % Final    Mono % 12/22/2022 9.4  4.0 - 15.0 % Final    Eosinophil % 12/22/2022 3.6  0.0 - 8.0 % Final    Basophil % 12/22/2022 1.0  0.0 - 1.9 % Final    Differential Method 12/22/2022 Automated   Final    Sodium 12/22/2022 140  136 - 145 mmol/L Final    Potassium 12/22/2022 4.5  3.5 - 5.1 mmol/L Final    Chloride 12/22/2022 106  95 - 110 mmol/L Final    CO2 12/22/2022 27  23 - 29 mmol/L Final    Glucose 12/22/2022 95  70 - 110 mg/dL  Final    BUN 12/22/2022 12  6 - 20 mg/dL Final    Creatinine 12/22/2022 0.8  0.5 - 1.4 mg/dL Final    Calcium 12/22/2022 10.1  8.7 - 10.5 mg/dL Final    Total Protein 12/22/2022 7.5  6.0 - 8.4 g/dL Final    Albumin 12/22/2022 3.9  3.5 - 5.2 g/dL Final    Total Bilirubin 12/22/2022 0.2  0.1 - 1.0 mg/dL Final    Comment: For infants and newborns, interpretation of results should be based  on gestational age, weight and in agreement with clinical  observations.    Premature Infant recommended reference ranges:  Up to 24 hours.............<8.0 mg/dL  Up to 48 hours............<12.0 mg/dL  3-5 days..................<15.0 mg/dL  6-29 days.................<15.0 mg/dL      Alkaline Phosphatase 12/22/2022 80  55 - 135 U/L Final    AST 12/22/2022 21  10 - 40 U/L Final    ALT 12/22/2022 23  10 - 44 U/L Final    Anion Gap 12/22/2022 7 (L)  8 - 16 mmol/L Final    eGFR 12/22/2022 >60.0  >60 mL/min/1.73 m^2 Final    Hemoglobin A1C 12/22/2022 5.9 (H)  4.0 - 5.6 % Final    Comment: ADA Screening Guidelines:  5.7-6.4%  Consistent with prediabetes  >or=6.5%  Consistent with diabetes    High levels of fetal hemoglobin interfere with the HbA1C  assay. Heterozygous hemoglobin variants (HbS, HgC, etc)do  not significantly interfere with this assay.   However, presence of multiple variants may affect accuracy.      Estimated Avg Glucose 12/22/2022 123  68 - 131 mg/dL Final    Cholesterol 12/22/2022 264 (H)  120 - 199 mg/dL Final    Comment: The National Cholesterol Education Program (NCEP) has set the  following guidelines (reference ranges) for Cholesterol:  Optimal.....................<200 mg/dL  Borderline High.............200-239 mg/dL  High........................> or = 240 mg/dL      Triglycerides 12/22/2022 82  30 - 150 mg/dL Final    Comment: The National Cholesterol Education Program (NCEP) has set the  following guidelines (reference values) for triglycerides:  Normal......................<150 mg/dL  Borderline  High.............150-199 mg/dL  High........................200-499 mg/dL      HDL 12/22/2022 80 (H)  40 - 75 mg/dL Final    Comment: The National Cholesterol Education Program (NCEP) has set the  following guidelines (reference values) for HDL Cholesterol:  Low...............<40 mg/dL  Optimal...........>60 mg/dL      LDL Cholesterol 12/22/2022 167.6 (H)  63.0 - 159.0 mg/dL Final    Comment: The National Cholesterol Education Program (NCEP) has set the  following guidelines (reference values) for LDL Cholesterol:  Optimal.......................<130 mg/dL  Borderline High...............130-159 mg/dL  High..........................160-189 mg/dL  Very High.....................>190 mg/dL      HDL/Cholesterol Ratio 12/22/2022 30.3  20.0 - 50.0 % Final    Total Cholesterol/HDL Ratio 12/22/2022 3.3  2.0 - 5.0 Final    Non-HDL Cholesterol 12/22/2022 184  mg/dL Final    Comment: Risk category and Non-HDL cholesterol goals:  Coronary heart disease (CHD)or equivalent (10-year risk of CHD >20%):  Non-HDL cholesterol goal     <130 mg/dL  Two or more CHD risk factors and 10-year risk of CHD <= 20%:  Non-HDL cholesterol goal     <160 mg/dL  0 to 1 CHD risk factor:  Non-HDL cholesterol goal     <190 mg/dL      Vit D, 25-Hydroxy 12/22/2022 51  30 - 96 ng/mL Final    Comment: Vitamin D deficiency.........<10 ng/mL                              Vitamin D insufficiency......10-29 ng/mL       Vitamin D sufficiency........> or equal to 30 ng/mL  Vitamin D toxicity............>100 ng/mL         Encounter Diagnosis   Name Primary?    Viral upper respiratory tract infection Yes        No orders of the defined types were placed in this encounter.     Medications Ordered This Encounter   Medications    benzonatate (TESSALON) 200 MG capsule     Sig: Take 1 capsule (200 mg total) by mouth 3 (three) times daily as needed for Cough.     Dispense:  30 capsule     Refill:  1    promethazine-dextromethorphan (PROMETHAZINE-DM) 6.25-15 mg/5 mL Syrp      Sig: Take 10-15ml by mouth every 8 hours as needed for coughing     Dispense:  240 mL     Refill:  0      Please see [email or web address] message for POC.     Follow up if symptoms worsen or fail to improve.      E-Visit Time Tracking:    Day 1 Time (in minutes): 5     Total Time (in minutes): 5

## 2023-05-03 DIAGNOSIS — C50.111 MALIGNANT NEOPLASM OF CENTRAL PORTION OF RIGHT BREAST IN FEMALE, ESTROGEN RECEPTOR POSITIVE: ICD-10-CM

## 2023-05-03 DIAGNOSIS — N95.1 MENOPAUSAL SYMPTOMS: ICD-10-CM

## 2023-05-03 DIAGNOSIS — Z17.0 MALIGNANT NEOPLASM OF CENTRAL PORTION OF RIGHT BREAST IN FEMALE, ESTROGEN RECEPTOR POSITIVE: ICD-10-CM

## 2023-05-03 DIAGNOSIS — C50.111 MALIGNANT NEOPLASM OF CENTRAL PORTION OF RIGHT FEMALE BREAST: ICD-10-CM

## 2023-05-03 RX ORDER — VENLAFAXINE HYDROCHLORIDE 150 MG/1
150 CAPSULE, EXTENDED RELEASE ORAL DAILY
Qty: 90 CAPSULE | Refills: 3 | Status: SHIPPED | OUTPATIENT
Start: 2023-05-03 | End: 2024-05-02

## 2023-05-03 RX ORDER — LETROZOLE 2.5 MG/1
TABLET, FILM COATED ORAL
Qty: 90 TABLET | Refills: 3 | Status: SHIPPED | OUTPATIENT
Start: 2023-05-03 | End: 2023-07-17

## 2023-06-20 ENCOUNTER — LAB VISIT (OUTPATIENT)
Dept: LAB | Facility: HOSPITAL | Age: 53
End: 2023-06-20
Attending: FAMILY MEDICINE
Payer: COMMERCIAL

## 2023-06-20 DIAGNOSIS — E78.5 DYSLIPIDEMIA: ICD-10-CM

## 2023-06-20 DIAGNOSIS — R73.03 PRE-DIABETES: ICD-10-CM

## 2023-06-20 DIAGNOSIS — E55.9 VITAMIN D DEFICIENCY: ICD-10-CM

## 2023-06-20 LAB
25(OH)D3+25(OH)D2 SERPL-MCNC: 71 NG/ML (ref 30–96)
ALBUMIN SERPL BCP-MCNC: 4.5 G/DL (ref 3.5–5.2)
ALP SERPL-CCNC: 79 U/L (ref 38–126)
ALT SERPL W/O P-5'-P-CCNC: 27 U/L (ref 10–44)
ANION GAP SERPL CALC-SCNC: 10 MMOL/L (ref 8–16)
AST SERPL-CCNC: 28 U/L (ref 15–46)
BASOPHILS # BLD AUTO: 0.01 K/UL (ref 0–0.2)
BASOPHILS NFR BLD: 0.2 % (ref 0–1.9)
BILIRUB SERPL-MCNC: 0.4 MG/DL (ref 0.1–1)
CALCIUM SERPL-MCNC: 9.6 MG/DL (ref 8.7–10.5)
CHLORIDE SERPL-SCNC: 100 MMOL/L (ref 95–110)
CHOLEST SERPL-MCNC: 237 MG/DL (ref 120–199)
CHOLEST/HDLC SERPL: 3.3 {RATIO} (ref 2–5)
CO2 SERPL-SCNC: 31 MMOL/L (ref 23–29)
CREAT SERPL-MCNC: 0.71 MG/DL (ref 0.5–1.4)
DIFFERENTIAL METHOD: ABNORMAL
EOSINOPHIL # BLD AUTO: 0.1 K/UL (ref 0–0.5)
EOSINOPHIL NFR BLD: 2.2 % (ref 0–8)
ERYTHROCYTE [DISTWIDTH] IN BLOOD BY AUTOMATED COUNT: 16.9 % (ref 11.5–14.5)
EST. GFR  (NO RACE VARIABLE): >60 ML/MIN/1.73 M^2
ESTIMATED AVG GLUCOSE: 120 MG/DL (ref 68–131)
GLUCOSE SERPL-MCNC: 105 MG/DL (ref 70–110)
HBA1C MFR BLD: 5.8 % (ref 4–5.6)
HCT VFR BLD AUTO: 42.2 % (ref 37–48.5)
HDLC SERPL-MCNC: 71 MG/DL (ref 40–75)
HDLC SERPL: 30 % (ref 20–50)
HGB BLD-MCNC: 13.2 G/DL (ref 12–16)
IMM GRANULOCYTES # BLD AUTO: 0.01 K/UL (ref 0–0.04)
IMM GRANULOCYTES NFR BLD AUTO: 0.2 % (ref 0–0.5)
LDLC SERPL CALC-MCNC: 144.6 MG/DL (ref 63–159)
LYMPHOCYTES # BLD AUTO: 1.5 K/UL (ref 1–4.8)
LYMPHOCYTES NFR BLD: 31.3 % (ref 18–48)
MCH RBC QN AUTO: 22.6 PG (ref 27–31)
MCHC RBC AUTO-ENTMCNC: 31.3 G/DL (ref 32–36)
MCV RBC AUTO: 72 FL (ref 82–98)
MONOCYTES # BLD AUTO: 0.4 K/UL (ref 0.3–1)
MONOCYTES NFR BLD: 8.6 % (ref 4–15)
NEUTROPHILS # BLD AUTO: 2.8 K/UL (ref 1.8–7.7)
NEUTROPHILS NFR BLD: 57.5 % (ref 38–73)
NONHDLC SERPL-MCNC: 166 MG/DL
NRBC BLD-RTO: 0 /100 WBC
PLATELET # BLD AUTO: 382 K/UL (ref 150–450)
PMV BLD AUTO: 11.2 FL (ref 9.2–12.9)
POTASSIUM SERPL-SCNC: 4.2 MMOL/L (ref 3.5–5.1)
PROT SERPL-MCNC: 7.8 G/DL (ref 6–8.4)
RBC # BLD AUTO: 5.83 M/UL (ref 4–5.4)
SODIUM SERPL-SCNC: 141 MMOL/L (ref 136–145)
TRIGL SERPL-MCNC: 107 MG/DL (ref 30–150)
UUN UR-MCNC: 15 MG/DL (ref 7–17)
WBC # BLD AUTO: 4.89 K/UL (ref 3.9–12.7)

## 2023-06-20 PROCEDURE — 80061 LIPID PANEL: CPT | Performed by: FAMILY MEDICINE

## 2023-06-20 PROCEDURE — 82306 VITAMIN D 25 HYDROXY: CPT | Mod: PO | Performed by: FAMILY MEDICINE

## 2023-06-20 PROCEDURE — 83036 HEMOGLOBIN GLYCOSYLATED A1C: CPT | Performed by: FAMILY MEDICINE

## 2023-06-20 PROCEDURE — 80053 COMPREHEN METABOLIC PANEL: CPT | Mod: PO | Performed by: FAMILY MEDICINE

## 2023-06-20 PROCEDURE — 85025 COMPLETE CBC W/AUTO DIFF WBC: CPT | Mod: PO | Performed by: FAMILY MEDICINE

## 2023-06-20 PROCEDURE — 36415 COLL VENOUS BLD VENIPUNCTURE: CPT | Mod: PO | Performed by: FAMILY MEDICINE

## 2023-06-21 ENCOUNTER — PATIENT MESSAGE (OUTPATIENT)
Dept: RESEARCH | Facility: CLINIC | Age: 53
End: 2023-06-21
Payer: COMMERCIAL

## 2023-06-22 ENCOUNTER — OFFICE VISIT (OUTPATIENT)
Dept: FAMILY MEDICINE | Facility: CLINIC | Age: 53
End: 2023-06-22
Payer: COMMERCIAL

## 2023-06-22 VITALS
TEMPERATURE: 98 F | DIASTOLIC BLOOD PRESSURE: 76 MMHG | HEIGHT: 62 IN | BODY MASS INDEX: 42.45 KG/M2 | SYSTOLIC BLOOD PRESSURE: 144 MMHG | HEART RATE: 81 BPM | WEIGHT: 230.69 LBS | OXYGEN SATURATION: 98 %

## 2023-06-22 DIAGNOSIS — C50.111 MALIGNANT NEOPLASM OF CENTRAL PORTION OF RIGHT BREAST IN FEMALE, ESTROGEN RECEPTOR POSITIVE: ICD-10-CM

## 2023-06-22 DIAGNOSIS — E66.01 CLASS 3 SEVERE OBESITY DUE TO EXCESS CALORIES WITH SERIOUS COMORBIDITY AND BODY MASS INDEX (BMI) OF 40.0 TO 44.9 IN ADULT: Primary | ICD-10-CM

## 2023-06-22 DIAGNOSIS — Z17.0 MALIGNANT NEOPLASM OF CENTRAL PORTION OF RIGHT BREAST IN FEMALE, ESTROGEN RECEPTOR POSITIVE: ICD-10-CM

## 2023-06-22 DIAGNOSIS — E78.5 DYSLIPIDEMIA: ICD-10-CM

## 2023-06-22 DIAGNOSIS — I10 ESSENTIAL HYPERTENSION: ICD-10-CM

## 2023-06-22 DIAGNOSIS — R73.03 PRE-DIABETES: ICD-10-CM

## 2023-06-22 PROBLEM — E66.813 CLASS 3 SEVERE OBESITY DUE TO EXCESS CALORIES WITH SERIOUS COMORBIDITY AND BODY MASS INDEX (BMI) OF 40.0 TO 44.9 IN ADULT: Status: ACTIVE | Noted: 2023-06-22

## 2023-06-22 PROCEDURE — 99999 PR PBB SHADOW E&M-EST. PATIENT-LVL V: ICD-10-PCS | Mod: PBBFAC,,, | Performed by: FAMILY MEDICINE

## 2023-06-22 PROCEDURE — 99214 OFFICE O/P EST MOD 30 MIN: CPT | Mod: S$GLB,,, | Performed by: FAMILY MEDICINE

## 2023-06-22 PROCEDURE — 3044F HG A1C LEVEL LT 7.0%: CPT | Mod: CPTII,S$GLB,, | Performed by: FAMILY MEDICINE

## 2023-06-22 PROCEDURE — 3077F SYST BP >= 140 MM HG: CPT | Mod: CPTII,S$GLB,, | Performed by: FAMILY MEDICINE

## 2023-06-22 PROCEDURE — 3044F PR MOST RECENT HEMOGLOBIN A1C LEVEL <7.0%: ICD-10-PCS | Mod: CPTII,S$GLB,, | Performed by: FAMILY MEDICINE

## 2023-06-22 PROCEDURE — 1160F PR REVIEW ALL MEDS BY PRESCRIBER/CLIN PHARMACIST DOCUMENTED: ICD-10-PCS | Mod: CPTII,S$GLB,, | Performed by: FAMILY MEDICINE

## 2023-06-22 PROCEDURE — 3078F PR MOST RECENT DIASTOLIC BLOOD PRESSURE < 80 MM HG: ICD-10-PCS | Mod: CPTII,S$GLB,, | Performed by: FAMILY MEDICINE

## 2023-06-22 PROCEDURE — 99999 PR PBB SHADOW E&M-EST. PATIENT-LVL V: CPT | Mod: PBBFAC,,, | Performed by: FAMILY MEDICINE

## 2023-06-22 PROCEDURE — 3078F DIAST BP <80 MM HG: CPT | Mod: CPTII,S$GLB,, | Performed by: FAMILY MEDICINE

## 2023-06-22 PROCEDURE — 3008F PR BODY MASS INDEX (BMI) DOCUMENTED: ICD-10-PCS | Mod: CPTII,S$GLB,, | Performed by: FAMILY MEDICINE

## 2023-06-22 PROCEDURE — 1159F MED LIST DOCD IN RCRD: CPT | Mod: CPTII,S$GLB,, | Performed by: FAMILY MEDICINE

## 2023-06-22 PROCEDURE — 3077F PR MOST RECENT SYSTOLIC BLOOD PRESSURE >= 140 MM HG: ICD-10-PCS | Mod: CPTII,S$GLB,, | Performed by: FAMILY MEDICINE

## 2023-06-22 PROCEDURE — 99214 PR OFFICE/OUTPT VISIT, EST, LEVL IV, 30-39 MIN: ICD-10-PCS | Mod: S$GLB,,, | Performed by: FAMILY MEDICINE

## 2023-06-22 PROCEDURE — 1160F RVW MEDS BY RX/DR IN RCRD: CPT | Mod: CPTII,S$GLB,, | Performed by: FAMILY MEDICINE

## 2023-06-22 PROCEDURE — 3008F BODY MASS INDEX DOCD: CPT | Mod: CPTII,S$GLB,, | Performed by: FAMILY MEDICINE

## 2023-06-22 PROCEDURE — 1159F PR MEDICATION LIST DOCUMENTED IN MEDICAL RECORD: ICD-10-PCS | Mod: CPTII,S$GLB,, | Performed by: FAMILY MEDICINE

## 2023-06-22 RX ORDER — COVID-19 MOLECULAR TEST ASSAY
KIT MISCELLANEOUS
COMMUNITY
Start: 2023-04-23

## 2023-06-22 RX ORDER — PRAVASTATIN SODIUM 40 MG/1
40 TABLET ORAL DAILY
Qty: 30 TABLET | Refills: 11 | Status: SHIPPED | OUTPATIENT
Start: 2023-06-22 | End: 2024-06-21

## 2023-06-22 NOTE — PROGRESS NOTES
Assessment & Plan:    Class 3 severe obesity due to excess calories with serious comorbidity and body mass index (BMI) of 40.0 to 44.9 in adult  Counseled on Mediterranean diet, portion control, and regular physical activity at least 150 minutes a week to decrease her risk of CVD and help with weight loss.   Continue Ozempic. May consider decreasing dose if nausea and diarrhea worsen.     Dyslipidemia  -     pravastatin (PRAVACHOL) 40 MG tablet; Take 1 tablet (40 mg total) by mouth once daily.  Dispense: 30 tablet; Refill: 11  -     Lipid Panel; Future; Expected date: 06/22/2023    See above.   Increase pravastatin to 40 mg.  Repeat labs in 6 mo.    Pre-diabetes  -     Comprehensive Metabolic Panel; Future; Expected date: 06/22/2023  -     Hemoglobin A1C; Future; Expected date: 06/22/2023    Stable. See above.    Essential hypertension  -     CBC Auto Differential; Future; Expected date: 06/22/2023  -     Comprehensive Metabolic Panel; Future; Expected date: 06/22/2023  -     Hemoglobin A1C; Future; Expected date: 06/22/2023  -     Lipid Panel; Future; Expected date: 06/22/2023    BP elevated. Reviewed goal <130/80. Keep track of BP on log to review at nursing BP check in 2 weeks.    Malignant neoplasm of central portion of right breast in female, estrogen receptor positive  Continue management plan per specialist care.      Follow-up: Follow up in about 2 weeks (around 7/6/2023) for nursing BP check, 6 months for chronic follow up.  ______________________________________________________________________    Chief Complaint  Chief Complaint   Patient presents with    Weight Loss    Hyperlipidemia       HPI  Brittny Urias is a 52 y.o. female with medical diagnoses as listed in the medical history and problem list that presents to the office to follow up on weight loss and review her most recent labs. She was last seen in December 2022. She has lost a net of 4 lb since her last visit. She struggles to lose weight  due to her diet and lifestyle. Admits to eating a lot of sweets, such as ice cream, with her grand kids. She is taking Ozempic 2 mg weekly and experiences some nausea and diarrhea, but this passes.    Most recent pertinent workup:     Last CBC Results:   Lab Results   Component Value Date    WBC 4.89 06/20/2023    HGB 13.2 06/20/2023    HCT 42.2 06/20/2023     06/20/2023       Last CMP Results  Lab Results   Component Value Date     06/20/2023    K 4.2 06/20/2023     06/20/2023    CO2 31 (H) 06/20/2023    BUN 15 06/20/2023    CREATININE 0.71 06/20/2023    CALCIUM 9.6 06/20/2023    ALBUMIN 4.5 06/20/2023    AST 28 06/20/2023    ALT 27 06/20/2023       Last Lipids  Lab Results   Component Value Date    CHOL 237 (H) 06/20/2023    TRIG 107 06/20/2023    HDL 71 06/20/2023    LDLCALC 144.6 06/20/2023   The 10-year ASCVD risk score (Yuly SUTHERLAND, et al., 2019) is: 5.8%    Values used to calculate the score:      Age: 52 years      Sex: Female      Is Non- : Yes      Diabetic: No      Tobacco smoker: No      Systolic Blood Pressure: 144 mmHg      Is BP treated: Yes      HDL Cholesterol: 71 mg/dL      Total Cholesterol: 237 mg/dL      Last A1C  Lab Results   Component Value Date    HGBA1C 5.8 (H) 06/20/2023         Health Maintenance         Date Due Completion Date    COVID-19 Vaccine (5 - Pfizer series) 07/19/2022 5/24/2022    Hemoglobin A1c (Prediabetes) 06/20/2024 6/20/2023    Colorectal Cancer Screening 10/10/2024 10/10/2019    Lipid Panel 06/20/2028 6/20/2023    TETANUS VACCINE 07/21/2030 7/21/2020              PAST MEDICAL HISTORY:  Past Medical History:   Diagnosis Date    Breast cancer, right breast 11/12/2018    Cancer     Diabetes mellitus, type 2     Hypertension     Psoriasis     on biologic for 1 year, has been controlling it well       PAST SURGICAL HISTORY:  Past Surgical History:   Procedure Laterality Date    COLONOSCOPY N/A 10/10/2019    Procedure: COLONOSCOPY;   Surgeon: Matt Marin MD;  Location: [facility] ENDO;  Service: Endoscopy;  Laterality: N/A;    DILATION AND CURETTAGE OF UTERUS      FOOT ARTHRODESIS Left 12/2/2020    Procedure: FUSION, FOOT;  Surgeon: Uriah Veras DPM;  Location: [facility] OR;  Service: Podiatry;  Laterality: Left;  mini c-arm, Arthrex screws and staples, 1 MTP arthrodesis plates  Libby notified 11/23, EF  Libby w/ Arthrex confirmed 12/1/2020 [place]    HYSTERECTOMY      partial hyst for prolapse    MASTECTOMY Bilateral 11/14/2018    Procedure: MASTECTOMY;  Surgeon: Marga Cardenas MD;  Location: [facility];  Service: Plastics;  Laterality: Bilateral;    MASTECTOMY WITH SENTINEL NODE BIOPSY AND AXILLARY LYMPH NODE DISSECTION Right 11/14/2018    Procedure: SENTINEL NODE BIOPSY AND AXILLARY LYMPHADENECTOMY;  Surgeon: Marga Cardenas MD;  Location: [facility];  Service: Plastics;  Laterality: Right;    RECONSTRUCTION OF BREAST WITH DEEP INFERIOR EPIGASTRIC ARTERY  (MEGAN) FREE FLAP Bilateral 11/14/2018    Procedure: RECONSTRUCTION, BREAST, USING MEGAN FREE FLAP;  Surgeon: Tye Chapman MD;  Location: [facility];  Service: Plastics;  Laterality: Bilateral;    RESECTION OF GASTROCNEMIUS MUSCLE Left 12/2/2020    Procedure: RESECTION, MUSCLE, GASTROCNEMIUS;  Surgeon: Uriah Veras DPM;  Location: [facility];  Service: Podiatry;  Laterality: Left;  video  Kj w/ video confirmed 12/1/2020       SOCIAL HISTORY:  Social History     Socioeconomic History    Marital status:    Tobacco Use    Smoking status: Never    Smokeless tobacco: Never   Substance and Sexual Activity    Alcohol use: Yes     Alcohol/week: 3.0 standard drinks     Types: 3 Glasses of wine per week     Comment: social    Drug use: No    Sexual activity: Yes     Partners: Male     Birth control/protection: Surgical     Social Determinants of Health     Financial Resource Strain: Low Risk     Difficulty of Paying Living Expenses: Not hard at all   Food Insecurity: No Food Insecurity     Worried About Running Out of Food in the Last Year: Never true    Ran Out of Food in the Last Year: Never true   Transportation Needs: No Transportation Needs    Lack of Transportation (Medical): No    Lack of Transportation (Non-Medical): No   Physical Activity: Insufficiently Active    Days of Exercise per Week: 3 days    Minutes of Exercise per Session: 30 min   Stress: No Stress Concern Present    Feeling of Stress : Only a little   Social Connections: Unknown    Frequency of Communication with Friends and Family: More than three times a week    Frequency of Social Gatherings with Friends and Family: Once a week    Active Member of Clubs or Organizations: Yes    Attends Club or Organization Meetings: 1 to 4 times per year    Marital Status:    Housing Stability: High Risk    Unable to Pay for Housing in the Last Year: Yes    Number of Places Lived in the Last Year: 1    Unstable Housing in the Last Year: No       FAMILY HISTORY:  Family History   Problem Relation Age of Onset    Diabetes Mother     Hypertension Mother     No Known Problems Father     Hypertension Brother     Cancer Paternal Uncle         leukemia    Breast cancer Neg Hx     Ovarian cancer Neg Hx     Colon cancer Neg Hx        ALLERGIES AND MEDICATIONS: updated and reviewed.  Review of patient's allergies indicates:  No Known Allergies  Current Outpatient Medications   Medication Sig Dispense Refill    amLODIPine (NORVASC) 10 MG tablet TAKE 1 TABLET BY MOUTH EVERY DAY 90 tablet 3    benzonatate (TESSALON) 200 MG capsule Take 1 capsule (200 mg total) by mouth 3 (three) times daily as needed for Cough. 30 capsule 1    betamethasone dipropionate (DIPROLENE) 0.05 % cream APPLY TWICE DAILY TO ALL AFFECTED AREAS.  2    BINAXNOW COVID-19 AG SELF TEST Kit FOLLOW INSTRUCTIONS INCLUDED WITH THE PACKAGE.      cholecalciferol, vitamin D3, 1,250 mcg (50,000 unit) capsule Take 1 capsule (50,000 Units total) by mouth every 7 days. 12 capsule 3     clobetasol (CLOBEX) 0.05 % shampoo USE TOPICALLY 3 TIMES WEEKLY  3    clobetasoL (TEMOVATE) 0.05 % external solution       fluticasone propionate (CUTIVATE) 0.05 % cream Apply topically 2 (two) times daily.      letrozole (FEMARA) 2.5 mg Tab TAKE 1 TABLET BY MOUTH EVERY DAY 90 tablet 3    loperamide (IMODIUM A-D) 2 mg Tab Take 2 mg by mouth 4 (four) times daily as needed.      losartan-hydrochlorothiazide 100-25 mg (HYZAAR) 100-25 mg per tablet TAKE 1 TABLET BY MOUTH EVERY DAY 90 tablet 3    multivitamin (THERAGRAN) per tablet Take 1 tablet by mouth once daily.      omeprazole (PRILOSEC) 40 MG capsule TAKE 1 CAPSULE BY MOUTH 30MIN BEFORE BREAKFAST OR COFFEE 90 capsule 2    ondansetron (ZOFRAN-ODT) 4 MG TbDL Take 1 tablet (4 mg total) by mouth every 8 (eight) hours as needed (nausea). 30 tablet 1    promethazine-dextromethorphan (PROMETHAZINE-DM) 6.25-15 mg/5 mL Syrp Take 10-15ml by mouth every 8 hours as needed for coughing 240 mL 0    semaglutide (OZEMPIC) 2 mg/dose (8 mg/3 mL) PnIj Inject 2 mg into the skin every 7 days. 1 pen 11    SKYRIZI 150 mg/mL Syrg Inject into the skin.      sucralfate (CARAFATE) 1 gram tablet Take 1 tablet (1 g total) by mouth 3 (three) times daily as needed (upset stomach). 30 tablet 2    triamcinolone acetonide 0.1% (KENALOG) 0.1 % cream Apply topically 2 (two) times daily.      venlafaxine (EFFEXOR-XR) 150 MG Cp24 TAKE 1 CAPSULE (150 MG TOTAL) BY MOUTH ONCE DAILY. 90 capsule 3    pravastatin (PRAVACHOL) 40 MG tablet Take 1 tablet (40 mg total) by mouth once daily. 30 tablet 11     Current Facility-Administered Medications   Medication Dose Route Frequency Provider Last Rate Last Admin    sodium chloride 0.9% flush 10 mL  10 mL Intravenous PRN Uriah Veras DPM         Facility-Administered Medications Ordered in Other Visits   Medication Dose Route Frequency Provider Last Rate Last Admin    EPINEPHrine 1,000 mcg in lactated Ringers 1,000 mL irrigation   Irrigation On Call  "Procedure Tye Chapman MD        EPINEPHrine 1,000 mcg in lactated Ringers 1,000 mL irrigation   Irrigation On Call Procedure Tye Chapman MD             ROS  Review of Systems   Constitutional:  Negative for activity change, fever and unexpected weight change.   HENT:  Negative for congestion and sore throat.    Eyes:  Negative for photophobia and visual disturbance.   Respiratory:  Negative for cough and shortness of breath.    Cardiovascular:  Negative for chest pain and leg swelling.   Gastrointestinal:  Positive for diarrhea and nausea. Negative for abdominal pain, constipation and vomiting.   Endocrine: Negative for polydipsia, polyphagia and polyuria.   Genitourinary:  Negative for dysuria and urgency.   Musculoskeletal:  Negative for arthralgias and gait problem.   Skin:  Negative for color change.   Neurological:  Negative for dizziness, weakness and headaches.   Psychiatric/Behavioral:  Negative for dysphoric mood and sleep disturbance. The patient is not nervous/anxious.          Physical Exam  Vitals:    06/22/23 0842 06/22/23 0913   BP: (!) 142/90 (!) 144/76   BP Location: Right arm    Patient Position: Sitting    BP Method: Medium (Manual)    Pulse: 81    Temp: 98.4 °F (36.9 °C)    TempSrc: Oral    SpO2: 98%    Weight: 104.6 kg (230 lb 11.4 oz)    Height: 5' 2" (1.575 m)     Body mass index is 42.2 kg/m².  Weight: 104.6 kg (230 lb 11.4 oz)   Height: 5' 2" (157.5 cm)   Physical Exam  Constitutional:       General: She is not in acute distress.     Appearance: She is obese.   HENT:      Head: Normocephalic and atraumatic.   Neck:      Thyroid: No thyromegaly.      Vascular: No carotid bruit.   Cardiovascular:      Rate and Rhythm: Normal rate and regular rhythm.      Pulses: Normal pulses.      Heart sounds: Normal heart sounds.   Pulmonary:      Effort: Pulmonary effort is normal. No respiratory distress.      Breath sounds: Normal breath sounds.   Musculoskeletal:      Cervical back: Neck " supple.      Right lower leg: No edema.      Left lower leg: No edema.   Lymphadenopathy:      Cervical: No cervical adenopathy.   Skin:     General: Skin is warm and dry.      Findings: No rash.   Neurological:      General: No focal deficit present.      Mental Status: She is alert and oriented to person, place, and time.   Psychiatric:         Mood and Affect: Mood normal.         Behavior: Behavior normal.         Thought Content: Thought content normal.

## 2023-06-23 ENCOUNTER — DOCUMENTATION ONLY (OUTPATIENT)
Dept: RESEARCH | Facility: CLINIC | Age: 53
End: 2023-06-23
Payer: COMMERCIAL

## 2023-06-23 DIAGNOSIS — Z00.6 RESEARCH SUBJECT: Primary | ICD-10-CM

## 2023-06-23 NOTE — PROGRESS NOTES
"PROPEL IT CONSENT DOCUMENTATION  Oncore Protocol 2022013: PROPEL IT  PROmoting Successful Weight Loss in Primary CarE in [place] (PROPEL) using Information Technology  : Mohan Perez, PhD.  IRB of Record: [facility] ([facility]) ID# [facility] [id number]  [facility] Investigator: Cammie Kang MD      Informed Consent Process   Name of Study Team member Present for discussion: N/A   Prior to the Informed Consent being signed or any protocol required testing, procedure or intervention being performed, the following was completed or discussed:   Purpose of the study, qualifications to participate:  Study design, schedule and procedure:  Risks, benefits, alternative treatments, compensation and costs:  Confidentiality and HIPAA authorization for Release of Medical Records for the research trial/subjects right/study related injury:  Study related contact information:  Voluntary participation and withdrawal from the research trial at any time:  Patient has been offered the opportunity to ask questions regarding the study    and PI contact information given to subject:  Signed copy of consent form was provided to the subject via eMail:  Original consent or copy of electronic consent in subject's chart and uploaded in EPIC:        Brittny Urias electronically signed the informed consent form.     Was the consent done electronically: Yes  Consent Signature Date (add to  note) ("Today's Date REDCap):  6-  The consent form as reviewed by Ryoa Evans  Name: ([facility] personnel reviewing consent form):  Petra Malcolm, Associate Clinical Research Coordinator   Is the potential subject currently enrolled in any other research trials (per Epic)? No  Participant ID (REDCap ID) added to Research Study   Yes    Comments: See  for Consent Forms        I acknowledge that I have reviewed the informed consent form and viewed " the volunteer's electronically signed and dated the signature section of the consent forms.    Yes

## 2023-06-23 NOTE — PROGRESS NOTES
PROPEL IT Weight Loss: Eligibility Confirmation  Remember to add Participant ID in Research Study  Age as of Today: 52 y.o.    Is patient age 40 to 70 years Yes    Is patient race Black/: Epic: Black or      Primary care provider at [facility]: Anya Stevens, DO     Does the patient have an active [facility] portal account?  Yes    Does the patient have prediabetes or Type 2 diabetes? Yes  If yes, Based on: (Prediabetes) HbA1c 5.7- 6.4%    LAST HBA1C   Lab Results   Component Value Date    HGBA1C 5.8 (H) 06/20/2023    HGBA1C 5.9 (H) 12/22/2022    HGBA1C 6.5 (H) 05/11/2022          LAST BMI:   BMI Readings from Last 1 Encounters:   06/22/23 42.20 kg/m²      Was the patient's most recent BMI (within the past 12 months) between 30 and 50  yes    PCP REFERRAL  Did provider acknowledge or deny patient's participation? Need to Pend Order to PCP (after Consent)  _____________________________________________  LAST WEIGHT  (verify if this was an outpatient):   Wt Readings from Last 4 Encounters:   06/22/23 104.6 kg (230 lb 11.4 oz)   12/22/22 106.4 kg (234 lb 10.9 oz)   09/08/22 105.8 kg (233 lb 4.8 oz)   08/23/22 106.4 kg (234 lb 9.1 oz)        PROPEL-IT Screening Date (enter from REDCap): 6-  Does the patient have a baseline clinic weight collected within 4 weeks (34 days) of Screening Date?  N/A, pt has not completed Screening                    When is the patient's next scheduled clinic appointment (potential wt)? 7-6-2023 Nurse Visit    Status Updated: 06/23/2023

## 2023-06-28 ENCOUNTER — PATIENT MESSAGE (OUTPATIENT)
Dept: FAMILY MEDICINE | Facility: CLINIC | Age: 53
End: 2023-06-28
Payer: COMMERCIAL

## 2023-06-28 DIAGNOSIS — C50.111 MALIGNANT NEOPLASM OF CENTRAL PORTION OF RIGHT BREAST IN FEMALE, ESTROGEN RECEPTOR POSITIVE: ICD-10-CM

## 2023-06-28 DIAGNOSIS — N95.1 MENOPAUSAL SYMPTOMS: ICD-10-CM

## 2023-06-28 DIAGNOSIS — Z17.0 MALIGNANT NEOPLASM OF CENTRAL PORTION OF RIGHT BREAST IN FEMALE, ESTROGEN RECEPTOR POSITIVE: ICD-10-CM

## 2023-07-06 ENCOUNTER — CLINICAL SUPPORT (OUTPATIENT)
Dept: FAMILY MEDICINE | Facility: CLINIC | Age: 53
End: 2023-07-06
Payer: COMMERCIAL

## 2023-07-06 VITALS — OXYGEN SATURATION: 98 % | SYSTOLIC BLOOD PRESSURE: 110 MMHG | DIASTOLIC BLOOD PRESSURE: 82 MMHG | HEART RATE: 86 BPM

## 2023-07-06 DIAGNOSIS — I10 ESSENTIAL HYPERTENSION: Primary | ICD-10-CM

## 2023-07-06 PROCEDURE — 99999 PR PBB SHADOW E&M-EST. PATIENT-LVL II: ICD-10-PCS | Mod: PBBFAC,,,

## 2023-07-06 PROCEDURE — 99999 PR PBB SHADOW E&M-EST. PATIENT-LVL II: CPT | Mod: PBBFAC,,,

## 2023-07-06 NOTE — PROGRESS NOTES
Brittny Urias 52 y.o. female is here today for Blood Pressure check.   History of HTN yes.    Review of patient's allergies indicates:  No Known Allergies  Creatinine   Date Value Ref Range Status   06/20/2023 0.71 0.50 - 1.40 mg/dL Final     Sodium   Date Value Ref Range Status   06/20/2023 141 136 - 145 mmol/L Final     Potassium   Date Value Ref Range Status   06/20/2023 4.2 3.5 - 5.1 mmol/L Final   ]  Patient verifies taking blood pressure medications on a regular basis at the same time of the day.     Current Outpatient Medications:     amLODIPine (NORVASC) 10 MG tablet, TAKE 1 TABLET BY MOUTH EVERY DAY, Disp: 90 tablet, Rfl: 3    benzonatate (TESSALON) 200 MG capsule, Take 1 capsule (200 mg total) by mouth 3 (three) times daily as needed for Cough., Disp: 30 capsule, Rfl: 1    betamethasone dipropionate (DIPROLENE) 0.05 % cream, APPLY TWICE DAILY TO ALL AFFECTED AREAS., Disp: , Rfl: 2    BINAXNOW COVID-19 AG SELF TEST Kit, FOLLOW INSTRUCTIONS INCLUDED WITH THE PACKAGE., Disp: , Rfl:     cholecalciferol, vitamin D3, 1,250 mcg (50,000 unit) capsule, Take 1 capsule (50,000 Units total) by mouth every 7 days., Disp: 12 capsule, Rfl: 3    clobetasol (CLOBEX) 0.05 % shampoo, USE TOPICALLY 3 TIMES WEEKLY, Disp: , Rfl: 3    clobetasoL (TEMOVATE) 0.05 % external solution, , Disp: , Rfl:     fluticasone propionate (CUTIVATE) 0.05 % cream, Apply topically 2 (two) times daily., Disp: , Rfl:     letrozole (FEMARA) 2.5 mg Tab, TAKE 1 TABLET BY MOUTH EVERY DAY, Disp: 90 tablet, Rfl: 3    loperamide (IMODIUM A-D) 2 mg Tab, Take 2 mg by mouth 4 (four) times daily as needed., Disp: , Rfl:     losartan-hydrochlorothiazide 100-25 mg (HYZAAR) 100-25 mg per tablet, TAKE 1 TABLET BY MOUTH EVERY DAY, Disp: 90 tablet, Rfl: 3    multivitamin (THERAGRAN) per tablet, Take 1 tablet by mouth once daily., Disp: , Rfl:     omeprazole (PRILOSEC) 40 MG capsule, TAKE 1 CAPSULE BY MOUTH 30MIN BEFORE BREAKFAST OR COFFEE, Disp: 90 capsule,  Rfl: 2    ondansetron (ZOFRAN-ODT) 4 MG TbDL, Take 1 tablet (4 mg total) by mouth every 8 (eight) hours as needed (nausea)., Disp: 30 tablet, Rfl: 1    pravastatin (PRAVACHOL) 40 MG tablet, Take 1 tablet (40 mg total) by mouth once daily., Disp: 30 tablet, Rfl: 11    promethazine-dextromethorphan (PROMETHAZINE-DM) 6.25-15 mg/5 mL Syrp, Take 10-15ml by mouth every 8 hours as needed for coughing, Disp: 240 mL, Rfl: 0    semaglutide (OZEMPIC) 2 mg/dose (8 mg/3 mL) PnIj, Inject 2 mg into the skin every 7 days., Disp: 1 pen, Rfl: 11    SKYRIZI 150 mg/mL Syrg, Inject into the skin., Disp: , Rfl:     sucralfate (CARAFATE) 1 gram tablet, Take 1 tablet (1 g total) by mouth 3 (three) times daily as needed (upset stomach)., Disp: 30 tablet, Rfl: 2    triamcinolone acetonide 0.1% (KENALOG) 0.1 % cream, Apply topically 2 (two) times daily., Disp: , Rfl:     venlafaxine (EFFEXOR-XR) 150 MG Cp24, TAKE 1 CAPSULE (150 MG TOTAL) BY MOUTH ONCE DAILY., Disp: 90 capsule, Rfl: 3    Current Facility-Administered Medications:     sodium chloride 0.9% flush 10 mL, 10 mL, Intravenous, PRN, Uriah Veras DPM    Facility-Administered Medications Ordered in Other Visits:     EPINEPHrine 1,000 mcg in lactated Ringers 1,000 mL irrigation, , Irrigation, On Call Procedure, Tye Chapman MD    EPINEPHrine 1,000 mcg in lactated Ringers 1,000 mL irrigation, , Irrigation, On Call Procedure, Tye Chapman MD  Does patient have record of home blood pressure readings yes. Readings have been averaging 120/84.   Last dose of blood pressure medication was taken at N/A.  Patient is asymptomatic.   Complains of N/A.    BP: 110/82 , Pulse: 86 .

## 2023-07-07 NOTE — PROGRESS NOTES
This patient meets at least one or more of the exclusion criteria for the PROPEL IT weight management study.  REASON(S) for EXCLUSION:   Currently taking exclusionary medications (e.g. Ozempic, Mounjaro, Rybelsus, Thong).

## 2023-07-12 NOTE — PROGRESS NOTES
Subjective:       Patient ID: Brittny Urias is a 52 y.o. female.    Chief Complaint: Follow-up (6 month visit)    HPI 52-year-old postmenopausal female who returns for follow-up of a diagnosis of right breast cancer, T1cN1a, ER positive, HER2 negative.  She is on letrozole hormonal therapy.    Overall she is doing well.   The hot flashes are tolerable, PCP doubled her medication with success.   Appetite and bowel movements are  good. Mild constipation.   Energy level is good.   She has been exercising (walking 4-5 times a week) and is feeling well.       Per Dr. Woods's previous note: Breast History: Screening mammogram on August 3, 2018 showed architectural distortion in the retroareolar right breast.  Follow-up diagnostic mammogram on August 18th showed architectural distortion and nipple retraction on the right.  Ultrasound showed no definite abnormality.    August 21, 2018 a biopsy was performed which showed infiltrating lobular carcinoma (histologic grade 3, nuclear grade 1, mitotic index 1).  ER was 90% positive, MT was 20% positive and HER2 was 0.  Ki-67 was less than 1%.    MRI of the breast on September 4, 2018 showed a 3.2 x 1.8 x 1.4 cm mass with spiculated margins in the subareolar region of the right breast.  The left breast was unremarkable.    On November 14, 2018 bilateral mastectomies were performed.  The right breast showed a 2 cm intermediate grade carcinoma (3+ 2+ 1) 2 of 3 sentinel lymph nodes were positive with some extracapsular extension.  Completion axillary lymph node dissection showed 0 of 12 additional nodes were involved.  Margins were negative.  The left breast showed no abnormalities.     Mammaprint was low risk at + 0.362    She completed radiation on February 26, 2019.    Letrozole was started in March 2019.    Review of Systems   Constitutional: Negative.  Negative for activity change, appetite change, chills, diaphoresis, fatigue, fever and unexpected weight change.   HENT:  Negative.  Negative for mouth sores and nosebleeds.    Eyes:  Negative for visual disturbance.   Respiratory: Negative.  Negative for cough and shortness of breath.    Cardiovascular: Negative.  Negative for chest pain, palpitations and leg swelling.   Gastrointestinal: Negative.  Negative for abdominal distention, abdominal pain, blood in stool, constipation, diarrhea, nausea and vomiting.   Endocrine: Positive for heat intolerance (hot flashes).   Genitourinary: Negative.  Negative for frequency, hematuria and vaginal bleeding.   Musculoskeletal:  Positive for arthralgias (occasional). Negative for back pain and myalgias.   Skin:  Negative for pallor and rash.   Allergic/Immunologic: Negative for immunocompromised state.   Neurological:  Negative for dizziness, weakness, light-headedness (minimal), numbness and headaches.        Occasional dizziness, not often.    Hematological:  Negative for adenopathy. Does not bruise/bleed easily.   Psychiatric/Behavioral: Negative.  Negative for confusion. The patient is not nervous/anxious.         Improved emotions     Objective:      Physical Exam  Vitals and nursing note reviewed.   Constitutional:       General: She is not in acute distress.     Appearance: Normal appearance. She is well-developed and normal weight.   HENT:      Head: Normocephalic.      Nose: Nose normal.   Eyes:      Pupils: Pupils are equal, round, and reactive to light.   Cardiovascular:      Rate and Rhythm: Normal rate and regular rhythm.      Heart sounds: Normal heart sounds.   Pulmonary:      Effort: Pulmonary effort is normal.      Breath sounds: Normal breath sounds.   Chest:      Comments: Bilateral mastectomies with reconstruction  No masses, no tenderness. Skin is normal        Abdominal:      General: Bowel sounds are normal. There is no distension.      Palpations: Abdomen is soft.      Tenderness: There is no abdominal tenderness.   Musculoskeletal:      Cervical back: Normal range of  motion.   Lymphadenopathy:      Cervical: No cervical adenopathy.      Upper Body:      Right upper body: No supraclavicular or axillary adenopathy.      Left upper body: No supraclavicular or axillary adenopathy.   Skin:     General: Skin is warm and dry.      Findings: No rash.   Neurological:      General: No focal deficit present.      Mental Status: She is alert and oriented to person, place, and time.   Psychiatric:         Mood and Affect: Mood normal.         Behavior: Behavior normal.         Thought Content: Thought content normal.         Judgment: Judgment normal.     Limited due to virtual visit    Assessment:       1. Malignant neoplasm of central portion of right breast in female, estrogen receptor positive    2. Class 3 severe obesity due to excess calories with serious comorbidity and body mass index (BMI) of 40.0 to 44.9 in adult        Plan:       1. She will continue letrozole and return to clinic in 6 months time  - DXA showed some osteopenia in Jan 2023. Repeat in 2 years  - She takes Vitamin D weekly, she is on calcium     Return to clinic in 6 months with MD appointment.     Patient is in agreement with the proposed treatment plan. All questions were answered to the patient's satisfaction. Patient knows to call clinic for any new or worsening symptoms and if anything is needed before the next clinic visit.          uLbna Jamison, RIGOBERTOP-C  Hematology & Medical Oncology   [address] [place] [id number]  ph. [phone]  Fax. [phone]    Collaborating physician, Dr. Woods.    Approximately 11 minutes were spent face-to-face with the patient.  Approximately 15 minutes in total were spent on this encounter, which includes face-to-face time and non-face-to-face time preparing to see the patient (e.g., review of tests), obtaining and/or reviewing separately obtained history, documenting clinical information in the electronic or other health record, independently interpreting  results (not separately reported) and communicating results to the patient/family/caregiver, or care coordination (not separately reported).     Route Chart for Scheduling    Med Onc Chart Routing      Follow up with physician    Follow up with GARY 6 months.   Infusion scheduling note    Injection scheduling note    Labs    Imaging    Pharmacy appointment    Other referrals                     .

## 2023-07-13 ENCOUNTER — OFFICE VISIT (OUTPATIENT)
Dept: HEMATOLOGY/ONCOLOGY | Facility: CLINIC | Age: 53
End: 2023-07-13
Payer: COMMERCIAL

## 2023-07-13 VITALS
WEIGHT: 230.06 LBS | SYSTOLIC BLOOD PRESSURE: 135 MMHG | RESPIRATION RATE: 20 BRPM | OXYGEN SATURATION: 99 % | BODY MASS INDEX: 42.33 KG/M2 | HEIGHT: 62 IN | DIASTOLIC BLOOD PRESSURE: 75 MMHG | HEART RATE: 73 BPM

## 2023-07-13 DIAGNOSIS — C50.111 MALIGNANT NEOPLASM OF CENTRAL PORTION OF RIGHT BREAST IN FEMALE, ESTROGEN RECEPTOR POSITIVE: Primary | ICD-10-CM

## 2023-07-13 DIAGNOSIS — E66.01 CLASS 3 SEVERE OBESITY DUE TO EXCESS CALORIES WITH SERIOUS COMORBIDITY AND BODY MASS INDEX (BMI) OF 40.0 TO 44.9 IN ADULT: ICD-10-CM

## 2023-07-13 DIAGNOSIS — Z17.0 MALIGNANT NEOPLASM OF CENTRAL PORTION OF RIGHT BREAST IN FEMALE, ESTROGEN RECEPTOR POSITIVE: Primary | ICD-10-CM

## 2023-07-13 PROCEDURE — 3075F PR MOST RECENT SYSTOLIC BLOOD PRESS GE 130-139MM HG: ICD-10-PCS | Mod: CPTII,S$GLB,, | Performed by: NURSE PRACTITIONER

## 2023-07-13 PROCEDURE — 3008F PR BODY MASS INDEX (BMI) DOCUMENTED: ICD-10-PCS | Mod: CPTII,S$GLB,, | Performed by: NURSE PRACTITIONER

## 2023-07-13 PROCEDURE — 99214 OFFICE O/P EST MOD 30 MIN: CPT | Mod: S$GLB,,, | Performed by: NURSE PRACTITIONER

## 2023-07-13 PROCEDURE — 1160F RVW MEDS BY RX/DR IN RCRD: CPT | Mod: CPTII,S$GLB,, | Performed by: NURSE PRACTITIONER

## 2023-07-13 PROCEDURE — 3078F PR MOST RECENT DIASTOLIC BLOOD PRESSURE < 80 MM HG: ICD-10-PCS | Mod: CPTII,S$GLB,, | Performed by: NURSE PRACTITIONER

## 2023-07-13 PROCEDURE — 1160F PR REVIEW ALL MEDS BY PRESCRIBER/CLIN PHARMACIST DOCUMENTED: ICD-10-PCS | Mod: CPTII,S$GLB,, | Performed by: NURSE PRACTITIONER

## 2023-07-13 PROCEDURE — 3008F BODY MASS INDEX DOCD: CPT | Mod: CPTII,S$GLB,, | Performed by: NURSE PRACTITIONER

## 2023-07-13 PROCEDURE — 3078F DIAST BP <80 MM HG: CPT | Mod: CPTII,S$GLB,, | Performed by: NURSE PRACTITIONER

## 2023-07-13 PROCEDURE — 99999 PR PBB SHADOW E&M-EST. PATIENT-LVL V: CPT | Mod: PBBFAC,,, | Performed by: NURSE PRACTITIONER

## 2023-07-13 PROCEDURE — 3075F SYST BP GE 130 - 139MM HG: CPT | Mod: CPTII,S$GLB,, | Performed by: NURSE PRACTITIONER

## 2023-07-13 PROCEDURE — 99999 PR PBB SHADOW E&M-EST. PATIENT-LVL V: ICD-10-PCS | Mod: PBBFAC,,, | Performed by: NURSE PRACTITIONER

## 2023-07-13 PROCEDURE — 1159F PR MEDICATION LIST DOCUMENTED IN MEDICAL RECORD: ICD-10-PCS | Mod: CPTII,S$GLB,, | Performed by: NURSE PRACTITIONER

## 2023-07-13 PROCEDURE — 3044F PR MOST RECENT HEMOGLOBIN A1C LEVEL <7.0%: ICD-10-PCS | Mod: CPTII,S$GLB,, | Performed by: NURSE PRACTITIONER

## 2023-07-13 PROCEDURE — 3044F HG A1C LEVEL LT 7.0%: CPT | Mod: CPTII,S$GLB,, | Performed by: NURSE PRACTITIONER

## 2023-07-13 PROCEDURE — 99214 PR OFFICE/OUTPT VISIT, EST, LEVL IV, 30-39 MIN: ICD-10-PCS | Mod: S$GLB,,, | Performed by: NURSE PRACTITIONER

## 2023-07-13 PROCEDURE — 1159F MED LIST DOCD IN RCRD: CPT | Mod: CPTII,S$GLB,, | Performed by: NURSE PRACTITIONER

## 2023-07-15 DIAGNOSIS — C50.111 MALIGNANT NEOPLASM OF CENTRAL PORTION OF RIGHT FEMALE BREAST: ICD-10-CM

## 2023-07-17 RX ORDER — ANASTROZOLE 1 MG/1
1 TABLET ORAL DAILY
Qty: 90 TABLET | Refills: 3 | Status: SHIPPED | OUTPATIENT
Start: 2023-07-17

## 2023-07-20 ENCOUNTER — DOCUMENTATION ONLY (OUTPATIENT)
Dept: HEMATOLOGY/ONCOLOGY | Facility: CLINIC | Age: 53
End: 2023-07-20
Payer: COMMERCIAL

## 2023-07-21 ENCOUNTER — TELEPHONE (OUTPATIENT)
Dept: HEMATOLOGY/ONCOLOGY | Facility: CLINIC | Age: 53
End: 2023-07-21
Payer: COMMERCIAL

## 2023-07-21 NOTE — TELEPHONE ENCOUNTER
Unable to reach Falguni @ [phone].     Patient chart notes and path report was faxed over to [employer] @ [phone] on 7/21/23.        ----- Message from Tremontana Chevalier sent at 7/21/2023  9:56 AM CDT -----  Regarding: orders  Name Of Caller: Falguni with [employer]    Name of Provider: Dr. Woods    Contact Preference: Falguni @ [phone]    Nature of call: re BCI order sent by [employer]-  Caller sys needing addl info - need the latest chart notes - and to confirm the clinical field on req form (histologic grade)    Does patient use [employer] [facility]?

## 2023-07-25 DIAGNOSIS — E66.01 CLASS 3 SEVERE OBESITY DUE TO EXCESS CALORIES WITH SERIOUS COMORBIDITY AND BODY MASS INDEX (BMI) OF 40.0 TO 44.9 IN ADULT: ICD-10-CM

## 2023-07-25 RX ORDER — SEMAGLUTIDE 2.68 MG/ML
2 INJECTION, SOLUTION SUBCUTANEOUS
Qty: 1 EACH | Refills: 11 | Status: SHIPPED | OUTPATIENT
Start: 2023-07-25 | End: 2023-11-28

## 2023-07-26 ENCOUNTER — TELEPHONE (OUTPATIENT)
Dept: HEMATOLOGY/ONCOLOGY | Facility: CLINIC | Age: 53
End: 2023-07-26
Payer: COMMERCIAL

## 2023-07-26 NOTE — TELEPHONE ENCOUNTER
----- Message from Annie Kim sent at 7/26/2023  9:43 AM CDT -----  Type:  Patient Returning Call    Who Called:Falguni Dick or [employer]   Who Left Message for Patient:  Does the patient know what this is regarding?:pt   Would the patient rather a call back or a response via [employer]? call  Best Call Back Number:[phone]  Additional Information: regarding a test order received from the provider. States they faxed over a clinical discrepancy form .

## 2023-08-02 DIAGNOSIS — I10 ESSENTIAL HYPERTENSION: ICD-10-CM

## 2023-08-02 RX ORDER — AMLODIPINE BESYLATE 10 MG/1
TABLET ORAL
Qty: 90 TABLET | Refills: 3 | Status: SHIPPED | OUTPATIENT
Start: 2023-08-02

## 2023-08-02 NOTE — TELEPHONE ENCOUNTER
No care due was identified.  [facility] Embedded Care Due Messages. Reference number: [id number].   8/02/2023 12:17:48 AM CDT

## 2023-08-02 NOTE — TELEPHONE ENCOUNTER
Refill Decision Note   Brittny Urias  is requesting a refill authorization.  Brief Assessment and Rationale for Refill:  Approve     Medication Therapy Plan:         Comments:     Note composed:8:33 AM 08/02/2023             Appointments     Last Visit   6/22/2023 Anya Stevens, DO   Next Visit   12/26/2023 Anya Stevens, DO

## 2023-08-22 NOTE — PATIENT INSTRUCTIONS
Gentle Lower trunk rotation with butterfly stretch    1 x 10 reps each side . Perform 2 times a day      Scapular Retraction: Rowing (Eccentric) - Arms - Side (Resistance Band)        Hold end of band in each hand with elbows bent at your sides. Pull elbows back against the band aa you squeeze your shoulder blades together.  Use orange resistance band, do 1 sets of 10 reps each 1-2x day.           Wall Climb    Perform this exercise two (2) times a day with ten (5) repetitions.    Stand and FACE THE WALL with your toes 10 to 12 inches away from the wall.    a. Place the fingers of your affected arm on the wall and slowly walk your fingers up the wall. Let your fingers climb the wall as high as possible without feeling pulling or pain.  b. Hold this stretch for 5 seconds then move your fingers back down the wall.  c. Try to go a little higher each time. It may relax you a bit if you rest your head against the wall.                Wall Walk (Shoulder Abduction)    Using your affected arm, walk your hand up  a wall straight out to the side, as high as you  are able  and lean towards wall holding for count of 5.  Repeat with left arm. Perform 5 times 2 times a day.   No

## 2023-10-12 ENCOUNTER — PATIENT MESSAGE (OUTPATIENT)
Dept: FAMILY MEDICINE | Facility: CLINIC | Age: 53
End: 2023-10-12
Payer: COMMERCIAL

## 2023-11-28 ENCOUNTER — TELEPHONE (OUTPATIENT)
Dept: FAMILY MEDICINE | Facility: CLINIC | Age: 53
End: 2023-11-28
Payer: COMMERCIAL

## 2023-11-28 DIAGNOSIS — E66.01 CLASS 3 SEVERE OBESITY DUE TO EXCESS CALORIES WITH SERIOUS COMORBIDITY AND BODY MASS INDEX (BMI) OF 40.0 TO 44.9 IN ADULT: Primary | ICD-10-CM

## 2023-11-28 RX ORDER — SEMAGLUTIDE 2.4 MG/.75ML
2.4 INJECTION, SOLUTION SUBCUTANEOUS
Qty: 0.75 ML | Refills: 11 | Status: SHIPPED | OUTPATIENT
Start: 2023-11-28 | End: 2024-02-26 | Stop reason: SDUPTHER

## 2023-12-08 DIAGNOSIS — E55.9 VITAMIN D DEFICIENCY: ICD-10-CM

## 2023-12-08 RX ORDER — ASPIRIN 325 MG
50000 TABLET, DELAYED RELEASE (ENTERIC COATED) ORAL
Qty: 12 CAPSULE | Refills: 3 | Status: SHIPPED | OUTPATIENT
Start: 2023-12-08

## 2024-02-26 DIAGNOSIS — E66.01 CLASS 3 SEVERE OBESITY DUE TO EXCESS CALORIES WITH SERIOUS COMORBIDITY AND BODY MASS INDEX (BMI) OF 40.0 TO 44.9 IN ADULT: ICD-10-CM

## 2024-02-26 RX ORDER — SEMAGLUTIDE 2.4 MG/.75ML
2.4 INJECTION, SOLUTION SUBCUTANEOUS
Qty: 0.75 ML | Refills: 11 | Status: SHIPPED | OUTPATIENT
Start: 2024-02-26 | End: 2024-03-06 | Stop reason: SDUPTHER

## 2024-02-26 NOTE — TELEPHONE ENCOUNTER
Care Due:                  Date            Visit Type   Department     Provider  --------------------------------------------------------------------------------                                EP -         [facility]                              PRIMARY      MED/ INTERNAL  Last Visit: 06-      CARE ([facility])   MED/ PEDS      Anya Stevens                              [facility]                              ANNUAL       [facility]                              CHECKUP/PHY  MED/ INTERNAL  Next Visit: 07-      S            MED/ PEDS      Anya Stevens                                                            Last  Test          Frequency    Reason                     Performed    Due Date  --------------------------------------------------------------------------------    HBA1C.......  6 months...  semaglutide,.............  06- 12-    [employer] Embedded Care Due Messages. Reference number: [id number].   2/26/2024 12:29:26 PM CST

## 2024-03-06 DIAGNOSIS — E66.01 CLASS 3 SEVERE OBESITY DUE TO EXCESS CALORIES WITH SERIOUS COMORBIDITY AND BODY MASS INDEX (BMI) OF 40.0 TO 44.9 IN ADULT: ICD-10-CM

## 2024-03-06 RX ORDER — SEMAGLUTIDE 2.4 MG/.75ML
2.4 INJECTION, SOLUTION SUBCUTANEOUS
Qty: 0.75 ML | Refills: 11 | Status: SHIPPED | OUTPATIENT
Start: 2024-03-06 | End: 2025-03-06

## 2024-03-06 NOTE — TELEPHONE ENCOUNTER
No care due was identified.  [facility] Embedded Care Due Messages. Reference number: [id number].   3/06/2024 5:30:13 AM CST

## 2024-04-17 NOTE — PROGRESS NOTES
[place] [address]                            OPERATIVE REPORT      PATIENT NAME: VLADIMIR ACEVEDO              : 1981  MED REC NO: [id number]                        ROOM: [facility]  ACCOUNT NO: [id number]                       ADMIT DATE: 2024  PROVIDER: Florentin Grover DPM      DATE OF PROCEDURE:  24    SURGEON:  Florentin Grover DPM    PREOPERATIVE DIAGNOSES:    1. Left foot open wound.  2. Left midfoot ulceration with necrosis of muscle.  3. Insulin-dependent diabetes mellitus with neuropathy.    POSTOPERATIVE DIAGNOSES:    1. Left foot open wound.  2. Left midfoot ulceration with necrosis of muscle.  3. Insulin-dependent diabetes mellitus with neuropathy.    PROCEDURES:    1. Excisional wound debridement, left midfoot ulceration, to and through level of deep fascia and muscle, total measurement 11 cm x 6 cm x 0.5 cm in dimension.  2. Application of skin substitute graft, specifically Integra bilayer graft.  3. Delayed primary closure, left foot.    ANESTHESIA:  Monitored anesthesia care.    INJECTABLES:  20 mL of 0.5% Marcaine plain.    ESTIMATED BLOOD LOSS:  Minimal.    COMPLICATIONS:  None.    HEMOSTASIS:  On the field.    INTRAOPERATIVE FINDINGS:  Necrosis, soft tissue.  Adequate bleeding noted throughout the procedure.    INDICATIONS:  43-year-old female who presents today for repeat debridement of left foot.  Counseled the patient on the nature of the problem, proposed course of procedure, potential benefits, risks, complications, and convalescence in detail.  All questions answered to the patient's satisfaction.  No guarantees given as to the outcome of procedure.    DESCRIPTION OF PROCEDURE:  Under mild sedation, the patient was brought to the operating room and placed on the operating table in supine position.  Left lower extremity was scrubbed, prepped, and draped in usual sterile fashion.  At this  New Breast Cancer  History and Physical  [facility]  Department of Surgery    REFERRING PROVIDER: No referring provider defined for this encounter.    CHIEF COMPLAINT: right breast cancer    Subjective:   49 yo female with hx of right IDC (Stage 1a -pT1pN1, ER/HI+,Her2-) s/p bilateral mastectomy with right axillary dissection and immediate MEGAN reconstruction on 18.     Doing very well.  Will need PMRT.      Interval History:   Brittny Urias is a 48 y.o. postmenopausal female referred for evaluation of recently diagnosed carcinoma of the right breast. The patient was initially referred for surgical evaluation of an abnormal mammogram first noted 18. Follow-up breast biopsy (18), mammogram (8/15/18) and ultrasound (8/15/18) showed architectural distortion in the retroareolar right breast . A stereotactic biopsy was performed on 18 with pathology revealing infiltrating lobular carcinoma of the breast.     Patient does not routinely do self breast exams.  Patient has not noted a change on breast exam.  Patient denies nipple discharge. Patient denies to previous breast biopsy. Patient denies a personal history of breast cancer.      GYN History:  Age of menarche was 11. Age of menopause was 45.  Last menstrual period was in  following partial hysterectomy, still has her ovaries. Patient admits to hormonal therapy, OCPs for approximately 10 years. Patient is . Age of first live birth was 19. Patient did breast feed.    FAMILY History:  No other family history of malignancy including ovarian, colon, pancreatic, prostate, gastric or melanoma cancers.    Past Medical History:   Diagnosis Date    Breast cancer, right breast 2018    Cancer     Hypertension     Psoriasis     on biologic for 1 year, has been controlling it well     Past Surgical History:   Procedure Laterality Date    DILATION AND CURETTAGE OF UTERUS      HYSTERECTOMY      MASTECTOMY Bilateral  11/14/2018    Per[facility]    RECONSTRUCTION, BREAST, USING MEGAN FREE FLAP Bilateral 11/14/2018    Performed by Tye Chapman [facility] at [facility] OR    SENTINEL NODE BIOPSY AND AXILLARY LYMPHADENECTOMY Right 11/14/2018    Performed by [facility] at [facility] OR     Current Outpatient Medications on File Prior to Visit   Medication Sig Dispense Refill    aspirin (ECOTRIN) 81 MG EC tablet Take 1 tablet (81 mg total) by mouth once daily. 30 tablet 0    bisacodyl (DULCOLAX) 5 mg EC tablet Take 1 tablet (5 mg total) by mouth daily as needed for Constipation. 30 tablet 0    cetirizine (ZYRTEC) 10 MG tablet Take 1 tablet (10 mg total) by mouth daily as needed for Allergies (sneezing). 30 tablet 0    docusate sodium (COLACE) 100 MG capsule Take 1 capsule (100 mg total) by mouth 2 (two) times daily. 30 capsule 0    hydroCHLOROthiazide (HYDRODIURIL) 25 MG tablet Take 0.5 tablets (12.5 mg total) by mouth once daily. 30 tablet 2    HYDROcodone-acetaminophen (NORCO) 5-325 mg per tablet Take 1 tablet by mouth every 4 (four) hours as needed. 30 tablet 0    ondansetron (ZOFRAN-ODT) 8 MG TbDL Take 1 tablet (8 mg total) by mouth every 8 (eight) hours as needed. 15 tablet 0    epinephrine (EPIPEN) 0.3 mg/0.3 mL AtIn Inject 0.3 mLs (0.3 mg total) into the muscle as needed. 2 Device 0     No current facility-administered medications on file prior to visit.      Social History     Socioeconomic History    Marital status:      Spouse name: Not on file    Number of children: Not on file    Years of education: Not on file    Highest education level: Not on file   Social Needs    Financial resource strain: Not on file    Food insecurity - worry: Not on file    Food insecurity - inability: Not on file    Transportation needs - medical: Not on file    Transportation needs - non-medical: Not on file   Occupational History    Not on file   Tobacco Use    Smoking status: Never Smoker     "Smokeless tobacco: Never Used   Substance and Sexual Activity    Alcohol use: Yes     Alcohol/week: 1.8 oz     Types: 3 Glasses of wine per week     Comment: social    Drug use: No    Sexual activity: Yes     Partners: Male     Birth control/protection: Surgical   Other Topics Concern    Not on file   Social History Narrative    Not on file     Family History   Problem Relation Age of Onset    Diabetes Mother     Hypertension Mother     Hypertension Brother     Cancer Paternal Uncle         Review of Systems  Review of Systems   Constitutional: Negative for chills and fever.   Respiratory: Negative for chest tightness and shortness of breath.    Cardiovascular: Negative for chest pain.   Gastrointestinal: Positive for blood in stool (for the last 2 months). Negative for constipation, diarrhea, nausea and vomiting.   Genitourinary: Negative for dysuria and hematuria.   Musculoskeletal: Negative for arthralgias and back pain.   Skin: Negative for wound.   Neurological: Negative for dizziness and headaches.   Hematological: Negative for adenopathy. Does not bruise/bleed easily.        Objective:   PHYSICAL EXAM:  BP (!) 167/85   Pulse 65   Ht 5' 2" (1.575 m)   Wt 104.2 kg (229 lb 11.5 oz)   BMI 42.02 kg/m²   General appearance: alert, appears stated age and cooperative  Head: Normocephalic, without obvious abnormality, atraumatic  Neck: no adenopathy and supple, symmetrical, trachea midline  Lungs: normal effort, nonlabored breathing  Breasts: bilateral MEGAN flap. Appear healthy and incisions are clean and dry. No erythema or drainage. Right drain in place with serosanguinous drainage. Lower abdominal incision is clean and dry. Right abdominal drain in place which is serosanguinous.   Heart: regular rate and rhythm  Abdomen: soft, non-tender; bowel sounds normal; no masses,  no organomegaly  Extremities: extremities normal, atraumatic, no cyanosis or edema  Skin: normal, no edema and no lesions " noted  Lymph nodes: Cervical, supraclavicular, and axillary nodes normal.  Neurologic: Grossly normal    Radiology review: Images personally reviewed by me in the clinic.   Mammogram:8/13/18 (screening), 8/15/18 (diagnostic)  Impression:  Right  Architectural Distortion: Right breast architectural distortion at the retroareolar anterior position. Assessment: 0 - Incomplete. Diagnostic Mammogram and/or Ultrasound is recommended.      Left  There is no mammographic evidence of malignancy.     BI-RADS Category:   Overall: 0 - Incomplete: Needs Additional Imaging Evaluation     Ultrasound:8/15/18 (diagnostic)  Targeted right breast ultrasound in the retroareolar region and right axilla was performed. There is no definite sonographic abnormality seen to correlate likely due to significant shadowing with the mammographic finding. Normal right axillary lymph nodes are seen.        Pathology:  FINAL PATHOLOGIC DIAGNOSIS  1. LYMPH NODE (RIGHT SENTINEL LYMPH NODE, CLINICAL): METASTATIC CARCINOMA.  Note: The metastatic tumor measures up to 6 mm and shows focal extranodal extension.  2. LYMPH NODES (2 RIGHT SENTINEL LYMPH NODES, CLINICAL): METASTATIC CARCINOMA TO ONE (1)  OF TWO (2) LYMPH NODES.  Note: The metastatic tumor measures up to 11 mm and shows extranodal extension.  3. BREAST (LEFT MASTECTOMY): FIBROCYSTIC CHANGES; NO EVIDENCE OF MALIGNANCY IN  SECTIONS OF BREAST, SKIN, OR NIPPLE.  4. BREAST (RIGHT MASTECTOMY): INVASIVE MAMMARY CARCINOMA WITH LOBULAR FEATURES (20  mm, CENTRAL BREAST); ANTERIOR AND POSTERIOR RESECTIONS MARGINS, NIPPLE, AND SKIN FREE  OF MALIGNANCY; FIBROCYSTIC CHANGES OF SURROUNDING BREAST.  5. LYMPH NODES (12, AXILLA): NO EVIDENCE OF MALIGNANCY; SMALL FRAGMENT OF BENIGN  AXILLARY BREAST TISSUE.  SURGICAL PATHOLOGY CANCER CASE SUMMARY  Invasive carcinoma of the breast:  Procedure: total mastectomy  Laterality: right  Tumor site: central breast  Tumor size: 20mm  Histologic type: invasive mammary  "carcinoma with lobular features  Histologic grade: intermediate grade (3, 2, 1)  Tumor focality: single focus  DCIS: absent  LCIS: absent  Tumor extension:  Skin: absent  Nipple: absent  Skeletal muscle: na  Margins: uninvolved  Invasive carcinoma: >20 mm from anterior and posterior margins  DCIS margin: na  Lymph nodes: 2+/15  Nodes with macrometastases (>2mm): 2  Nodes with micrometastases (>0.2mm-</=2mm): 0  Nodes with isolated tumor cells (</=0.2mm or 200cells): 0  Size of largest tumor deposit: 11mm  Extranodal extension: present  Treatment effect: na  In breast: na  In lymph nodes: na  Pathological stage: pT1c pN1  Ancillary studies-interpreted by Dr. YURI Gill on needle biopsy [id number]  ER positive  IL positive  Her2 negative  Tumor block: 4D  Supplemental Diagnosis  The invasive mammary carcinoma with lobular features appears of the "classic" variety.      Assessment:      Brittny Urias is a 48 y.o. postmenopausal female with recently diagnosed carcinoma of the right breast (Stage 1a).  s/p bilateral mastectomy with right axillary dissection and immediate MEGAN reconstruction 11/14     Plan:     met with rad onc--will get pmrt.    Cont f/u with med onc      "

## 2024-05-13 ENCOUNTER — PATIENT MESSAGE (OUTPATIENT)
Dept: PODIATRY | Facility: CLINIC | Age: 54
End: 2024-05-13
Payer: COMMERCIAL

## 2024-06-04 DIAGNOSIS — C50.111 MALIGNANT NEOPLASM OF CENTRAL PORTION OF RIGHT FEMALE BREAST: ICD-10-CM

## 2024-06-04 RX ORDER — ANASTROZOLE 1 MG/1
1 TABLET ORAL
Qty: 90 TABLET | Refills: 4 | Status: SHIPPED | OUTPATIENT
Start: 2024-06-04

## 2024-06-07 DIAGNOSIS — E78.5 DYSLIPIDEMIA: ICD-10-CM

## 2024-06-07 RX ORDER — PRAVASTATIN SODIUM 40 MG/1
40 TABLET ORAL
Qty: 90 TABLET | Refills: 0 | Status: SHIPPED | OUTPATIENT
Start: 2024-06-07

## 2024-06-07 NOTE — TELEPHONE ENCOUNTER
CC: UTI    Patient was seen on morning rounds.  His catheter has been removed and he is voiding w/o difficulty or pain.  UOP clear.  Has been tolerating diet.  No chest pain or SOB    Vital Signs (last 24 hrs)_____ Last Charted___________Minimum____________ Maximum____________  Temp    98.8  (MAR 08 16:57) 97.8  (MAR 07 23:49) 98.8  (MAR 08 16:57)  Heart Rate   64  (MAR 08 16:57) L 55 (MAR 08 07:46) 85  (MAR 07 23:49)  Resp Rate       16  (MAR 08 16:57) 14  (MAR 08 07:46) 18  (MAR 07 20:41)  SBP    123  (MAR 08 16:57) 114  (MAR 08 07:46) 130  (MAR 07 20:41)  DBP    73  (MAR 08 16:57) 63  (MAR 08 11:54) 73  (MAR 08 07:46)      Intake Output Balance    03/08/2019 7a-3p     0     0     0   3p-11p    50     0    50 As of 17:09   11p-7a     0     0     0   Totals    50     0    50    03/07/2019 7a-3p    50   225  -175   3p-11p    50   125   -75   11p-7a   142     0   142   Totals   242   350  -108    03/06/2019 7a-3p   740   250   490   3p-11p   210   500  -290   11p-7a    50   400  -350   Totals  1000  1150  -150    Gen: NAD, resting in bed  Chest: respirations symmetric, unlabored bilaterally  ABD: soft, NT  Back: No CVA tenderness  Ext: nontender    Labs (Last four charted values)  WBC                  4.4 (MAR 08) 4.8 (MAR 07) 5.6 (MAR 06) 6.0 (MAR 06)   Hgb                  L 10.4 (MAR 08) L 9.8 (MAR 08) L 10.2 (MAR 08) L 10.5 (MAR 07)   Hct                  L 34 (MAR 08) L 32 (MAR 08) L 32 (MAR 08) L 34 (MAR 07)   Plt                  L 137 (MAR 08) L 127 (MAR 07) L 142 (MAR 06) L 119 (MAR 06)   Na                   139 (MAR 08) 139 (MAR 07) L 135 (MAR 06) L 133 (MAR 05)   K                    4.1 (MAR 08) 3.8 (MAR 07) 4.0 (MAR 06) 3.9 (MAR 05)   CO2                  24 (MAR 08) 27 (MAR 07) 22 (MAR 06) 25 (MAR 05)   Cl                   H 110 (MAR 08) H 108 (MAR 07) 107 (MAR 06) 103 (MAR 05)   Cr                   1.04 (MAR 08) 0.93 (MAR 07) 0.96 (MAR 06) 1.14 (MAR 05)   BUN                  17 (MAR  Care Due:                  Date            Visit Type   Department     Provider  --------------------------------------------------------------------------------                                [facility]                              PRIMARY      MED/ INTERNAL  Last Visit: 06-      CARE ([facility])   MED/ PEDS      Anya Stevens                              [facility]                              ANNUAL       [facility]                              CHECKUP/PHY  MED/ INTERNAL  Next Visit: 07-      S            MED/ PEDCAESAR Stevens                                                            Last  Test          Frequency    Reason                     Performed    Due Date  --------------------------------------------------------------------------------    CMP.........  12 months..  cholecalciferol,,          06- 06-                             losartan-hydrochlorothiaz                             beatrice, pravastatin.........    HBA1C.......  6 months...  semaglutide,.............  06- 12-    Lipid Panel.  12 months..  pravastatin..............  06- 06-    Vitamin D...  12 months..  cholecalciferol,.........  06- 06-    [employer] Embedded Care Due Messages. Reference number: [id number].   6/07/2024 12:12:55 AM CDT   08) 18 (MAR 07) 19 (MAR 06) H 23 (MAR 05)   Glucose              99 (MAR 08) 94 (MAR 07) 94 (MAR 06) 90 (MAR 05)   Ca                   L 8.1 (MAR 08) L 8.3 (MAR 07) 8.4 (MAR 06) 8.4 (MAR 05)   PT                   H 12.1 (MAR 08) H 12.9 (MAR 07) H 12.6 (MAR 06) H 14.0 (MAR 05)   INR                  H 1.2 (MAR 08) H 1.3 (MAR 07) H 1.3 (MAR 06) H 1.4 (MAR 05)   PTT                  H 52 (MAR 08) H 50 (MAR 07) H 40 (MAR 07) H 39 (MAR 07)   Troponin             0.01 (MAR 04)     A:  Hematuria, resolved  UTI  Bladder mass    P:  Doing well from  standpoint.  Will arrange for resection of bladder mass with Dr. Magana once recovered from UTI.  Procedure likely to be performed as outpatient.             Electronically Signed On 03.08.2019 17:11  ___________________________________________________   Vince Roca MD

## 2024-06-07 NOTE — TELEPHONE ENCOUNTER
Provider Staff:  Action required for this patient    Requires labs      Please see care gap opportunities below in Care Due Message.    Thanks!  [facility]     Appointments      Date Provider   Last Visit   6/22/2023 Anya Stevens, DO   Next Visit   7/8/2024 Anya Stevens, DO     Refill Decision Note   Brittny Urias  is requesting a refill authorization.  Brief Assessment and Rationale for Refill:  Approve     Medication Therapy Plan:         Comments:     Note composed:1:55 AM 06/07/2024

## 2024-07-03 DIAGNOSIS — C50.111 MALIGNANT NEOPLASM OF CENTRAL PORTION OF RIGHT BREAST IN FEMALE, ESTROGEN RECEPTOR POSITIVE: ICD-10-CM

## 2024-07-03 DIAGNOSIS — N95.1 MENOPAUSAL SYMPTOMS: ICD-10-CM

## 2024-07-03 DIAGNOSIS — Z17.0 MALIGNANT NEOPLASM OF CENTRAL PORTION OF RIGHT BREAST IN FEMALE, ESTROGEN RECEPTOR POSITIVE: ICD-10-CM

## 2024-07-03 RX ORDER — VENLAFAXINE HYDROCHLORIDE 150 MG/1
150 CAPSULE, EXTENDED RELEASE ORAL DAILY
Qty: 90 CAPSULE | Refills: 3 | Status: SHIPPED | OUTPATIENT
Start: 2024-07-03 | End: 2025-07-03

## 2024-07-06 DIAGNOSIS — I10 ESSENTIAL HYPERTENSION: ICD-10-CM

## 2024-07-06 NOTE — TELEPHONE ENCOUNTER
Care Due:                  Date            Visit Type   Department     Provider  --------------------------------------------------------------------------------                                [facility]                              PRIMARY      MED/ INTERNAL  Last Visit: 06-      CARE ([facility])   MED/ PEDS      Anya Stevens  Next Visit: None Scheduled  None         None Found                                                            Last  Test          Frequency    Reason                     Performed    Due Date  --------------------------------------------------------------------------------    Office Visit  12 months..  cholecalciferol,,          06- 06-                             losartan-hydrochlorothiaz                             beatrice, omeprazole,                             pravastatin, semaglutide,    [employer] Embedded Care Due Messages. Reference number: [id number].   7/06/2024 12:50:36 AM CDT

## 2024-07-07 RX ORDER — LOSARTAN POTASSIUM AND HYDROCHLOROTHIAZIDE 25; 100 MG/1; MG/1
1 TABLET ORAL DAILY
Qty: 90 TABLET | Refills: 0 | Status: SHIPPED | OUTPATIENT
Start: 2024-07-07

## 2024-07-07 NOTE — TELEPHONE ENCOUNTER
Refill Routing Note   Medication(s) are not appropriate for processing by [facility] for the following reason(s):        Required labs outdated    [facility] action(s):  Defer        Medication Therapy Plan: FOVS 7/8/24      Appointments  past 12m or future 3m with PCP    Date Provider   Last Visit   6/22/2023 Anya Stevens, DO   Next Visit   7/8/2024 Anya Stevens, DO   ED visits in past 90 days: 0        Note composed:7:52 AM 07/07/2024

## 2024-07-08 ENCOUNTER — LAB VISIT (OUTPATIENT)
Dept: LAB | Facility: HOSPITAL | Age: 54
End: 2024-07-08
Attending: FAMILY MEDICINE
Payer: COMMERCIAL

## 2024-07-08 ENCOUNTER — OFFICE VISIT (OUTPATIENT)
Dept: FAMILY MEDICINE | Facility: CLINIC | Age: 54
End: 2024-07-08
Payer: COMMERCIAL

## 2024-07-08 VITALS
BODY MASS INDEX: 43.64 KG/M2 | TEMPERATURE: 98 F | HEART RATE: 73 BPM | SYSTOLIC BLOOD PRESSURE: 120 MMHG | WEIGHT: 237.13 LBS | DIASTOLIC BLOOD PRESSURE: 72 MMHG | OXYGEN SATURATION: 98 % | HEIGHT: 62 IN

## 2024-07-08 DIAGNOSIS — E78.5 DYSLIPIDEMIA: ICD-10-CM

## 2024-07-08 DIAGNOSIS — Z17.0 MALIGNANT NEOPLASM OF CENTRAL PORTION OF RIGHT BREAST IN FEMALE, ESTROGEN RECEPTOR POSITIVE: ICD-10-CM

## 2024-07-08 DIAGNOSIS — R73.03 PRE-DIABETES: ICD-10-CM

## 2024-07-08 DIAGNOSIS — I10 ESSENTIAL HYPERTENSION: ICD-10-CM

## 2024-07-08 DIAGNOSIS — E66.01 CLASS 3 SEVERE OBESITY DUE TO EXCESS CALORIES WITH SERIOUS COMORBIDITY AND BODY MASS INDEX (BMI) OF 40.0 TO 44.9 IN ADULT: ICD-10-CM

## 2024-07-08 DIAGNOSIS — C50.111 MALIGNANT NEOPLASM OF CENTRAL PORTION OF RIGHT BREAST IN FEMALE, ESTROGEN RECEPTOR POSITIVE: ICD-10-CM

## 2024-07-08 DIAGNOSIS — F33.0 MILD EPISODE OF RECURRENT MAJOR DEPRESSIVE DISORDER: ICD-10-CM

## 2024-07-08 DIAGNOSIS — Z00.00 ANNUAL PHYSICAL EXAM: ICD-10-CM

## 2024-07-08 DIAGNOSIS — Z00.00 ANNUAL PHYSICAL EXAM: Primary | ICD-10-CM

## 2024-07-08 LAB
ALBUMIN SERPL BCP-MCNC: 3.9 G/DL (ref 3.5–5.2)
ALP SERPL-CCNC: 80 U/L (ref 55–135)
ALT SERPL W/O P-5'-P-CCNC: 26 U/L (ref 10–44)
ANION GAP SERPL CALC-SCNC: 10 MMOL/L (ref 8–16)
AST SERPL-CCNC: 26 U/L (ref 10–40)
BASOPHILS # BLD AUTO: 0.03 K/UL (ref 0–0.2)
BASOPHILS NFR BLD: 0.6 % (ref 0–1.9)
BILIRUB SERPL-MCNC: 0.4 MG/DL (ref 0.1–1)
BUN SERPL-MCNC: 9 MG/DL (ref 6–20)
CALCIUM SERPL-MCNC: 9.9 MG/DL (ref 8.7–10.5)
CHLORIDE SERPL-SCNC: 103 MMOL/L (ref 95–110)
CHOLEST SERPL-MCNC: 222 MG/DL (ref 120–199)
CHOLEST/HDLC SERPL: 2.8 {RATIO} (ref 2–5)
CO2 SERPL-SCNC: 25 MMOL/L (ref 23–29)
CREAT SERPL-MCNC: 0.8 MG/DL (ref 0.5–1.4)
DIFFERENTIAL METHOD BLD: ABNORMAL
EOSINOPHIL # BLD AUTO: 0.1 K/UL (ref 0–0.5)
EOSINOPHIL NFR BLD: 2 % (ref 0–8)
ERYTHROCYTE [DISTWIDTH] IN BLOOD BY AUTOMATED COUNT: 19.3 % (ref 11.5–14.5)
EST. GFR  (NO RACE VARIABLE): >60 ML/MIN/1.73 M^2
ESTIMATED AVG GLUCOSE: 120 MG/DL (ref 68–131)
GLUCOSE SERPL-MCNC: 96 MG/DL (ref 70–110)
HBA1C MFR BLD: 5.8 % (ref 4–5.6)
HCT VFR BLD AUTO: 42.9 % (ref 37–48.5)
HDLC SERPL-MCNC: 79 MG/DL (ref 40–75)
HDLC SERPL: 35.6 % (ref 20–50)
HGB BLD-MCNC: 13.2 G/DL (ref 12–16)
IMM GRANULOCYTES # BLD AUTO: 0.01 K/UL (ref 0–0.04)
IMM GRANULOCYTES NFR BLD AUTO: 0.2 % (ref 0–0.5)
LDLC SERPL CALC-MCNC: 127.4 MG/DL (ref 63–159)
LYMPHOCYTES # BLD AUTO: 1.5 K/UL (ref 1–4.8)
LYMPHOCYTES NFR BLD: 27.2 % (ref 18–48)
MCH RBC QN AUTO: 22.9 PG (ref 27–31)
MCHC RBC AUTO-ENTMCNC: 30.8 G/DL (ref 32–36)
MCV RBC AUTO: 75 FL (ref 82–98)
MONOCYTES # BLD AUTO: 0.5 K/UL (ref 0.3–1)
MONOCYTES NFR BLD: 8.8 % (ref 4–15)
NEUTROPHILS # BLD AUTO: 3.3 K/UL (ref 1.8–7.7)
NEUTROPHILS NFR BLD: 61.2 % (ref 38–73)
NONHDLC SERPL-MCNC: 143 MG/DL
NRBC BLD-RTO: 0 /100 WBC
PLATELET # BLD AUTO: 350 K/UL (ref 150–450)
PMV BLD AUTO: 11.4 FL (ref 9.2–12.9)
POTASSIUM SERPL-SCNC: 4 MMOL/L (ref 3.5–5.1)
PROT SERPL-MCNC: 7.5 G/DL (ref 6–8.4)
RBC # BLD AUTO: 5.76 M/UL (ref 4–5.4)
SODIUM SERPL-SCNC: 138 MMOL/L (ref 136–145)
TRIGL SERPL-MCNC: 78 MG/DL (ref 30–150)
WBC # BLD AUTO: 5.45 K/UL (ref 3.9–12.7)

## 2024-07-08 PROCEDURE — 3078F DIAST BP <80 MM HG: CPT | Mod: CPTII,S$GLB,, | Performed by: FAMILY MEDICINE

## 2024-07-08 PROCEDURE — 80061 LIPID PANEL: CPT | Performed by: FAMILY MEDICINE

## 2024-07-08 PROCEDURE — 1160F RVW MEDS BY RX/DR IN RCRD: CPT | Mod: CPTII,S$GLB,, | Performed by: FAMILY MEDICINE

## 2024-07-08 PROCEDURE — 3008F BODY MASS INDEX DOCD: CPT | Mod: CPTII,S$GLB,, | Performed by: FAMILY MEDICINE

## 2024-07-08 PROCEDURE — 36415 COLL VENOUS BLD VENIPUNCTURE: CPT | Mod: PO | Performed by: FAMILY MEDICINE

## 2024-07-08 PROCEDURE — 99396 PREV VISIT EST AGE 40-64: CPT | Mod: S$GLB,,, | Performed by: FAMILY MEDICINE

## 2024-07-08 PROCEDURE — 85025 COMPLETE CBC W/AUTO DIFF WBC: CPT | Performed by: FAMILY MEDICINE

## 2024-07-08 PROCEDURE — 99214 OFFICE O/P EST MOD 30 MIN: CPT | Mod: 25,S$GLB,, | Performed by: FAMILY MEDICINE

## 2024-07-08 PROCEDURE — 99999 PR PBB SHADOW E&M-EST. PATIENT-LVL III: CPT | Mod: PBBFAC,,, | Performed by: FAMILY MEDICINE

## 2024-07-08 PROCEDURE — 80053 COMPREHEN METABOLIC PANEL: CPT | Performed by: FAMILY MEDICINE

## 2024-07-08 PROCEDURE — 83036 HEMOGLOBIN GLYCOSYLATED A1C: CPT | Performed by: FAMILY MEDICINE

## 2024-07-08 PROCEDURE — 1159F MED LIST DOCD IN RCRD: CPT | Mod: CPTII,S$GLB,, | Performed by: FAMILY MEDICINE

## 2024-07-08 PROCEDURE — 3074F SYST BP LT 130 MM HG: CPT | Mod: CPTII,S$GLB,, | Performed by: FAMILY MEDICINE

## 2024-07-08 RX ORDER — BUPROPION HYDROCHLORIDE 150 MG/1
150 TABLET ORAL DAILY
Qty: 30 TABLET | Refills: 11 | Status: SHIPPED | OUTPATIENT
Start: 2024-07-08 | End: 2025-07-08

## 2024-07-08 NOTE — PROGRESS NOTES
Assessment & Plan:    Annual physical exam  -     CBC Auto Differential; Future; Expected date: 07/08/2024  -     Comprehensive Metabolic Panel; Future; Expected date: 07/08/2024  -     Hemoglobin A1C; Future; Expected date: 07/08/2024  -     Lipid Panel; Future; Expected date: 07/08/2024    Fasting labs to be performed.    Mild episode of recurrent major depressive disorder  -     buPROPion (WELLBUTRIN XL) 150 MG TB24 tablet; Take 1 tablet (150 mg total) by mouth once daily.  Dispense: 30 tablet; Refill: 11    Start trial of Wellbutrin with added benefit of weight loss as a possible side effect. Patient was informed that this medication may take 4-6 weeks to be fully effective but that side effects may occur sooner. Informed of the most common side effects & notify if they occur.     Class 3 severe obesity due to excess calories with serious comorbidity and body mass index (BMI) of 40.0 to 44.9 in adult  See above.    Essential hypertension  -     CBC Auto Differential; Future; Expected date: 07/08/2024  -     Comprehensive Metabolic Panel; Future; Expected date: 07/08/2024  -     Hemoglobin A1C; Future; Expected date: 07/08/2024  -     Lipid Panel; Future; Expected date: 07/08/2024    Controlled. Continue current therapy.     Pre-diabetes  -     Hemoglobin A1C; Future; Expected date: 07/08/2024    Dyslipidemia  -     Lipid Panel; Future; Expected date: 07/08/2024    Malignant neoplasm of central portion of right breast in female, estrogen receptor positive  Continue therapy plan.      Follow-up: Follow up in about 6 months (around 1/8/2025).  ______________________________________________________________________    Chief Complaint  Chief Complaint   Patient presents with    Annual Exam       HPI  Brittny Urias is a 53 y.o. female with medical diagnoses as listed in the medical history and problem list that presents to the office for an annual exam. Patient states that she has been feeling down and crying a  lot lately. Her  became very sick on their cruise in January and he is now on dialysis. Her daughter just moved in with her grandkids. She has not been able to exercise like she was before. She has gained a net of 7 lbs since her last visit a year ago.     Health Maintenance         Date Due Completion Date    COVID-19 Vaccine (7 - 2023-24 season) 11/20/2023 9/25/2023    Hemoglobin A1c (Prediabetes) 06/20/2024 6/20/2023    Influenza Vaccine (1) 09/01/2024 9/25/2023    Colorectal Cancer Screening 10/10/2024 10/10/2019    Lipid Panel 06/20/2028 6/20/2023    TETANUS VACCINE 07/21/2030 7/21/2020              PAST MEDICAL HISTORY:  Past Medical History:   Diagnosis Date    Breast cancer, right breast 11/12/2018    Cancer     Diabetes mellitus, type 2     Hypertension     Psoriasis     on biologic for 1 year, has been controlling it well       PAST SURGICAL HISTORY:  Past Surgical History:   Procedure Laterality Date    COLONOSCOPY N/A 10/10/2019    Procedure: COLONOSCOPY;  Surgeon: Matt Marin MD;  Location: [facility];  Service: Endoscopy;  Laterality: N/A;    DILATION AND CURETTAGE OF UTERUS      FOOT ARTHRODESIS Left 12/2/2020    Procedure: FUSION, FOOT;  Surgeon: Uriah Veras DPM;  Location: [facility] OR;  Service: Podiatry;  Laterality: Left;  mini c-arm, Arthrex screws and staples, 1 MTP arthrodesis plates  Libby notified 11/23, EF  Libby w/ Arthrex confirmed 12/1/2020 [place]    HYSTERECTOMY      partial hyst for prolapse    MASTECTOMY Bilateral 11/14/2018    Procedure: MASTECTOMY;  Surgeon: Marga Cardenas MD;  Location: [facility];  Service: Plastics;  Laterality: Bilateral;    MASTECTOMY WITH SENTINEL NODE BIOPSY AND AXILLARY LYMPH NODE DISSECTION Right 11/14/2018    Procedure: SENTINEL NODE BIOPSY AND AXILLARY LYMPHADENECTOMY;  Surgeon: Marga Cardenas MD;  Location: [facility];  Service: Plastics;  Laterality: Right;    RECONSTRUCTION OF BREAST WITH DEEP INFERIOR EPIGASTRIC ARTERY  (MEGAN) FREE  FLAP Bilateral 11/14/2018    Procedure: RECONSTRUCTION, BREAST, USING MEGAN FREE FLAP;  Surgeon: Tye Chapman MD;  Location: [facility] OR;  Service: Plastics;  Laterality: Bilateral;    RESECTION OF GASTROCNEMIUS MUSCLE Left 12/2/2020    Procedure: RESECTION, MUSCLE, GASTROCNEMIUS;  Surgeon: Uriah Veras DPM;  Location: [facility] OR;  Service: Podiatry;  Laterality: Left;  video  Kj w/ video confirmed 12/1/2020       SOCIAL HISTORY:  Social History     Socioeconomic History    Marital status:    Tobacco Use    Smoking status: Never    Smokeless tobacco: Never   Substance and Sexual Activity    Alcohol use: Yes     Alcohol/week: 3.0 standard drinks of alcohol     Types: 3 Glasses of wine per week     Comment: social    Drug use: No    Sexual activity: Yes     Partners: Male     Birth control/protection: Surgical     Social Determinants of Health     Financial Resource Strain: Low Risk  (7/12/2023)    Overall Financial Resource Strain (CARDIA)     Difficulty of Paying Living Expenses: Not hard at all   Food Insecurity: No Food Insecurity (7/12/2023)    Hunger Vital Sign     Worried About Running Out of Food in the Last Year: Never true     Ran Out of Food in the Last Year: Never true   Transportation Needs: No Transportation Needs (7/12/2023)    PRAPARE - Transportation     Lack of Transportation (Medical): No     Lack of Transportation (Non-Medical): No   Physical Activity: Sufficiently Active (7/12/2023)    Exercise Vital Sign     Days of Exercise per Week: 5 days     Minutes of Exercise per Session: 50 min   Recent Concern: Physical Activity - Insufficiently Active (6/21/2023)    Exercise Vital Sign     Days of Exercise per Week: 3 days     Minutes of Exercise per Session: 30 min    Received from [facility]    Stress   Housing Stability: High Risk (7/12/2023)    Housing Stability Vital Sign     Unable to Pay for Housing in the Last Year: Yes     Number of Places Lived in the Last Year: 1     Unstable  Housing in the Last Year: No       FAMILY HISTORY:  Family History   Problem Relation Name Age of Onset    Diabetes Mother      Hypertension Mother      No Known Problems Father      Hypertension Brother      Cancer Paternal Uncle          leukemia    Breast cancer Neg Hx      Ovarian cancer Neg Hx      Colon cancer Neg Hx         ALLERGIES AND MEDICATIONS: updated and reviewed.  Review of patient's allergies indicates:  No Known Allergies  Current Outpatient Medications   Medication Sig Dispense Refill    amLODIPine (NORVASC) 10 MG tablet TAKE 1 TABLET BY MOUTH EVERY DAY 90 tablet 3    anastrozole (ARIMIDEX) 1 mg Tab TAKE 1 TABLET BY MOUTH EVERY DAY 90 tablet 4    betamethasone dipropionate (DIPROLENE) 0.05 % cream APPLY TWICE DAILY TO ALL AFFECTED AREAS.  2    cholecalciferol, vitamin D3, 1,250 mcg (50,000 unit) capsule TAKE 1 CAPSULE BY MOUTH ONE TIME PER WEEK 12 capsule 3    clobetasol (CLOBEX) 0.05 % shampoo USE TOPICALLY 3 TIMES WEEKLY  3    clobetasoL (TEMOVATE) 0.05 % external solution       fluticasone propionate (CUTIVATE) 0.05 % cream Apply topically 2 (two) times daily.      losartan-hydrochlorothiazide 100-25 mg (HYZAAR) 100-25 mg per tablet Take 1 tablet by mouth once daily. 90 tablet 0    multivitamin (THERAGRAN) per tablet Take 1 tablet by mouth once daily.      omeprazole (PRILOSEC) 40 MG capsule TAKE 1 CAPSULE BY MOUTH 30MIN BEFORE BREAKFAST OR COFFEE 90 capsule 2    ondansetron (ZOFRAN-ODT) 4 MG TbDL Take 1 tablet (4 mg total) by mouth every 8 (eight) hours as needed (nausea). 30 tablet 1    pravastatin (PRAVACHOL) 40 MG tablet TAKE 1 TABLET BY MOUTH EVERY DAY 90 tablet 0    semaglutide, weight loss, (WEGOVY) 2.4 mg/0.75 mL PnIj Inject 2.4 mg into the skin every 7 days. 0.75 mL 11    SKYRIZI 150 mg/mL Syrg Inject into the skin.      sucralfate (CARAFATE) 1 gram tablet Take 1 tablet (1 g total) by mouth 3 (three) times daily as needed (upset stomach). 30 tablet 2    triamcinolone acetonide 0.1%  "(KENALOG) 0.1 % cream Apply topically 2 (two) times daily.      venlafaxine (EFFEXOR-XR) 150 MG Cp24 TAKE 1 CAPSULE (150 MG TOTAL) BY MOUTH ONCE DAILY. 90 capsule 3    buPROPion (WELLBUTRIN XL) 150 MG TB24 tablet Take 1 tablet (150 mg total) by mouth once daily. 30 tablet 11     Current Facility-Administered Medications   Medication Dose Route Frequency Provider Last Rate Last Admin    sodium chloride 0.9% flush 10 mL  10 mL Intravenous PRN Uriah Veras DPM         Facility-Administered Medications Ordered in Other Visits   Medication Dose Route Frequency Provider Last Rate Last Admin    EPINEPHrine 1,000 mcg in lactated Ringers 1,000 mL irrigation   Irrigation On Call Procedure Tye Chapman MD        EPINEPHrine 1,000 mcg in lactated Ringers 1,000 mL irrigation   Irrigation On Call Procedure Tye Chapman MD             ROS  Review of Systems   Constitutional:  Positive for activity change and unexpected weight change.   Psychiatric/Behavioral:  Positive for dysphoric mood.            Physical Exam  Vitals:    07/08/24 0819   BP: 120/72   Pulse: 73   Temp: 98 °F (36.7 °C)   TempSrc: Oral   SpO2: 98%   Weight: 107.6 kg (237 lb 1.7 oz)   Height: 5' 2" (1.575 m)    Body mass index is 43.37 kg/m².  Weight: 107.6 kg (237 lb 1.7 oz)   Height: 5' 2" (157.5 cm)   Physical Exam  Constitutional:       General: She is not in acute distress.     Appearance: She is obese.   HENT:      Head: Normocephalic and atraumatic.   Neck:      Thyroid: No thyromegaly.      Vascular: No carotid bruit.   Cardiovascular:      Rate and Rhythm: Normal rate and regular rhythm.      Pulses: Normal pulses.      Heart sounds: Normal heart sounds.   Pulmonary:      Effort: Pulmonary effort is normal. No respiratory distress.      Breath sounds: Normal breath sounds.   Musculoskeletal:      Cervical back: Neck supple.      Right lower leg: No edema.      Left lower leg: No edema.   Lymphadenopathy:      Cervical: No cervical " adenopathy.   Skin:     General: Skin is warm and dry.      Findings: No rash.   Neurological:      General: No focal deficit present.      Mental Status: She is alert and oriented to person, place, and time.   Psychiatric:         Mood and Affect: Mood is depressed.         Behavior: Behavior normal.         Thought Content: Thought content normal.

## 2024-07-09 ENCOUNTER — PATIENT MESSAGE (OUTPATIENT)
Dept: FAMILY MEDICINE | Facility: CLINIC | Age: 54
End: 2024-07-09
Payer: COMMERCIAL

## 2024-07-09 DIAGNOSIS — R73.03 PRE-DIABETES: Primary | ICD-10-CM

## 2024-07-09 DIAGNOSIS — E78.5 DYSLIPIDEMIA: ICD-10-CM

## 2024-07-09 RX ORDER — ATORVASTATIN CALCIUM 40 MG/1
40 TABLET, FILM COATED ORAL DAILY
Qty: 90 TABLET | Refills: 3 | Status: SHIPPED | OUTPATIENT
Start: 2024-07-09 | End: 2025-07-09

## 2024-07-09 NOTE — TELEPHONE ENCOUNTER
Attempted to contact pt to schedule fasting lab appt, no answer, left VM instructing pt to read [employer] message or return call to clinic.

## 2024-07-18 ENCOUNTER — HOSPITAL ENCOUNTER (OUTPATIENT)
Dept: RADIOLOGY | Facility: HOSPITAL | Age: 54
Discharge: HOME | End: 2024-07-18
Attending: PODIATRIST
Payer: COMMERCIAL

## 2024-07-18 ENCOUNTER — OFFICE VISIT (OUTPATIENT)
Dept: PODIATRY | Facility: CLINIC | Age: 54
End: 2024-07-18
Payer: COMMERCIAL

## 2024-07-18 ENCOUNTER — PATIENT MESSAGE (OUTPATIENT)
Dept: PODIATRY | Facility: CLINIC | Age: 54
End: 2024-07-18

## 2024-07-18 VITALS
SYSTOLIC BLOOD PRESSURE: 153 MMHG | HEART RATE: 89 BPM | HEIGHT: 62 IN | WEIGHT: 237.19 LBS | DIASTOLIC BLOOD PRESSURE: 87 MMHG | BODY MASS INDEX: 43.65 KG/M2

## 2024-07-18 DIAGNOSIS — Z98.890 HISTORY OF FOOT SURGERY: ICD-10-CM

## 2024-07-18 DIAGNOSIS — Z98.890 HISTORY OF FOOT SURGERY: Primary | ICD-10-CM

## 2024-07-18 DIAGNOSIS — M25.572 ACUTE LEFT ANKLE PAIN: ICD-10-CM

## 2024-07-18 PROCEDURE — 3044F HG A1C LEVEL LT 7.0%: CPT | Mod: CPTII,S$GLB,, | Performed by: PODIATRIST

## 2024-07-18 PROCEDURE — 3077F SYST BP >= 140 MM HG: CPT | Mod: CPTII,S$GLB,, | Performed by: PODIATRIST

## 2024-07-18 PROCEDURE — 73610 X-RAY EXAM OF ANKLE: CPT | Mod: TC,FY,LT

## 2024-07-18 PROCEDURE — 99999 PR PBB SHADOW E&M-EST. PATIENT-LVL IV: CPT | Mod: PBBFAC,,, | Performed by: PODIATRIST

## 2024-07-18 PROCEDURE — 99214 OFFICE O/P EST MOD 30 MIN: CPT | Mod: S$GLB,,, | Performed by: PODIATRIST

## 2024-07-18 PROCEDURE — 1160F RVW MEDS BY RX/DR IN RCRD: CPT | Mod: CPTII,S$GLB,, | Performed by: PODIATRIST

## 2024-07-18 PROCEDURE — 73630 X-RAY EXAM OF FOOT: CPT | Mod: 26,LT,, | Performed by: RADIOLOGY

## 2024-07-18 PROCEDURE — 73630 X-RAY EXAM OF FOOT: CPT | Mod: TC,FY,LT

## 2024-07-18 PROCEDURE — 3008F BODY MASS INDEX DOCD: CPT | Mod: CPTII,S$GLB,, | Performed by: PODIATRIST

## 2024-07-18 PROCEDURE — 1159F MED LIST DOCD IN RCRD: CPT | Mod: CPTII,S$GLB,, | Performed by: PODIATRIST

## 2024-07-18 PROCEDURE — 3079F DIAST BP 80-89 MM HG: CPT | Mod: CPTII,S$GLB,, | Performed by: PODIATRIST

## 2024-07-18 PROCEDURE — 73610 X-RAY EXAM OF ANKLE: CPT | Mod: 26,LT,, | Performed by: RADIOLOGY

## 2024-07-18 RX ORDER — DICLOFENAC SODIUM 10 MG/G
2 GEL TOPICAL 4 TIMES DAILY
Qty: 100 G | Refills: 2 | Status: SHIPPED | OUTPATIENT
Start: 2024-07-18

## 2024-07-18 NOTE — PROGRESS NOTES
Subjective:      Patient ID: Brittny Urias is a 53 y.o. female.    Chief Complaint: Ankle Pain (Left foot )      Patient presents with left bunion and ankle pain. She reports over a year history of left bunion pain. She had seen Dr. Gupta for surgical evaluation but already had several procedures arranged for unrelated pathologies. She returns today for re-evaluation of the bunion. For the bunion, she has had improvement in the bunion pain after she modified her shoe gear; however, over the last 2 months, she has had worsening ankle and heel pain. She denies any traumatic event but does admit to walking 3-4 miles everyday.     10/01/2020:  Follow-up from wearing it left ankle brace and MRI to left ankle.  She relates that she has been feeling better overall with wearing the brace and taking the anti-inflammatory.  She does note some occasional pain or pressure on top of the foot.    10/22/2020:  Presents today accompanied by her daughter for surgical consultation in order to proceed with the previously discussed surgery for the left foot.  No new complaints.    12/10/2020: Patient presents to clinic 1 week s/p left foot medial double arthrodesis with Lapiplasty, and open gastrocnemius resection. DOS:12/2/2020  Patient denies any pedal or calf pain. She has been complaint with nonweightbearing in tall orthopedic boot with knee scooter and crutches. Dressings to left foot clean dry intact. She is only taking celebrex and Eliquis at this point. No other complaints.     12/24/2020:  3 weeks postop.  Relates no pain.  She has been nonweightbearing with orthopedic boot in using a rolling knee walker.  No new concerns.  States she would like to return to work since she is a  and can sit down after work and that she can also for from home.    01/28/2021:  8 weeks postop.  Relates no pain to left foot.  No new complaints.  Nonweightbearing for rolling knee walker.    02/19/2021:  11 weeks postop.  Relates  no pain to left foot.  She did have a bout of throbbing pain to left foot when she initially started weight-bearing with the orthopedic boot however that has improved and the swelling has improved as well.  States she is working and that she is using her orthopedic boot.  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  She is also inquiring about returning to the gym.    4/22/2021: 4 months post op. She has completely transitioned out of the orthopedic boot and ankle brace into a regular tennis shoe. Relates to intermittent swelling and throbbing pain in the left foot and ankle with prolonged standing/walking. Rates pain as 3/10, improved with rest. She has been going to PT twice a week and is expected to finish PT at the end of this week. No new complaints.     01/20/2021:  Nearly 11 months postop from left foot medial double arthrodesis with 1st metatarsophalangeal joint arthrodesis.  Relates that she has not experienced any significant pain however she will get some aching to the left foot when she is on her feet for extended periods of time.  She purchased motion control tennis shoes which have her her feet in the beginning.  States that she felt like they are trying to hold her foot too straight.    11/28/2022: Nearly 2 years since patient received 2 medial double arthrodesis with 1st metatarsophalangeal joint arthrodesis left foot.  Relates no significant pain but she gets intermittent sharp in achy pain to the dorsal left midfoot when she attempts to do light dog.  She also injured her left 3rd and 4th toes against a coffee table a couple weeks ago and still has some residual discoloration and pain.    07/18/2024: Returns complaining of pain to left ankle for the past 2 months with acute exacerbation over the past 2 weeks.  Notes moderate aching sensation pointing to the front of the ankle with standing and walking.  No change in activity or shoe gear.  Denies any  "trauma.      Vitals:    07/18/24 1309   BP: (!) 153/87   Pulse: 89   Weight: 107.6 kg (237 lb 3.4 oz)   Height: 5' 2" (1.575 m)   PainSc:   6      Past Medical History:   Diagnosis Date    Breast cancer, right breast 11/12/2018    Cancer     Diabetes mellitus, type 2     Hypertension     Psoriasis     on biologic for 1 year, has been controlling it well       Past Surgical History:   Procedure Laterality Date    COLONOSCOPY N/A 10/10/2019    Procedure: COLONOSCOPY;  Surgeon: Matt Marin MD;  Location: [facility] ENDO;  Service: Endoscopy;  Laterality: N/A;    DILATION AND CURETTAGE OF UTERUS      FOOT ARTHRODESIS Left 12/2/2020    Procedure: FUSION, FOOT;  Surgeon: Uriah Veras DPM;  Location: [facility] OR;  Service: Podiatry;  Laterality: Left;  mini c-arm, Arthrex screws and staples, 1 MTP arthrodesis plates  Libby notified 11/23, EF  Libby w/ Arthrex confirmed 12/1/2020 [place]    HYSTERECTOMY      partial hyst for prolapse    MASTECTOMY Bilateral 11/14/2018    Procedure: MASTECTOMY;  Surgeon: Marga Cardenas MD;  Location: [facility];  Service: Plastics;  Laterality: Bilateral;    MASTECTOMY WITH SENTINEL NODE BIOPSY AND AXILLARY LYMPH NODE DISSECTION Right 11/14/2018    Procedure: SENTINEL NODE BIOPSY AND AXILLARY LYMPHADENECTOMY;  Surgeon: Marga Cardenas MD;  Location: [facility];  Service: Plastics;  Laterality: Right;    RECONSTRUCTION OF BREAST WITH DEEP INFERIOR EPIGASTRIC ARTERY  (MEGAN) FREE FLAP Bilateral 11/14/2018    Procedure: RECONSTRUCTION, BREAST, USING MEGAN FREE FLAP;  Surgeon: Tye Chapman MD;  Location: [facility];  Service: Plastics;  Laterality: Bilateral;    RESECTION OF GASTROCNEMIUS MUSCLE Left 12/2/2020    Procedure: RESECTION, MUSCLE, GASTROCNEMIUS;  Surgeon: Uriah Veras DPM;  Location: [facility] OR;  Service: Podiatry;  Laterality: Left;  video  Kj w/ video confirmed 12/1/2020       Family History   Problem Relation Name Age of Onset    Diabetes Mother      Hypertension Mother   "    No Known Problems Father      Hypertension Brother      Cancer Paternal Uncle          leukemia    Breast cancer Neg Hx      Ovarian cancer Neg Hx      Colon cancer Neg Hx         Social History     Socioeconomic History    Marital status:    Tobacco Use    Smoking status: Never    Smokeless tobacco: Never   Substance and Sexual Activity    Alcohol use: Yes     Alcohol/week: 3.0 standard drinks of alcohol     Types: 3 Glasses of wine per week     Comment: social    Drug use: No    Sexual activity: Yes     Partners: Male     Birth control/protection: Surgical     Social Determinants of Health     Financial Resource Strain: Low Risk  (7/12/2023)    Overall Financial Resource Strain (CARDIA)     Difficulty of Paying Living Expenses: Not hard at all   Food Insecurity: No Food Insecurity (7/12/2023)    Hunger Vital Sign     Worried About Running Out of Food in the Last Year: Never true     Ran Out of Food in the Last Year: Never true   Transportation Needs: No Transportation Needs (7/12/2023)    PRAPARE - Transportation     Lack of Transportation (Medical): No     Lack of Transportation (Non-Medical): No   Physical Activity: Sufficiently Active (7/12/2023)    Exercise Vital Sign     Days of Exercise per Week: 5 days     Minutes of Exercise per Session: 50 min   Recent Concern: Physical Activity - Insufficiently Active (6/21/2023)    Exercise Vital Sign     Days of Exercise per Week: 3 days     Minutes of Exercise per Session: 30 min    Received from [facility]    Stress   Housing Stability: High Risk (7/12/2023)    Housing Stability Vital Sign     Unable to Pay for Housing in the Last Year: Yes     Number of Places Lived in the Last Year: 1     Unstable Housing in the Last Year: No       Current Outpatient Medications   Medication Sig Dispense Refill    amLODIPine (NORVASC) 10 MG tablet TAKE 1 TABLET BY MOUTH EVERY DAY 90 tablet 3    anastrozole (ARIMIDEX) 1 mg Tab TAKE 1 TABLET BY MOUTH EVERY DAY 90 tablet  4    atorvastatin (LIPITOR) 40 MG tablet Take 1 tablet (40 mg total) by mouth once daily. 90 tablet 3    betamethasone dipropionate (DIPROLENE) 0.05 % cream APPLY TWICE DAILY TO ALL AFFECTED AREAS.  2    buPROPion (WELLBUTRIN XL) 150 MG TB24 tablet Take 1 tablet (150 mg total) by mouth once daily. 30 tablet 11    cholecalciferol, vitamin D3, 1,250 mcg (50,000 unit) capsule TAKE 1 CAPSULE BY MOUTH ONE TIME PER WEEK 12 capsule 3    clobetasol (CLOBEX) 0.05 % shampoo USE TOPICALLY 3 TIMES WEEKLY  3    clobetasoL (TEMOVATE) 0.05 % external solution       fluticasone propionate (CUTIVATE) 0.05 % cream Apply topically 2 (two) times daily.      losartan-hydrochlorothiazide 100-25 mg (HYZAAR) 100-25 mg per tablet TAKE 1 TABLET BY MOUTH EVERY DAY 90 tablet 3    multivitamin (THERAGRAN) per tablet Take 1 tablet by mouth once daily.      omeprazole (PRILOSEC) 40 MG capsule TAKE 1 CAPSULE BY MOUTH 30MIN BEFORE BREAKFAST OR COFFEE 90 capsule 2    ondansetron (ZOFRAN-ODT) 4 MG TbDL Take 1 tablet (4 mg total) by mouth every 8 (eight) hours as needed (nausea). 30 tablet 1    semaglutide, weight loss, (WEGOVY) 2.4 mg/0.75 mL PnIj Inject 2.4 mg into the skin every 7 days. 0.75 mL 11    SKYRIZI 150 mg/mL Syrg Inject into the skin.      sucralfate (CARAFATE) 1 gram tablet Take 1 tablet (1 g total) by mouth 3 (three) times daily as needed (upset stomach). 30 tablet 2    triamcinolone acetonide 0.1% (KENALOG) 0.1 % cream Apply topically 2 (two) times daily.      venlafaxine (EFFEXOR-XR) 150 MG Cp24 TAKE 1 CAPSULE (150 MG TOTAL) BY MOUTH ONCE DAILY. 90 capsule 3    diclofenac sodium (VOLTAREN) 1 % Gel Apply 2 g topically 4 (four) times daily. 100 g 2     Current Facility-Administered Medications   Medication Dose Route Frequency Provider Last Rate Last Admin    sodium chloride 0.9% flush 10 mL  10 mL Intravenous PRN Uriah Veras, DPM         Facility-Administered Medications Ordered in Other Visits   Medication Dose Route Frequency  Provider Last Rate Last Admin    EPINEPHrine 1,000 mcg in lactated Ringers 1,000 mL irrigation   Irrigation On Call Procedure Tye Chapman MD        EPINEPHrine 1,000 mcg in lactated Ringers 1,000 mL irrigation   Irrigation On Call Procedure Tye Chapman MD           Review of patient's allergies indicates:  No Known Allergies      Review of Systems   Constitutional: Negative for chills, decreased appetite, fever and night sweats.   HENT:  Negative for congestion and hearing loss.    Eyes:  Negative for vision loss in left eye and vision loss in right eye.   Cardiovascular:  Positive for leg swelling. Negative for chest pain, claudication, cyanosis and irregular heartbeat.   Respiratory:  Negative for cough and shortness of breath.    Skin:  Negative for color change, dry skin, itching, nail changes, poor wound healing, rash, suspicious lesions and unusual hair distribution.   Musculoskeletal:  Negative for arthritis, back pain, falls, gout, joint pain, joint swelling and muscle weakness.        Left foot pain   Gastrointestinal:  Negative for nausea and vomiting.   Neurological:  Negative for dizziness, headaches, loss of balance, numbness and weakness.   Psychiatric/Behavioral:  Negative for altered mental status. The patient is not nervous/anxious.            Objective:      Physical Exam  Constitutional:       General: She is not in acute distress.     Appearance: She is not ill-appearing.   Cardiovascular:      Pulses:           Popliteal pulses are 2+ on the right side and 2+ on the left side.        Dorsalis pedis pulses are 2+ on the right side and 2+ on the left side.      Comments: CFT brisk < 3s. Diffuse edema to the left foot with no discoloration.   Musculoskeletal:      Comments: Negative anterior drawer sign left ankle.  No pain with stress eversion or inversion left ankle.  Pain on palpation along the anterior aspect of the left ankle and extending overlying the lateral gutter especially  over the ATF region.  Localized edema left ankle region.  Moderate crepitus with left ankle range motion which is limited secondary to the previous medial double arthrodesis.    No available 1 MTP range motion left foot.  There is mild dorsal extension of the distal phalanx with some mild pain on palpation at the tip of the toe.    Reducible flexion adductovarus contracture of the 3rd and 4th toe left foot.   Skin:     General: Skin is warm.      Capillary Refill: Capillary refill takes less than 2 seconds.      Findings: No ecchymosis or erythema.      Nails: There is no clubbing.      Comments: Normal appearing scars left foot.   Neurological:      Mental Status: She is alert.      Comments: Light touch intact. Negative provocation sign to the deep peroneal, tibial, superficial peroneal, sural nerve left lower extremity                 Assessment:       Encounter Diagnoses   Name Primary?    History of foot surgery Yes    Acute left ankle pain          Plan:       Brittny was seen today for ankle pain.    Diagnoses and all orders for this visit:    History of foot surgery  -     X-Ray Foot Complete Left; Future  -     X-Ray Ankle Complete Left; Future    Acute left ankle pain  -     X-Ray Foot Complete Left; Future  -     X-Ray Ankle Complete Left; Future    Other orders  -     diclofenac sodium (VOLTAREN) 1 % Gel; Apply 2 g topically 4 (four) times daily.      I counseled the patient on her conditions, their implications and medical management.    Ordered updated weight-bearing left foot x-ray and left ankle x-ray.  We discussed today that we need to monitor for valgus deformity of the ankle secondary to the previous arthrodesis and that I am concerned about developing arthritis to her left ankle.  Patient refused oral NSAID therapy for now however we will utilize topical diclofenac up to 4 times daily as needed.  We discussed activity restrictions and modifications to help alleviate her discomfort.    We  discussed that she should avoid complete push-off on the left hallux since she has no available 1 MTP range motion in his she should wear shoe with a stiffer shank/rocker sole.      RTC within 2 weeks to re-evaluate or p.r.n. as discussed    A portion of this note was generated by voice recognition software and may contain spelling and grammar errors.

## 2024-08-01 ENCOUNTER — PATIENT MESSAGE (OUTPATIENT)
Dept: ADMINISTRATIVE | Facility: HOSPITAL | Age: 54
End: 2024-08-01
Payer: COMMERCIAL

## 2024-08-01 ENCOUNTER — TELEPHONE (OUTPATIENT)
Dept: PODIATRY | Facility: CLINIC | Age: 54
End: 2024-08-01
Payer: COMMERCIAL

## 2024-08-01 NOTE — TELEPHONE ENCOUNTER
Spoke with Ms Urias who was unable to attend her appt today with Dr Veras. Accepted new appt date and time of 8/12/24 at 8 am. No other needs voiced. Call back encouraged if needed.

## 2024-08-01 NOTE — TELEPHONE ENCOUNTER
----- Message from Awilda Hines sent at 8/1/2024  1:10 PM CDT -----  Type:  Sooner Apoointment Request    Caller is requesting a sooner appointment.  Caller declined first available appointment listed below.  Caller will not accept being placed on the waitlist and is requesting a message be sent to doctor.  Name of Caller:Pt  When is the first available appointment?9/19  Would the patient rather a call back or a response via [facility]? Evikon MCIpetey  Additional Information: Pt cannot make today's appt and is requesting one sooner than 9/19

## 2024-08-02 ENCOUNTER — PATIENT OUTREACH (OUTPATIENT)
Dept: ADMINISTRATIVE | Facility: HOSPITAL | Age: 54
End: 2024-08-02
Payer: COMMERCIAL

## 2024-08-02 DIAGNOSIS — Z12.11 ENCOUNTER FOR SCREENING FOR COLORECTAL MALIGNANT NEOPLASM: Primary | ICD-10-CM

## 2024-08-02 DIAGNOSIS — Z12.12 ENCOUNTER FOR SCREENING FOR COLORECTAL MALIGNANT NEOPLASM: Primary | ICD-10-CM

## 2024-08-02 NOTE — PROGRESS NOTES
Colonoscopy will be due soon - ambulatory referral placed and a pre-admit testing appointment was scheduled on 08/09/2024.    Non-Compliant Blood Pressure Reading - portal message sent.

## 2024-08-09 ENCOUNTER — CLINICAL SUPPORT (OUTPATIENT)
Dept: ENDOSCOPY | Facility: HOSPITAL | Age: 54
End: 2024-08-09
Attending: FAMILY MEDICINE
Payer: COMMERCIAL

## 2024-08-09 ENCOUNTER — TELEPHONE (OUTPATIENT)
Dept: ENDOSCOPY | Facility: HOSPITAL | Age: 54
End: 2024-08-09

## 2024-08-09 VITALS — WEIGHT: 237 LBS | BODY MASS INDEX: 43.61 KG/M2 | HEIGHT: 62 IN

## 2024-08-09 DIAGNOSIS — Z12.12 ENCOUNTER FOR SCREENING FOR COLORECTAL MALIGNANT NEOPLASM: ICD-10-CM

## 2024-08-09 DIAGNOSIS — Z12.11 ENCOUNTER FOR SCREENING FOR COLORECTAL MALIGNANT NEOPLASM: ICD-10-CM

## 2024-08-09 RX ORDER — POLYETHYLENE GLYCOL 3350, SODIUM SULFATE ANHYDROUS, SODIUM BICARBONATE, SODIUM CHLORIDE, POTASSIUM CHLORIDE 236; 22.74; 6.74; 5.86; 2.97 G/4L; G/4L; G/4L; G/4L; G/4L
4 POWDER, FOR SOLUTION ORAL ONCE
Qty: 4000 ML | Refills: 0 | Status: SHIPPED | OUTPATIENT
Start: 2024-08-09 | End: 2024-08-09

## 2024-08-12 ENCOUNTER — PATIENT OUTREACH (OUTPATIENT)
Dept: ADMINISTRATIVE | Facility: HOSPITAL | Age: 54
End: 2024-08-12
Payer: COMMERCIAL

## 2024-08-12 ENCOUNTER — OFFICE VISIT (OUTPATIENT)
Dept: PODIATRY | Facility: CLINIC | Age: 54
End: 2024-08-12
Payer: COMMERCIAL

## 2024-08-12 VITALS
WEIGHT: 237 LBS | HEIGHT: 62 IN | SYSTOLIC BLOOD PRESSURE: 132 MMHG | BODY MASS INDEX: 43.61 KG/M2 | HEART RATE: 70 BPM | SYSTOLIC BLOOD PRESSURE: 132 MMHG | DIASTOLIC BLOOD PRESSURE: 83 MMHG | DIASTOLIC BLOOD PRESSURE: 83 MMHG

## 2024-08-12 DIAGNOSIS — G89.29 CHRONIC PAIN OF LEFT ANKLE: ICD-10-CM

## 2024-08-12 DIAGNOSIS — M19.072 OSTEOARTHRITIS OF LEFT ANKLE, UNSPECIFIED OSTEOARTHRITIS TYPE: Primary | ICD-10-CM

## 2024-08-12 DIAGNOSIS — M25.572 CHRONIC PAIN OF LEFT ANKLE: ICD-10-CM

## 2024-08-12 DIAGNOSIS — M89.8X7 EXOSTOSIS OF BONE OF ANKLE: ICD-10-CM

## 2024-08-12 PROCEDURE — 99214 OFFICE O/P EST MOD 30 MIN: CPT | Mod: 25,S$GLB,, | Performed by: PODIATRIST

## 2024-08-12 PROCEDURE — 3079F DIAST BP 80-89 MM HG: CPT | Mod: CPTII,S$GLB,, | Performed by: PODIATRIST

## 2024-08-12 PROCEDURE — 3044F HG A1C LEVEL LT 7.0%: CPT | Mod: CPTII,S$GLB,, | Performed by: PODIATRIST

## 2024-08-12 PROCEDURE — 3075F SYST BP GE 130 - 139MM HG: CPT | Mod: CPTII,S$GLB,, | Performed by: PODIATRIST

## 2024-08-12 PROCEDURE — 1159F MED LIST DOCD IN RCRD: CPT | Mod: CPTII,S$GLB,, | Performed by: PODIATRIST

## 2024-08-12 PROCEDURE — 99999 PR PBB SHADOW E&M-EST. PATIENT-LVL IV: CPT | Mod: PBBFAC,,, | Performed by: PODIATRIST

## 2024-08-12 PROCEDURE — 3008F BODY MASS INDEX DOCD: CPT | Mod: CPTII,S$GLB,, | Performed by: PODIATRIST

## 2024-08-12 PROCEDURE — 20605 DRAIN/INJ JOINT/BURSA W/O US: CPT | Mod: LT,S$GLB,, | Performed by: PODIATRIST

## 2024-08-12 RX ORDER — POLYETHYLENE GLYCOL-3350 AND ELECTROLYTES 236; 6.74; 5.86; 2.97; 22.74 G/274.31G; G/274.31G; G/274.31G; G/274.31G; G/274.31G
POWDER, FOR SOLUTION ORAL
COMMUNITY
Start: 2024-08-09

## 2024-08-12 RX ORDER — DEXAMETHASONE SODIUM PHOSPHATE 4 MG/ML
4 INJECTION, SOLUTION INTRA-ARTICULAR; INTRALESIONAL; INTRAMUSCULAR; INTRAVENOUS; SOFT TISSUE
Status: COMPLETED | OUTPATIENT
Start: 2024-08-12 | End: 2024-08-12

## 2024-08-12 RX ADMIN — DEXAMETHASONE SODIUM PHOSPHATE 4 MG: 4 INJECTION, SOLUTION INTRA-ARTICULAR; INTRALESIONAL; INTRAMUSCULAR; INTRAVENOUS; SOFT TISSUE at 08:08

## 2024-08-12 NOTE — PROGRESS NOTES
Cailin Putnam is an 16 year old female with persistent hip pain.    Past Medical History:   Diagnosis Date    Depression with anxiety      Past Surgical History:   Procedure Laterality Date    Create eardrum opening,local anesth      Create eardrum opening,local anesth      No past surgeries       Family History   Problem Relation Age of Onset    Substance Abuse Mother     ADHD/ADD Father     Other Paternal Grandmother         ITP, polycythemia vera, stent in heart at age 50    Systemic Lupus Erythematosus Paternal Grandmother         Raynauds disease    Hyperlipidemia Paternal Grandmother     Mitral valve prolapse Paternal Grandmother     Bipolar disorder Paternal Grandfather     Other Other         Raynaud's disease on paternal side    Other Paternal Uncle         Von Willibrand's disease, thalassemia minor     Social History     Tobacco Use    Smoking status: Never    Smokeless tobacco: Never   Substance Use Topics    Alcohol use: Never       Prior to Admission Meds:  Medications Prior to Admission   Medication Sig Dispense Refill    indomethacin (INDOCIN SR) 75 MG CR capsule [None received]      ondansetron (ZOFRAN ODT) 8 MG disintegrating tablet [None received]      Multiple Vitamins-Minerals (MULTI ADULT GUMMIES PO)       Isibloom 0.15-30 MG-MCG per tablet Take 1 tablet by mouth daily.      benzonatate (TESSALON PERLES) 100 MG capsule Take 1 capsule by mouth 3 times daily as needed for Cough. (Patient not taking: Reported on 2/5/2024) 20 capsule 0    albuterol 108 (90 Base) MCG/ACT inhaler Inhale 2 puffs into the lungs every 4 hours as needed for Shortness of Breath or Wheezing. 1 each 1    azithromycin (Zithromax Z-Martell) 250 MG tablet Take 2 tablets (500 mg) by mouth x 1 day then 1 tablet (250 mg) by mouth daily x 4 days. (Patient not taking: Reported on 2/5/2024) 6 tablet 0    fluticasone (FLONASE) 50 MCG/ACT nasal spray Spray 1 spray in each nostril daily. (Patient not taking: Reported on 2/5/2024) 1 each 0  Subjective:      Patient ID: Brittny Urias is a 53 y.o. female.    Chief Complaint: Ankle Pain (Follow up left ankle pain)      Patient presents with left bunion and ankle pain. She reports over a year history of left bunion pain. She had seen Dr. Gupta for surgical evaluation but already had several procedures arranged for unrelated pathologies. She returns today for re-evaluation of the bunion. For the bunion, she has had improvement in the bunion pain after she modified her shoe gear; however, over the last 2 months, she has had worsening ankle and heel pain. She denies any traumatic event but does admit to walking 3-4 miles everyday.     10/01/2020:  Follow-up from wearing it left ankle brace and MRI to left ankle.  She relates that she has been feeling better overall with wearing the brace and taking the anti-inflammatory.  She does note some occasional pain or pressure on top of the foot.    10/22/2020:  Presents today accompanied by her daughter for surgical consultation in order to proceed with the previously discussed surgery for the left foot.  No new complaints.    12/10/2020: Patient presents to clinic 1 week s/p left foot medial double arthrodesis with Lapiplasty, and open gastrocnemius resection. DOS:12/2/2020  Patient denies any pedal or calf pain. She has been complaint with nonweightbearing in tall orthopedic boot with knee scooter and crutches. Dressings to left foot clean dry intact. She is only taking celebrex and Eliquis at this point. No other complaints.     12/24/2020:  3 weeks postop.  Relates no pain.  She has been nonweightbearing with orthopedic boot in using a rolling knee walker.  No new concerns.  States she would like to return to work since she is a  and can sit down after work and that she can also for from home.    01/28/2021:  8 weeks postop.  Relates no pain to left foot.  No new complaints.  Nonweightbearing for rolling knee walker.    02/19/2021:  11 weeks      Scheduled Meds:  Current Facility-Administered Medications   Medication Dose Route Frequency Provider Last Rate Last Admin    bupivacaine liposome (EXPAREL) 1.3 % injection 10 mg  10 mg Infiltration Once Clay Moya MD        bupivacaine PF (SENSORCAINE-MPF) 0.5 % (PF) injection 75 mg  15 mL Infiltration Once Clay Moya MD        ceFAZolin (ANCEF) syringe 2,000 mg  2,000 mg Intravenous Once Fernandez Colbert MD        BUPIVACAINE HCL (PF) 0.5 % IJ SOLN Pyxis Override             FENTANYL CITRATE 0.05 MG/ML IJ SOLN (WRAPPED) Pyxis Override             MIDAZOLAM HCL (PF) 2 MG/2ML IJ SOLN Pyxis Override             DEXAMETHASONE SOD PHOSPHATE PF 10 MG/ML IJ SOLN Pyxis Override              Continuous Infusions:  Current Facility-Administered Medications   Medication Dose Route Frequency Provider Last Rate Last Admin    lactated ringers infusion   Intravenous Continuous Fernandez Colbert  mL/hr at 02/07/24 0638 New Bag at 02/07/24 0638     PRN Meds:  Current Facility-Administered Medications   Medication Dose Route Frequency Provider Last Rate Last Admin    BUPIVACAINE HCL (PF) 0.5 % IJ SOLN Pyxis Override             FENTANYL CITRATE 0.05 MG/ML IJ SOLN (WRAPPED) Pyxis Override             MIDAZOLAM HCL (PF) 2 MG/2ML IJ SOLN Pyxis Override             DEXAMETHASONE SOD PHOSPHATE PF 10 MG/ML IJ SOLN Pyxis Override                Allergies:   ALLERGIES:   Allergen Reactions    Amoxicillin Other (See Comments)     Red spots  ANESTHESIA REVIEWED 1/31/24-MINOR RISK    Penicillins HIVES     ANESTHESIA REVIEWED 1/31/24-MINOR RISK       Active Problems:    * No active hospital problems. *    Blood pressure 110/70, pulse 76, temperature 98.2 °F (36.8 °C), resp. rate 17, height 5' 1.81\" (1.57 m), weight 65.6 kg (144 lb 10 oz), last menstrual period 01/31/2024, SpO2 100 %.    Review of Systems     Physical Exam  Alert NAD  NVI distally  Nonlabored respirations  Pos FADIR/SUNSHINE  Assessment:  Hip labral  postop.  Relates no pain to left foot.  She did have a bout of throbbing pain to left foot when she initially started weight-bearing with the orthopedic boot however that has improved and the swelling has improved as well.  States she is working and that she is using her orthopedic boot.  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  She is also inquiring about returning to the gym.    4/22/2021: 4 months post op. She has completely transitioned out of the orthopedic boot and ankle brace into a regular tennis shoe. Relates to intermittent swelling and throbbing pain in the left foot and ankle with prolonged standing/walking. Rates pain as 3/10, improved with rest. She has been going to PT twice a week and is expected to finish PT at the end of this week. No new complaints.     01/20/2021:  Nearly 11 months postop from left foot medial double arthrodesis with 1st metatarsophalangeal joint arthrodesis.  Relates that she has not experienced any significant pain however she will get some aching to the left foot when she is on her feet for extended periods of time.  She purchased motion control tennis shoes which have her her feet in the beginning.  States that she felt like they are trying to hold her foot too straight.    11/28/2022: Nearly 2 years since patient received 2 medial double arthrodesis with 1st metatarsophalangeal joint arthrodesis left foot.  Relates no significant pain but she gets intermittent sharp in achy pain to the dorsal left midfoot when she attempts to do light dog.  She also injured her left 3rd and 4th toes against a coffee table a couple weeks ago and still has some residual discoloration and pain.    07/18/2024: Returns complaining of pain to left ankle for the past 2 months with acute exacerbation over the past 2 weeks.  Notes moderate aching sensation pointing to the front of the ankle with standing and walking.  No change in activity or shoe gear.  " Denies any trauma.    08/12/2024: Follow-up from left foot and ankle x-ray imaging to ascertain the extent of her left ankle pain.  States no significant improvement in her pain since last visit.  Utilize topical diclofenac gel.  Works as a  and performance mostly administrative work however does stand and walk around throughout the day.  Pain aggravated by increased standing and walking.  No significant pain at rest.      Vitals:    08/12/24 0818   BP: 132/83   Pulse: 70   Weight: 107.5 kg (237 lb)   Height: 5' 2" (1.575 m)   PainSc:   4   PainLoc: Ankle      Past Medical History:   Diagnosis Date    Breast cancer, right breast 11/12/2018    Cancer     Diabetes mellitus, type 2     Hypertension     Psoriasis     on biologic for 1 year, has been controlling it well       Past Surgical History:   Procedure Laterality Date    COLONOSCOPY N/A 10/10/2019    Procedure: COLONOSCOPY;  Surgeon: Matt Marin MD;  Location: [facility] ENDO;  Service: Endoscopy;  Laterality: N/A;    DILATION AND CURETTAGE OF UTERUS      FOOT ARTHRODESIS Left 12/2/2020    Procedure: FUSION, FOOT;  Surgeon: Uriah Veras DPM;  Location: [facility] OR;  Service: Podiatry;  Laterality: Left;  mini c-arm, Arthrex screws and staples, 1 MTP arthrodesis plates  Libby notified 11/23, EF  Libby w/ Arthrex confirmed 12/1/2020 [place]    HYSTERECTOMY      partial hyst for prolapse    MASTECTOMY Bilateral 11/14/2018    Procedure: MASTECTOMY;  Surgeon: Marga Cardenas MD;  Location: [facility] [place] OR;  Service: Plastics;  Laterality: Bilateral;    MASTECTOMY WITH SENTINEL NODE BIOPSY AND AXILLARY LYMPH NODE DISSECTION Right 11/14/2018    Procedure: SENTINEL NODE BIOPSY AND AXILLARY LYMPHADENECTOMY;  Surgeon: Marga Cardenas MD;  Location: [facility] [place] OR;  Service: Plastics;  Laterality: Right;    RECONSTRUCTION OF BREAST WITH DEEP INFERIOR EPIGASTRIC ARTERY  (MEGAN) FREE FLAP Bilateral 11/14/2018    Procedure: RECONSTRUCTION, BREAST, USING MEGAN FREE FLAP;  " tear/DHRUV    Plan:  Hip labral repair    Fernandez Colbert MD  2/7/2024   Surgeon: Tye Chapman MD;  Location: [facility] [place] OR;  Service: Plastics;  Laterality: Bilateral;    RESECTION OF GASTROCNEMIUS MUSCLE Left 12/2/2020    Procedure: RESECTION, MUSCLE, GASTROCNEMIUS;  Surgeon: Uriah Veras DPM;  Location: [facility] OR;  Service: Podiatry;  Laterality: Left;  video  Kj w/ video confirmed 12/1/2020       Family History   Problem Relation Name Age of Onset    Diabetes Mother      Hypertension Mother      No Known Problems Father      Hypertension Brother      Cancer Paternal Uncle          leukemia    Breast cancer Neg Hx      Ovarian cancer Neg Hx      Colon cancer Neg Hx         Social History     Socioeconomic History    Marital status:    Tobacco Use    Smoking status: Never    Smokeless tobacco: Never   Substance and Sexual Activity    Alcohol use: Yes     Alcohol/week: 3.0 standard drinks of alcohol     Types: 3 Glasses of wine per week     Comment: social    Drug use: No    Sexual activity: Yes     Partners: Male     Birth control/protection: Surgical     Social Determinants of Health     Financial Resource Strain: Low Risk  (7/12/2023)    Overall Financial Resource Strain (CARDIA)     Difficulty of Paying Living Expenses: Not hard at all   Food Insecurity: No Food Insecurity (7/12/2023)    Hunger Vital Sign     Worried About Running Out of Food in the Last Year: Never true     Ran Out of Food in the Last Year: Never true   Transportation Needs: No Transportation Needs (7/12/2023)    PRAPARE - Transportation     Lack of Transportation (Medical): No     Lack of Transportation (Non-Medical): No   Physical Activity: Sufficiently Active (7/12/2023)    Exercise Vital Sign     Days of Exercise per Week: 5 days     Minutes of Exercise per Session: 50 min   Recent Concern: Physical Activity - Insufficiently Active (6/21/2023)    Exercise Vital Sign     Days of Exercise per Week: 3 days     Minutes of Exercise per Session: 30 min    Received from [facility]  Stability: High Risk (7/12/2023)    Housing Stability Vital Sign     Unable to Pay for Housing in the Last Year: Yes     Number of Places Lived in the Last Year: 1     Unstable Housing in the Last Year: No       Current Outpatient Medications   Medication Sig Dispense Refill    amLODIPine (NORVASC) 10 MG tablet TAKE 1 TABLET BY MOUTH EVERY DAY 90 tablet 3    anastrozole (ARIMIDEX) 1 mg Tab TAKE 1 TABLET BY MOUTH EVERY DAY 90 tablet 4    atorvastatin (LIPITOR) 40 MG tablet Take 1 tablet (40 mg total) by mouth once daily. 90 tablet 3    betamethasone dipropionate (DIPROLENE) 0.05 % cream APPLY TWICE DAILY TO ALL AFFECTED AREAS.  2    buPROPion (WELLBUTRIN XL) 150 MG TB24 tablet Take 1 tablet (150 mg total) by mouth once daily. 30 tablet 11    cholecalciferol, vitamin D3, 1,250 mcg (50,000 unit) capsule TAKE 1 CAPSULE BY MOUTH ONE TIME PER WEEK 12 capsule 3    clobetasol (CLOBEX) 0.05 % shampoo USE TOPICALLY 3 TIMES WEEKLY  3    clobetasoL (TEMOVATE) 0.05 % external solution       diclofenac sodium (VOLTAREN) 1 % Gel Apply 2 g topically 4 (four) times daily. 100 g 2    fluticasone propionate (CUTIVATE) 0.05 % cream Apply topically 2 (two) times daily.      losartan-hydrochlorothiazide 100-25 mg (HYZAAR) 100-25 mg per tablet TAKE 1 TABLET BY MOUTH EVERY DAY 90 tablet 3    multivitamin (THERAGRAN) per tablet Take 1 tablet by mouth once daily.      omeprazole (PRILOSEC) 40 MG capsule TAKE 1 CAPSULE BY MOUTH 30MIN BEFORE BREAKFAST OR COFFEE 90 capsule 2    ondansetron (ZOFRAN-ODT) 4 MG TbDL Take 1 tablet (4 mg total) by mouth every 8 (eight) hours as needed (nausea). 30 tablet 1    semaglutide, weight loss, (WEGOVY) 2.4 mg/0.75 mL PnIj Inject 2.4 mg into the skin every 7 days. 0.75 mL 11    SKYRIZI 150 mg/mL Syrg Inject into the skin.      sucralfate (CARAFATE) 1 gram tablet Take 1 tablet (1 g total) by mouth 3 (three) times daily as needed (upset stomach). 30 tablet 2    triamcinolone acetonide 0.1% (KENALOG) 0.1 % cream  Apply topically 2 (two) times daily.      venlafaxine (EFFEXOR-XR) 150 MG Cp24 TAKE 1 CAPSULE (150 MG TOTAL) BY MOUTH ONCE DAILY. 90 capsule 3    GAVILYTE-G 236-22.74-6.74 -5.86 gram suspension SMARTSIG:Milliliter(s) By Mouth       Current Facility-Administered Medications   Medication Dose Route Frequency Provider Last Rate Last Admin    sodium chloride 0.9% flush 10 mL  10 mL Intravenous PRN Uriah Veras DPM         Facility-Administered Medications Ordered in Other Visits   Medication Dose Route Frequency Provider Last Rate Last Admin    EPINEPHrine 1,000 mcg in lactated Ringers 1,000 mL irrigation   Irrigation On Call Procedure Tye Chapman MD        EPINEPHrine 1,000 mcg in lactated Ringers 1,000 mL irrigation   Irrigation On Call Procedure Tye Chapman MD           Review of patient's allergies indicates:  No Known Allergies      Review of Systems   Constitutional: Negative for chills, decreased appetite, fever and night sweats.   HENT:  Negative for congestion and hearing loss.    Eyes:  Negative for vision loss in left eye and vision loss in right eye.   Cardiovascular:  Positive for leg swelling. Negative for chest pain, claudication, cyanosis and irregular heartbeat.   Respiratory:  Negative for cough and shortness of breath.    Skin:  Negative for color change, dry skin, itching, nail changes, poor wound healing, rash, suspicious lesions and unusual hair distribution.   Musculoskeletal:  Negative for arthritis, back pain, falls, gout, joint pain, joint swelling and muscle weakness.        Left foot pain   Gastrointestinal:  Negative for nausea and vomiting.   Neurological:  Negative for dizziness, headaches, loss of balance, numbness and weakness.   Psychiatric/Behavioral:  Negative for altered mental status. The patient is not nervous/anxious.            Objective:      Physical Exam  Constitutional:       General: She is not in acute distress.     Appearance: She is not  ill-appearing.   Cardiovascular:      Pulses:           Popliteal pulses are 2+ on the right side and 2+ on the left side.        Dorsalis pedis pulses are 2+ on the right side and 2+ on the left side.      Comments: CFT brisk < 3s. Diffuse edema to the left foot with no discoloration.   Musculoskeletal:      Comments: Negative anterior drawer sign left ankle.  No pain with stress eversion or inversion left ankle.  Pain on palpation along the anterior aspect of the left ankle and extending overlying the lateral gutter especially over the ATF region.  Localized edema left ankle region.  Moderate crepitus with left ankle range motion which is limited secondary to the previous medial double arthrodesis.    No available 1 MTP range motion left foot.  There is mild dorsal extension of the distal phalanx with some mild pain on palpation at the tip of the toe.    Reducible flexion adductovarus contracture of the 3rd and 4th toe left foot.   Skin:     General: Skin is warm.      Capillary Refill: Capillary refill takes less than 2 seconds.      Findings: No ecchymosis or erythema.      Nails: There is no clubbing.      Comments: Normal appearing scars left foot.   Neurological:      Mental Status: She is alert.      Comments: Light touch intact. Negative provocation sign to the deep peroneal, tibial, superficial peroneal, sural nerve left lower extremity                 Assessment:       Encounter Diagnoses   Name Primary?    Osteoarthritis of left ankle, unspecified osteoarthritis type Yes    Exostosis of bone of ankle     Chronic pain of left ankle          Plan:       Brittny was seen today for ankle pain.    Diagnoses and all orders for this visit:    Osteoarthritis of left ankle, unspecified osteoarthritis type  -     CT Ankle (Including Hindfoot) Without Contrast Left; Future    Exostosis of bone of ankle  -     CT Ankle (Including Hindfoot) Without Contrast Left; Future    Chronic pain of left ankle  -     CT Ankle  (Including Hindfoot) Without Contrast Left; Future    Other orders  -     dexAMETHasone injection 4 mg      I counseled the patient on her conditions, their implications and medical management.    Evaluated left foot and ankle x-ray in detail with the patient.  Note moderate sized osteophyte formation at the distal anterior aspect of the left ankle extending along the anterior lateral ankle with some mild joint space narrowing and increased subchondral sclerosis of the distal tibia noted on the AP projection and oblique projection of the ankle.  Overall no significant valgus or varus tilt to the talus on weight-bearing left ankle x-ray.  There is some mild arthrosis within the navicular cuneiform joint of the left foot x-ray.    We discussed conservative care such as the activity restrictions we discussed previously in addition to a steroid injection to left ankle to determine how much relief she can get.  We discussed that if she gets a good amount of relief, however if it is brief then we could proceed with arthroscopic debridement of the left ankle to remove the exostosis and perform synovectomy to help alleviate her symptoms.  I did mention that she has some progressive arthritis and that this would not help reverse the changes but it would be provided for symptom relief.  In addition we discussed ordering a CT scan to ascertain if there is other pathology potentially affecting her ankle such as a subchondral cyst or osteochondral defect.    With the patient's verbal consent the skin was prepped along the anterior lateral aspect of the left ankle for alcohol followed by anesthesia with ethyl chloride.  Injected a mixture containing 4 mg of Decadron with 2 mL 0.25% plain Marcaine directly into left ankle via the anterior lateral portal.  She tolerated procedure well without apparent complication.  Instructed to rest, ice and elevate p.r.n..    RTC within 1 month to re-evaluate or p.r.n. as discussed    A portion  of this note was generated by voice recognition software and may contain spelling and grammar errors.

## 2024-08-12 NOTE — PROGRESS NOTES
Incoming blood pressure reading of 132/83 taken on 08/12/2024 received from the patient via the [facility] portal. BP recorded.

## 2024-08-23 ENCOUNTER — HOSPITAL ENCOUNTER (OUTPATIENT)
Dept: RADIOLOGY | Facility: HOSPITAL | Age: 54
Discharge: HOME | End: 2024-08-23
Attending: PODIATRIST
Payer: COMMERCIAL

## 2024-08-23 DIAGNOSIS — G89.29 CHRONIC PAIN OF LEFT ANKLE: ICD-10-CM

## 2024-08-23 DIAGNOSIS — M25.572 CHRONIC PAIN OF LEFT ANKLE: ICD-10-CM

## 2024-08-23 DIAGNOSIS — M19.072 OSTEOARTHRITIS OF LEFT ANKLE, UNSPECIFIED OSTEOARTHRITIS TYPE: ICD-10-CM

## 2024-08-23 DIAGNOSIS — M89.8X7 EXOSTOSIS OF BONE OF ANKLE: ICD-10-CM

## 2024-08-23 PROCEDURE — 73700 CT LOWER EXTREMITY W/O DYE: CPT | Mod: TC,PN,LT

## 2024-08-23 PROCEDURE — 73700 CT LOWER EXTREMITY W/O DYE: CPT | Mod: 26,LT,, | Performed by: RADIOLOGY

## 2024-09-03 DIAGNOSIS — I10 ESSENTIAL HYPERTENSION: ICD-10-CM

## 2024-09-03 RX ORDER — AMLODIPINE BESYLATE 10 MG/1
TABLET ORAL
Qty: 90 TABLET | Refills: 3 | Status: SHIPPED | OUTPATIENT
Start: 2024-09-03

## 2024-09-03 NOTE — TELEPHONE ENCOUNTER
Refill Decision Note   Brittny Urias  is requesting a refill authorization.  Brief Assessment and Rationale for Refill:  Approve     Medication Therapy Plan:        Comments:     Note composed:7:47 AM 09/03/2024

## 2024-09-03 NOTE — TELEPHONE ENCOUNTER
Care Due:                  Date            Visit Type   Department     Provider  --------------------------------------------------------------------------------                                MYCHART                              ANNUAL       [facility]                              CHECKUP/PHY  MED/ INTERNAL  Last Visit: 07-      S            MED/ PEDS      Anya Stevens                              EP -         [facility]                              PRIMARY      MED/ INTERNAL  Next Visit: 01-      CARE ([facility])   MED/ PEDS      Anya Stevens                                                            Last  Test          Frequency    Reason                     Performed    Due Date  --------------------------------------------------------------------------------    Vitamin D...  12 months..  cholecalciferol,.........  06- 06-    Health [facility] Embedded Care Due Messages. Reference number: [id number].   9/03/2024 12:04:20 AM CDT

## 2024-09-08 DIAGNOSIS — E55.9 VITAMIN D DEFICIENCY: ICD-10-CM

## 2024-09-09 RX ORDER — ASPIRIN 325 MG
50000 TABLET, DELAYED RELEASE (ENTERIC COATED) ORAL
Qty: 12 CAPSULE | Refills: 3 | Status: SHIPPED | OUTPATIENT
Start: 2024-09-09

## 2024-09-09 NOTE — TELEPHONE ENCOUNTER
No care due was identified.  Health [facility] Embedded Care Due Messages. Reference number: [id number].   9/08/2024 10:08:26 PM CDT

## 2024-09-09 NOTE — TELEPHONE ENCOUNTER
Refill Routing Note   Medication(s) are not appropriate for processing by [facility] for the following reason(s):        Outside of protocol    [facility] action(s):  Route               Appointments  past 12m or future 3m with PCP    Date Provider   Last Visit   7/8/2024 Anya Stevens, DO   Next Visit   1/9/2025 Anya Stevens, DO   ED visits in past 90 days: 0        Note composed:12:06 PM 09/09/2024

## 2024-09-18 ENCOUNTER — TELEPHONE (OUTPATIENT)
Dept: ENDOSCOPY | Facility: HOSPITAL | Age: 54
End: 2024-09-18
Payer: COMMERCIAL

## 2024-09-18 NOTE — TELEPHONE ENCOUNTER
Contacted Pt for Endoscopy precall to confirm scheduled procedure(s) Colonoscopy on 9/24/24. Pt did not answer. Left voicemail for pt to call Endoscopy Scheduling to confirm.

## 2024-09-23 ENCOUNTER — TELEPHONE (OUTPATIENT)
Dept: PODIATRY | Facility: CLINIC | Age: 54
End: 2024-09-23
Payer: COMMERCIAL

## 2024-09-23 ENCOUNTER — TELEPHONE (OUTPATIENT)
Dept: ENDOSCOPY | Facility: HOSPITAL | Age: 54
End: 2024-09-23
Payer: COMMERCIAL

## 2024-09-23 NOTE — TELEPHONE ENCOUNTER
Spoke to pt for pre-call to confirm scheduled Colonoscopy and patient verbalized understanding of the following:       Date of Procedure (s)  verified 9/24/24  Arrival Time 11:00 AM verified.  Location of Procedure(s) [address]  verified.  NPO status reinforced. Ok to continue clear liquids up until 4 hours prior to the Endoscopy procedure.   Confirmed Pt is currently taking Wegovy/Ozempic (Semaglutide), Pt instructed to stop stop taking Wegovy/Ozempic (Semaglutide) on 9/16/24.     Denies blood thinners.  Pt confirmed receipt of prep instructions and Rx prep (if applicable).  Instructions provided to patient via [facility]  Pt confirmed ride home after procedure if procedure requires anesthesia.   Pre-call screening questionnaire reviewed and completed with patient.   Appointment details are tentative, including check-in time.  If the patient begins taking any blood thinning medications, injectable weight loss/diabetes medications (other than insulin), or Adipex (phentermine) patient was instructed to contact the endoscopy scheduling department as soon as possible.  Patient was advised to call the endoscopy scheduling department if any questions or concerns arise.       [facility] Endoscopy Scheduling Department

## 2024-09-23 NOTE — TELEPHONE ENCOUNTER
Spoke with Ms Urias to assist her with rescheduling her appointment with Dr Veras. Accepted new appt date and time of 10/14/24 at 3 pm with no other needs voiced at this time. Call back encouraged if needed.

## 2024-09-23 NOTE — TELEPHONE ENCOUNTER
----- Message from Tristan Little sent at 9/23/2024  9:28 AM CDT -----  .Type:  Needs Medical Advice    Who Called: pt    Would the patient rather a call back or a response via [facility]? Call back   Best Call Back Number: [phone]  Additional Information:     Pt stated she would like a call back to reschedule her appt for sooner than November

## 2025-01-06 ENCOUNTER — LAB VISIT (OUTPATIENT)
Dept: LAB | Facility: HOSPITAL | Age: 55
End: 2025-01-06
Attending: FAMILY MEDICINE
Payer: COMMERCIAL

## 2025-01-06 DIAGNOSIS — E78.5 DYSLIPIDEMIA: ICD-10-CM

## 2025-01-06 DIAGNOSIS — R73.03 PRE-DIABETES: ICD-10-CM

## 2025-01-06 LAB
ALBUMIN SERPL BCP-MCNC: 4.3 G/DL (ref 3.5–5.2)
ALP SERPL-CCNC: 64 U/L (ref 38–126)
ALT SERPL W/O P-5'-P-CCNC: 26 U/L (ref 10–44)
ANION GAP SERPL CALC-SCNC: 6 MMOL/L (ref 8–16)
AST SERPL-CCNC: 30 U/L (ref 15–46)
BASOPHILS # BLD AUTO: 0.05 K/UL (ref 0–0.2)
BASOPHILS NFR BLD: 1 % (ref 0–1.9)
BILIRUB SERPL-MCNC: 0.6 MG/DL (ref 0.1–1)
CALCIUM SERPL-MCNC: 10.1 MG/DL (ref 8.7–10.5)
CHLORIDE SERPL-SCNC: 103 MMOL/L (ref 95–110)
CHOLEST SERPL-MCNC: 213 MG/DL (ref 120–199)
CHOLEST/HDLC SERPL: 2.9 {RATIO} (ref 2–5)
CO2 SERPL-SCNC: 29 MMOL/L (ref 23–29)
CREAT SERPL-MCNC: 0.75 MG/DL (ref 0.5–1.4)
DIFFERENTIAL METHOD BLD: ABNORMAL
EOSINOPHIL # BLD AUTO: 0.1 K/UL (ref 0–0.5)
EOSINOPHIL NFR BLD: 1.6 % (ref 0–8)
ERYTHROCYTE [DISTWIDTH] IN BLOOD BY AUTOMATED COUNT: 17.8 % (ref 11.5–14.5)
EST. GFR  (NO RACE VARIABLE): >60 ML/MIN/1.73 M^2
ESTIMATED AVG GLUCOSE: 117 MG/DL (ref 68–131)
GLUCOSE SERPL-MCNC: 94 MG/DL (ref 70–110)
HBA1C MFR BLD: 5.7 % (ref 4–5.6)
HCT VFR BLD AUTO: 40.9 % (ref 37–48.5)
HDLC SERPL-MCNC: 73 MG/DL (ref 40–75)
HDLC SERPL: 34.3 % (ref 20–50)
HGB BLD-MCNC: 13 G/DL (ref 12–16)
IMM GRANULOCYTES # BLD AUTO: 0.01 K/UL (ref 0–0.04)
IMM GRANULOCYTES NFR BLD AUTO: 0.2 % (ref 0–0.5)
LDLC SERPL CALC-MCNC: 127.2 MG/DL (ref 63–159)
LYMPHOCYTES # BLD AUTO: 1.7 K/UL (ref 1–4.8)
LYMPHOCYTES NFR BLD: 33.7 % (ref 18–48)
MCH RBC QN AUTO: 22.9 PG (ref 27–31)
MCHC RBC AUTO-ENTMCNC: 31.8 G/DL (ref 32–36)
MCV RBC AUTO: 72 FL (ref 82–98)
MONOCYTES # BLD AUTO: 0.5 K/UL (ref 0.3–1)
MONOCYTES NFR BLD: 10.5 % (ref 4–15)
NEUTROPHILS # BLD AUTO: 2.6 K/UL (ref 1.8–7.7)
NEUTROPHILS NFR BLD: 53 % (ref 38–73)
NONHDLC SERPL-MCNC: 140 MG/DL
NRBC BLD-RTO: 0 /100 WBC
PLATELET # BLD AUTO: 344 K/UL (ref 150–450)
PMV BLD AUTO: 10.9 FL (ref 9.2–12.9)
POTASSIUM SERPL-SCNC: 4.6 MMOL/L (ref 3.5–5.1)
PROT SERPL-MCNC: 7.3 G/DL (ref 6–8.4)
RBC # BLD AUTO: 5.67 M/UL (ref 4–5.4)
SODIUM SERPL-SCNC: 138 MMOL/L (ref 136–145)
TRIGL SERPL-MCNC: 64 MG/DL (ref 30–150)
UUN UR-MCNC: 15 MG/DL (ref 7–17)
WBC # BLD AUTO: 4.95 K/UL (ref 3.9–12.7)

## 2025-01-06 PROCEDURE — 85025 COMPLETE CBC W/AUTO DIFF WBC: CPT | Mod: PN | Performed by: FAMILY MEDICINE

## 2025-01-06 PROCEDURE — 83036 HEMOGLOBIN GLYCOSYLATED A1C: CPT | Performed by: FAMILY MEDICINE

## 2025-01-06 PROCEDURE — 80061 LIPID PANEL: CPT | Performed by: FAMILY MEDICINE

## 2025-01-06 PROCEDURE — 80053 COMPREHEN METABOLIC PANEL: CPT | Mod: PN | Performed by: FAMILY MEDICINE

## 2025-01-09 ENCOUNTER — OFFICE VISIT (OUTPATIENT)
Dept: FAMILY MEDICINE | Facility: CLINIC | Age: 55
End: 2025-01-09
Payer: COMMERCIAL

## 2025-01-09 VITALS
BODY MASS INDEX: 36.76 KG/M2 | TEMPERATURE: 98 F | HEART RATE: 70 BPM | DIASTOLIC BLOOD PRESSURE: 76 MMHG | OXYGEN SATURATION: 97 % | HEIGHT: 62 IN | WEIGHT: 199.75 LBS | SYSTOLIC BLOOD PRESSURE: 132 MMHG

## 2025-01-09 DIAGNOSIS — E66.812 CLASS 2 SEVERE OBESITY DUE TO EXCESS CALORIES WITH SERIOUS COMORBIDITY AND BODY MASS INDEX (BMI) OF 36.0 TO 36.9 IN ADULT: ICD-10-CM

## 2025-01-09 DIAGNOSIS — Z23 NEED FOR COVID-19 VACCINE: ICD-10-CM

## 2025-01-09 DIAGNOSIS — I10 ESSENTIAL HYPERTENSION: Primary | ICD-10-CM

## 2025-01-09 DIAGNOSIS — F33.0 MILD EPISODE OF RECURRENT MAJOR DEPRESSIVE DISORDER: ICD-10-CM

## 2025-01-09 DIAGNOSIS — E66.01 CLASS 2 SEVERE OBESITY DUE TO EXCESS CALORIES WITH SERIOUS COMORBIDITY AND BODY MASS INDEX (BMI) OF 36.0 TO 36.9 IN ADULT: ICD-10-CM

## 2025-01-09 DIAGNOSIS — E78.5 HYPERLIPIDEMIA, UNSPECIFIED HYPERLIPIDEMIA TYPE: ICD-10-CM

## 2025-01-09 DIAGNOSIS — C50.111 MALIGNANT NEOPLASM OF CENTRAL PORTION OF RIGHT BREAST IN FEMALE, ESTROGEN RECEPTOR POSITIVE: ICD-10-CM

## 2025-01-09 DIAGNOSIS — K21.9 GASTROESOPHAGEAL REFLUX DISEASE, UNSPECIFIED WHETHER ESOPHAGITIS PRESENT: ICD-10-CM

## 2025-01-09 DIAGNOSIS — R73.03 PRE-DIABETES: ICD-10-CM

## 2025-01-09 DIAGNOSIS — Z17.0 MALIGNANT NEOPLASM OF CENTRAL PORTION OF RIGHT BREAST IN FEMALE, ESTROGEN RECEPTOR POSITIVE: ICD-10-CM

## 2025-01-09 PROBLEM — E66.813 CLASS 3 SEVERE OBESITY DUE TO EXCESS CALORIES WITH SERIOUS COMORBIDITY AND BODY MASS INDEX (BMI) OF 40.0 TO 44.9 IN ADULT: Status: RESOLVED | Noted: 2023-06-22 | Resolved: 2025-01-09

## 2025-01-09 PROCEDURE — 91320 SARSCV2 VAC 30MCG TRS-SUC IM: CPT | Mod: S$GLB,,, | Performed by: FAMILY MEDICINE

## 2025-01-09 PROCEDURE — G2211 COMPLEX E/M VISIT ADD ON: HCPCS | Mod: S$GLB,,, | Performed by: FAMILY MEDICINE

## 2025-01-09 PROCEDURE — 3075F SYST BP GE 130 - 139MM HG: CPT | Mod: CPTII,S$GLB,, | Performed by: FAMILY MEDICINE

## 2025-01-09 PROCEDURE — 3044F HG A1C LEVEL LT 7.0%: CPT | Mod: CPTII,S$GLB,, | Performed by: FAMILY MEDICINE

## 2025-01-09 PROCEDURE — 3008F BODY MASS INDEX DOCD: CPT | Mod: CPTII,S$GLB,, | Performed by: FAMILY MEDICINE

## 2025-01-09 PROCEDURE — 1160F RVW MEDS BY RX/DR IN RCRD: CPT | Mod: CPTII,S$GLB,, | Performed by: FAMILY MEDICINE

## 2025-01-09 PROCEDURE — 99999 PR PBB SHADOW E&M-EST. PATIENT-LVL V: CPT | Mod: PBBFAC,,, | Performed by: FAMILY MEDICINE

## 2025-01-09 PROCEDURE — 1159F MED LIST DOCD IN RCRD: CPT | Mod: CPTII,S$GLB,, | Performed by: FAMILY MEDICINE

## 2025-01-09 PROCEDURE — 99214 OFFICE O/P EST MOD 30 MIN: CPT | Mod: S$GLB,,, | Performed by: FAMILY MEDICINE

## 2025-01-09 PROCEDURE — 3078F DIAST BP <80 MM HG: CPT | Mod: CPTII,S$GLB,, | Performed by: FAMILY MEDICINE

## 2025-01-09 PROCEDURE — 90480 ADMN SARSCOV2 VAC 1/ONLY CMP: CPT | Mod: S$GLB,,, | Performed by: FAMILY MEDICINE

## 2025-01-09 NOTE — PROGRESS NOTES
Assessment & Plan:    Essential hypertension  -     CBC Auto Differential; Future; Expected date: 01/09/2025  -     Comprehensive Metabolic Panel; Future; Expected date: 01/09/2025  -     Hemoglobin A1C; Future; Expected date: 01/09/2025  -     Lipid Panel; Future; Expected date: 01/09/2025    Controlled. Continue current therapy.     Hyperlipidemia, unspecified hyperlipidemia type  -     Lipid Panel; Future; Expected date: 01/09/2025    TC and LDL elevated but HDL is protective. Continue high intensity statin.  Repeat labs in 6 mo.    Class 2 severe obesity due to excess calories with serious comorbidity and body mass index (BMI) of 36.0 to 36.9 in adult  -     Hemoglobin A1C; Future; Expected date: 01/09/2025  -     Lipid Panel; Future; Expected date: 01/09/2025    Pre-diabetes  -     Hemoglobin A1C; Future; Expected date: 01/09/2025    A1c is nearly in normal range.  Congratulated patient on her healthy dietary and lifestyle changes. Continue Wegovy.    Mild episode of recurrent major depressive disorder  Controlled. Continue current therapy.     Gastroesophageal reflux disease, unspecified whether esophagitis present  Controlled. Continue current therapy.     Malignant neoplasm of central portion of right breast in female, estrogen receptor positive  Continue therapy plan.    Need for COVID-19 vaccine  -     COVID-19 (Pfizer) 30 mcg/0.3 mL IM vaccine (>/= 11 yo) 0.3 mL      Follow-up: Follow up in about 6 months (around 7/9/2025).  ______________________________________________________________________    Chief Complaint  Chief Complaint   Patient presents with    Health Maintenance     6 month follow up        HPI  Brittny Urias is a 54 y.o. female with medical diagnoses as listed in the medical history and problem list that presents to the office to follow up on her chronic conditions. She has been limiting her intake of sugary foods and exercising 3 times a week. She is also taking Wegovy. She has lost  about 40 lbs since her last visit in July.     Most recent pertinent workup:     Last CBC Results:   Lab Results   Component Value Date    WBC 4.95 01/06/2025    HGB 13.0 01/06/2025    HCT 40.9 01/06/2025     01/06/2025       Last CMP Results  Lab Results   Component Value Date     01/06/2025    K 4.6 01/06/2025     01/06/2025    CO2 29 01/06/2025    BUN 15 01/06/2025    CREATININE 0.75 01/06/2025    CALCIUM 10.1 01/06/2025    ALBUMIN 4.3 01/06/2025    AST 30 01/06/2025    ALT 26 01/06/2025       Last Lipids  Lab Results   Component Value Date    CHOL 213 (H) 01/06/2025    TRIG 64 01/06/2025    HDL 73 01/06/2025    LDLCALC 127.2 01/06/2025   The 10-year ASCVD risk score (Yuly SUTHERLAND, et al., 2019) is: 4.4%    Values used to calculate the score:      Age: 54 years      Sex: Female      Is Non- : Yes      Diabetic: No      Tobacco smoker: No      Systolic Blood Pressure: 132 mmHg      Is BP treated: Yes      HDL Cholesterol: 73 mg/dL      Total Cholesterol: 213 mg/dL      Last A1C  Lab Results   Component Value Date    HGBA1C 5.7 (H) 01/06/2025         Health Maintenance         Date Due Completion Date    COVID-19 Vaccine (8 - 2024-25 season) 11/05/2024 9/10/2024    Hemoglobin A1c (Prediabetes) 01/06/2026 1/6/2025    Lipid Panel 01/06/2030 1/6/2025    TETANUS VACCINE 07/21/2030 7/21/2020    Colorectal Cancer Screening 09/24/2031 9/24/2024    RSV Vaccine (Age 60+ and Pregnant patients) (1 - 1-dose 75+ series) 09/11/2044 ---              PAST MEDICAL HISTORY:  Past Medical History:   Diagnosis Date    Breast cancer, right breast 11/12/2018    Cancer     Diabetes mellitus, type 2     pt reports not diabetic    Hypertension     Psoriasis     on biologic for 1 year, has been controlling it well    Sleep apnea        PAST SURGICAL HISTORY:  Past Surgical History:   Procedure Laterality Date    COLONOSCOPY N/A 10/10/2019    Procedure: COLONOSCOPY;  Surgeon: Matt Marin MD;   Location: [facility] ENDO;  Service: Endoscopy;  Laterality: N/A;    COLONOSCOPY N/A 9/24/2024    Procedure: COLONOSCOPY;  Surgeon: Bora Paul MD;  Location: [facility] ENDO;  Service: Endoscopy;  Laterality: N/A;    DILATION AND CURETTAGE OF UTERUS      FOOT ARTHRODESIS Left 12/2/2020    Procedure: FUSION, FOOT;  Surgeon: Uriah Veras DPM;  Location: [facility] OR;  Service: Podiatry;  Laterality: Left;  mini c-arm, Arthrex screws and staples, 1 MTP arthrodesis plates  Libby notified 11/23, EF  Libby w/ Arthrex confirmed 12/1/2020 [place]    HYSTERECTOMY      partial hyst for prolapse    MASTECTOMY Bilateral 11/14/2018    Procedure: MASTECTOMY;  Surgeon: Marga Cardenas MD;  Location: [facility];  Service: Plastics;  Laterality: Bilateral;    MASTECTOMY WITH SENTINEL NODE BIOPSY AND AXILLARY LYMPH NODE DISSECTION Right 11/14/2018    Procedure: SENTINEL NODE BIOPSY AND AXILLARY LYMPHADENECTOMY;  Surgeon: Marga Cardenas MD;  Location: [facility];  Service: Plastics;  Laterality: Right;    RECONSTRUCTION OF BREAST WITH DEEP INFERIOR EPIGASTRIC ARTERY  (MEGAN) FREE FLAP Bilateral 11/14/2018    Procedure: RECONSTRUCTION, BREAST, USING MEGAN FREE FLAP;  Surgeon: Tye Chapman MD;  Location: [facility];  Service: Plastics;  Laterality: Bilateral;    RESECTION OF GASTROCNEMIUS MUSCLE Left 12/2/2020    Procedure: RESECTION, MUSCLE, GASTROCNEMIUS;  Surgeon: Uriah Veras DPM;  Location: [facility] OR;  Service: Podiatry;  Laterality: Left;  video  Kj w/ video confirmed 12/1/2020       SOCIAL HISTORY:  Social History     Socioeconomic History    Marital status:    Tobacco Use    Smoking status: Never    Smokeless tobacco: Never   Substance and Sexual Activity    Alcohol use: Yes     Alcohol/week: 3.0 standard drinks of alcohol     Types: 3 Glasses of wine per week     Comment: social    Drug use: No    Sexual activity: Yes     Partners: Male     Birth control/protection: Surgical     Social Drivers of Health     Financial  Resource Strain: Low Risk  (1/9/2025)    Overall Financial Resource Strain (CARDIA)     Difficulty of Paying Living Expenses: Not hard at all   Food Insecurity: No Food Insecurity (1/9/2025)    Hunger Vital Sign     Worried About Running Out of Food in the Last Year: Never true     Ran Out of Food in the Last Year: Never true   Transportation Needs: No Transportation Needs (7/12/2023)    PRAPARE - Transportation     Lack of Transportation (Medical): No     Lack of Transportation (Non-Medical): No   Physical Activity: Insufficiently Active (1/9/2025)    Exercise Vital Sign     Days of Exercise per Week: 4 days     Minutes of Exercise per Session: 30 min   Stress: No Stress Concern Present (1/9/2025)    [place] [employer] - Occupational Stress Questionnaire     Feeling of Stress : Only a little   Recent Concern: Stress - Medium Risk (12/25/2024)    Received from [facility]    Stress     Stress in your Life: Low     Dealing with Stress: Moderately effective   Housing Stability: Unknown (1/9/2025)    Housing Stability Vital Sign     Unable to Pay for Housing in the Last Year: No       FAMILY HISTORY:  Family History   Problem Relation Name Age of Onset    Diabetes Mother      Hypertension Mother      No Known Problems Father      Hypertension Brother      Cancer Paternal Uncle          leukemia    Breast cancer Neg Hx      Ovarian cancer Neg Hx      Colon cancer Neg Hx         ALLERGIES AND MEDICATIONS: updated and reviewed.  Review of patient's allergies indicates:  No Known Allergies  Current Outpatient Medications   Medication Sig Dispense Refill    amLODIPine (NORVASC) 10 MG tablet TAKE 1 TABLET BY MOUTH EVERY DAY 90 tablet 3    anastrozole (ARIMIDEX) 1 mg Tab TAKE 1 TABLET BY MOUTH EVERY DAY 90 tablet 4    atorvastatin (LIPITOR) 40 MG tablet Take 1 tablet (40 mg total) by mouth once daily. 90 tablet 3    buPROPion (WELLBUTRIN XL) 150 MG TB24 tablet Take 1 tablet (150 mg total) by mouth  "once daily. 30 tablet 11    cholecalciferol, vitamin D3, 1,250 mcg (50,000 unit) capsule Take 1 capsule (50,000 Units total) by mouth every 7 days. 12 capsule 3    losartan-hydrochlorothiazide 100-25 mg (HYZAAR) 100-25 mg per tablet TAKE 1 TABLET BY MOUTH EVERY DAY 90 tablet 3    multivitamin (THERAGRAN) per tablet Take 1 tablet by mouth once daily.      ondansetron (ZOFRAN-ODT) 4 MG TbDL Take 1 tablet (4 mg total) by mouth every 8 (eight) hours as needed (nausea). 30 tablet 1    semaglutide, weight loss, (WEGOVY) 2.4 mg/0.75 mL PnIj Inject 2.4 mg into the skin every 7 days. 0.75 mL 11    SKYRIZI 150 mg/mL Syrg Inject into the skin.      venlafaxine (EFFEXOR-XR) 150 MG Cp24 TAKE 1 CAPSULE (150 MG TOTAL) BY MOUTH ONCE DAILY. 90 capsule 3     Current Facility-Administered Medications   Medication Dose Route Frequency Provider Last Rate Last Admin    COVID-19 (Pfizer) 30 mcg/0.3 mL IM vaccine (>/= 13 yo) 0.3 mL  0.3 mL Intramuscular 1 time in Clinic/HOD         sodium chloride 0.9% flush 10 mL  10 mL Intravenous PRN Uriah Veras DPM         Facility-Administered Medications Ordered in Other Visits   Medication Dose Route Frequency Provider Last Rate Last Admin    EPINEPHrine 1,000 mcg in lactated Ringers 1,000 mL irrigation   Irrigation On Call Procedure Tye Chapman MD        EPINEPHrine 1,000 mcg in lactated Ringers 1,000 mL irrigation   Irrigation On Call Procedure Tye Chapman MD             ROS  Review of Systems   Constitutional:  Positive for activity change. Negative for unexpected weight change.           Physical Exam  Vitals:    01/09/25 0845 01/09/25 0846   BP: (!) 144/80 132/76   Pulse: 70    Temp: 98.1 °F (36.7 °C)    TempSrc: Oral    SpO2: 97%    Weight: 90.6 kg (199 lb 11.8 oz)    Height: 5' 2" (1.575 m)     Body mass index is 36.53 kg/m².  Weight: 90.6 kg (199 lb 11.8 oz)   Height: 5' 2" (157.5 cm)   Physical Exam  Constitutional:       General: She is not in acute distress.     " Appearance: She is obese.   HENT:      Head: Normocephalic and atraumatic.   Neck:      Thyroid: No thyromegaly.      Vascular: No carotid bruit.   Cardiovascular:      Rate and Rhythm: Normal rate and regular rhythm.      Pulses: Normal pulses.      Heart sounds: Normal heart sounds.   Pulmonary:      Effort: Pulmonary effort is normal. No respiratory distress.      Breath sounds: Normal breath sounds.   Musculoskeletal:      Cervical back: Neck supple.      Right lower leg: No edema.      Left lower leg: No edema.   Lymphadenopathy:      Cervical: No cervical adenopathy.   Skin:     General: Skin is warm and dry.      Findings: No rash.   Neurological:      General: No focal deficit present.      Mental Status: She is alert and oriented to person, place, and time.   Psychiatric:         Mood and Affect: Mood normal.         Behavior: Behavior normal.         Thought Content: Thought content normal.

## 2025-03-29 DIAGNOSIS — E66.01 CLASS 3 SEVERE OBESITY DUE TO EXCESS CALORIES WITH SERIOUS COMORBIDITY AND BODY MASS INDEX (BMI) OF 40.0 TO 44.9 IN ADULT: ICD-10-CM

## 2025-03-29 DIAGNOSIS — E66.813 CLASS 3 SEVERE OBESITY DUE TO EXCESS CALORIES WITH SERIOUS COMORBIDITY AND BODY MASS INDEX (BMI) OF 40.0 TO 44.9 IN ADULT: ICD-10-CM

## 2025-03-29 NOTE — TELEPHONE ENCOUNTER
Care Due:                  Date            Visit Type   Department     Provider  --------------------------------------------------------------------------------                                [facility]                              PRIMARY      MED/ INTERNAL  Last Visit: 01-      CARE ([facility])   MED/ PEDS      Anya Stevens                              [facility]                              PRIMARY      MED/ INTERNAL  Next Visit: 07-      CARE ([facility])   MED/ PEDS      Anya Stevens                                                            Last  Test          Frequency    Reason                     Performed    Due Date  --------------------------------------------------------------------------------    Vitamin D...  12 months..  cholecalciferol,.........  06- 06-    [facility] Embedded Care Due Messages. Reference number: [id number].   3/29/2025 6:58:24 AM CDT

## 2025-03-30 RX ORDER — SEMAGLUTIDE 2.4 MG/.75ML
2.4 INJECTION, SOLUTION SUBCUTANEOUS
Qty: 6 ML | Refills: 1 | Status: SHIPPED | OUTPATIENT
Start: 2025-03-30

## 2025-03-30 NOTE — TELEPHONE ENCOUNTER
Provider Staff:  Action required for this patient    Requires labs      Please see care gap opportunities below in Care Due Message.    Thanks!  [facility]     Appointments      Date Provider   Last Visit   1/9/2025 Anya Stevens, DO   Next Visit   7/25/2025 Anya Stevens, DO     Refill Decision Note   Brittny Urias  is requesting a refill authorization.  Brief Assessment and Rationale for Refill:  Approve     Medication Therapy Plan:         Comments:     Note composed:1:22 PM 03/30/2025

## 2025-05-14 ENCOUNTER — ON-DEMAND VIRTUAL (OUTPATIENT)
Dept: URGENT CARE | Facility: CLINIC | Age: 55
End: 2025-05-14
Payer: COMMERCIAL

## 2025-05-14 DIAGNOSIS — R21 RASH: Primary | ICD-10-CM

## 2025-05-14 RX ORDER — TRIAMCINOLONE ACETONIDE 1 MG/G
CREAM TOPICAL 2 TIMES DAILY
Qty: 28.4 G | Refills: 0 | Status: SHIPPED | OUTPATIENT
Start: 2025-05-14 | End: 2025-05-21

## 2025-05-15 NOTE — PROGRESS NOTES
Subjective:      Patient ID: Brittny Urias is a 54 y.o. female.    Vitals:  vitals were not taken for this visit.     Chief Complaint: Rash      Visit Type: TELE AUDIOVISUAL - This visit was conducted virtually based on  subjective information and limited objective exam    Present with the patient at the time of consultation: TELEMED PRESENT WITH PATIENT: None  LOCATION OF PATIENT [place]  Two patient identifiers used to verify patient- saying out date of birth and full name.       Past Medical History:   Diagnosis Date    Breast cancer, right breast 11/12/2018    Cancer     Diabetes mellitus, type 2     pt reports not diabetic    Hypertension     Psoriasis     on biologic for 1 year, has been controlling it well    Sleep apnea      Past Surgical History:   Procedure Laterality Date    COLONOSCOPY N/A 10/10/2019    Procedure: COLONOSCOPY;  Surgeon: Matt Marin MD;  Location: [facility] ENDO;  Service: Endoscopy;  Laterality: N/A;    COLONOSCOPY N/A 9/24/2024    Procedure: COLONOSCOPY;  Surgeon: Bora Paul MD;  Location: [facility] ENDO;  Service: Endoscopy;  Laterality: N/A;    DILATION AND CURETTAGE OF UTERUS      FOOT ARTHRODESIS Left 12/2/2020    Procedure: FUSION, FOOT;  Surgeon: Uriah Veras DPM;  Location: [facility] OR;  Service: Podiatry;  Laterality: Left;  mini c-arm, Arthrex screws and staples, 1 MTP arthrodesis plates  Libby notified 11/23, EF  Libby w/ Arthrex confirmed 12/1/2020 [place]    HYSTERECTOMY      partial hyst for prolapse    MASTECTOMY Bilateral 11/14/2018    Procedure: MASTECTOMY;  Surgeon: Marga Cardenas MD;  Location: [facility];  Service: Plastics;  Laterality: Bilateral;    MASTECTOMY WITH SENTINEL NODE BIOPSY AND AXILLARY LYMPH NODE DISSECTION Right 11/14/2018    Procedure: SENTINEL NODE BIOPSY AND AXILLARY LYMPHADENECTOMY;  Surgeon: Marga Cardenas MD;  Location: [facility] OR;  Service: Plastics;  Laterality: Right;    RECONSTRUCTION OF BREAST WITH DEEP INFERIOR EPIGASTRIC ARTERY   (MEGAN) FREE FLAP Bilateral 11/14/2018    Procedure: RECONSTRUCTION, BREAST, USING MEGAN FREE FLAP;  Surgeon: Tye Chapman MD;  Location: [facility] OR;  Service: Plastics;  Laterality: Bilateral;    RESECTION OF GASTROCNEMIUS MUSCLE Left 12/2/2020    Procedure: RESECTION, MUSCLE, GASTROCNEMIUS;  Surgeon: Uriah Veras DPM;  Location: [facility] OR;  Service: Podiatry;  Laterality: Left;  video  Kj w/ video confirmed 12/1/2020     Review of patient's allergies indicates:  No Known Allergies  Medications Ordered Prior to Encounter[1]  Family History   Problem Relation Name Age of Onset    Diabetes Mother      Hypertension Mother      No Known Problems Father      Hypertension Brother      Cancer Paternal Uncle          leukemia    Breast cancer Neg Hx      Ovarian cancer Neg Hx      Colon cancer Neg Hx         Medications Ordered                [id number] - [address] AT CORNER OF [address] [address]    Telephone: [phone]   Fax: [phone]   Hours: Not open 24 hours                         E-Prescribed (1 of 1)              triamcinolone acetonide 0.1% (KENALOG) 0.1 % cream    Sig: Apply topically 2 (two) times daily. for 7 days       Start: 5/14/25     Quantity: 28.4 g Refills: 0                           [facility] Peq Odvv Intake    5/14/2025  7:18 PM CDT - Filed by Patient   What is your current physical address in the event of a medical emergency? [address] [place] [address] , [place] [address]   Are you able to take your vital signs? No   Please attach any relevant images or files    Is your employer contracted with [facility]? No         55 yo female with c/o rash to right arm near elbow. She states itchy red raised. She does not know if insect bite .         Constitution: Negative.   HENT: Negative.     Cardiovascular: Negative.    Respiratory: Negative.     Gastrointestinal: Negative.    Endocrine: negative.    Genitourinary: Negative.  Negative for frequency and urgency.   Musculoskeletal: Negative.    Skin: Negative.    Allergic/Immunologic: Negative.    Neurological: Negative.    Hematologic/Lymphatic: Negative.    Psychiatric/Behavioral: Negative.          Objective:   The physical exam was conducted virtually.    AAO x 3 ; no acute distress noted; appearance normal; mood and behavior normal; thought process normal  Head- normocephalic  Nose- appears normal, no discharge or erythema  Eyes- pupils appear normal in size, no drainage, no erythema  Ears- normal appearing; no discharge, no erythema  Mouth- appears normal  Oropharynx- no erythema, lesions  Lungs- breathing at a normal rate, no acute distress noted  Heart- no reports of tachycardia, palpitations, chest pain  Abdomen- non distended, non tender reported by patient  Skin- warm and dry, no erythema or edema noted by patient or visualized  Psych- as above; no si/hi            Assessment:     1. Rash        Plan:     Will try triamcinalone cream  Advised f/u if worsening or not improving.         Thank you for choosing [facility]!    Our goal in the [facility] is to always provide outstanding medical care. You may receive a survey by mail or e-mail in the next week regarding your experience today. We would greatly appreciate you completing and returning the survey. Your feedback provides us with a way to recognize our staff who provide very good care, and it helps us learn how to improve when your experience was below our aspiration of excellence.         We appreciate you trusting us with your medical care. We hope you feel better soon. We will be happy to take care of you for all of your future medical needs.    You must understand that you've received an Urgent Care treatment only and that you may be released before all your medical problems are known or treated. You, the patient, will arrange for follow up care as  instructed.    Follow up with your PCP or specialty clinic as directed in the next 1-2 weeks if not improved or as needed.  You can call [phone] to schedule an appointment with the appropriate provider.    If your condition worsens we recommend that you receive another evaluation in person, with your primary care provider, urgent care or at the emergency room immediately or contact your primary medical clinics after hours call service to discuss your concerns.         Rash  -     triamcinolone acetonide 0.1% (KENALOG) 0.1 % cream; Apply topically 2 (two) times daily. for 7 days  Dispense: 28.4 g; Refill: 0                           [1]   Current Outpatient Medications on File Prior to Visit   Medication Sig Dispense Refill    amLODIPine (NORVASC) 10 MG tablet TAKE 1 TABLET BY MOUTH EVERY DAY 90 tablet 3    anastrozole (ARIMIDEX) 1 mg Tab TAKE 1 TABLET BY MOUTH EVERY DAY 90 tablet 4    atorvastatin (LIPITOR) 40 MG tablet Take 1 tablet (40 mg total) by mouth once daily. 90 tablet 3    buPROPion (WELLBUTRIN XL) 150 MG TB24 tablet Take 1 tablet (150 mg total) by mouth once daily. 30 tablet 11    cholecalciferol, vitamin D3, 1,250 mcg (50,000 unit) capsule Take 1 capsule (50,000 Units total) by mouth every 7 days. 12 capsule 3    losartan-hydrochlorothiazide 100-25 mg (HYZAAR) 100-25 mg per tablet TAKE 1 TABLET BY MOUTH EVERY DAY 90 tablet 3    multivitamin (THERAGRAN) per tablet Take 1 tablet by mouth once daily.      ondansetron (ZOFRAN-ODT) 4 MG TbDL Take 1 tablet (4 mg total) by mouth every 8 (eight) hours as needed (nausea). 30 tablet 1    semaglutide, weight loss, (WEGOVY) 2.4 mg/0.75 mL PnIj Inject 2.4 mg into the skin every 7 days. 6 mL 1    SKYRIZI 150 mg/mL Syrg Inject into the skin.      venlafaxine (EFFEXOR-XR) 150 MG Cp24 TAKE 1 CAPSULE (150 MG TOTAL) BY MOUTH ONCE DAILY. 90 capsule 3     Current Facility-Administered Medications on File Prior to Visit   Medication Dose Route Frequency Provider Last  Rate Last Admin    EPINEPHrine 1,000 mcg in lactated Ringers 1,000 mL irrigation   Irrigation On Call Procedure Tye Chapman MD        EPINEPHrine 1,000 mcg in lactated Ringers 1,000 mL irrigation   Irrigation On Call Procedure Tye Chapman MD        sodium chloride 0.9% flush 10 mL  10 mL Intravenous PRN Uriah Veras, SHEREENM

## 2025-06-08 DIAGNOSIS — C50.111 MALIGNANT NEOPLASM OF CENTRAL PORTION OF RIGHT FEMALE BREAST: ICD-10-CM

## 2025-06-09 RX ORDER — ANASTROZOLE 1 MG/1
1 TABLET ORAL
Qty: 30 TABLET | Refills: 0 | Status: SHIPPED | OUTPATIENT
Start: 2025-06-09

## 2025-06-20 ENCOUNTER — TELEPHONE (OUTPATIENT)
Dept: FAMILY MEDICINE | Facility: CLINIC | Age: 55
End: 2025-06-20
Payer: COMMERCIAL

## 2025-06-20 NOTE — TELEPHONE ENCOUNTER
Attempted to contact patient. Left a voice mail to call clinic back. Patient needs to be rescheduled for appointment provider out of office 07/25/25.

## 2025-06-20 NOTE — TELEPHONE ENCOUNTER
Attempted to contact patient. Left a voice mail to call clinic back.Copied from CRM #[id number]. Topic: General Inquiry - Return Call  >> Jun 20, 2025  4:10 PM Tye wrote:  Type: Patient Call Back    Who called:self    What is the request in detail: Patient returning Larissa Ram CMA missed phone call       Can the clinic reply by [facility]?no    Would the patient rather a call back or a response via [facility]? call    Best call back number:[phone]      Additional Information:

## 2025-07-15 ENCOUNTER — PATIENT OUTREACH (OUTPATIENT)
Dept: ADMINISTRATIVE | Facility: HOSPITAL | Age: 55
End: 2025-07-15
Payer: COMMERCIAL

## 2025-07-15 DIAGNOSIS — R73.03 PREDIABETES: Primary | ICD-10-CM

## 2025-07-16 DIAGNOSIS — N95.1 MENOPAUSAL SYMPTOMS: ICD-10-CM

## 2025-07-16 DIAGNOSIS — C50.111 MALIGNANT NEOPLASM OF CENTRAL PORTION OF RIGHT BREAST IN FEMALE, ESTROGEN RECEPTOR POSITIVE: ICD-10-CM

## 2025-07-16 DIAGNOSIS — Z17.0 MALIGNANT NEOPLASM OF CENTRAL PORTION OF RIGHT BREAST IN FEMALE, ESTROGEN RECEPTOR POSITIVE: ICD-10-CM

## 2025-07-16 RX ORDER — VENLAFAXINE HYDROCHLORIDE 150 MG/1
150 CAPSULE, EXTENDED RELEASE ORAL DAILY
Qty: 90 CAPSULE | Refills: 3 | Status: SHIPPED | OUTPATIENT
Start: 2025-07-16 | End: 2026-07-16

## 2025-07-25 DIAGNOSIS — E66.813 CLASS 3 SEVERE OBESITY DUE TO EXCESS CALORIES WITH SERIOUS COMORBIDITY AND BODY MASS INDEX (BMI) OF 40.0 TO 44.9 IN ADULT: ICD-10-CM

## 2025-07-25 NOTE — TELEPHONE ENCOUNTER
No care due was identified.  [facility] Embedded Care Due Messages. Reference number: [id number].   7/25/2025 12:05:45 AM CDT

## 2025-07-25 NOTE — TELEPHONE ENCOUNTER
Refill Routing Note   Medication(s) are not appropriate for processing by [facility] for the following reason(s):        Required labs outdated-A1c    [facility] action(s):  Defer               Appointments  past 12m or future 3m with PCP    Date Provider   Last Visit   1/9/2025 Anya Stevens, DO   Next Visit   10/27/2025 Anya Stevens, DO   ED visits in past 90 days: 0        Note composed:6:02 PM 07/25/2025

## 2025-07-27 RX ORDER — SEMAGLUTIDE 2.4 MG/.75ML
2.4 INJECTION, SOLUTION SUBCUTANEOUS
Qty: 6 ML | Refills: 1 | Status: SHIPPED | OUTPATIENT
Start: 2025-07-27

## (undated) DEVICE — SKIN MARKER DEVON 160

## (undated) DEVICE — ADHESIVE DERMABOND ADVANCED

## (undated) DEVICE — BLADE SCALP OPHTL BEVEL STR

## (undated) DEVICE — WARMER DRAPE STERILE LF

## (undated) DEVICE — BLADE SURG #15 CARBON STEEL

## (undated) DEVICE — ELECTRODE BLD EXT INSUL 1

## (undated) DEVICE — BANDAGE SOFFORM STER 2IN

## (undated) DEVICE — DRESSING AQUACEL SACRAL 9 X 9

## (undated) DEVICE — DRESSING XEROFORM 1X8IN

## (undated) DEVICE — BIT DRILL MTP 2 MM

## (undated) DEVICE — SEE MEDLINE ITEM 146345

## (undated) DEVICE — Device

## (undated) DEVICE — PAD ABD 8X10 STERILE

## (undated) DEVICE — SEE MEDLINE ITEM 152741

## (undated) DEVICE — SEE MEDLINE ITEM 157110

## (undated) DEVICE — SEE MEDLINE ITEM 152523

## (undated) DEVICE — SYR 10CC LUER LOCK

## (undated) DEVICE — CLIPPER BLADE MOD 4406 (CAREF)

## (undated) DEVICE — APPLICATOR CHLORAPREP ORN 26ML

## (undated) DEVICE — STAPLER SKIN ROTATING HEAD

## (undated) DEVICE — SEE MEDLINE ITEM 146308

## (undated) DEVICE — TOURNIQUET SB QC DP 34X4IN

## (undated) DEVICE — NDL 22GA X1 1/2 REG BEVEL

## (undated) DEVICE — PAD PREP 50/CA

## (undated) DEVICE — SEE L#120831

## (undated) DEVICE — GLOVE BIOGEL SKINSENSE PI 6.5

## (undated) DEVICE — PADDING CAST 4IN SPECIALIST

## (undated) DEVICE — COVER OVERHEAD SURG LT BLUE

## (undated) DEVICE — EVACUATOR WOUND BULB 100CC

## (undated) DEVICE — SPONGE GAUZE 4X4 12 PLY STRL

## (undated) DEVICE — SEE MEDLINE ITEM 156953

## (undated) DEVICE — SEE MEDLINE ITEM 152742

## (undated) DEVICE — GUIDEWIRE DE TROCAR .045IN
Type: IMPLANTABLE DEVICE | Site: FOOT | Status: NON-FUNCTIONAL
Removed: 2020-12-02

## (undated) DEVICE — DRESSING XEROFORM FOIL PK 1X8

## (undated) DEVICE — PAD CAST SPECIALIST STRL 4

## (undated) DEVICE — PAD CAST SPECIALIST STRL 6

## (undated) DEVICE — SYRINGE 0.9% NACL 10MIL PREFIL

## (undated) DEVICE — ELECTRODE REM PLYHSV RETURN 9

## (undated) DEVICE — SUT MCRYL PLUS 4-0 PS2 27IN

## (undated) DEVICE — NDL HYPO REG 25G X 1 1/2

## (undated) DEVICE — DRAPE MINI C-ARM 54 X 64

## (undated) DEVICE — BANDAGE ELASTIC 2X5 VELCRO ST

## (undated) DEVICE — SET DECANTER MEDICHOICE

## (undated) DEVICE — SUT VICRYL CTD 2-0 GI 27 SH

## (undated) DEVICE — ADHESIVE SURG LIQ 2 OZ

## (undated) DEVICE — GLOVE BIOGEL SKINSENSE PI 7.5

## (undated) DEVICE — SOL NACL .9% 250ML INJ

## (undated) DEVICE — CORD BIPOLAR 12 FOOT

## (undated) DEVICE — SUT PROLENE 4-0 MONO 18IN

## (undated) DEVICE — SOL NS 1000CC

## (undated) DEVICE — COVER PROBE SHEATH SURG 6X3IN

## (undated) DEVICE — GAUZE SPONGE 4X4 12PLY

## (undated) DEVICE — SUT 5/0 18IN PLAIN GUT D/A

## (undated) DEVICE — CONTAINER SPECIMEN STRL 4OZ

## (undated) DEVICE — SUT 9/0 5IN ETHILON BLK MON

## (undated) DEVICE — NDL 18GA X1 1/2 REG BEVEL

## (undated) DEVICE — GLOVE BIOGEL SKINSENSE PI 7.0

## (undated) DEVICE — GLOVE BIOGEL PI MICRO INDIC 8

## (undated) DEVICE — STOCKINET TUBULAR 1 PLY 6X60IN

## (undated) DEVICE — SUT STRATAFIX PGAPCL 3 FS-1

## (undated) DEVICE — SYS PRINEO SKIN CLOSURE

## (undated) DEVICE — STOCKINET 4INX48

## (undated) DEVICE — SEE MEDLINE ITEM 152522

## (undated) DEVICE — BLANKET HYPER ADULT 24X60IN

## (undated) DEVICE — TEGADERM IV

## (undated) DEVICE — POSITIONER HEAD DONUT 9IN FOAM

## (undated) DEVICE — SEE MEDLINE ITEM 157117

## (undated) DEVICE — SPONGE DERMACEA GAUZE 4X4

## (undated) DEVICE — EVACUATOR PENCIL SMOKE NEPTUNE

## (undated) DEVICE — SEE MEDLINE ITEM 152622

## (undated) DEVICE — SYR 50ML CATH TIP

## (undated) DEVICE — WAVEGUIDE BRITEFIELD DISP

## (undated) DEVICE — CLIP DOUBLE MICRO.

## (undated) DEVICE — SYS CLSR DERMABOND PRINEO 22CM

## (undated) DEVICE — SCRUB 10% POVIDONE IODINE 4OZ

## (undated) DEVICE — BLADE PEAK PLASMA

## (undated) DEVICE — PACK UNIV PROCEDURE

## (undated) DEVICE — SUT MONOCRYL 3-0 PS-2 UND

## (undated) DEVICE — CLIP LIGATING HEMOCLP SMALL

## (undated) DEVICE — CLAMP SINGLE MICRO.

## (undated) DEVICE — STOCKINETTE DBL PLY ST 4X

## (undated) DEVICE — BINDER ABDOMINAL 9 30-45

## (undated) DEVICE — SEE MEDLINE ITEM 146231

## (undated) DEVICE — SUT MONOCRYL PLUS UD 3-0 27

## (undated) DEVICE — SUT VICRYL 2-0 36 CT-1

## (undated) DEVICE — CLIP LIGATING MEDIUM

## (undated) DEVICE — SPEARS EYE 10/PK

## (undated) DEVICE — SEE MEDLINE ITEM 146417

## (undated) DEVICE — SEE MEDLINE ITEM 153151

## (undated) DEVICE — BRA CLASSIC COMFORT 42BLACK

## (undated) DEVICE — GUIDEWIRE TROCAR TIP.062
Type: IMPLANTABLE DEVICE | Site: FOOT | Status: NON-FUNCTIONAL
Removed: 2020-12-02

## (undated) DEVICE — GUIDEWIRE TROCAR 0.094 X 8
Type: IMPLANTABLE DEVICE | Site: FOOT | Status: NON-FUNCTIONAL
Removed: 2020-12-02

## (undated) DEVICE — GLOVE BIOGEL SKINSENSE PI 8.0

## (undated) DEVICE — SUT 5/0 18IN PLAIN FAST AB

## (undated) DEVICE — ELECTRODE BLADE INSULATED 1 IN

## (undated) DEVICE — BIT DRILL 2.2MM CMP FT CALIB

## (undated) DEVICE — DRAIN CHANNEL ROUND 19FR

## (undated) DEVICE — SEE MEDLINE ITEM 146313

## (undated) DEVICE — SYR 0.9% NACL 10ML STERILE

## (undated) DEVICE — TUBING INFILTRATION STRL DISP

## (undated) DEVICE — BRACE LOWER LEG SIZE 8-9 LARGE

## (undated) DEVICE — CANNULA IRR ANTERIOR 27GX4MM

## (undated) DEVICE — WIRE K SMALL BALL BB-TAK
Type: IMPLANTABLE DEVICE | Site: FOOT | Status: NON-FUNCTIONAL
Removed: 2020-12-02

## (undated) DEVICE — SEE MEDLINE ITEM 157116

## (undated) DEVICE — ALCOHOL 70% ISOP W/GREEN 16OZ

## (undated) DEVICE — PROFILE DRILL MINI CMP FT

## (undated) DEVICE — TUBE FEEDING PURPLE 5FRX40CM

## (undated) DEVICE — SEE MEDLINE ITEM 157131

## (undated) DEVICE — SEE MEDLINE ITEM 152537

## (undated) DEVICE — IMPLANTABLE DEVICE
Type: IMPLANTABLE DEVICE | Site: FOOT | Status: NON-FUNCTIONAL
Removed: 2020-12-02

## (undated) DEVICE — GLOVE BIOGEL ECLIPSE SZ 8

## (undated) DEVICE — SUT 2/0 30IN SILK BLK BRAI

## (undated) DEVICE — SPONGE LAP 18X18 PREWASHED

## (undated) DEVICE — SPONGE NEURO 1/2 X 2

## (undated) DEVICE — SEE MEDLINE ITEM 146271

## (undated) DEVICE — BANDAGE DERMACEA STRETCH 4X1IN

## (undated) DEVICE — HOLDER DRAIN POUCH PINK

## (undated) DEVICE — TOWELS STERILE 18 X 25.5